# Patient Record
Sex: FEMALE | Race: WHITE | NOT HISPANIC OR LATINO | Employment: OTHER | URBAN - METROPOLITAN AREA
[De-identification: names, ages, dates, MRNs, and addresses within clinical notes are randomized per-mention and may not be internally consistent; named-entity substitution may affect disease eponyms.]

---

## 2017-02-19 ENCOUNTER — APPOINTMENT (EMERGENCY)
Dept: RADIOLOGY | Facility: HOSPITAL | Age: 77
DRG: 194 | End: 2017-02-19
Payer: MEDICARE

## 2017-02-19 ENCOUNTER — HOSPITAL ENCOUNTER (INPATIENT)
Facility: HOSPITAL | Age: 77
LOS: 4 days | Discharge: HOME | DRG: 194 | End: 2017-02-23
Attending: EMERGENCY MEDICINE | Admitting: FAMILY MEDICINE
Payer: MEDICARE

## 2017-02-19 DIAGNOSIS — J18.9 PNEUMONIA: Primary | ICD-10-CM

## 2017-02-19 PROBLEM — F32.A DEPRESSION: Status: ACTIVE | Noted: 2017-02-19

## 2017-02-19 LAB
ALBUMIN SERPL BCP-MCNC: 3.3 G/DL (ref 3.5–5)
ALP SERPL-CCNC: 69 U/L (ref 46–116)
ALT SERPL W P-5'-P-CCNC: 16 U/L (ref 12–78)
ANION GAP SERPL CALCULATED.3IONS-SCNC: 11 MMOL/L (ref 4–13)
APTT PPP: 30 SECONDS (ref 24–33)
AST SERPL W P-5'-P-CCNC: 23 U/L (ref 5–45)
BASOPHILS # BLD AUTO: 0 THOUSANDS/ΜL (ref 0–0.1)
BASOPHILS NFR BLD AUTO: 1 % (ref 0–1)
BILIRUB DIRECT SERPL-MCNC: 0.1 MG/DL (ref 0–0.2)
BILIRUB SERPL-MCNC: 0.3 MG/DL (ref 0.2–1)
BUN SERPL-MCNC: 19 MG/DL (ref 5–25)
CALCIUM SERPL-MCNC: 8.9 MG/DL (ref 8.3–10.1)
CHLORIDE SERPL-SCNC: 106 MMOL/L (ref 100–108)
CK SERPL-CCNC: 75 U/L (ref 26–192)
CO2 SERPL-SCNC: 22 MMOL/L (ref 21–32)
CREAT SERPL-MCNC: 0.79 MG/DL (ref 0.6–1.3)
EOSINOPHIL # BLD AUTO: 0 THOUSAND/ΜL (ref 0–0.61)
EOSINOPHIL NFR BLD AUTO: 0 % (ref 0–6)
ERYTHROCYTE [DISTWIDTH] IN BLOOD BY AUTOMATED COUNT: 12.9 % (ref 11.6–15.1)
FLUAV AG SPEC QL IA: NEGATIVE
FLUBV AG SPEC QL IA: NEGATIVE
GFR SERPL CREATININE-BSD FRML MDRD: >60 ML/MIN/1.73SQ M
GLUCOSE SERPL-MCNC: 134 MG/DL (ref 65–140)
HCT VFR BLD AUTO: 41.3 % (ref 37–47)
HGB BLD-MCNC: 13.2 G/DL (ref 12–16)
INR PPP: 0.95 (ref 0.86–1.16)
LACTATE SERPL-SCNC: 1.3 MMOL/L (ref 0.5–2)
LIPASE SERPL-CCNC: 188 U/L (ref 73–393)
LYMPHOCYTES # BLD AUTO: 0.6 THOUSANDS/ΜL (ref 0.6–4.47)
LYMPHOCYTES NFR BLD AUTO: 23 % (ref 14–44)
MCH RBC QN AUTO: 28.3 PG (ref 27–31)
MCHC RBC AUTO-ENTMCNC: 32 G/DL (ref 31.4–37.4)
MCV RBC AUTO: 88 FL (ref 82–98)
MONOCYTES # BLD AUTO: 0.3 THOUSAND/ΜL (ref 0.17–1.22)
MONOCYTES NFR BLD AUTO: 12 % (ref 4–12)
NEUTROPHILS # BLD AUTO: 1.5 THOUSANDS/ΜL (ref 1.85–7.62)
NEUTS SEG NFR BLD AUTO: 64 % (ref 43–75)
NT-PROBNP SERPL-MCNC: 839 PG/ML
PLATELET # BLD AUTO: 126 THOUSANDS/UL (ref 130–400)
PMV BLD AUTO: 8 FL (ref 8.9–12.7)
POTASSIUM SERPL-SCNC: 4.6 MMOL/L (ref 3.5–5.3)
PROT SERPL-MCNC: 7 G/DL (ref 6.4–8.2)
PROTHROMBIN TIME: 10 SECONDS (ref 9.4–11.7)
RBC # BLD AUTO: 4.69 MILLION/UL (ref 4.2–5.4)
SODIUM SERPL-SCNC: 139 MMOL/L (ref 136–145)
TROPONIN I SERPL-MCNC: 0.02 NG/ML
WBC # BLD AUTO: 2.4 THOUSAND/UL (ref 4.8–10.8)

## 2017-02-19 PROCEDURE — 94640 AIRWAY INHALATION TREATMENT: CPT

## 2017-02-19 PROCEDURE — 85730 THROMBOPLASTIN TIME PARTIAL: CPT | Performed by: EMERGENCY MEDICINE

## 2017-02-19 PROCEDURE — 82248 BILIRUBIN DIRECT: CPT | Performed by: EMERGENCY MEDICINE

## 2017-02-19 PROCEDURE — 87040 BLOOD CULTURE FOR BACTERIA: CPT | Performed by: EMERGENCY MEDICINE

## 2017-02-19 PROCEDURE — 71010 HB CHEST X-RAY 1 VIEW FRONTAL (PORTABLE): CPT

## 2017-02-19 PROCEDURE — 36415 COLL VENOUS BLD VENIPUNCTURE: CPT | Performed by: EMERGENCY MEDICINE

## 2017-02-19 PROCEDURE — 99285 EMERGENCY DEPT VISIT HI MDM: CPT

## 2017-02-19 PROCEDURE — 83880 ASSAY OF NATRIURETIC PEPTIDE: CPT | Performed by: EMERGENCY MEDICINE

## 2017-02-19 PROCEDURE — 83605 ASSAY OF LACTIC ACID: CPT | Performed by: EMERGENCY MEDICINE

## 2017-02-19 PROCEDURE — 96365 THER/PROPH/DIAG IV INF INIT: CPT

## 2017-02-19 PROCEDURE — 94760 N-INVAS EAR/PLS OXIMETRY 1: CPT

## 2017-02-19 PROCEDURE — 87081 CULTURE SCREEN ONLY: CPT | Performed by: FAMILY MEDICINE

## 2017-02-19 PROCEDURE — 87400 INFLUENZA A/B EACH AG IA: CPT | Performed by: FAMILY MEDICINE

## 2017-02-19 PROCEDURE — 84484 ASSAY OF TROPONIN QUANT: CPT | Performed by: EMERGENCY MEDICINE

## 2017-02-19 PROCEDURE — 85025 COMPLETE CBC W/AUTO DIFF WBC: CPT | Performed by: EMERGENCY MEDICINE

## 2017-02-19 PROCEDURE — 85610 PROTHROMBIN TIME: CPT | Performed by: EMERGENCY MEDICINE

## 2017-02-19 PROCEDURE — 80053 COMPREHEN METABOLIC PANEL: CPT | Performed by: EMERGENCY MEDICINE

## 2017-02-19 PROCEDURE — 87798 DETECT AGENT NOS DNA AMP: CPT | Performed by: FAMILY MEDICINE

## 2017-02-19 PROCEDURE — 83690 ASSAY OF LIPASE: CPT | Performed by: EMERGENCY MEDICINE

## 2017-02-19 PROCEDURE — 93005 ELECTROCARDIOGRAM TRACING: CPT | Performed by: EMERGENCY MEDICINE

## 2017-02-19 PROCEDURE — 82550 ASSAY OF CK (CPK): CPT | Performed by: EMERGENCY MEDICINE

## 2017-02-19 RX ORDER — CEFUROXIME AXETIL 250 MG/1
250 TABLET ORAL 2 TIMES DAILY
COMMUNITY
Start: 2017-02-15 | End: 2017-02-23 | Stop reason: HOSPADM

## 2017-02-19 RX ORDER — IPRATROPIUM BROMIDE AND ALBUTEROL SULFATE 2.5; .5 MG/3ML; MG/3ML
3 SOLUTION RESPIRATORY (INHALATION)
Status: DISCONTINUED | OUTPATIENT
Start: 2017-02-19 | End: 2017-02-23 | Stop reason: HOSPADM

## 2017-02-19 RX ORDER — LEVOFLOXACIN 5 MG/ML
750 INJECTION, SOLUTION INTRAVENOUS ONCE
Status: COMPLETED | OUTPATIENT
Start: 2017-02-19 | End: 2017-02-19

## 2017-02-19 RX ORDER — URSODIOL 300 MG/1
300 CAPSULE ORAL 2 TIMES DAILY
Status: DISCONTINUED | OUTPATIENT
Start: 2017-02-19 | End: 2017-02-23 | Stop reason: HOSPADM

## 2017-02-19 RX ORDER — ONDANSETRON 2 MG/ML
4 INJECTION INTRAMUSCULAR; INTRAVENOUS EVERY 6 HOURS PRN
Status: DISCONTINUED | OUTPATIENT
Start: 2017-02-19 | End: 2017-02-23 | Stop reason: HOSPADM

## 2017-02-19 RX ORDER — BENAZEPRIL HYDROCHLORIDE 10 MG/1
20 TABLET ORAL DAILY
Status: DISCONTINUED | OUTPATIENT
Start: 2017-02-20 | End: 2017-02-23 | Stop reason: HOSPADM

## 2017-02-19 RX ORDER — ACETAMINOPHEN 325 MG/1
650 TABLET ORAL EVERY 6 HOURS PRN
Status: DISCONTINUED | OUTPATIENT
Start: 2017-02-19 | End: 2017-02-23 | Stop reason: HOSPADM

## 2017-02-19 RX ORDER — LEVOFLOXACIN 5 MG/ML
250 INJECTION, SOLUTION INTRAVENOUS EVERY 24 HOURS
Status: DISCONTINUED | OUTPATIENT
Start: 2017-02-20 | End: 2017-02-23 | Stop reason: HOSPADM

## 2017-02-19 RX ORDER — FLUOXETINE HYDROCHLORIDE 20 MG/1
20 CAPSULE ORAL DAILY
Status: DISCONTINUED | OUTPATIENT
Start: 2017-02-20 | End: 2017-02-23 | Stop reason: HOSPADM

## 2017-02-19 RX ORDER — LANOLIN ALCOHOL/MO/W.PET/CERES
3 CREAM (GRAM) TOPICAL
Status: DISCONTINUED | OUTPATIENT
Start: 2017-02-19 | End: 2017-02-23 | Stop reason: HOSPADM

## 2017-02-19 RX ORDER — DIPHENHYDRAMINE HCL 25 MG
12.5 TABLET ORAL
Status: DISCONTINUED | OUTPATIENT
Start: 2017-02-19 | End: 2017-02-23 | Stop reason: HOSPADM

## 2017-02-19 RX ORDER — PANTOPRAZOLE SODIUM 40 MG/1
40 TABLET, DELAYED RELEASE ORAL
Status: DISCONTINUED | OUTPATIENT
Start: 2017-02-20 | End: 2017-02-21

## 2017-02-19 RX ADMIN — ACETAMINOPHEN 650 MG: 325 TABLET, FILM COATED ORAL at 20:37

## 2017-02-19 RX ADMIN — URSODIOL 300 MG: 300 CAPSULE ORAL at 20:40

## 2017-02-19 RX ADMIN — LEVOFLOXACIN 750 MG: 5 INJECTION, SOLUTION INTRAVENOUS at 14:30

## 2017-02-19 RX ADMIN — SODIUM CHLORIDE 500 ML: 0.9 INJECTION, SOLUTION INTRAVENOUS at 14:15

## 2017-02-19 RX ADMIN — IPRATROPIUM BROMIDE AND ALBUTEROL SULFATE 3 ML: .5; 3 SOLUTION RESPIRATORY (INHALATION) at 14:16

## 2017-02-19 RX ADMIN — IPRATROPIUM BROMIDE AND ALBUTEROL SULFATE 3 ML: .5; 3 SOLUTION RESPIRATORY (INHALATION) at 20:00

## 2017-02-19 RX ADMIN — MELATONIN TAB 3 MG 3 MG: 3 TAB at 21:16

## 2017-02-20 LAB
ANION GAP SERPL CALCULATED.3IONS-SCNC: 9 MMOL/L (ref 4–13)
BUN SERPL-MCNC: 15 MG/DL (ref 5–25)
CALCIUM SERPL-MCNC: 8.5 MG/DL (ref 8.3–10.1)
CHLORIDE SERPL-SCNC: 109 MMOL/L (ref 100–108)
CO2 SERPL-SCNC: 23 MMOL/L (ref 21–32)
CREAT SERPL-MCNC: 0.64 MG/DL (ref 0.6–1.3)
ERYTHROCYTE [DISTWIDTH] IN BLOOD BY AUTOMATED COUNT: 12.9 % (ref 11.6–15.1)
FLUAV AG SPEC QL: DETECTED
FLUBV AG SPEC QL: ABNORMAL
GFR SERPL CREATININE-BSD FRML MDRD: >60 ML/MIN/1.73SQ M
GLUCOSE SERPL-MCNC: 100 MG/DL (ref 65–140)
HCT VFR BLD AUTO: 35.8 % (ref 37–47)
HGB BLD-MCNC: 11.4 G/DL (ref 12–16)
MAGNESIUM SERPL-MCNC: 1.8 MG/DL (ref 1.6–2.6)
MCH RBC QN AUTO: 27.9 PG (ref 27–31)
MCHC RBC AUTO-ENTMCNC: 31.9 G/DL (ref 31.4–37.4)
MCV RBC AUTO: 87 FL (ref 82–98)
PLATELET # BLD AUTO: 113 THOUSANDS/UL (ref 130–400)
PMV BLD AUTO: 8.1 FL (ref 8.9–12.7)
POTASSIUM SERPL-SCNC: 4.3 MMOL/L (ref 3.5–5.3)
RBC # BLD AUTO: 4.11 MILLION/UL (ref 4.2–5.4)
RSV B RNA SPEC QL NAA+PROBE: ABNORMAL
SODIUM SERPL-SCNC: 141 MMOL/L (ref 136–145)
WBC # BLD AUTO: 1.8 THOUSAND/UL (ref 4.8–10.8)

## 2017-02-20 PROCEDURE — 94760 N-INVAS EAR/PLS OXIMETRY 1: CPT

## 2017-02-20 PROCEDURE — G8978 MOBILITY CURRENT STATUS: HCPCS

## 2017-02-20 PROCEDURE — 94640 AIRWAY INHALATION TREATMENT: CPT

## 2017-02-20 PROCEDURE — G8980 MOBILITY D/C STATUS: HCPCS

## 2017-02-20 PROCEDURE — 97162 PT EVAL MOD COMPLEX 30 MIN: CPT

## 2017-02-20 PROCEDURE — 97166 OT EVAL MOD COMPLEX 45 MIN: CPT

## 2017-02-20 PROCEDURE — 97110 THERAPEUTIC EXERCISES: CPT

## 2017-02-20 PROCEDURE — 80048 BASIC METABOLIC PNL TOTAL CA: CPT | Performed by: FAMILY MEDICINE

## 2017-02-20 PROCEDURE — 83735 ASSAY OF MAGNESIUM: CPT | Performed by: FAMILY MEDICINE

## 2017-02-20 PROCEDURE — G8987 SELF CARE CURRENT STATUS: HCPCS

## 2017-02-20 PROCEDURE — 97535 SELF CARE MNGMENT TRAINING: CPT

## 2017-02-20 PROCEDURE — G8988 SELF CARE GOAL STATUS: HCPCS

## 2017-02-20 PROCEDURE — 85027 COMPLETE CBC AUTOMATED: CPT | Performed by: FAMILY MEDICINE

## 2017-02-20 RX ORDER — OSELTAMIVIR PHOSPHATE 75 MG/1
75 CAPSULE ORAL EVERY 12 HOURS SCHEDULED
Status: DISCONTINUED | OUTPATIENT
Start: 2017-02-20 | End: 2017-02-20

## 2017-02-20 RX ORDER — OSELTAMIVIR PHOSPHATE 30 MG/1
30 CAPSULE ORAL EVERY 12 HOURS SCHEDULED
Status: DISCONTINUED | OUTPATIENT
Start: 2017-02-20 | End: 2017-02-23 | Stop reason: HOSPADM

## 2017-02-20 RX ORDER — ASPIRIN 325 MG
325 TABLET ORAL DAILY
Status: DISCONTINUED | OUTPATIENT
Start: 2017-02-20 | End: 2017-02-23 | Stop reason: HOSPADM

## 2017-02-20 RX ORDER — IPRATROPIUM BROMIDE AND ALBUTEROL SULFATE 2.5; .5 MG/3ML; MG/3ML
3 SOLUTION RESPIRATORY (INHALATION) EVERY 4 HOURS PRN
Status: DISCONTINUED | OUTPATIENT
Start: 2017-02-20 | End: 2017-02-23 | Stop reason: HOSPADM

## 2017-02-20 RX ADMIN — OSELTAMIVIR PHOSPHATE 30 MG: 30 CAPSULE ORAL at 14:01

## 2017-02-20 RX ADMIN — LEVOFLOXACIN 250 MG: 5 INJECTION, SOLUTION INTRAVENOUS at 15:17

## 2017-02-20 RX ADMIN — ASPIRIN 325 MG: 325 TABLET ORAL at 08:47

## 2017-02-20 RX ADMIN — URSODIOL 300 MG: 300 CAPSULE ORAL at 08:47

## 2017-02-20 RX ADMIN — IPRATROPIUM BROMIDE AND ALBUTEROL SULFATE 3 ML: .5; 3 SOLUTION RESPIRATORY (INHALATION) at 07:27

## 2017-02-20 RX ADMIN — IPRATROPIUM BROMIDE AND ALBUTEROL SULFATE 3 ML: .5; 3 SOLUTION RESPIRATORY (INHALATION) at 20:25

## 2017-02-20 RX ADMIN — OSELTAMIVIR PHOSPHATE 30 MG: 30 CAPSULE ORAL at 21:14

## 2017-02-20 RX ADMIN — IPRATROPIUM BROMIDE AND ALBUTEROL SULFATE 3 ML: .5; 3 SOLUTION RESPIRATORY (INHALATION) at 13:22

## 2017-02-20 RX ADMIN — PANTOPRAZOLE SODIUM 40 MG: 40 TABLET, DELAYED RELEASE ORAL at 05:10

## 2017-02-20 RX ADMIN — BENAZEPRIL HYDROCHLORIDE 20 MG: 10 TABLET ORAL at 08:47

## 2017-02-20 RX ADMIN — FLUOXETINE 20 MG: 20 CAPSULE ORAL at 08:47

## 2017-02-20 RX ADMIN — URSODIOL 300 MG: 300 CAPSULE ORAL at 17:23

## 2017-02-20 RX ADMIN — ACETAMINOPHEN 650 MG: 325 TABLET, FILM COATED ORAL at 14:00

## 2017-02-20 RX ADMIN — MELATONIN TAB 3 MG 3 MG: 3 TAB at 21:14

## 2017-02-20 RX ADMIN — ENOXAPARIN SODIUM 40 MG: 40 INJECTION SUBCUTANEOUS at 08:47

## 2017-02-21 PROBLEM — J10.1 INFLUENZA A: Status: ACTIVE | Noted: 2017-02-21

## 2017-02-21 PROBLEM — D61.818 PANCYTOPENIA (HCC): Status: ACTIVE | Noted: 2017-02-21

## 2017-02-21 LAB
BASOPHILS # BLD AUTO: 0 THOUSANDS/ΜL (ref 0–0.1)
BASOPHILS NFR BLD AUTO: 1 % (ref 0–1)
BILIRUB UR QL STRIP: NEGATIVE
CLARITY UR: CLEAR
COLOR UR: YELLOW
EOSINOPHIL # BLD AUTO: 0 THOUSAND/ΜL (ref 0–0.61)
EOSINOPHIL NFR BLD AUTO: 0 % (ref 0–6)
ERYTHROCYTE [DISTWIDTH] IN BLOOD BY AUTOMATED COUNT: 13 % (ref 11.6–15.1)
GLUCOSE UR STRIP-MCNC: NEGATIVE MG/DL
HCT VFR BLD AUTO: 38.8 % (ref 37–47)
HGB BLD-MCNC: 12.4 G/DL (ref 12–16)
HGB UR QL STRIP.AUTO: NEGATIVE
KETONES UR STRIP-MCNC: NEGATIVE MG/DL
L PNEUMO1 AG UR QL IA.RAPID: NEGATIVE
LEUKOCYTE ESTERASE UR QL STRIP: NEGATIVE
LYMPHOCYTES # BLD AUTO: 0.6 THOUSANDS/ΜL (ref 0.6–4.47)
LYMPHOCYTES NFR BLD AUTO: 25 % (ref 14–44)
MCH RBC QN AUTO: 28.5 PG (ref 27–31)
MCHC RBC AUTO-ENTMCNC: 32.1 G/DL (ref 31.4–37.4)
MCV RBC AUTO: 89 FL (ref 82–98)
MONOCYTES # BLD AUTO: 0.2 THOUSAND/ΜL (ref 0.17–1.22)
MONOCYTES NFR BLD AUTO: 9 % (ref 4–12)
MRSA NOSE QL CULT: NORMAL
NEUTROPHILS # BLD AUTO: 1.4 THOUSANDS/ΜL (ref 1.85–7.62)
NEUTS SEG NFR BLD AUTO: 65 % (ref 43–75)
NITRITE UR QL STRIP: NEGATIVE
PH UR STRIP.AUTO: 6 [PH] (ref 5–9)
PLATELET # BLD AUTO: 145 THOUSANDS/UL (ref 130–400)
PMV BLD AUTO: 8.1 FL (ref 8.9–12.7)
PROT UR STRIP-MCNC: NEGATIVE MG/DL
RBC # BLD AUTO: 4.37 MILLION/UL (ref 4.2–5.4)
S PNEUM AG UR QL: NEGATIVE
SP GR UR STRIP.AUTO: 1.02 (ref 1–1.03)
UROBILINOGEN UR QL STRIP.AUTO: 0.2 E.U./DL
VIT B12 SERPL-MCNC: 394 PG/ML (ref 100–900)
WBC # BLD AUTO: 2.2 THOUSAND/UL (ref 4.8–10.8)

## 2017-02-21 PROCEDURE — 85025 COMPLETE CBC W/AUTO DIFF WBC: CPT | Performed by: INTERNAL MEDICINE

## 2017-02-21 PROCEDURE — 94760 N-INVAS EAR/PLS OXIMETRY 1: CPT

## 2017-02-21 PROCEDURE — 81003 URINALYSIS AUTO W/O SCOPE: CPT | Performed by: EMERGENCY MEDICINE

## 2017-02-21 PROCEDURE — 82607 VITAMIN B-12: CPT | Performed by: INTERNAL MEDICINE

## 2017-02-21 PROCEDURE — 86022 PLATELET ANTIBODIES: CPT | Performed by: INTERNAL MEDICINE

## 2017-02-21 PROCEDURE — 94640 AIRWAY INHALATION TREATMENT: CPT

## 2017-02-21 PROCEDURE — 87449 NOS EACH ORGANISM AG IA: CPT | Performed by: FAMILY MEDICINE

## 2017-02-21 RX ORDER — IPRATROPIUM BROMIDE AND ALBUTEROL SULFATE 2.5; .5 MG/3ML; MG/3ML
SOLUTION RESPIRATORY (INHALATION)
Status: COMPLETED
Start: 2017-02-21 | End: 2017-02-21

## 2017-02-21 RX ORDER — LANOLIN ALCOHOL/MO/W.PET/CERES
CREAM (GRAM) TOPICAL
Status: COMPLETED
Start: 2017-02-21 | End: 2017-02-21

## 2017-02-21 RX ORDER — FAMOTIDINE 20 MG/1
20 TABLET, FILM COATED ORAL DAILY
Status: DISCONTINUED | OUTPATIENT
Start: 2017-02-22 | End: 2017-02-23 | Stop reason: HOSPADM

## 2017-02-21 RX ORDER — ACETAMINOPHEN 325 MG/1
TABLET ORAL
Status: COMPLETED
Start: 2017-02-21 | End: 2017-02-21

## 2017-02-21 RX ADMIN — BENAZEPRIL HYDROCHLORIDE 20 MG: 10 TABLET ORAL at 09:01

## 2017-02-21 RX ADMIN — IPRATROPIUM BROMIDE AND ALBUTEROL SULFATE 3 ML: .5; 3 SOLUTION RESPIRATORY (INHALATION) at 08:22

## 2017-02-21 RX ADMIN — OSELTAMIVIR PHOSPHATE 30 MG: 30 CAPSULE ORAL at 09:01

## 2017-02-21 RX ADMIN — FLUOXETINE 20 MG: 20 CAPSULE ORAL at 09:01

## 2017-02-21 RX ADMIN — IPRATROPIUM BROMIDE AND ALBUTEROL SULFATE 3 ML: .5; 3 SOLUTION RESPIRATORY (INHALATION) at 14:56

## 2017-02-21 RX ADMIN — ACETAMINOPHEN 650 MG: 325 TABLET ORAL at 21:45

## 2017-02-21 RX ADMIN — MELATONIN TAB 3 MG 3 MG: 3 TAB at 21:45

## 2017-02-21 RX ADMIN — URSODIOL 300 MG: 300 CAPSULE ORAL at 09:01

## 2017-02-21 RX ADMIN — IPRATROPIUM BROMIDE AND ALBUTEROL SULFATE 3 ML: .5; 3 SOLUTION RESPIRATORY (INHALATION) at 20:30

## 2017-02-21 RX ADMIN — ACETAMINOPHEN 650 MG: 325 TABLET, FILM COATED ORAL at 21:45

## 2017-02-21 RX ADMIN — PANTOPRAZOLE SODIUM 40 MG: 40 TABLET, DELAYED RELEASE ORAL at 05:17

## 2017-02-21 RX ADMIN — ASPIRIN 325 MG: 325 TABLET ORAL at 09:01

## 2017-02-21 RX ADMIN — URSODIOL 300 MG: 300 CAPSULE ORAL at 17:25

## 2017-02-21 RX ADMIN — ENOXAPARIN SODIUM 40 MG: 40 INJECTION SUBCUTANEOUS at 09:01

## 2017-02-21 RX ADMIN — ACETAMINOPHEN 650 MG: 325 TABLET, FILM COATED ORAL at 05:17

## 2017-02-21 RX ADMIN — LEVOFLOXACIN 250 MG: 5 INJECTION, SOLUTION INTRAVENOUS at 15:00

## 2017-02-21 RX ADMIN — OSELTAMIVIR PHOSPHATE 30 MG: 30 CAPSULE ORAL at 21:45

## 2017-02-22 LAB
ANION GAP SERPL CALCULATED.3IONS-SCNC: 7 MMOL/L (ref 4–13)
BASOPHILS # BLD AUTO: 0 THOUSANDS/ΜL (ref 0–0.1)
BASOPHILS NFR BLD AUTO: 0 % (ref 0–1)
BUN SERPL-MCNC: 12 MG/DL (ref 5–25)
CALCIUM SERPL-MCNC: 8.6 MG/DL (ref 8.3–10.1)
CHLORIDE SERPL-SCNC: 105 MMOL/L (ref 100–108)
CO2 SERPL-SCNC: 27 MMOL/L (ref 21–32)
CREAT SERPL-MCNC: 0.65 MG/DL (ref 0.6–1.3)
EOSINOPHIL # BLD AUTO: 0 THOUSAND/ΜL (ref 0–0.61)
EOSINOPHIL NFR BLD AUTO: 1 % (ref 0–6)
ERYTHROCYTE [DISTWIDTH] IN BLOOD BY AUTOMATED COUNT: 13.6 % (ref 11.6–15.1)
GFR SERPL CREATININE-BSD FRML MDRD: >60 ML/MIN/1.73SQ M
GLUCOSE SERPL-MCNC: 111 MG/DL (ref 65–140)
HCT VFR BLD AUTO: 36.2 % (ref 37–47)
HGB BLD-MCNC: 11.6 G/DL (ref 12–16)
LYMPHOCYTES # BLD AUTO: 0.7 THOUSANDS/ΜL (ref 0.6–4.47)
LYMPHOCYTES NFR BLD AUTO: 28 % (ref 14–44)
MAGNESIUM SERPL-MCNC: 1.8 MG/DL (ref 1.6–2.6)
MCH RBC QN AUTO: 27.8 PG (ref 27–31)
MCHC RBC AUTO-ENTMCNC: 32.2 G/DL (ref 31.4–37.4)
MCV RBC AUTO: 87 FL (ref 82–98)
MONOCYTES # BLD AUTO: 0.3 THOUSAND/ΜL (ref 0.17–1.22)
MONOCYTES NFR BLD AUTO: 10 % (ref 4–12)
NEUTROPHILS # BLD AUTO: 1.6 THOUSANDS/ΜL (ref 1.85–7.62)
NEUTS SEG NFR BLD AUTO: 61 % (ref 43–75)
NRBC BLD AUTO-RTO: 0 /100 WBCS
PF4 HEPARIN CMPLX AB SER-ACNC: 0.25 OD (ref 0–0.4)
PLATELET # BLD AUTO: 156 THOUSANDS/UL (ref 130–400)
PMV BLD AUTO: 8.3 FL (ref 8.9–12.7)
POTASSIUM SERPL-SCNC: 5.3 MMOL/L (ref 3.5–5.3)
RBC # BLD AUTO: 4.18 MILLION/UL (ref 4.2–5.4)
SODIUM SERPL-SCNC: 139 MMOL/L (ref 136–145)
WBC # BLD AUTO: 2.6 THOUSAND/UL (ref 4.8–10.8)

## 2017-02-22 PROCEDURE — 83735 ASSAY OF MAGNESIUM: CPT | Performed by: INTERNAL MEDICINE

## 2017-02-22 PROCEDURE — 94760 N-INVAS EAR/PLS OXIMETRY 1: CPT

## 2017-02-22 PROCEDURE — 94640 AIRWAY INHALATION TREATMENT: CPT

## 2017-02-22 PROCEDURE — 80048 BASIC METABOLIC PNL TOTAL CA: CPT | Performed by: INTERNAL MEDICINE

## 2017-02-22 PROCEDURE — 97110 THERAPEUTIC EXERCISES: CPT

## 2017-02-22 PROCEDURE — 85025 COMPLETE CBC W/AUTO DIFF WBC: CPT | Performed by: INTERNAL MEDICINE

## 2017-02-22 RX ADMIN — FLUOXETINE 20 MG: 20 CAPSULE ORAL at 09:39

## 2017-02-22 RX ADMIN — ACETAMINOPHEN 650 MG: 325 TABLET, FILM COATED ORAL at 05:52

## 2017-02-22 RX ADMIN — URSODIOL 300 MG: 300 CAPSULE ORAL at 09:39

## 2017-02-22 RX ADMIN — BENAZEPRIL HYDROCHLORIDE 20 MG: 10 TABLET ORAL at 09:38

## 2017-02-22 RX ADMIN — IPRATROPIUM BROMIDE AND ALBUTEROL SULFATE 3 ML: .5; 3 SOLUTION RESPIRATORY (INHALATION) at 07:40

## 2017-02-22 RX ADMIN — MELATONIN TAB 3 MG 3 MG: 3 TAB at 21:27

## 2017-02-22 RX ADMIN — IPRATROPIUM BROMIDE AND ALBUTEROL SULFATE 3 ML: .5; 3 SOLUTION RESPIRATORY (INHALATION) at 13:16

## 2017-02-22 RX ADMIN — ASPIRIN 325 MG: 325 TABLET ORAL at 09:39

## 2017-02-22 RX ADMIN — FAMOTIDINE 20 MG: 20 TABLET ORAL at 09:39

## 2017-02-22 RX ADMIN — URSODIOL 300 MG: 300 CAPSULE ORAL at 17:16

## 2017-02-22 RX ADMIN — LEVOFLOXACIN 250 MG: 5 INJECTION, SOLUTION INTRAVENOUS at 14:12

## 2017-02-22 RX ADMIN — IPRATROPIUM BROMIDE AND ALBUTEROL SULFATE 3 ML: .5; 3 SOLUTION RESPIRATORY (INHALATION) at 01:33

## 2017-02-22 RX ADMIN — IPRATROPIUM BROMIDE AND ALBUTEROL SULFATE 3 ML: .5; 3 SOLUTION RESPIRATORY (INHALATION) at 19:18

## 2017-02-22 RX ADMIN — OSELTAMIVIR PHOSPHATE 30 MG: 30 CAPSULE ORAL at 09:39

## 2017-02-22 RX ADMIN — OSELTAMIVIR PHOSPHATE 30 MG: 30 CAPSULE ORAL at 21:27

## 2017-02-23 VITALS
DIASTOLIC BLOOD PRESSURE: 65 MMHG | RESPIRATION RATE: 22 BRPM | TEMPERATURE: 97.6 F | OXYGEN SATURATION: 84 % | HEART RATE: 105 BPM | BODY MASS INDEX: 19.1 KG/M2 | SYSTOLIC BLOOD PRESSURE: 150 MMHG | WEIGHT: 126 LBS | HEIGHT: 68 IN

## 2017-02-23 PROCEDURE — 94760 N-INVAS EAR/PLS OXIMETRY 1: CPT

## 2017-02-23 PROCEDURE — 97110 THERAPEUTIC EXERCISES: CPT

## 2017-02-23 PROCEDURE — 94640 AIRWAY INHALATION TREATMENT: CPT

## 2017-02-23 PROCEDURE — 97535 SELF CARE MNGMENT TRAINING: CPT

## 2017-02-23 PROCEDURE — 94761 N-INVAS EAR/PLS OXIMETRY MLT: CPT

## 2017-02-23 RX ORDER — LEVOFLOXACIN 500 MG/1
500 TABLET, FILM COATED ORAL DAILY
Qty: 3 TABLET | Refills: 0 | Status: SHIPPED | OUTPATIENT
Start: 2017-02-23 | End: 2017-02-26

## 2017-02-23 RX ORDER — IPRATROPIUM BROMIDE AND ALBUTEROL SULFATE 2.5; .5 MG/3ML; MG/3ML
3 SOLUTION RESPIRATORY (INHALATION)
Qty: 360 ML | Refills: 0 | Status: SHIPPED | OUTPATIENT
Start: 2017-02-23 | End: 2017-03-25

## 2017-02-23 RX ORDER — ATORVASTATIN CALCIUM 40 MG/1
40 TABLET, FILM COATED ORAL
Qty: 30 TABLET | Refills: 0 | Status: SHIPPED | OUTPATIENT
Start: 2017-02-23 | End: 2019-09-18

## 2017-02-23 RX ORDER — OSELTAMIVIR PHOSPHATE 30 MG/1
30 CAPSULE ORAL EVERY 12 HOURS SCHEDULED
Qty: 6 CAPSULE | Refills: 0 | Status: SHIPPED | OUTPATIENT
Start: 2017-02-23 | End: 2017-02-26

## 2017-02-23 RX ORDER — ASPIRIN 325 MG
325 TABLET ORAL DAILY
Qty: 30 TABLET | Refills: 0 | Status: SHIPPED | OUTPATIENT
Start: 2017-02-23 | End: 2019-09-18

## 2017-02-23 RX ADMIN — ASPIRIN 325 MG: 325 TABLET ORAL at 08:16

## 2017-02-23 RX ADMIN — FLUOXETINE 20 MG: 20 CAPSULE ORAL at 08:16

## 2017-02-23 RX ADMIN — FAMOTIDINE 20 MG: 20 TABLET ORAL at 08:16

## 2017-02-23 RX ADMIN — URSODIOL 300 MG: 300 CAPSULE ORAL at 08:16

## 2017-02-23 RX ADMIN — IPRATROPIUM BROMIDE AND ALBUTEROL SULFATE 3 ML: .5; 3 SOLUTION RESPIRATORY (INHALATION) at 13:38

## 2017-02-23 RX ADMIN — LEVOFLOXACIN 250 MG: 5 INJECTION, SOLUTION INTRAVENOUS at 14:08

## 2017-02-23 RX ADMIN — OSELTAMIVIR PHOSPHATE 30 MG: 30 CAPSULE ORAL at 08:16

## 2017-02-23 RX ADMIN — IPRATROPIUM BROMIDE AND ALBUTEROL SULFATE 3 ML: .5; 3 SOLUTION RESPIRATORY (INHALATION) at 07:26

## 2017-02-23 RX ADMIN — BENAZEPRIL HYDROCHLORIDE 20 MG: 10 TABLET ORAL at 08:16

## 2017-02-24 LAB
ATRIAL RATE: 102 BPM
BACTERIA BLD CULT: NORMAL
P AXIS: 33 DEGREES
PR INTERVAL: 136 MS
QRS AXIS: -33 DEGREES
QRSD INTERVAL: 80 MS
QT INTERVAL: 350 MS
QTC INTERVAL: 456 MS
T WAVE AXIS: 71 DEGREES
VENTRICULAR RATE: 102 BPM

## 2017-02-25 LAB — BACTERIA BLD CULT: NORMAL

## 2017-03-23 ENCOUNTER — ALLSCRIPTS OFFICE VISIT (OUTPATIENT)
Dept: OTHER | Facility: OTHER | Age: 77
End: 2017-03-23

## 2017-03-23 DIAGNOSIS — R06.09 OTHER FORMS OF DYSPNEA: ICD-10-CM

## 2017-04-03 ENCOUNTER — GENERIC CONVERSION - ENCOUNTER (OUTPATIENT)
Dept: OTHER | Facility: OTHER | Age: 77
End: 2017-04-03

## 2018-01-10 NOTE — PROGRESS NOTES
Assessment    1  Cough (786 2) (R05)   2  COPD, moderate (496) (J44 9)   3  Dyspnea on exertion (786 09) (R06 09)   4  Hypoxia (799 02) (R09 02)    Plan  COPD, moderate    · ProAir  (90 Base) MCG/ACT Inhalation Aerosol Solution; 2 PUFFS EVERY 6  HOURS   Rx By: Edilia Dooley; Dispense: 30 Days ; #:1 X 8 5 GM Inhaler; Refill: 1; For: COPD, moderate; ELMO = N; Verified Transmission to [facility] ([address]; Last Updated By: SystemRevelens; 3/23/2017 10:42:19 AM   · ProAir RespiClick 911 (90 Base) MCG/ACT Inhalation Aerosol Powder Breath  Activated; INHALE 2 PUFFS 4 times daily   Rx By: Edilia Dooley; Dispense: 30 Days ; #:1 Aerosol Powder Breath Activated; Refill: 0; For: COPD, moderate; ELMO = N; Dispense Sample  COPD, moderate, Dyspnea on exertion    · Nocturnal Pulse Oximetry; Status:Active; Requested for:23Mar2017;    Perform:[facility]; Order Comments:on room air; Due:23Mar2018; Ordered; For:COPD, moderate, Dyspnea on exertion; Ordered By:Kamilla Winslow;  Dyspnea on exertion    · * XR CHEST PA & LATERAL; Status:Active; Requested for:23Mar2017;    Perform:[facility]; Order Comments:compare to chest Xray 2/17; Due:23Mar2018; Ordered; For:Dyspnea on exertion; Ordered By:Monroe Winslow; Results/Data  PFT Results v2:     Spirometry: Forced vital capacity: 1 99L and 75% Predicted Values  Forced expiratory volume in one second: 0 93L and 47% Predicted Value  FEV1/FVC ratio is 47  Post Bronchodilator Spirometry: Forced vital capacity : 2 09L and 79% Predicted Values  Forced expiratory volume in one second : 1 20L and 61% Predicted Value  FEV1/FVC ratio is 57  Lung Volumes:   DLCO:    PFT Interpretation:   moderate airway obstruction  improvement post bronchodilation FEV1 from 0 93 to 1 20 61% of predicted (28% + change)        Discussion/Summary  Discussion Summary:   Zenon North has moderate COPD and s/p pneumonia She had chest Xray done in 2/17 that revealed density in right hilum and perihilar tissue  She is going to have repeat Chest Xray this week or next  Mamta Shahid is a former smoker  She had 28% increase in FEV1 post bronchodilation  FEV1 was 1 98L or 47% of predicted and increased to 1 20L or 61% of predicted  Mamta Shahid will continue Advair 250/50 1 puff BID and was given ProAir respiclick for rescue inhalation  Mamta Shahid was discharged with supplemental oxygen  She had room air oxygen 92% and this decreased to 88% with steps  with 3L she had oxygen saturation of 94%  I am going to order room air nocturnal pulse oximetry  Follow up in 6 months  Mamta Shahid is stable  Counseling Documentation With Imm: The patient was counseled regarding  Goals and Barriers: The patient has the current Goals: Mamta Shahid will continue maintenance corticosteroid inhaler  Chest xray will be down within 7 days  Patient's Capacity to Self-Care: Patient is able to Self-Care  Active Problems    1  Aorto-iliac atherosclerosis (440 0,440 20) (I70 0,I70 299)   2  CHF (congestive heart failure) (428 0) (I50 9)   3  Hypertension (401 9) (I10)    Chief Complaint  Chief Complaint Free Text Note Form: Pt presents today for hfu  Pt was in [address] for pneumonia, flu, and bronchitis  Pt reports no cough or sob currently  History of Present Illness  HPI: Mamta Shahid is a 76year old female who was hospitalized in February 2017  She had pneumonia and influenza A  She was treated with Ceftin and had chest Xray  Chest Xray showed increased density in the right hilum and perihilar tissue  She was discharged with supplemental oxygen L4 with ambulation  She is using it now with exertion, walking up and down steps and with sleep  Mamta Shahid is a former smoker; She quit over twenty years ago and smoked for 20+ years; 1 PPD  Mamta Shahid is here for hospital follow up  Mamta Shahid is currently using Advair 250/50 1 puff daily  She uses nebulizer about once daily  Past Medical History    1  No pertinent past medical history    Surgical History    1   History of Appendectomy   2  History of Cholecystectomy   3  History of Hip Surgery   4  History of Tonsillectomy   5  History of Tubal Ligation  Surgical History Reviewed: The surgical history was reviewed and updated today  Family History  Mother    1  Family history of cardiac disorder (V17 49) (Z82 49)  Father    2  Family history of cardiac disorder (V17 49) (Z82 49)  Sister    3  Family history of cardiac disorder (V17 49) (Z82 49)  Brother    4  Family history of cardiac disorder (V17 49) (Z82 49)  Paternal Great Grandfather    5  Family history of cerebrovascular accident (V17 1) (Z82 3)  Family History Reviewed: The family history was reviewed and updated today  Social History    · Alcohol use   · Former smoker (G45 02) (G90 650)  Social History Reviewed: The social history was reviewed and updated today  Current Meds   1  Amlodipine Besy-Benazepril HCl - 10-20 MG Oral Capsule; Therapy: 21Apr2014 to Recorded   2  Dexilant 60 MG Oral Capsule Delayed Release; Therapy: 21Apr2014 to Recorded   3  Iron TABS; Therapy: (Recorded:08May2014) to Recorded   4  Vitamin B12 TABS; Therapy: (Recorded:08May2014) to Recorded  Medication List Reviewed: The medication list was reviewed and updated today  Allergies    1  No Known Drug Allergies    Vitals  Vital Signs    Recorded: 30OCC6150 10:00AM   Temperature 98 2 F, Oral   Heart Rate 119   Pulse Quality Normal   Respiration Quality Normal   Respiration 12   Systolic 021, LUE, Sitting   Diastolic 82, LUE, Sitting   Height 5 ft 6 in   Weight 130 lb    BMI Calculated 20 98   BSA Calculated 1 67   O2 Saturation 98, RA     Physical Exam    Constitutional   General appearance: No acute distress, well appearing and well nourished  Eyes   Examination of pupil and irises: Anicteric, pupils reactive     Ears, Nose, Mouth, and Throat   External inspection of ears and nose: Normal     Otoscopic examination: Tympanic membranes translucent with normal light reflex  Canals patent without erythema  Nasal mucosa, septum, and turbinates: Normal without edema or erythema  Lips, teeth, and gums: Normal, good dentition  Oropharynx: Normal with no erythema, edema, exudate or lesions  Mallampati Classification: 2  Neck   Neck: Supple, symmetric, trachea midline, no masses  Jugular veins: Normal     Pulmonary   Chest: Normal     Respiratory effort: No increased work of breathing or signs of respiratory distress  Auscultation of lungs: Abnormal   wheezing over both bases  Cardiovascular   Auscultation of heart: Normal rate and rhythm, normal S1 and S2, no murmurs  Pedal pulses: 2+ bilaterally  Examination of extremities for edema and/or varicosities: Normal     Abdomen   Abdomen: Soft, non-tender  Lymphatic   Palpation of lymph nodes in neck: No lymphadenopathy  Musculoskeletal   Gait and station: Normal     Digits and nails: Normal without clubbing or cyanosis  Neurologic   Mental Status: Normal  Not confused, no evidence of dementia, good comprehension, good concentration  Motor Exam: Gross motor function normal     Skin   Skin and subcutaneous tissue: Limited exam shows no rash      Psychiatric   Orientation to person, place and time: Normal     Mood and affect: Normal        Signatures   Electronically signed by : ANTONINA Li; Mar 23 2017 10:55AM EST                       (Author)

## 2018-01-14 VITALS
HEART RATE: 119 BPM | SYSTOLIC BLOOD PRESSURE: 118 MMHG | DIASTOLIC BLOOD PRESSURE: 82 MMHG | OXYGEN SATURATION: 98 % | TEMPERATURE: 98.2 F | RESPIRATION RATE: 12 BRPM | WEIGHT: 130 LBS | HEIGHT: 66 IN | BODY MASS INDEX: 20.89 KG/M2

## 2018-01-16 NOTE — MISCELLANEOUS
Message  reviewed noctural pulse oximetry with patient  Oxygen room air below 88% was 32 minutes  she should continue to use supplemental oxygen 2L at night time  She is agreeable  She has not had follow up ches tXray  Signatures   Electronically signed by : ANTONINA To;  Apr  3 2017 11:57AM EST                       (Author)

## 2019-09-18 ENCOUNTER — APPOINTMENT (EMERGENCY)
Dept: RADIOLOGY | Facility: HOSPITAL | Age: 79
End: 2019-09-18
Payer: MEDICARE

## 2019-09-18 ENCOUNTER — HOSPITAL ENCOUNTER (EMERGENCY)
Facility: HOSPITAL | Age: 79
End: 2019-09-18
Attending: EMERGENCY MEDICINE
Payer: MEDICARE

## 2019-09-18 ENCOUNTER — HOSPITAL ENCOUNTER (INPATIENT)
Facility: HOSPITAL | Age: 79
LOS: 2 days | Discharge: HOME HEALTH | DRG: 184 | End: 2019-09-20
Attending: SURGERY | Admitting: SURGERY
Payer: MEDICARE

## 2019-09-18 VITALS
WEIGHT: 122.25 LBS | HEART RATE: 89 BPM | OXYGEN SATURATION: 98 % | BODY MASS INDEX: 19.73 KG/M2 | DIASTOLIC BLOOD PRESSURE: 89 MMHG | RESPIRATION RATE: 17 BRPM | SYSTOLIC BLOOD PRESSURE: 183 MMHG | TEMPERATURE: 98.2 F

## 2019-09-18 DIAGNOSIS — S22.49XA MULTIPLE RIB FRACTURES: Primary | ICD-10-CM
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
## 2019-09-18 DIAGNOSIS — S22.49XA MULTIPLE CLOSED FRACTURES OF RIBS: Primary | ICD-10-CM

## 2019-09-18 LAB
ALBUMIN SERPL BCP-MCNC: 3.4 G/DL (ref 3.5–5)
ALP SERPL-CCNC: 78 U/L (ref 46–116)
ALT SERPL W P-5'-P-CCNC: 14 U/L (ref 12–78)
ANION GAP SERPL CALCULATED.3IONS-SCNC: 6 MMOL/L (ref 4–13)
ANION GAP SERPL CALCULATED.3IONS-SCNC: 7 MMOL/L (ref 4–13)
APTT PPP: 22 SECONDS (ref 23–37)
APTT PPP: 29 SECONDS (ref 23–37)
AST SERPL W P-5'-P-CCNC: 35 U/L (ref 5–45)
ATRIAL RATE: 99 BPM
BASOPHILS # BLD AUTO: 0.01 THOUSANDS/ΜL (ref 0–0.1)
BASOPHILS NFR BLD AUTO: 0 % (ref 0–1)
BILIRUB SERPL-MCNC: 0.5 MG/DL (ref 0.2–1)
BUN SERPL-MCNC: 15 MG/DL (ref 5–25)
BUN SERPL-MCNC: 16 MG/DL (ref 5–25)
CALCIUM SERPL-MCNC: 8.8 MG/DL (ref 8.3–10.1)
CALCIUM SERPL-MCNC: 8.9 MG/DL (ref 8.3–10.1)
CHLORIDE SERPL-SCNC: 98 MMOL/L (ref 100–108)
CHLORIDE SERPL-SCNC: 98 MMOL/L (ref 100–108)
CO2 SERPL-SCNC: 26 MMOL/L (ref 21–32)
CO2 SERPL-SCNC: 28 MMOL/L (ref 21–32)
CREAT SERPL-MCNC: 0.8 MG/DL (ref 0.6–1.3)
CREAT SERPL-MCNC: 0.99 MG/DL (ref 0.6–1.3)
EOSINOPHIL # BLD AUTO: 0.03 THOUSAND/ΜL (ref 0–0.61)
EOSINOPHIL NFR BLD AUTO: 1 % (ref 0–6)
ERYTHROCYTE [DISTWIDTH] IN BLOOD BY AUTOMATED COUNT: 12.9 % (ref 11.6–15.1)
GFR SERPL CREATININE-BSD FRML MDRD: 54 ML/MIN/1.73SQ M
GFR SERPL CREATININE-BSD FRML MDRD: 69 ML/MIN/1.73SQ M
GLUCOSE SERPL-MCNC: 102 MG/DL (ref 65–140)
GLUCOSE SERPL-MCNC: 103 MG/DL (ref 65–140)
HCT VFR BLD AUTO: 38.8 % (ref 34.8–46.1)
HGB BLD-MCNC: 12.1 G/DL (ref 11.5–15.4)
IMM GRANULOCYTES # BLD AUTO: 0.02 THOUSAND/UL (ref 0–0.2)
IMM GRANULOCYTES NFR BLD AUTO: 0 % (ref 0–2)
INR PPP: 0.92 (ref 0.91–1.09)
INR PPP: 1.01 (ref 0.84–1.19)
LYMPHOCYTES # BLD AUTO: 0.45 THOUSANDS/ΜL (ref 0.6–4.47)
LYMPHOCYTES NFR BLD AUTO: 8 % (ref 14–44)
MCH RBC QN AUTO: 28.4 PG (ref 26.8–34.3)
MCHC RBC AUTO-ENTMCNC: 31.2 G/DL (ref 31.4–37.4)
MCV RBC AUTO: 91 FL (ref 82–98)
MONOCYTES # BLD AUTO: 0.37 THOUSAND/ΜL (ref 0.17–1.22)
MONOCYTES NFR BLD AUTO: 6 % (ref 4–12)
NEUTROPHILS # BLD AUTO: 4.88 THOUSANDS/ΜL (ref 1.85–7.62)
NEUTS SEG NFR BLD AUTO: 85 % (ref 43–75)
NRBC BLD AUTO-RTO: 0 /100 WBCS
P AXIS: 44 DEGREES
PLATELET # BLD AUTO: 163 THOUSANDS/UL (ref 149–390)
PLATELET # BLD AUTO: 187 THOUSANDS/UL (ref 149–390)
PMV BLD AUTO: 9.7 FL (ref 8.9–12.7)
PMV BLD AUTO: 9.9 FL (ref 8.9–12.7)
POTASSIUM SERPL-SCNC: 5.1 MMOL/L (ref 3.5–5.3)
POTASSIUM SERPL-SCNC: 6.1 MMOL/L (ref 3.5–5.3)
PR INTERVAL: 148 MS
PROT SERPL-MCNC: 7.2 G/DL (ref 6.4–8.2)
PROTHROMBIN TIME: 12.9 SECONDS (ref 11.6–14.5)
PROTHROMBIN TIME: 9.9 SECONDS (ref 9.8–12)
QRS AXIS: -22 DEGREES
QRSD INTERVAL: 80 MS
QT INTERVAL: 346 MS
QTC INTERVAL: 444 MS
RBC # BLD AUTO: 4.26 MILLION/UL (ref 3.81–5.12)
SODIUM SERPL-SCNC: 130 MMOL/L (ref 136–145)
SODIUM SERPL-SCNC: 133 MMOL/L (ref 136–145)
T WAVE AXIS: 64 DEGREES
TROPONIN I SERPL-MCNC: <0.02 NG/ML
TSH SERPL DL<=0.05 MIU/L-ACNC: 3.08 UIU/ML (ref 0.36–3.74)
VENTRICULAR RATE: 99 BPM
WBC # BLD AUTO: 5.76 THOUSAND/UL (ref 4.31–10.16)

## 2019-09-18 PROCEDURE — 80048 BASIC METABOLIC PNL TOTAL CA: CPT | Performed by: PHYSICIAN ASSISTANT

## 2019-09-18 PROCEDURE — 85049 AUTOMATED PLATELET COUNT: CPT | Performed by: EMERGENCY MEDICINE

## 2019-09-18 PROCEDURE — 72125 CT NECK SPINE W/O DYE: CPT

## 2019-09-18 PROCEDURE — 80053 COMPREHEN METABOLIC PANEL: CPT | Performed by: PHYSICIAN ASSISTANT

## 2019-09-18 PROCEDURE — 93005 ELECTROCARDIOGRAM TRACING: CPT

## 2019-09-18 PROCEDURE — 99222 1ST HOSP IP/OBS MODERATE 55: CPT | Performed by: INTERNAL MEDICINE

## 2019-09-18 PROCEDURE — 84443 ASSAY THYROID STIM HORMONE: CPT | Performed by: EMERGENCY MEDICINE

## 2019-09-18 PROCEDURE — 85610 PROTHROMBIN TIME: CPT | Performed by: EMERGENCY MEDICINE

## 2019-09-18 PROCEDURE — 99223 1ST HOSP IP/OBS HIGH 75: CPT | Performed by: SURGERY

## 2019-09-18 PROCEDURE — 99285 EMERGENCY DEPT VISIT HI MDM: CPT

## 2019-09-18 PROCEDURE — 73030 X-RAY EXAM OF SHOULDER: CPT

## 2019-09-18 PROCEDURE — 85730 THROMBOPLASTIN TIME PARTIAL: CPT | Performed by: EMERGENCY MEDICINE

## 2019-09-18 PROCEDURE — 70450 CT HEAD/BRAIN W/O DYE: CPT

## 2019-09-18 PROCEDURE — 93010 ELECTROCARDIOGRAM REPORT: CPT | Performed by: INTERNAL MEDICINE

## 2019-09-18 PROCEDURE — 85025 COMPLETE CBC W/AUTO DIFF WBC: CPT | Performed by: PHYSICIAN ASSISTANT

## 2019-09-18 PROCEDURE — 85610 PROTHROMBIN TIME: CPT | Performed by: PHYSICIAN ASSISTANT

## 2019-09-18 PROCEDURE — 84484 ASSAY OF TROPONIN QUANT: CPT | Performed by: PHYSICIAN ASSISTANT

## 2019-09-18 PROCEDURE — 96360 HYDRATION IV INFUSION INIT: CPT

## 2019-09-18 PROCEDURE — 71250 CT THORAX DX C-: CPT

## 2019-09-18 PROCEDURE — 1124F ACP DISCUSS-NO DSCNMKR DOCD: CPT | Performed by: SURGERY

## 2019-09-18 PROCEDURE — 36415 COLL VENOUS BLD VENIPUNCTURE: CPT | Performed by: PHYSICIAN ASSISTANT

## 2019-09-18 PROCEDURE — 85730 THROMBOPLASTIN TIME PARTIAL: CPT | Performed by: PHYSICIAN ASSISTANT

## 2019-09-18 RX ORDER — OXYCODONE HYDROCHLORIDE 5 MG/1
5 TABLET ORAL EVERY 4 HOURS PRN
Status: DISCONTINUED | OUTPATIENT
Start: 2019-09-18 | End: 2019-09-20 | Stop reason: HOSPADM

## 2019-09-18 RX ORDER — ACETAMINOPHEN 325 MG/1
975 TABLET ORAL EVERY 8 HOURS SCHEDULED
Status: DISCONTINUED | OUTPATIENT
Start: 2019-09-18 | End: 2019-09-20 | Stop reason: HOSPADM

## 2019-09-18 RX ORDER — HYDROMORPHONE HCL/PF 1 MG/ML
0.2 SYRINGE (ML) INJECTION EVERY 4 HOURS PRN
Status: DISCONTINUED | OUTPATIENT
Start: 2019-09-18 | End: 2019-09-19

## 2019-09-18 RX ORDER — LIDOCAINE 50 MG/G
1 PATCH TOPICAL DAILY
Status: DISCONTINUED | OUTPATIENT
Start: 2019-09-18 | End: 2019-09-20 | Stop reason: HOSPADM

## 2019-09-18 RX ORDER — GABAPENTIN 100 MG/1
100 CAPSULE ORAL
Status: DISCONTINUED | OUTPATIENT
Start: 2019-09-18 | End: 2019-09-20 | Stop reason: HOSPADM

## 2019-09-18 RX ORDER — HEPARIN SODIUM 5000 [USP'U]/ML
5000 INJECTION, SOLUTION INTRAVENOUS; SUBCUTANEOUS EVERY 8 HOURS SCHEDULED
Status: DISCONTINUED | OUTPATIENT
Start: 2019-09-18 | End: 2019-09-19

## 2019-09-18 RX ORDER — OXYCODONE HYDROCHLORIDE AND ACETAMINOPHEN 5; 325 MG/1; MG/1
1 TABLET ORAL ONCE
Status: COMPLETED | OUTPATIENT
Start: 2019-09-18 | End: 2019-09-18

## 2019-09-18 RX ORDER — LANOLIN ALCOHOL/MO/W.PET/CERES
3 CREAM (GRAM) TOPICAL
Status: DISCONTINUED | OUTPATIENT
Start: 2019-09-18 | End: 2019-09-20 | Stop reason: HOSPADM

## 2019-09-18 RX ORDER — OXYCODONE HYDROCHLORIDE 5 MG/1
2.5 TABLET ORAL EVERY 4 HOURS PRN
Status: DISCONTINUED | OUTPATIENT
Start: 2019-09-18 | End: 2019-09-20 | Stop reason: HOSPADM

## 2019-09-18 RX ADMIN — OXYCODONE HYDROCHLORIDE 5 MG: 5 TABLET ORAL at 16:35

## 2019-09-18 RX ADMIN — ACETAMINOPHEN 975 MG: 325 TABLET ORAL at 16:33

## 2019-09-18 RX ADMIN — LIDOCAINE 1 PATCH: 50 PATCH CUTANEOUS at 15:07

## 2019-09-18 RX ADMIN — OXYCODONE HYDROCHLORIDE 5 MG: 5 TABLET ORAL at 20:47

## 2019-09-18 RX ADMIN — GABAPENTIN 100 MG: 100 CAPSULE ORAL at 22:57

## 2019-09-18 RX ADMIN — HYDROMORPHONE HYDROCHLORIDE 0.2 MG: 1 INJECTION, SOLUTION INTRAMUSCULAR; INTRAVENOUS; SUBCUTANEOUS at 22:57

## 2019-09-18 RX ADMIN — OXYCODONE HYDROCHLORIDE AND ACETAMINOPHEN 1 TABLET: 5; 325 TABLET ORAL at 11:14

## 2019-09-18 RX ADMIN — HEPARIN SODIUM 5000 UNITS: 5000 INJECTION INTRAVENOUS; SUBCUTANEOUS at 22:57

## 2019-09-18 RX ADMIN — SODIUM CHLORIDE 1000 ML: 0.9 INJECTION, SOLUTION INTRAVENOUS at 12:07

## 2019-09-18 RX ADMIN — ACETAMINOPHEN 975 MG: 325 TABLET ORAL at 22:57

## 2019-09-18 RX ADMIN — HEPARIN SODIUM 5000 UNITS: 5000 INJECTION INTRAVENOUS; SUBCUTANEOUS at 15:08

## 2019-09-18 RX ADMIN — MELATONIN 3 MG: 3 TAB ORAL at 22:57

## 2019-09-18 NOTE — CONSULTS
Consultation - Acute Pain Service   Roxanne Clemente 78 y o  female MRN: [id number]  Unit/Bed#: ED 28 Encounter: [id number]               Assessment/Plan     Assessment:   Patient Active Problem List   Diagnosis    Numbness    [facility]    Hypertension    GERD (gastroesophageal reflux disease)    Pneumonia    Depression    Influenza A    Pancytopenia ([facility] )        Plan:   Right 2-4 rib fractures  · Patient able to tolerate IS to 1000mL with pain in right upper chest at 1000mL  Plt and PT/INR/aPTT normal on admission  · Respiratory status stable, not a candidate for Epidural at this time  · Recommend continue with Scheduled Acetaminophen 975mg PO [id number]  · Continue Gabapentin 100mg PO QHS  · Continue Lidocaine patch   · Continue oxycodone 2 5/5mg PO for moderate/severe pain [id number] PRN  · Continue Hydromorphone 0 2mg IV for breakthrough pain [id number] PRN  · Plan discussed with primary team    APS will continue to follow; please contact [facility]PS ( [phone]) with any further questions    History of Present Illness    Admit Date:  9/18/2019  Hospital Day:  0 days  Primary Service:  Trauma  Attending Provider:  Geo Schmidt DO  Reason for Consult / Principal Problem: right rib fractures 2-4  HPI: Roxanne Clemente is a 78y o  year old female who presents with fall from standing position on Friday with associated right upper chest wall pain since her fall on Friday  Patient states that she was managing her pain at home with intermittent use of ibuprofen throughout the weekend  She denied any limitation in breathing or increased shortness of breath with her pain for the week  She uses oxygen chronically at bed time of 2L NC for her severe COPD and sates that she has not needed to increase the frequency or amount of oxygen since her fall  She denied any other falls since her initial fall on Friday   She reports that her pain is well controlled on examination, and that the percocet she received on admission has helped to relieve her symptoms  Current pain location(s): right upper chest wall  Pain Scale: Moderate, worse with deep inspiration  Quality: sharp/burning  Current Analgesic regimen:  As written above    Pain History: chronic intermittent (1-2x weekly) tramadol for chronic arthritic pains  Pain Management Provider:  PCP    I have reviewed the patient's controlled substance dispensing history in the Prescription Drug Monitoring Program in compliance with the [facility] regulations before prescribing any controlled substances  Inpatient consult to Acute Pain Service  Consult performed by: Tori Mcqueen MD  Consult ordered by: Irena Prado MD          Review of Systems   Constitutional: Negative for activity change, appetite change, chills, diaphoresis, fatigue and fever  Eyes: Negative for discharge and itching  Respiratory: Negative for cough, chest tightness, shortness of breath and wheezing  Cardiovascular: Positive for chest pain (right chest wall)  Negative for palpitations and leg swelling  Gastrointestinal: Negative for abdominal distention, abdominal pain, constipation, diarrhea, nausea and vomiting  Endocrine: Negative for cold intolerance and heat intolerance  Musculoskeletal: Negative for arthralgias, back pain, gait problem and myalgias  Skin: Negative for color change, pallor, rash and wound  Allergic/Immunologic: Negative for environmental allergies and food allergies  Neurological: Negative for dizziness, weakness, light-headedness and headaches  Hematological: Negative for adenopathy  Psychiatric/Behavioral: Negative for agitation, behavioral problems, confusion, decreased concentration and dysphoric mood         Historical Information   Past Medical History:   Diagnosis Date    Arthritis     [facility]     Coronary artery disease     aortic valve replacement    Heart murmur     Hypertension      Past Surgical History:   Procedure Laterality Date    AORTIC VALVE REPLACEMENT      APPENDECTOMY      CHOLECYSTECTOMY      JOINT REPLACEMENT      bilaterl hip and right knee    TONSILECTOMY AND ADNOIDECTOMY      TONSILLECTOMY      TUBAL LIGATION       Social History   Social History     Substance and Sexual Activity   Alcohol Use Yes    Comment: rare     Social History     Substance and Sexual Activity   Drug Use No     Social History     Tobacco Use   Smoking Status Former Smoker    Packs/day: 1 00    Years: 20 00    Pack years: 20 00    Last attempt to quit: 2000    Years since quittin 7   Smokeless Tobacco Never Used     Family History: non-contributory    Meds/Allergies   all current active meds have been reviewed, current meds:   Current Facility-Administered Medications   Medication Dose Route Frequency    acetaminophen (TYLENOL) tablet 975 mg  975 mg Oral Q8H [address]    gabapentin (NEURONTIN) capsule 100 mg  100 mg Oral HS    heparin (porcine) subcutaneous injection 5,000 Units  5,000 Units Subcutaneous Q8H [address]    HYDROmorphone (DILAUDID) injection 0 2 mg  0 2 mg Intravenous Q4H PRN    lidocaine (LIDODERM) 5 % patch 1 patch  1 patch Topical Daily    oxyCODONE (ROXICODONE) IR tablet 2 5 mg  2 5 mg Oral Q4H PRN    oxyCODONE (ROXICODONE) IR tablet 5 mg  5 mg Oral Q4H PRN    and PTA meds:   Prior to Admission Medications   Prescriptions Last Dose Informant Patient Reported? Taking?    FLUoxetine (PROzac) 20 mg capsule   Yes No   Sig: Take 20 mg by mouth daily   benazepril (LOTENSIN) 20 mg tablet   Yes No   Sig: Take 20 mg by mouth daily   omeprazole (PriLOSEC) 40 MG capsule   Yes No   Sig: Take 40 mg by mouth daily      Facility-Administered Medications: None       No Known Allergies    Objective   Temp:  [98 °F (36 7 °C)-98 2 °F (36 8 °C)] 98 2 °F (36 8 °C)  HR:  [] 100  Resp:  [17-24] 19  BP: (135-201)/() 141/105  No intake or output data in the 24 hours ending 19 1609    Physical Exam   Constitutional: She is oriented to person, place, and time  She appears well-developed and well-nourished  No distress  HENT:   Head: Normocephalic and atraumatic  Eyes: Pupils are equal, round, and reactive to light  Conjunctivae and EOM are normal  Right eye exhibits no discharge  Left eye exhibits no discharge  Neck: Normal range of motion  Neck supple  No JVD present  Cardiovascular: Normal rate and regular rhythm  Pulmonary/Chest: Effort normal  No respiratory distress  She exhibits tenderness (right upper chest wall tenderness)  Abdominal: Soft  Bowel sounds are normal  She exhibits no distension  There is no tenderness  Musculoskeletal: Normal range of motion  She exhibits no edema, tenderness or deformity  Neurological: She is alert and oriented to person, place, and time  No cranial nerve deficit  Skin: Skin is warm and dry  No rash noted  She is not diaphoretic  No erythema  Psychiatric: She has a normal mood and affect  Her behavior is normal    Vitals reviewed  Lab Results:   I have personally reviewed pertinent labs  , CBC:   Lab Results   Component Value Date    WBC 5 76 09/18/2019    HGB 12 1 09/18/2019    HCT 38 8 09/18/2019    MCV 91 09/18/2019     09/18/2019    MCH 28 4 09/18/2019    MCHC 31 2 (L) 09/18/2019    RDW 12 9 09/18/2019    MPV 9 9 09/18/2019    NRBC 0 09/18/2019   , CMP:   Lab Results   Component Value Date    SODIUM 133 (L) 09/18/2019    K 5 1 09/18/2019    CL 98 (L) 09/18/2019    CO2 28 09/18/2019    BUN 15 09/18/2019    CREATININE 0 99 09/18/2019    CALCIUM 8 9 09/18/2019    AST 35 09/18/2019    ALT 14 09/18/2019    ALKPHOS 78 09/18/2019    EGFR 54 09/18/2019       Imaging Studies: I have personally reviewed pertinent reports  Counseling / Coordination of Care  Total floor / unit time spent today Level 3 = 55 minutes  Greater than 50% of total time was spent with the patient and / or family counseling and / or coordination of care   A description of the counseling / coordination of care: Discussed plan of care with patient, primary team and attending physician    Mae Smith MD  Palliative and Supportive Care Fellow

## 2019-09-18 NOTE — ED NOTES
"I'm probably dehydrated because I don't drink much"  Pt sitting on bed, with neighbors at bedside, conversing easily       Debby Juarez RN  09/18/19 [id number]

## 2019-09-18 NOTE — ED PROVIDER NOTES
History  Chief Complaint   Patient presents [facility]   [facility] she fell on friday- her leg gave oput  She landed on her rt shoulder  Pain and increased pain when she takes a deep breath     78year old female hx CHF, CAD, HTN presents with R shoulder pain x5 days  She had a fall on Friday  She was walking on the curb near [facility] when she fell  She states her knee gave out, which happens often  No dizziness, lightheadedness, weakness, chest pain before or after the fall  No back pain  She landed on her R shoulder  She has worsened chest pain with deep inspiration  She has neck pain  She states the pain was at its worst yesterday  She has been taking advil with minimal relief  She denies any injury below the chest  She is ambulating normally without difficulty or pain  No numbness or tingling  No blood thinners  No knee pain or hip pain  No visual disturbances  No abdominal pain, nausea, vomiting, diarrhea, constipation  Prior to Admission Medications   Prescriptions Last Dose Informant Patient Reported? Taking? FLUoxetine (PROzac) 20 mg capsule   Yes No   Sig: Take 20 mg by mouth daily   benazepril (LOTENSIN) 20 mg tablet   Yes No   Sig: Take 20 mg by mouth daily   omeprazole (PriLOSEC) 40 MG capsule   Yes No   Sig: Take 40 mg by mouth daily      Facility-Administered Medications: None       Past Medical History:   Diagnosis Date    Arthritis     CHF (congestive heart failure) (HCC)     Coronary artery disease     aortic valve replacement    Heart murmur     Hypertension        Past Surgical History:   Procedure Laterality Date    AORTIC VALVE REPLACEMENT      APPENDECTOMY      CHOLECYSTECTOMY      JOINT REPLACEMENT      bilaterl hip and right knee    TONSILECTOMY AND ADNOIDECTOMY      TONSILLECTOMY      TUBAL LIGATION         Family History   Problem Relation Age of Onset    Heart defect Father      I have reviewed and agree with the history as documented      Social History Tobacco Use    Smoking status: Former Smoker     Packs/day:  00     Years: 20 00     Pack years: 20 00     Last attempt to quit: 2000     Years since quittin 7    Smokeless tobacco: Never Used   Substance Use Topics    Alcohol use: Yes     Comment: rare    Drug use: No        Review of Systems   Constitutional: Negative for chills and fever  HENT: Negative for sneezing and sore throat  Eyes: Negative for visual disturbance  Respiratory: Negative for cough and shortness of breath  Cardiovascular: Negative for chest pain, palpitations and leg swelling  Gastrointestinal: Negative for abdominal pain, constipation, diarrhea, nausea and vomiting  Musculoskeletal: Positive for arthralgias, myalgias and neck pain  Negative for back pain, gait problem, joint swelling and neck stiffness  Skin: Negative for color change, pallor, rash and wound  Neurological: Negative for dizziness, syncope, weakness, light-headedness, numbness and headaches  All other systems reviewed and are negative  Physical Exam  Physical Exam   Constitutional: She is oriented to person, place, and time  She appears well-developed and well-nourished  No distress  HENT:   Head: Normocephalic and atraumatic  Right Ear: External ear normal    Left Ear: External ear normal    Nose: Nose normal    Mouth/Throat: Oropharynx is clear and moist  No oropharyngeal exudate  No raccoon or marrero sign  No hemotympanum   Eyes: Pupils are equal, round, and reactive to light  Conjunctivae and EOM are normal  Right eye exhibits no discharge  Left eye exhibits no discharge  No scleral icterus  Neck: Normal range of motion and full passive range of motion without pain  Neck supple  Muscular tenderness present  No spinous process tenderness present  No neck rigidity  No edema, no erythema and normal range of motion present  Cardiovascular: Normal rate, regular rhythm, normal heart sounds and intact distal pulses   Exam reveals no gallop and no friction rub  No murmur heard  Pulmonary/Chest: Effort normal and breath sounds normal  No stridor  No respiratory distress  She has no wheezes  She has no rales  Sp02 is 95% indicating adequate oxygenation on room air   Abdominal: Soft  Bowel sounds are normal  She exhibits no distension and no mass  There is no tenderness  There is no guarding  Abdomen soft, nontender  No peritoneal signs   Musculoskeletal:        Arms:  No midline or paravertebral tenderness at cervical, thoracic, or lumbar spine  No step offs  Sensation intact  Neurological: She is alert and oriented to person, place, and time  She has normal strength  She displays no atrophy and no tremor  No cranial nerve deficit or sensory deficit  She exhibits normal muscle tone  She displays a negative Romberg sign  She displays no seizure activity  Coordination and gait normal  GCS eye subscore is 4  GCS verbal subscore is 5  GCS motor subscore is 6  No signs of ataxia  Good finger to nose, heel to shin, rapid alternating movements  Skin: Skin is warm and dry  Capillary refill takes less than 2 seconds  No rash noted  She is not diaphoretic  No erythema  No pallor  Nursing note and vitals reviewed        Vital Signs  ED Triage Vitals   Temperature Pulse Respirations Blood Pressure SpO2   09/18/19 1024 09/18/19 1024 09/18/19 1024 09/18/19 1024 09/18/19 1024   98 °F (36 7 °C) (!) 107 (!) 24 135/89 95 %      Temp Source Heart Rate Source Patient Position - Orthostatic VS BP Location FiO2 (%)   09/18/19 1232 09/18/19 1232 09/18/19 1232 09/18/19 1232 --   Oral Monitor Lying Left arm       Pain Score       09/18/19 1024       3           Vitals:    09/18/19 1024 09/18/19 1232 09/18/19 1245 09/18/19 1300   BP: 135/89 (!) 182/81 (!) 201/90 (!) 190/81   Pulse: (!) 107 105 102 101   Patient Position - Orthostatic VS:  Lying           Visual Acuity  Visual Acuity      Most Recent Value   L Pupil Size (mm)  3   R Pupil Size (mm) 3          ED Medications  Medications   oxyCODONE-acetaminophen (PERCOCET) 5-325 mg per tablet 1 tablet (1 tablet Oral Given 9/18/19 1114)       Diagnostic Studies  Results Reviewed     Procedure Component Value Units Date/Time    Basic metabolic panel [[id number]]  (Abnormal) Collected:  09/18/19 1206    Lab Status:  Final result Specimen:  Blood from Arm, Left Updated:  09/18/19 1223     Sodium 133 mmol/L      Potassium 5 1 mmol/L      Chloride 98 mmol/L      CO2 28 mmol/L      ANION GAP 7 mmol/L      BUN 15 mg/dL      Creatinine 0 99 mg/dL      Glucose 102 mg/dL      Calcium 8 9 mg/dL      eGFR 54 ml/min/1 73sq m     Narrative:       [place] guidelines for Chronic Kidney Disease (CKD):     Stage 1 with normal or high GFR (GFR > 90 mL/min/1 73 square meters)    Stage 2 Mild CKD (GFR = 60-89 mL/min/1 73 square meters)    Stage 3A Moderate CKD (GFR = 45-59 mL/min/1 73 square meters)    Stage 3B Moderate CKD (GFR = 30-44 mL/min/1 73 square meters)    Stage 4 Severe CKD (GFR = 15-29 mL/min/1 73 square meters)    Stage 5 End Stage CKD (GFR <15 mL/min/1 73 square meters)  Note: GFR calculation is accurate only with a steady state creatinine    CBC and differential [[id number]]  (Abnormal) Collected:  09/18/19 1154    Lab Status:  Final result Specimen:  Blood from Arm, Left Updated:  09/18/19 1200     WBC 5 76 Thousand/uL      RBC 4 26 Million/uL      Hemoglobin 12 1 g/dL      Hematocrit 38 8 %      MCV 91 fL      MCH 28 4 pg      MCHC 31 2 g/dL      RDW 12 9 %      MPV 9 7 fL      Platelets 067 Thousands/uL      nRBC 0 /100 WBCs      Neutrophils Relative 85 %      Immat GRANS % 0 %      Lymphocytes Relative 8 %      Monocytes Relative 6 %      Eosinophils Relative 1 %      Basophils Relative 0 %      Neutrophils Absolute 4 88 Thousands/µL      Immature Grans Absolute 0 02 Thousand/uL      Lymphocytes Absolute 0 45 Thousands/µL      Monocytes Absolute 0 37 Thousand/µL      Eosinophils Absolute 0 03 Thousand/µL      Basophils Absolute 0 01 Thousands/µL     Comprehensive metabolic panel [[id number]]  (Abnormal) Collected:  09/18/19 1112    Lab Status:  Final result Specimen:  Blood from Arm, Right Updated:  09/18/19 1153     Sodium 130 mmol/L      Potassium 6 1 mmol/L      Chloride 98 mmol/L      CO2 26 mmol/L      ANION GAP 6 mmol/L      BUN 16 mg/dL      Creatinine 0 80 mg/dL      Glucose 103 mg/dL      Calcium 8 8 mg/dL      AST 35 U/L      ALT 14 U/L      Alkaline Phosphatase 78 U/L      Total Protein 7 2 g/dL      Albumin 3 4 g/dL      Total Bilirubin 0 50 mg/dL      eGFR 69 ml/min/1 73sq m     Narrative:       [place] guidelines for Chronic Kidney Disease (CKD):     Stage 1 with normal or high GFR (GFR > 90 mL/min/1 73 square meters)    Stage 2 Mild CKD (GFR = 60-89 mL/min/1 73 square meters)    Stage 3A Moderate CKD (GFR = 45-59 mL/min/1 73 square meters)    Stage 3B Moderate CKD (GFR = 30-44 mL/min/1 73 square meters)    Stage 4 Severe CKD (GFR = 15-29 mL/min/1 73 square meters)    Stage 5 End Stage CKD (GFR <15 mL/min/1 73 square meters)  Note: GFR calculation is accurate only with a steady state creatinine    Troponin I [[id number]]  (Normal) Collected:  09/18/19 1112    Lab Status:  Final result Specimen:  Blood from Arm, Right Updated:  09/18/19 1136     Troponin I <0 02 ng/mL     Protime-INR [[id number]]  (Normal) Collected:  09/18/19 1112    Lab Status:  Final result Specimen:  Blood from Arm, Right Updated:  09/18/19 1128     Protime 9 9 seconds      INR 0 92    APTT [[id number]]  (Abnormal) Collected:  09/18/19 1112    Lab Status:  Final result Specimen:  Blood from Arm, Right Updated:  09/18/19 1128     PTT 22 seconds                  XR shoulder 2+ views RIGHT   Final Result by Aly Garcia MD (09/18 1203)      No acute osseous abnormality  Degenerative changes as described              Workstation performed: [id number]         CT head without contrast   Final Result by Rizwana Jones MD (09/18 1140)      No acute intracranial abnormality  Microangiopathic changes  Since 2016 there are new areas of chronic encephalomalacia involving the posterior superior left cerebellum and left occipital lobe suggesting prior CVA  Workstation performed: [id number]         CT spine cervical without contrast   Final Result by Rizwana Jones MD (09/18 1148)      No cervical spine fracture or traumatic malalignment  Severe degenerative disc disease  Workstation performed: [id number]         CT chest without contrast   Final Result by Rizwana Jones MD (09/18 1202)      Severe COPD  No acute traumatic pulmonary abnormality evident  Persistent scarlike consolidation in the superior segment of the right lower lobe  See above for further details  No pleural fluid collection or pneumothorax  Nondisplaced fractures of the right 2nd, 3rd and 4th ribs  The study was marked in [facility] for immediate notification  Workstation performed: [id number]                    Procedures  ECG 12 Lead Documentation Only  Date/Time: 9/18/2019 11:16 AM  Performed by: Harshil Escalante PA-C  Authorized by:  Harshil Escalante PA-C     Indications / Diagnosis:  Fall, rib pain  Patient location:  ED  Interpretation:     Interpretation: normal    Rate:     ECG rate:  99    ECG rate assessment: normal    Rhythm:     Rhythm: sinus rhythm    Ectopy:     Ectopy: none    QRS:     QRS axis:  Normal    QRS intervals:  Normal  Conduction:     Conduction: normal    ST segments:     ST segments:  Normal  T waves:     T waves: normal             ED Course  ED Course as of Sep 18 1308   Wed Sep 18, 2019   1201 Will repeat bmp   Potassium(!): 6 1   1223 Patient accepted trauma transfer Dr Ann Luciano      1223 Potassium: 5 1                               MDM  Number of Diagnoses or Management Options  Fall, initial encounter:   Multiple rib fractures: Diagnosis management comments: Patient to be transferred trauma service       Amount and/or Complexity of Data Reviewed  Clinical lab tests: ordered and reviewed  Tests in the radiology section of CPT®: ordered and reviewed  Tests in the medicine section of CPT®: ordered and reviewed  Discussion of test results with the performing providers: yes  Review and summarize past medical records: yes  Discuss the patient with other providers: yes  Independent visualization of images, tracings, or specimens: yes        Disposition  Final diagnoses:   Multiple rib fractures   Fall, initial encounter     Time reflects when diagnosis was documented in both MDM as applicable and the Disposition within this note     Time User Action Codes Description Comment    9/18/2019 12:21 PM Vianey Pascual Add [S22 49XA] Multiple rib fractures     9/18/2019 12:21 PM Vianey Pascual Add [W19  Candelaria Cody, initial encounter       ED Disposition     ED Disposition Condition Date/Time Comment    Transfer to Another Facility-In Network  Wed Sep 18, 2019 12:21 PM Lashaun Ibarra should be transferred out to [facility]        MD Documentation      Most Recent Value   Patient Condition  The patient has been stabilized such that within reasonable medical probability, no material deterioration of the patient condition or the condition of the unborn child(hernesto) is likely to result from the transfer   Reason for Transfer  Level of Care needed not available at this facility   Benefits of Transfer  Specialized equipment and/or services available at the receiving facility (Include comment)________________________   Risks of Transfer  Potential for delay in receiving treatment   Accepting Physician  Dr Schofield Felicia Name, Simona Pace   Provider Certification  General risk, such as traffic hazards, adverse weather conditions, rough terrain or turbulence, possible failure of equipment (including vehicle or aircraft), or consequences of actions of persons outside the control of the transport personnel      RN Documentation      [address] Name, [address] Assignment  Emergency trama   Report Given to  Will Riggs RN   Medications Reviewed with Next Provider of Service  Yes   Transport Mode  Ambulance   Patient Belongings Disposition  Sent with patient      Follow-up Information    None         Patient's Medications   Discharge Prescriptions    No medications on file     No discharge procedures on file      ED Provider  Electronically Signed by           Sherryle Macadam, PA-C  09/18/19 7629

## 2019-09-18 NOTE — H&P
H&P Exam - [address] 78 y o  female MRN: [id number]  Unit/Bed#: ED 28 Encounter: [id number]    Assessment/Plan   Trauma Alert: Evaluation  Model of Arrival: Ambulance and Transfer \[facility] [place] [facility]\""  Trauma Team: Attending Stefanie Narvaez, Residents Angelia and Fellow Petit-Me  Consultants: None    Trauma Active Problems:   -Right 2-4 rib fractures  -Fall   -Syncope    Trauma Plan:   -Rib fracture protocol  -Multimodal pain control  -APS consult  -Respiratory protocol  -Serial CXR    Chief Complaint: It hurts when I [facility]    History of Present Illness   HPI:  Bryant Monroy is a 78 y o  female who presents with rib fractures  Patient fell 3 days ago while attempting to walk in a restaurant  She states that she fell hard on her right shoulder  No LOC, vomoting  Complains of worsening right sided rib pain & pain with coughing  Found to have right 2-4 rib fractures on CT chest obtained @ \[facility]\""  Pain well controlled with oxycodone PTA  No blood thinners  Hx of CHF s/p TAVR, COPD, HTN  Mechanism:Fall    Review of Systems   Constitutional: Negative for appetite change, chills, diaphoresis, fatigue and fever  HENT: Negative for congestion, rhinorrhea and sore throat  Eyes: Negative for photophobia, pain, discharge, redness, itching and visual disturbance  Respiratory: Negative for cough, shortness of breath, wheezing and stridor  Cardiovascular: Positive for chest pain  Negative for palpitations and leg swelling  Gastrointestinal: Negative for abdominal distention, abdominal pain, constipation, diarrhea, nausea and vomiting  Endocrine: Negative for polydipsia and polyuria  Genitourinary: Negative for dysuria and hematuria  Musculoskeletal: Negative for back pain, neck pain and neck stiffness  Skin: Negative for pallor, rash and wound  Neurological: Positive for syncope, weakness and headaches  Negative for dizziness, light-headedness and numbness     Psychiatric/Behavioral: Negative for behavioral problems and confusion  All other systems reviewed and are negative  Historical Information   History is unobtainable from the patient due to n/a  Efforts to obtain history included the following sources: other medical personnel, obtained from other records    Past Medical History:   Diagnosis Date    Arthritis     CHF (congestive heart failure) ([facility] [address] )     Coronary artery disease     aortic valve replacement    Heart murmur     Hypertension      Past Surgical History:   Procedure Laterality Date    AORTIC VALVE REPLACEMENT      APPENDECTOMY      CHOLECYSTECTOMY      JOINT REPLACEMENT      bilaterl hip and right knee    TONSILECTOMY AND ADNOIDECTOMY      TONSILLECTOMY      TUBAL LIGATION       Social History   Social History     Substance and Sexual Activity   Alcohol Use Yes    Comment: rare     Social History     Substance and Sexual Activity   Drug Use No     Social History     Tobacco Use   Smoking Status Former Smoker    Packs/day:     Years: 20     Pack years: 20     Last attempt to quit: 2000    Years since quittin 7   Smokeless Tobacco Never Used       There is no immunization history on file for this patient  Last Tetanus: unk  Family History: Non-contributory  Unable to obtain/limited by n/a      Meds/Allergies   all current active meds have been reviewed  Scheduled Meds:    Current Facility-Administered Medications:  acetaminophen 975 mg Oral Q8H [facility] Irena Prado MD   gabapentin 100 mg Oral HS Irena Prado MD   heparin (porcine) 5,000 Units Subcutaneous [facility] Irena Prado MD   HYDROmorphone 0 2 mg Intravenous Q4H PRN Irena Prado MD   lidocaine 1 patch Topical Daily Irena Prado MD   oxyCODONE 2 5 mg Oral Q4H PRN Irena Prado MD   oxyCODONE 5 mg Oral Q4H PRN Irena Prado MD     Continuous Infusions:   PRN Meds:  HYDROmorphone    oxyCODONE    oxyCODONE    No Known Allergies      PHYSICAL EXAM    PE limited by: n/a    Objective   Vitals:   First set: Pulse: 91 (09/18/19 1418)  Respirations: 18 (09/18/19 1418)  Blood Pressure: (!) 199/79 (09/18/19 1418)    Primary Survey:   (A) Airway: intact  (B) Breathing: decreased b/s RMF  (C) Circulation: Pulses:   carotid  2/4, pedal  2/4, radial  2/4 and femoral  2/4  (D) Disabliity:  GCS Total:  15  (E) Expose:  Completed    Secondary Survey: (Click on Physical Exam tab above)  Physical Exam   Constitutional: She is oriented to person, place, and time  She appears well-developed and well-nourished  No distress  HENT:   Head: Normocephalic and atraumatic  B/l TM impacted with cerumen   Eyes: Pupils are equal, round, and reactive to light  Conjunctivae and EOM are normal  No scleral icterus  Neck: Normal range of motion  Neck supple  Cardiovascular: Normal rate, regular rhythm, normal heart sounds and intact distal pulses  Exam reveals no gallop and no friction rub  No murmur heard  Pulmonary/Chest: Effort normal  No stridor  No respiratory distress  She has no wheezes  She has no rales  She exhibits tenderness  Right lateral rib TTP  No crepitance, ecchymosis  Decreased b/s right mlf   Abdominal: Soft  Bowel sounds are normal  She exhibits no distension and no mass  There is no tenderness  There is no rebound and no guarding  Musculoskeletal: Normal range of motion  She exhibits tenderness  She exhibits no edema or deformity  Ecchymosis over right anterior shoulder  FROM  Motor/sens intact  No deformity   Neurological: She is alert and oriented to person, place, and time  GCS eye subscore is 4  GCS verbal subscore is 5  GCS motor subscore is 6  Moving all extremities   Skin: Skin is warm and dry  Capillary refill takes less than 2 seconds  No rash noted  She is not diaphoretic  No erythema  No pallor  Psychiatric: She has a normal mood and affect  Her behavior is normal    Nursing note and vitals reviewed        Invasive Devices     Peripheral Intravenous Line Peripheral IV 09/18/19 Left;Ventral (anterior) Antecubital less than 1 day                Lab Results:   Results: I have personally reviewed pertinent films in PACS, BMP/CMP:   Lab Results   Component Value Date    SODIUM 133 (L) 09/18/2019    K 5 1 09/18/2019    CL 98 (L) 09/18/2019    CO2 28 09/18/2019    BUN 15 09/18/2019    CREATININE 0 99 09/18/2019    CALCIUM 8 9 09/18/2019    AST 35 09/18/2019    ALT 14 09/18/2019    ALKPHOS 78 09/18/2019    EGFR 54 09/18/2019    and CBC:   Lab Results   Component Value Date    WBC 5 76 09/18/2019    HGB 12 1 09/18/2019    HCT 38 8 09/18/2019    MCV 91 09/18/2019     09/18/2019    MCH 28 4 09/18/2019    MCHC 31 2 (L) 09/18/2019    RDW 12 9 09/18/2019    MPV 9 9 09/18/2019    NRBC 0 09/18/2019     Imaging/EKG Studies: Results: I have personally reviewed pertinent films in PACS   Xr Shoulder 2+ Views Right    Result Date: 9/18/2019  Impression: No acute osseous abnormality  Degenerative changes as described  Workstation performed: [id number]     Ct Head Without Contrast    Result Date: 9/18/2019  Impression: No acute intracranial abnormality  Microangiopathic changes  Since 2016 there are new areas of chronic encephalomalacia involving the posterior superior left cerebellum and left occipital lobe suggesting prior CVA  Workstation performed: [id number]     Ct Chest Without Contrast    Result Date: 9/18/2019  Impression: Severe COPD  No acute traumatic pulmonary abnormality evident  Persistent scarlike consolidation in the superior segment of the right lower lobe  See above for further details  No pleural fluid collection or pneumothorax  Nondisplaced fractures of the right 2nd, 3rd and 4th ribs  The study was [facility] in [facility] for immediate notification  Workstation performed: [id number]     Ct Spine Cervical Without Contrast    Result Date: 9/18/2019  Impression: No cervical spine fracture or traumatic malalignment  Severe degenerative disc disease    Workstation performed: [id number]     Other Studies:     Code Status: Level 3 - DNAR and DNI  Advance Directive and Living Will:      Power of :    POLST:

## 2019-09-18 NOTE — ED NOTES
"I was walking into [employer] in [place] and just went down"  Pt states feeling better after pain medication       Debby Juarez RN  09/18/19 6532

## 2019-09-18 NOTE — CONSULTS
Consultation - [address] 78 y o  female MRN: [id number]  Unit/Bed#: ED 28 Encounter: [id number]      Assessment/Plan     Fall  Patient experience fall 5 days ago while walking  It appears to be not purely mechanical as she did not trip  She is noted to have right lower extremity weakness on exam   This patient does have history of bilateral hip replacement and right knee replacement  She also complains of fatigue significantly limiting her activities   - PT/OT evaluation  - patient may benefit from using her assistive devices that she has at home from her surgery many years ago    Fatigue  As above, patient complains of worsening fatigue and setting of sleeping up to 12-13 hours per day of interrupted sleep  She uses supplemental oxygen overnight for her COPD, and states that she has been using this more frequently recently  TSH is normal    - also complains of occasional diaphoresis, unclear if this is related; would consider malignancy workup if so   * CBC with differential is largely normal today   * noted history of leukopenia which is no longer present  - recommend iron studies  - albumin is slightly low at 3 4  - recommend rechecking vitamin-D, B12, B6  - monitor overnight pulse ox    History of Present Illness   Physician Requesting Consult: Tobi Law,   Reason for Consult / Principal Problem: Fall  Hx and PE limited by: None  Additional history obtained from: Chart review      HPI: Chio Mendez is a 78y o  year old female with history of COPD with nocturnal oxygen when needed, CVA in 2016, HTN, aortic valve replacement, hip replacement x3, R knee replacement who presents with right rib fractures from fall  The patient reports that the fall occurred 5 days ago when she was walking outside friendlys  The patient is not sure exactly what caused the fall, since she did not trip or feel lightheaded    She states that she was not able to catch herself and that her head landed on soft ground in a garden  She was able to tolerate the pain initially but it became unbearable last night she presents to the hospital today  The patient reports that she has had probably 3-4 falls like this in the past 2 years  However, none have lead to injury in this way  The patient lives at home with her   They have family and friends nearby who helps him with driving  She reports that her  does heavy housework and laundry  The patient is able to manage household finances and does the cooking  She does grocery shopping weekly when her friend drive her, however she says that she is limited by fatigue  The patient complains of non restful sleep, stating that she sleeps from 11:00 p m  To 9:00 a m , sometimes feels rested in the morning, and sometimes takes 2-3 hour naps around 1 p m  Osmani Masterson She states that she wakes up approximately twice at night to use the bathroom  She states that she does not snore but her  does  The patient states that she has a decreased appetite recently  She complains of sweats but no fever or chills  She follows with a PCP based out of [facility]  She reports that her only medications are Prozac, benazepril, and omeprazole  She has a nebulizer that she uses at home when needed, and she does not require this daily  Inpatient consult to Gerontology     Date/Time 9/18/2019 4:10 PM     Performed by  Mukul Madden DO     Authorized by Paul Ferreira MD              Review of Systems   Constitutional: Positive for appetite change, diaphoresis and fatigue  Negative for chills and fever  Eyes: Negative for visual disturbance  Respiratory: Negative for cough, shortness of breath and wheezing  Cardiovascular: Negative for chest pain, palpitations and leg swelling  Gastrointestinal: Positive for nausea  Negative for abdominal distention, constipation, diarrhea and vomiting  Endocrine: Positive for heat intolerance  Genitourinary: Negative for difficulty urinating and dysuria  Neurological: Negative for dizziness, light-headedness and headaches  All other systems reviewed and are negative          Geriatric Conditions:   Memory:  Good, MiniCog 5/5 with recall of 3 words, however clock draw was abnormal  Mobility:  Good, limited by fatigue  Falls:  Pt reports 3-4 in past 2 years, none resulting in injury like this  Assistive Devices:  Has cane, walker, commode at home but has not used since knee surgery approx 10 years ago  [place]: Level 4  Nutrition/weight loss/grocery shopping/meal preparation: Patient is able to manage  Vision impairment: Reading glasses  Hearing impairment: None  Incontinence: None  Delirium: None  Polypharmacy: None  Patients primary residence:Home Lives with:  iADL's:  Telephone: patient, Shopping: patient, Food: patient, Housekeeping: patient, Laundry: , Transport: friends/family, Medications: patient, Finances: patient  ADL's:  Independent    Historical Information   Past Medical History:   Diagnosis Date    Arthritis     CHF [facility]congestive heart failure) ([facility] [address] )     Coronary artery disease     aortic valve replacement    Heart murmur     Hypertension      Past Surgical History:   Procedure Laterality Date    AORTIC VALVE REPLACEMENT      APPENDECTOMY      CHOLECYSTECTOMY      JOINT REPLACEMENT      bilaterl hip and right knee    TONSILECTOMY AND ADNOIDECTOMY      TONSILLECTOMY      TUBAL LIGATION       Social History   Social History     Substance and Sexual Activity   Alcohol Use Yes    Comment: rare     Social History     Substance and Sexual Activity   Drug Use No     Social History     Tobacco Use   Smoking Status Former Smoker    Packs/day: 1 00    Years: 20 00    Pack years: 20 00    Last attempt to quit: 2000    Years since quittin 7   Smokeless Tobacco Never Used     Family History: non-contributory    Meds/Allergies   all current active meds have been reviewed    No Known Allergies    Objective   No intake or output data in the 24 hours ending 09/18/19 1630  Invasive Devices     Peripheral Intravenous Line            Peripheral IV 09/18/19 Left;Ventral (anterior) Antecubital less than 1 day                Physical Exam   Constitutional: She is oriented to person, place, and time  She appears well-developed and well-nourished  HENT:   Head: Normocephalic and atraumatic  Eyes: Conjunctivae and EOM are normal  No scleral icterus  Cardiovascular: Normal rate, regular rhythm and normal heart sounds  No murmur heard  Pulmonary/Chest: Effort normal and breath sounds normal  She has no wheezes  She has no rales  Abdominal: Soft  Bowel sounds are normal  She exhibits no distension  There is no tenderness  There is no guarding  Musculoskeletal: She exhibits no edema, tenderness or deformity  Neurological: She is alert and oriented to person, place, and time  No cranial nerve deficit  RLE 4/5, all other extremities 5/5   Skin: Skin is warm and dry  No erythema  Psychiatric: She has a normal mood and affect  Her behavior is normal        Lab Results:   I have personally reviewed pertinent lab results including the following:  -BMP with sodium 133, chloride 98, K 5 1; TSH 3 082; CBC WNL    I have personally reviewed the following imaging study reports:  - CT chest - nondisplaced fractures of right ribs 2, 3, and 4   Right LL consolidation, reported as consistent with scarring, imaging evidence of COPD    Personal interpretation of imaging studies mentioned above:    - as above - COPD    Therapies:   PT: Pending  OT: Pending    VTE Prophylaxis: Heparin    Code Status: Level 3 - DNAR and DNI  Advance Directive and Living Will:      Power of :    POLST:      Family and Social Support: Patient lives with , has family, friends, and neighbors nearby who visit and drive them when needed  No data recorded    Goals of Care: Ongoing

## 2019-09-18 NOTE — EMTALA/ACUTE CARE TRANSFER
[facility] EMERGENCY DEPARTMENT  [address]  [place] [address]  Dept: [phone]      EMTALA TRANSFER CONSENT    NAME Cameron Albarran                                         1940                              MRN [id number]    I have been informed of my rights regarding examination, treatment, and transfer   by Dr Christa Daily MD    Benefits: Specialized equipment and/or services available at the receiving facility (Include comment)________________________    Risks: Potential for delay in receiving treatment      Transfer Request   I acknowledge that my medical condition has been evaluated and explained to me by the emergency department physician or other qualified medical person and/or my attending physician who has recommended and offered to me further medical examination and treatment  I understand the Hospital's obligation with respect to the treatment and stabilization of my emergency medical condition  I nevertheless request to be transferred  I release the Hospital, the doctor, and any other persons caring for me from all responsibility or liability for any injury or ill effects that may result from my transfer and agree to accept all responsibility for the consequences of my choice to transfer, rather than receive stabilizing treatment at the Hospital  I understand that because the transfer is my request, my insurance may not provide reimbursement for the services  The Hospital will assist and direct me and my family in how to make arrangements for transfer, but the hospital is not liable for any fees charged by the transport service  In spite of this understanding, I refuse to consent to further medical examination and treatment which has been offered to me, and request transfer to  [facility] [address], [address]   I authorize the performance of emergency medical procedures and treatments upon me in both transit and upon arrival at the receiving facility  Additionally, I authorize the release of any and all medical records to the receiving facility and request they be transported with me, if possible  I authorize the performance of emergency medical procedures and treatments upon me in both transit and upon arrival at the receiving facility  Additionally, I authorize the release of any and all medical records to the receiving facility and request they be transported with me, if possible  I understand that the safest mode of transportation during a medical emergency is an ambulance and that the Hospital advocates the use of this mode of transport  Risks of traveling to the receiving facility by car, including absence of medical control, life sustaining equipment, such as oxygen, and medical personnel has been explained to me and I fully understand them  (JON CORRECT BOX BELOW)  [  ]  I consent to the stated transfer and to be transported by ambulance/helicopter  [  ]  I consent to the stated transfer, but refuse transportation by ambulance and accept full responsibility for my transportation by car  I understand the risks of non-ambulance transfers and I exonerate the Hospital and its staff from any deterioration in my condition that results from this refusal     X___________________________________________    DATE  19  TIME________  Signature of patient or legally responsible individual signing on patient behalf           RELATIONSHIP TO PATIENT_________________________          Provider Certification    NAME Primitivo Demarco                                        [facility] 1940                              MRN [id number]    A medical screening exam was performed on the above named patient  Based on the examination:    Condition Necessitating Transfer The primary encounter diagnosis was Multiple rib fractures  A diagnosis of Fall, initial encounter was also pertinent to this visit      Patient Condition: The patient has been stabilized such that within reasonable medical probability, no material deterioration of the patient condition or the condition of the unborn child(hernesto) is likely to result from the transfer    Reason for Transfer: Level of Care needed not available at this facility    Transfer Requirements: [address]   · Space available and qualified personnel available for treatment as acknowledged by    · Agreed to accept transfer and to provide appropriate medical treatment as acknowledged by       Dr Cj Shoemakre  · Appropriate medical records of the examination and treatment of the patient are provided at the time of transfer   [facility], [address] _______  · Transfer will be performed by qualified personnel from    and appropriate transfer equipment as required, including the use of necessary and appropriate life support measures  Provider Certification: I have examined the patient and explained the following risks and benefits of being transferred/refusing transfer to the patient/family:  General risk, such as traffic hazards, adverse weather conditions, rough terrain or turbulence, possible failure of equipment (including vehicle or aircraft), or consequences of actions of persons outside the control of the transport personnel      Based on these reasonable risks and benefits to the patient and/or the unborn child(hernesto), and based upon the information available at the time of the patients examination, I certify that the medical benefits reasonably to be expected from the provision of appropriate medical treatments at another medical facility outweigh the increasing risks, if any, to the individuals medical condition, and in the case of labor to the unborn child, from effecting the transfer      X____________________________________________ DATE 09/18/19        TIME_______      ORIGINAL - SEND TO MEDICAL RECORDS   COPY - SEND WITH PATIENT DURING TRANSFER

## 2019-09-18 NOTE — ED NOTES
Pt states she uses oxygen via NC at night   "it's as needed, but I've been needing it more and more"       Nicolle Levine, BALDOMERO  09/18/19 7542

## 2019-09-19 ENCOUNTER — APPOINTMENT (INPATIENT)
Dept: RADIOLOGY | Facility: HOSPITAL | Age: 79
DRG: 184 | End: 2019-09-19
Payer: MEDICARE

## 2019-09-19 PROBLEM — S22.49XA MULTIPLE RIB FRACTURES: Status: ACTIVE | Noted: 2019-09-19
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
## 2019-09-19 LAB
ANION GAP SERPL CALCULATED.3IONS-SCNC: 7 MMOL/L (ref 4–13)
BASOPHILS # BLD AUTO: 0.02 THOUSANDS/ΜL (ref 0–0.1)
BASOPHILS NFR BLD AUTO: 1 % (ref 0–1)
BUN SERPL-MCNC: 15 MG/DL (ref 5–25)
CALCIUM SERPL-MCNC: 9.3 MG/DL (ref 8.3–10.1)
CHLORIDE SERPL-SCNC: 102 MMOL/L (ref 100–108)
CO2 SERPL-SCNC: 27 MMOL/L (ref 21–32)
CREAT SERPL-MCNC: 0.94 MG/DL (ref 0.6–1.3)
EOSINOPHIL # BLD AUTO: 0.06 THOUSAND/ΜL (ref 0–0.61)
EOSINOPHIL NFR BLD AUTO: 2 % (ref 0–6)
ERYTHROCYTE [DISTWIDTH] IN BLOOD BY AUTOMATED COUNT: 13 % (ref 11.6–15.1)
GFR SERPL CREATININE-BSD FRML MDRD: 57 ML/MIN/1.73SQ M
GLUCOSE SERPL-MCNC: 92 MG/DL (ref 65–140)
HCT VFR BLD AUTO: 37 % (ref 34.8–46.1)
HGB BLD-MCNC: 11.5 G/DL (ref 11.5–15.4)
IMM GRANULOCYTES # BLD AUTO: 0.02 THOUSAND/UL (ref 0–0.2)
IMM GRANULOCYTES NFR BLD AUTO: 1 % (ref 0–2)
LYMPHOCYTES # BLD AUTO: 0.49 THOUSANDS/ΜL (ref 0.6–4.47)
LYMPHOCYTES NFR BLD AUTO: 15 % (ref 14–44)
MCH RBC QN AUTO: 28.3 PG (ref 26.8–34.3)
MCHC RBC AUTO-ENTMCNC: 31.1 G/DL (ref 31.4–37.4)
MCV RBC AUTO: 91 FL (ref 82–98)
MONOCYTES # BLD AUTO: 0.3 THOUSAND/ΜL (ref 0.17–1.22)
MONOCYTES NFR BLD AUTO: 9 % (ref 4–12)
NEUTROPHILS # BLD AUTO: 2.31 THOUSANDS/ΜL (ref 1.85–7.62)
NEUTS SEG NFR BLD AUTO: 72 % (ref 43–75)
NRBC BLD AUTO-RTO: 0 /100 WBCS
PLATELET # BLD AUTO: 160 THOUSANDS/UL (ref 149–390)
PMV BLD AUTO: 9.8 FL (ref 8.9–12.7)
POTASSIUM SERPL-SCNC: 4.6 MMOL/L (ref 3.5–5.3)
RBC # BLD AUTO: 4.06 MILLION/UL (ref 3.81–5.12)
SODIUM SERPL-SCNC: 136 MMOL/L (ref 136–145)
WBC # BLD AUTO: 3.2 THOUSAND/UL (ref 4.31–10.16)

## 2019-09-19 PROCEDURE — 99232 SBSQ HOSP IP/OBS MODERATE 35: CPT | Performed by: INTERNAL MEDICINE

## 2019-09-19 PROCEDURE — G8987 SELF CARE CURRENT STATUS: HCPCS

## 2019-09-19 PROCEDURE — G8978 MOBILITY CURRENT STATUS: HCPCS

## 2019-09-19 PROCEDURE — 94760 N-INVAS EAR/PLS OXIMETRY 1: CPT

## 2019-09-19 PROCEDURE — 85025 COMPLETE CBC W/AUTO DIFF WBC: CPT | Performed by: EMERGENCY MEDICINE

## 2019-09-19 PROCEDURE — 99232 SBSQ HOSP IP/OBS MODERATE 35: CPT | Performed by: SURGERY

## 2019-09-19 PROCEDURE — G8988 SELF CARE GOAL STATUS: HCPCS

## 2019-09-19 PROCEDURE — NC001 PR NO CHARGE: Performed by: SURGERY

## 2019-09-19 PROCEDURE — 97163 PT EVAL HIGH COMPLEX 45 MIN: CPT

## 2019-09-19 PROCEDURE — 97166 OT EVAL MOD COMPLEX 45 MIN: CPT

## 2019-09-19 PROCEDURE — 80048 BASIC METABOLIC PNL TOTAL CA: CPT | Performed by: EMERGENCY MEDICINE

## 2019-09-19 PROCEDURE — 71046 X-RAY EXAM CHEST 2 VIEWS: CPT

## 2019-09-19 PROCEDURE — G8979 MOBILITY GOAL STATUS: HCPCS

## 2019-09-19 RX ORDER — DOCUSATE SODIUM 100 MG/1
100 CAPSULE, LIQUID FILLED ORAL 2 TIMES DAILY
Status: DISCONTINUED | OUTPATIENT
Start: 2019-09-19 | End: 2019-09-20 | Stop reason: HOSPADM

## 2019-09-19 RX ORDER — SENNOSIDES 8.6 MG
1 TABLET ORAL
Status: DISCONTINUED | OUTPATIENT
Start: 2019-09-19 | End: 2019-09-20 | Stop reason: HOSPADM

## 2019-09-19 RX ADMIN — ACETAMINOPHEN 975 MG: 325 TABLET ORAL at 12:42

## 2019-09-19 RX ADMIN — LIDOCAINE 1 PATCH: 50 PATCH CUTANEOUS at 16:05

## 2019-09-19 RX ADMIN — HEPARIN SODIUM 5000 UNITS: 5000 INJECTION INTRAVENOUS; SUBCUTANEOUS at 05:16

## 2019-09-19 RX ADMIN — ACETAMINOPHEN 975 MG: 325 TABLET ORAL at 05:16

## 2019-09-19 RX ADMIN — GABAPENTIN 100 MG: 100 CAPSULE ORAL at 22:11

## 2019-09-19 RX ADMIN — ACETAMINOPHEN 975 MG: 325 TABLET ORAL at 22:10

## 2019-09-19 RX ADMIN — OXYCODONE HYDROCHLORIDE 5 MG: 5 TABLET ORAL at 16:04

## 2019-09-19 RX ADMIN — SENNOSIDES 8.6 MG: 8.6 TABLET, FILM COATED ORAL at 22:12

## 2019-09-19 RX ADMIN — OXYCODONE HYDROCHLORIDE 5 MG: 5 TABLET ORAL at 09:48

## 2019-09-19 RX ADMIN — MELATONIN 3 MG: 3 TAB ORAL at 22:11

## 2019-09-19 RX ADMIN — ENOXAPARIN SODIUM 30 MG: 30 INJECTION SUBCUTANEOUS at 23:38

## 2019-09-19 RX ADMIN — OXYCODONE HYDROCHLORIDE 5 MG: 5 TABLET ORAL at 22:06

## 2019-09-19 NOTE — PLAN OF CARE
Problem: PHYSICAL THERAPY ADULT  Goal: Performs mobility at highest level of function for planned discharge setting  See evaluation for individualized goals  Description  Treatment/Interventions: Functional transfer training, LE strengthening/ROM, Elevations, Therapeutic exercise, Endurance training, Patient/family training, Equipment eval/education, Bed mobility, Gait training, Spoke to nursing, OT  Equipment Recommended: Walker(RW)       See flowsheet documentation for full assessment, interventions and recommendations  Note:   Prognosis: Good  Problem List: Decreased strength, Decreased endurance, Impaired balance, Decreased mobility, Pain, Decreased safety awareness, Decreased cognition  Assessment: Pt is 78 y o  female seen for PT evaluation s/p admit to [facility] on 9/18/2019  Two pt identifiers were used to confirm  Pt presented s/p fall 3 days ago while walking into restaurant with increased R rib pain  Pt was admitted with a primary dx of: R 2-4 rib fxs,   PT now consulted for assessment of mobility and d/c needs  Pt with OOB to chair orders  Pts current co morbidities effecting treatment include: CHF, CAD, HTN, heart murmur, and person factors including EZRA home  Pts current clinical presentation is Unstable/ Unpredictable (high complexity) due to Ongoing medical management for primary dx, Increased reliance on more restrictive AD compared to baseline, Decreased activity tolerance compared to baseline, Fall risk, Increased assistance needed from caregiver at current time, Cog status, Continuous pulse oximetry monitoring     Prior to admission, pt was I with ambulation without the use of an AD as per pt  Upon evaluation, pt currently is requiring ; S for transfers and S for ambulation w/ RW   Pt denies any lightheadedness or dizziness with ambulation   Pt presents at PT eval functioning below baseline and currently w/ overall mobility deficits 2* to: BLE weakness, impaired balance, decreased endurance, gait deviations, pain, decreased activity tolerance compared to baseline, decreased safety awareness, fall risk  Pt currently at a fall risk 2* to impairments listed above  Based on the aforementioned PT evaluation, pt will continue to benefit from skilled Acute PT interventions to address stated impairments; to maximize functional mobility; for ongoing pt/ family training; and DME needs  At conclusion of PT session pt returned back in chair with phone and call bell within reach  Pt denies any further questions at this time  PT is currently recommending home PT, home with family support  Pt/ family agreeable to plan and goals as stated on evaluation  PT will continue to follow during hospital stay  Barriers to Discharge: None  Barriers to Discharge Comments: Pt denies any mobility or safety concerns at d/c   Recommendation: Home with family support, Home PT, Other (Comment)(increased family support)          See flowsheet documentation for full assessment  Dehydration

## 2019-09-19 NOTE — RESTORATIVE TECHNICIAN NOTE
Restorative Specialist Mobility Note       Activity: Bathroom privileges, Stand at bedside, Chair([id number])     Assistive Device: Other (Comment)     Ambulation Response: Tolerated well  Repositioned: Up in chair              Anti-Embolism Device On: Bilateral, Sequential compression devices, below knee     Pt left in chair with chair alarm and call bell within reach      Vernadine Mcburney

## 2019-09-19 NOTE — PLAN OF CARE
Problem: Potential for Falls  Goal: Patient will remain free of falls  Description  INTERVENTIONS:  - Assess patient frequently for physical needs  -  Identify cognitive and physical deficits and behaviors that affect risk of falls    -  [place] fall precautions as indicated by assessment   - Educate patient/family on patient safety including physical limitations  - Instruct patient to call for assistance with activity based on assessment  - Modify environment to reduce risk of injury  - Consider OT/PT consult to assist with strengthening/mobility  Outcome: Progressing     Problem: PAIN - ADULT  Goal: Verbalizes/displays adequate comfort level or baseline comfort level  Description  Interventions:  - Encourage patient to monitor pain and request assistance  - Assess pain using appropriate pain scale  - Administer analgesics based on type and severity of pain and evaluate response  - Implement non-pharmacological measures as appropriate and evaluate response  - Consider cultural and social influences on pain and pain management  - Notify physician/advanced practitioner if interventions unsuccessful or patient reports new pain  Outcome: Progressing     Problem: INFECTION - ADULT  Goal: Absence or prevention of progression during hospitalization  Description  INTERVENTIONS:  - Assess and monitor for signs and symptoms of infection  - Monitor lab/diagnostic results  - Monitor all insertion sites, i e  indwelling lines, tubes, and drains  - Monitor endotracheal if appropriate and nasal secretions for changes in amount and color  - [place] appropriate cooling/warming therapies per order  - Administer medications as ordered  - Instruct and encourage patient and family to use good hand hygiene technique  - Identify and instruct in appropriate isolation precautions for identified infection/condition  Outcome: Progressing  Goal: Absence of fever/infection during neutropenic period  Description  INTERVENTIONS:  - Monitor WBC    Outcome: Progressing     Problem: SAFETY ADULT  Goal: Patient will remain free of falls  Description  INTERVENTIONS:  - Assess patient frequently for physical needs  -  Identify cognitive and physical deficits and behaviors that affect risk of falls    -  [place] fall precautions as indicated by assessment   - Educate patient/family on patient safety including physical limitations  - Instruct patient to call for assistance with activity based on assessment  - Modify environment to reduce risk of injury  - Consider OT/PT consult to assist with strengthening/mobility  Outcome: Progressing  Goal: Maintain or return to baseline ADL function  Description  INTERVENTIONS:  -  Assess patient's ability to carry out ADLs; assess patient's baseline for ADL function and identify physical deficits which impact ability to perform ADLs (bathing, care of mouth/teeth, toileting, grooming, dressing, etc )  - Assess/evaluate cause of self-care deficits   - Assess range of motion  - Assess patient's mobility; develop plan if impaired  - Assess patient's need for assistive devices and provide as appropriate  - Encourage maximum independence but intervene and supervise when necessary  - Involve family in performance of ADLs  - Assess for home care needs following discharge   - Consider OT consult to assist with ADL evaluation and planning for discharge  - Provide patient education as appropriate  Outcome: Progressing  Goal: Maintain or return mobility status to optimal level  Description  INTERVENTIONS:  - Assess patient's baseline mobility status (ambulation, transfers, stairs, etc )    - Identify cognitive and physical deficits and behaviors that affect mobility  - Identify mobility aids required to assist with transfers and/or ambulation (gait belt, sit-to-stand, lift, walker, cane, etc )  - [place] fall precautions as indicated by assessment  - Record patient progress and toleration of activity level on Mobility SBAR; progress patient to next Phase/Stage  - Instruct patient to call for assistance with activity based on assessment  - Consider rehabilitation consult to assist with strengthening/weightbearing, etc   Outcome: Progressing     Problem: DISCHARGE PLANNING  Goal: Discharge to home or other facility with appropriate resources  Description  INTERVENTIONS:  - Identify barriers to discharge w/patient and caregiver  - Arrange for needed discharge resources and transportation as appropriate  - Identify discharge learning needs (meds, wound care, etc )  - Arrange for interpretive services to assist at discharge as needed  - Refer to Case Management Department for coordinating discharge planning if the patient needs post-hospital services based on physician/advanced practitioner order or complex needs related to functional status, cognitive ability, or social support system  Outcome: Progressing     Problem: Knowledge Deficit  Goal: Patient/family/caregiver demonstrates understanding of disease process, treatment plan, medications, and discharge instructions  Description  Complete learning assessment and assess knowledge base    Interventions:  - Provide teaching at level of understanding  - Provide teaching via preferred learning methods  Outcome: Progressing

## 2019-09-19 NOTE — PLAN OF CARE
Problem: OCCUPATIONAL THERAPY ADULT  Goal: Performs self-care activities at highest level of function for planned discharge setting  See evaluation for individualized goals  Description             See flowsheet documentation for full assessment, interventions and recommendations  Note:         Assessment: Pt is 78 y o  female seen for OT evaluation admitted to [place] [address] on 2019 with Multiple rib fractures s/p fall/syncope  pt denies LOC  Pt has a hx of HTN, CVA, GERD and depression  Orders were placed for OT evaluation and treatment  At least 2 pt identifers were utilized including name/ and wristband check  Prior to admission, pt reported to be independent with ADLs/functional mobility except for occasional help with closing her bra in the back  Pt shares IADLs of cooking/cleaning with  who she lives with  Pt does not drive and  is home with her most of the time  Pt has friends/family close by as well  During time of evaluation, pt was Supervision UB ADLs, Minimal Assistance LB ADLs and Supervision functional mobility  Pt required verbal cues for safety awareness and energy conservation  Deficits currently affecting pt's occupational performance include: orientation, attention span, decreased activity tolerance, impaired memory, impaired problem solving and decreased safety awareness  Pt has family/friends at home and that live close to her that can assist pt with deficits previously listed  Pt would continue to benefit from skilled OT services while in the hospital  Areas of occupation to be addressed include ADLs/IADLs: bathing, dressing, grooming, toileting and toilet hygiene, oral hygiene, functional mobility, health maintenance and management, community mobility household management and cognitive reorientation  From an OT standpoint, pt would benefit from Home with family support at time of discharge  to return to prior level of functioning        OT Discharge Recommendation: Home with family support

## 2019-09-19 NOTE — PROGRESS NOTES
Geriatric Medicine Progress Note   Unit/Bed#: [facility] 617-01 Encounter: [id number]        Ibeth Grover 78 y o  female [id number]    Hospital Stay Days: 1      Assessment/Plan:    Active Problems:    Hypertension    Multiple rib fractures    Fall    Ambulatory dysfunction with fall  Patient had a fall with incomplete recollection of events/unclear trigger 6 days ago, with subsequent rib fractures  She complains of feeling tired today  She states that her pain was very well controlled overnight but that it has recently worsened   - PT/OT  - may benefit from short term rehab    Rib fractures  Fractures of ribs 2,3,4 on the right secondary to fall  - pain control per protocol - APAP [id number] scheduled, oxy 2 5, dilaudid for breakthrough  - lung cancer history - reestablish with outpatient oncologist at [facility] - pt does not recall his name    Hyponatremia - resolved  Now resolved  Possibly secondary to dehydration and poor PO intake  - patient may benefit from nutritional evaluation, albumin slightly low yesterday at 3 4    Possible mild cognitive impairment  Patient has history of CVAs, aortic valve replacement  Today she discloses that she also has a history of lung cancer, about 3-4 years ago that was treated with radiation only  - recommend outpatient geriatric assessment  - delirium precautions    Disposition: Continue inpatient      Subjective:   Patient seen and examined  She reports that her pain was very well controlled overnight but that it has gotten worse this morning  She is otherwise feeling well; denies fever/chills/nausea/vomiting  She reports this morning that she has a history of lung cancer, 3-4 years ago  She states that she refused chemo and that it was treated with radiation only  She says this was all done at [facility], and that she had been following with an oncologist but has not seen him in some time  She does not recall his name          Vitals: Temp (24hrs), Av °F (36 7 °C), Min:97 8 °F (36 6 °C), Max:98 2 °F (36 8 °C)  Current: Temperature: 97 8 °F (36 6 °C)  Vitals:    09/18/19 1932 09/18/19 2215 09/19/19 0100 09/19/19 0654   BP: 138/72 119/68  152/76   Pulse: 99 101 92 89   Resp: 17 17  18   Temp: 98 1 °F (36 7 °C) 97 9 °F (36 6 °C)  97 8 °F (36 6 °C)   SpO2: 93% 93% 99% 100%   Weight:        Body mass index is 19 75 kg/m²  I/O last 24 hours: In: 61 [P O :60]  Out: -     Physical Exam   Constitutional: She is oriented to person, place, and time  She appears well-developed and well-nourished  Thin   HENT:   Head: Normocephalic and atraumatic  Eyes: Conjunctivae are normal  No scleral icterus  Cardiovascular: Normal rate, regular rhythm and normal heart sounds  No murmur heard  Pulmonary/Chest: Effort normal and breath sounds normal  She has no wheezes  She has no rales  Abdominal: Soft  Bowel sounds are normal  She exhibits no distension  There is no tenderness  There is no guarding  Musculoskeletal: She exhibits no edema, tenderness or deformity  Neurological: She is alert and oriented to person, place, and time  Skin: Skin is warm and dry  No erythema  Psychiatric: She has a normal mood and affect   Her behavior is normal        Invasive Devices     Peripheral Intravenous Line            Peripheral IV 09/18/19 Left;Ventral (anterior) Antecubital less than 1 day                          Labs:   Recent Results (from the past 24 hour(s))   Protime-INR    Collection Time: 09/18/19 11:12 AM   Result Value Ref Range    Protime 9 9 9 8 - 12 0 seconds    INR 0 92 0 91 - 1 09   APTT    Collection Time: 09/18/19 11:12 AM   Result Value Ref Range    PTT 22 (L) 23 - 37 seconds   Comprehensive metabolic panel    Collection Time: 09/18/19 11:12 AM   Result Value Ref Range    Sodium 130 (L) 136 - 145 mmol/L    Potassium 6 1 (H) 3 5 - 5 3 mmol/L    Chloride 98 (L) 100 - 108 mmol/L    CO2 26 21 - 32 mmol/L    ANION GAP 6 4 - 13 mmol/L    BUN 16 5 - 25 mg/dL    Creatinine 0 80 0 60 - 1 30 mg/dL    Glucose 103 65 - 140 mg/dL    Calcium 8 8 8 3 - 10 1 mg/dL    AST 35 5 - 45 U/L    ALT 14 12 - 78 U/L    Alkaline Phosphatase 78 46 - 116 U/L    Total Protein 7 2 6 4 - 8 2 g/dL    Albumin 3 4 (L) 3 5 - 5 0 g/dL    Total Bilirubin 0 50 0 20 - 1 00 mg/dL    eGFR 69 ml/min/1 73sq m   Troponin I    Collection Time: 09/18/19 11:12 AM   Result Value Ref Range    Troponin I <0 02 <=0 04 ng/mL   ECG 12 lead    Collection Time: 09/18/19 11:13 AM   Result Value Ref Range    Ventricular Rate 99 BPM    Atrial Rate 99 BPM    TN Interval 148 ms    QRSD Interval 80 ms    QT Interval 346 ms    QTC Interval 444 ms    P Axis 44 degrees    QRS Axis -22 degrees    T Wave Axis 64 degrees   CBC and differential    Collection Time: 09/18/19 11:54 AM   Result Value Ref Range    WBC 5 76 4 31 - 10 16 Thousand/uL    RBC 4 26 3 81 - 5 12 Million/uL    Hemoglobin 12 1 11 5 - 15 4 g/dL    Hematocrit 38 8 34 8 - 46 1 %    MCV 91 82 - 98 fL    MCH 28 4 26 8 - 34 3 pg    MCHC 31 2 (L) 31 4 - 37 4 g/dL    RDW 12 9 11 6 - 15 1 %    MPV 9 7 8 9 - 12 7 fL    Platelets 542 986 - 076 Thousands/uL    nRBC 0 /100 WBCs    Neutrophils Relative 85 (H) 43 - 75 %    Immat GRANS % 0 0 - 2 %    Lymphocytes Relative 8 (L) 14 - 44 %    Monocytes Relative 6 4 - 12 %    Eosinophils Relative 1 0 - 6 %    Basophils Relative 0 0 - 1 %    Neutrophils Absolute 4 88 1 85 - 7 62 Thousands/µL    Immature Grans Absolute 0 02 0 00 - 0 20 Thousand/uL    Lymphocytes Absolute 0 45 (L) 0 60 - 4 47 Thousands/µL    Monocytes Absolute 0 37 0 17 - 1 22 Thousand/µL    Eosinophils Absolute 0 03 0 00 - 0 61 Thousand/µL    Basophils Absolute 0 01 0 00 - 0 10 Thousands/µL   Basic metabolic panel    Collection Time: 09/18/19 12:06 PM   Result Value Ref Range    Sodium 133 (L) 136 - 145 mmol/L    Potassium 5 1 3 5 - 5 3 mmol/L    Chloride 98 (L) 100 - 108 mmol/L    CO2 28 21 - 32 mmol/L    ANION GAP 7 4 - 13 mmol/L    BUN 15 5 - 25 mg/dL    Creatinine 0 99 0 60 - 1 30 mg/dL    Glucose 102 65 - 140 mg/dL    Calcium 8 9 8 3 - 10 1 mg/dL    eGFR 54 ml/min/1 73sq m   TSH, 3rd generation with Free T4 reflex    Collection Time: 09/18/19 12:06 PM   Result Value Ref Range    TSH 3RD GENERATON 3 082 0 358 - 3 740 uIU/mL   Protime-INR STAT    Collection Time: 09/18/19  3:06 PM   Result Value Ref Range    Protime 12 9 11 6 - 14 5 seconds    INR 1 01 0 84 - 1 19   APTT STAT    Collection Time: 09/18/19  3:06 PM   Result Value Ref Range    PTT 29 23 - 37 seconds   Platelet count    Collection Time: 09/18/19  3:06 PM   Result Value Ref Range    Platelets 263 868 - 435 Thousands/uL    MPV 9 9 8 9 - 12 7 fL   CBC and differential    Collection Time: 09/19/19  5:06 AM   Result Value Ref Range    WBC 3 20 (L) 4 31 - 10 16 Thousand/uL    RBC 4 06 3 81 - 5 12 Million/uL    Hemoglobin 11 5 11 5 - 15 4 g/dL    Hematocrit 37 0 34 8 - 46 1 %    MCV 91 82 - 98 fL    MCH 28 3 26 8 - 34 3 pg    MCHC 31 1 (L) 31 4 - 37 4 g/dL    RDW 13 0 11 6 - 15 1 %    MPV 9 8 8 9 - 12 7 fL    Platelets 774 162 - 232 Thousands/uL    nRBC 0 /100 WBCs    Neutrophils Relative 72 43 - 75 %    Immat GRANS % 1 0 - 2 %    Lymphocytes Relative 15 14 - 44 %    Monocytes Relative 9 4 - 12 %    Eosinophils Relative 2 0 - 6 %    Basophils Relative 1 0 - 1 %    Neutrophils Absolute 2 31 1 85 - 7 62 Thousands/µL    Immature Grans Absolute 0 02 0 00 - 0 20 Thousand/uL    Lymphocytes Absolute 0 49 (L) 0 60 - 4 47 Thousands/µL    Monocytes Absolute 0 30 0 17 - 1 22 Thousand/µL    Eosinophils Absolute 0 06 0 00 - 0 61 Thousand/µL    Basophils Absolute 0 02 0 00 - 0 10 Thousands/µL   Basic Metabolic Panel    Collection Time: 09/19/19  5:06 AM   Result Value Ref Range    Sodium 136 136 - 145 mmol/L    Potassium 4 6 3 5 - 5 3 mmol/L    Chloride 102 100 - 108 mmol/L    CO2 27 21 - 32 mmol/L    ANION GAP 7 4 - 13 mmol/L    BUN 15 5 - 25 mg/dL    Creatinine 0 94 0 60 - 1 30 mg/dL    Glucose 92 65 - 140 mg/dL    Calcium 9 3 8 3 - 10 1 mg/dL eGFR 57 ml/min/1 73sq m       Radiology Results: I have personally reviewed pertinent reports  Other Diagnostic Testing:   I have personally reviewed pertinent reports          Active Meds:   Current Facility-Administered Medications   Medication Dose Route Frequency    acetaminophen (TYLENOL) tablet 975 mg  975 mg Oral Q8H [address]    gabapentin (NEURONTIN) capsule 100 mg  100 mg Oral HS    heparin (porcine) subcutaneous injection 5,000 Units  5,000 Units Subcutaneous Q8H [address]    HYDROmorphone (DILAUDID) injection 0 2 mg  0 2 mg Intravenous Q4H PRN    lidocaine (LIDODERM) 5 % patch 1 patch  1 patch Topical Daily    melatonin tablet 3 mg  3 mg Oral HS    oxyCODONE (ROXICODONE) IR tablet 2 5 mg  2 5 mg Oral Q4H PRN    oxyCODONE (ROXICODONE) IR tablet 5 mg  5 mg Oral Q4H PRN           Kaylene Iglesias DO  Internal Medicine Resident PGY-2

## 2019-09-19 NOTE — OCCUPATIONAL THERAPY NOTE
Occupational Therapy Evaluation         Patient Name: Santo Adams  [id number] Date: 9/19/2019 09/19/19 1101   Note Type   Note type Eval only   Restrictions/Precautions   Weight Bearing Precautions Per Order No   Other Precautions Chair Alarm;Cognitive;Multiple lines; Fall Risk;Pain   Pain Assessment   Pain Assessment 0-10   Pain Score 5   Pain Type Acute pain   Pain Location Rib cage   Pain Orientation Right   Pain Descriptors Aching;Burning   Pain Frequency Constant/continuous   Pain Onset Ongoing   Clinical Progression Gradually improving   Patient's Stated Pain Goal No pain   Hospital Pain Intervention(s) Repositioned; Ambulation/increased activity   Response to Interventions tolerated    Multiple Pain Sites Yes   Pain 2   Pain Score 2 5   Pain Location 2 Shoulder   Pain Orientation 2 Bilateral  (pt reported chronic arthritis)   Home Living   Type of [address] [place] [address] Two level;Bed/bath upstairs; Access;Stairs to enter with rails  (4 [employer]; 13 steps to bed/bath)   Bathroom Shower/Tub Tub/shower unit   Bathroom Toilet Standard   Bathroom Equipment Shower chair   Bathroom Accessibility Accessible   Home Equipment Walker;Cane;Other (Comment)  (no AD at baseline)   Prior Function   Level of [place] Needs assistance with ADLs and functional mobility; Independent with ADLs and functional mobility   Lives With Spouse   Receives Help From Family;Friend(s)   ADL Assistance Independent  (pt reported occasional help with closing bra in back)   IADLs Needs assistance  (shares with  )   Falls in the last 6 months 1 to 4  (2 per pt; questionable historian )   Vocational Retired  (previous [employer])   Comments pt does not drives; recieves rides from family/friends   Lifestyle   Autonomy pt reported to be independent with ADLs/functional mobilit except for occasional help with closing her bra in the back  Pt shares IADLs of cooking/cleaning with  who she lives with   Pt does not frive and  is home with her most of the time  Pt has friends/family close by as well  Reciprocal Relationships Supportive family, friends   Service to Others retired nursing aide   Intrinsic Gratification cooking, crosswords   Psychosocial   Psychosocial ([address]) [address] " My  helps me at home with things I can't do"  ADL   Where Assessed Chair   Equipment Provided Other (Comment)  (none provided)   Eating Assistance 7  Independent   Grooming Assistance 5  Supervision/Setup   Grooming Deficit Setup;Verbal cueing;Supervision/safety; Increased time to complete   UB Bathing Assistance 5  Supervision/Setup   UB Bathing Deficit Setup;Verbal cueing;Supervision/safety; Increased time to complete   LB Bathing Assistance 4  Minimal Assistance   LB Bathing Deficit Setup;Verbal cueing;Supervision/safety; Increased time to complete   UB Dressing Assistance 5  Supervision/Setup   UB Dressing Deficit Setup;Verbal cueing;Supervision/safety; Increased time to complete   LB Dressing Assistance 4  Minimal Assistance   LB Dressing Deficit Setup;Verbal cueing;Supervision/safety; Increased time to complete   Toileting Assistance  5  Supervision/Setup   Toileting Deficit Setup;Verbal cueing;Supervison/safety; Increased time to complete   Functional Assistance 5  Supervision/Setup   Functional Deficit Setup;Verbal cueing;Supervision/safety; Increased time to complete   Bed Mobility   Additional Comments pt received OOB in recliner and session ended with pt in recliner, alarm on and current needs met  Transfers   Sit to Stand 5  Supervision   Additional items Assist x 1; Increased time required;Verbal cues   Stand to Sit 5  Supervision   Additional items Assist x 1; Increased time required;Verbal cues   Functional Mobility   Functional Mobility 5  Supervision   Additional Comments Ax1  Pt required verbal cues for energy conservation and safety awareness      Additional items Rolling walker   Balance   Static Sitting Fair +   Dynamic Sitting Fair +   Static Standing Fair   Dynamic Standing [address], & [address] -   Activity Tolerance   Activity Tolerance Patient limited by pain   Medical Staff Made Aware PT JULIUS Sorensen   RUDAVID Assessment   RUE Assessment WFL  (assessed functionally )   LUE Assessment   LUE Assessment WFL  (assessed functionally )   Hand Function   Gross Motor Coordination Functional   Fine Motor Coordination Functional   Vision-Basic Assessment   Current Vision Wears glasses only for reading   Patient Visual Report Other (Comment)  (pt reported no recent changes)   Perception   Inattention/Neglect Appears intact   Motor Planning Appears intact   Cognition   Overall Cognitive Status Impaired   Arousal/Participation Alert; Responsive;Arousable; Cooperative   Attention Attends with cues to redirect   Orientation Level Oriented to person;Oriented to place;Oriented to situation   Memory Decreased recall of recent events;Decreased short term memory   Following Commands Follows one step commands with increased time or repetition   Comments pt agreeable to OT eval   Assessment   Assessment Pt is 78 y o  female seen for OT evaluation admitted to [place] [address] on 2019 with Multiple rib fractures s/p fall/syncope  pt denies LOC  Pt has a hx of HTN, CVA, GERD and depression  Orders were placed for OT evaluation and treatment  At least 2 pt identifers were utilized including name/ and wristband check  Prior to admission, pt reported to be independent with ADLs/functional mobility except for occasional help with closing her bra in the back  Pt shares IADLs of cooking/cleaning with  who she lives with  Pt does not drive and  is home with her most of the time  Pt has friends/family close by as well  During time of evaluation, pt was Supervision UB ADLs, Minimal Assistance LB ADLs and Supervision functional mobility   Pt required verbal cues for safety awareness and energy conservation  Deficits currently affecting pt's occupational performance include: orientation, attention span, decreased activity tolerance, impaired memory, impaired problem solving and decreased safety awareness  Pt has family/friends at home and that live close to her that can assist pt with deficits previously listed  Pt would continue to benefit from skilled OT services while in the hospital  Areas of occupation to be addressed include ADLs/IADLs: bathing, dressing, grooming, toileting and toilet hygiene, oral hygiene, functional mobility, health maintenance and management, community mobility household management and cognitive reorientation  From an OT standpoint, pt would benefit from Home OT at time of discharge  to return to prior level of functioning  Goals   Patient Goals pt expressed need to go home   Recommendation   OT Discharge Recommendation Home OT   Barthel Index   Feeding 10   Bathing 0   Grooming Score 0   Dressing Score 5   Bladder Score 10   Bowels Score 10   Toilet Use Score 5   Transfers (Bed/Chair) Score 10   Mobility (Level Surface) Score 0   Stairs Score 5   Barthel Index Score 55   Modified Nneka Scale   Modified Nneka Scale 4     Occupational Therapy Goals:     1  Pt will complete LB dressing independently with no verbal cues for proper body mechanics   2  Pt will tolerate 35-40 minute OT session with no verbal cues for deep breathing techniques to increase activity tolerance  3  Pt will complete transfers to/from all surfaces independently with no verbal cues for safety awreness  4  Pt will be independent with functional mobility during participation in ADLs/IADLs with no verbal cues for energy conservation  5  Pt will complete further cognitive testing to determine cognitive level of  functioning

## 2019-09-19 NOTE — UTILIZATION REVIEW
Initial Clinical Review    Admission: Date/Time/Statement: Inpatient Admission Orders (From admission, onward)     Ordered        09/18/19 1452  Inpatient Admission  Once                   Orders Placed This Encounter   Procedures    Inpatient Admission     Standing Status:   Standing     Number of Occurrences:   1     Order Specific Question:   Admitting Physician     Answer:   Mell Dia [[id number]]     Order Specific Question:   Level of Care     Answer:   Med Surg [[id number]]     Order Specific Question:   Estimated length of stay     Answer:   More than 2 Midnights     Order Specific Question:   Certification     Answer:   I certify that inpatient services are medically necessary for this patient for a duration of greater than two midnights  See H&P and MD Progress Notes for additional information about the patient's course of treatment  ED Arrival Information     Expected Arrival Acuity Means of Arrival Escorted By Service Admission Type    9/18/2019 13:29 9/18/2019 13:29 Emergent Ambulance SLETS DEPARTMENT [facility]) Trauma Emergency    Arrival Complaint    fall,rib fractures        Chief Complaint   Patient presents with    Fall     Pt fell on friday walking  Does not know why she fell, pt has rib fx on R side, 2, 3, 4  Assessment/Plan: 78 y o  female who presents with rib fractures  Patient fell 3 days ago while attempting to walk in a restaurant  She states that she fell hard on her right shoulder  No LOC, vomoting  Complains of worsening right sided rib pain & pain with coughing  Found to have right 2-4 rib fractures on CT chest obtained @ [facility]  Pain well controlled with oxycodone PTA  No blood thinners  Hx of CHF s/p TAVR, COPD, HTN      Trauma Active Problems:   -Right 2-4 rib fractures  -Fall   -Syncope     Trauma Plan:   -Rib fracture protocol  -Multimodal pain control  -APS consult  -Respiratory protocol  -Serial CXR  ED Triage Vitals   Temperature Pulse Respirations Blood Pressure SpO2   09/18/19 1932 09/18/19 1418 09/18/19 1418 09/18/19 1418 09/18/19 1417   98 1 °F (36 7 °C) 91 18 (!) 199/79 100 %      Temp src Heart Rate Source Patient Position - Orthostatic VS BP Location FiO2 (%)   -- 09/18/19 1418 09/18/19 1418 -- --    Monitor Lying        Pain Score       09/18/19 1420       3        Wt Readings from Last 1 Encounters:   09/18/19 55 5 kg (122 lb 5 7 oz)     Additional Vital Signs: oxygen 2 liters   09/19/19 06:54:40  97 8 °F (36 6 °C)  89  18  152/76  101  100 %  --  --   09/18/19 22:15:48  97 9 °F (36 6 °C)  101  17  119/68  85  93 %  --  --   09/18/19 19:32:39  98 1 °F (36 7 °C)  99  17  138/72  94  93 %  --  --   09/18/19 1751  --  93  18  174/90Abnormal   --  95 %  None (Room air)  Sitting   09/18/19 17:31:26  --  97  17  --  --  94 %  --  --   09/18/19 16:14:29  --  98  18  161/73  --  96 %  --  --   09/18/19 1503  --  100  19  141/105Abnormal   --  95 %  --  Lying   09/18/19 1448  --  102  17  141/105Abnormal   --  94 %           Pertinent Labs/Diagnostic Test Results:   9/18/2019 Xray right shoulder - No acute osseous abnormality  Degenerative changes    9/18/2019  CT head - No acute intracranial abnormality   Microangiopathic changes   Since 2016 there are new areas of chronic encephalomalacia involving the posterior superior left cerebellum and left occipital lobe suggesting prior CVA    9/18/2019  CT cervical spine - No cervical spine fracture or traumatic malalignment  Severe degenerative disc disease    9/18/2019 CT chest - Severe COPD   No acute traumatic pulmonary abnormality evident   Persistent scarlike consolidation in the superior segment of the right lower lobe   See above for further details  No pleural fluid collection or pneumothorax    Nondisplaced fractures of the right 2nd, 3rd and 4th ribs  Results from last 7 days   Lab Units 09/19/19  0506 09/18/19  1506 09/18/19  1154   WBC Thousand/uL 3 20*  --  5 76   HEMOGLOBIN g/dL 11 5  --  12 1   HEMATOCRIT % 37 0  --  38 8   PLATELETS Thousands/uL 160 163 187   NEUTROS ABS Thousands/µL 2 31  --  4 88     Results from last 7 days   Lab Units 09/19/19  0506 09/18/19  1206 09/18/19  1112   SODIUM mmol/L 136 133* 130*   POTASSIUM mmol/L 4 6 5 1 6 1*   CHLORIDE mmol/L 102 98* 98*   CO2 mmol/L 27 28 26   ANION GAP mmol/L 7 7 6   BUN mg/dL 15 15 16   CREATININE mg/dL 0 94 0 99 0 80   EGFR ml/min/1 73sq m 57 54 69   CALCIUM mg/dL 9 3 8 9 8 8     Results from last 7 days   Lab Units 09/18/19  1112   AST U/L 35   ALT U/L 14   ALK PHOS U/L 78   TOTAL PROTEIN g/dL 7 2   ALBUMIN g/dL 3 4*   TOTAL BILIRUBIN mg/dL 0 50     Results from last 7 days   Lab Units 09/19/19  0506 09/18/19  1206 09/18/19  1112   GLUCOSE RANDOM mg/dL 92 102 103     Results from last 7 days   Lab Units 09/18/19  1112   TROPONIN I ng/mL <0 02     Results from last 7 days   Lab Units 09/18/19  1506 09/18/19  1112   PROTIME seconds 12 9 9 9   INR  1 01 0 92   PTT seconds 29 22*     Results from last 7 days   Lab Units 09/18/19  1206   TSH 3RD GENERATON uIU/mL 3 082     ED Treatment:   Medication Administration from 09/18/2019 1329 to 09/18/2019 1914       Date/Time Order Dose Route Action Comments     09/18/2019 1507 lidocaine (LIDODERM) 5 % patch 1 patch 1 patch Topical Medication Applied      09/18/2019 1635 oxyCODONE (ROXICODONE) IR tablet 5 mg 5 mg Oral Given      09/18/2019 1633 acetaminophen (TYLENOL) tablet 975 mg 975 mg Oral Given      09/18/2019 1508 heparin (porcine) subcutaneous injection 5,000 Units 5,000 Units Subcutaneous Given         Past Medical History:   Diagnosis Date    Arthritis     CHF [facility]congestive heart failure) ([facility] [address] )     Coronary artery disease     aortic valve replacement    Heart murmur     Hypertension      Present on Admission:   Multiple rib fractures   Hypertension      Admitting Diagnosis: Multiple closed fractures of ribs [S22 49XA]  Age/Sex: 78 y o  female  Admission Orders:  9/18/2019  1452 INPATIENT     Current Facility-Administered Medications:  acetaminophen 975 mg Oral Q8H [address]   gabapentin 100 mg Oral HS   heparin (porcine) 5,000 Units Subcutaneous Q8H [address]   HYDROmorphone - used x 1 (8357) 0 2 mg Intravenous Q4H PRN   lidocaine 1 patch Topical Daily   melatonin 3 mg Oral HS   oxyCODONE 2 5 mg Oral Q4H PRN   oxyCODONE - used x 2  5 mg Oral Q4H PRN       IP CONSULT TO CASE MANAGEMENT  IP CONSULT TO ACUTE PAIN SERVICE  IP CONSULT TO GERONTOLOGY     telemetry    Network Utilization Review Department  Phone: [phone]; Fax [phone]  [email or web address]  ATTENTION: Please call with any questions or concerns to [phone]  and carefully listen to the prompts so that you are directed to the right person  Send all requests for admission clinical reviews, approved or denied determinations and any other requests to fax [phone]   All voicemails are confidential

## 2019-09-19 NOTE — PHYSICAL THERAPY NOTE
PHYSICAL THERAPY EVALUATION  NAME:  An Hutchins: 09/19/19    AGE:   78 y o  Mrn:   [id number]  ADMIT DX:  Multiple closed fractures of ribs [S22 49XA]    Past Medical History:   Diagnosis Date    Arthritis     CHF (congestive heart failure) (HCC)     Coronary artery disease     aortic valve replacement    Heart murmur     Hypertension        Past Surgical History:   Procedure Laterality Date    AORTIC VALVE REPLACEMENT      APPENDECTOMY      CHOLECYSTECTOMY      JOINT REPLACEMENT      bilaterl hip and right knee    TONSILECTOMY AND ADNOIDECTOMY      TONSILLECTOMY      TUBAL LIGATION         Length Of Stay: 1    PHYSICAL THERAPY EVALUATION:        09/19/19 1100   Note Type   Note type Eval only   Pain Assessment   Pain Assessment 0-10   Pain Score 5   Pain Type Acute pain   Pain Location Rib cage   Pain Orientation Right   Pain Descriptors Aching;Burning   Pain Frequency Constant/continuous   Pain Onset Ongoing   Clinical Progression Gradually improving   Effect of Pain on Daily Activities increased pain with activity    Patient's Stated Pain Goal No pain   Hospital Pain Intervention(s) Ambulation/increased activity;Repositioned   Response to Interventions tolerated    Home Living   Type of [address] [place] [address] Two level;Stairs to enter with rails;Bed/bath upstairs  (4 EZRA )   Home Equipment Walker;Cane   Additional Comments Pt reports living wiht spouse who is able to assist pt if needed    Prior Function   Level of [place] Independent with ADLs and functional mobility   Lives With Bhatt-Walker Help From Family;Friend(s)   ADL Assistance Independent   Falls in the last 6 months 1 to 4  (2 as per pt )   Vocational Retired   Comments Pt denies the use of an AD for ambulation PTA    Restrictions/Precautions   Weight Bearing Precautions Per Order No   Other Precautions Chair Alarm;Cognitive;Multiple lines; Fall Risk;Pain   General   Family/Caregiver Present No   Cognition   Overall Cognitive Status Impaired   Arousal/Participation Alert   Orientation Level Oriented to person;Oriented to place;Oriented to time   Following Commands Follows one step commands with increased time or repetition   RUE Assessment   RUE Assessment WFL   LUE Assessment   LUE Assessment WFL   RLE Assessment   RLE Assessment WFL   Strength RLE   RLE Overall Strength 4/5   LLE Assessment   LLE Assessment WFL   Strength LLE   LLE Overall Strength 4/5   Bed Mobility   Additional Comments NA, Pt seated OOB in chair at time of PT eval    Transfers   Sit to Stand 5  Supervision   Additional items Increased time required;Verbal cues   Stand to Sit 5  Supervision   Additional items Increased time required;Verbal cues   Additional Comments VC and TC needed for hand placement and safety    Ambulation/Elevation   Gait pattern Short stride; Foward flexed; Inconsistent mary   Gait Assistance 5  Supervision   Assistive Device Rolling walker   Distance 60ft x 2    Stair Management Assistance 5  Supervision   Stair Management Technique Two rails   Number of Stairs 4   Balance   Static Sitting Fair +   Static Standing Fair   Ambulatory Fair -   Endurance Deficit   Endurance Deficit Yes   Endurance Deficit Description pain    Activity Tolerance   Activity Tolerance Patient limited by pain   Medical Staff Made Aware JULIUS Perkins and Chad Brand OT student   Nurse Made Aware Pt appropriate to be seen and mobilize per nsg    Assessment   Prognosis Good   Problem List Decreased strength;Decreased endurance; Impaired balance;Decreased mobility;Pain;Decreased safety awareness;Decreased cognition   Assessment Pt is 78 y o  female seen for PT evaluation s/p admit [facility] 9/18[facility]2019  Two pt identifiers were used to confirm  Pt presented s/p fall 3 days ago while walking into restaurant with increased R rib pain  Pt was admitted with a primary dx of: R 2-4 rib fxs,   PT now consulted for assessment of mobility and d/c needs   Pt with OOB to chair orders  Pts current co morbidities effecting treatment include: CHF, CAD, HTN, heart murmur, and person factors including EZRA home  Pts current clinical presentation is Unstable/ Unpredictable (high complexity) due to Ongoing medical management for primary dx, Increased reliance on more restrictive AD compared to baseline, Decreased activity tolerance compared to baseline, Fall risk, Increased assistance needed from caregiver at current time, Cog status, Continuous pulse oximetry monitoring     Prior to admission, pt was I with ambulation without the use of an AD as per pt  Upon evaluation, pt currently is requiring ; S for transfers and S for ambulation w/ RW   Pt denies any lightheadedness or dizziness with ambulation  Pt presents at PT eval functioning below baseline and currently w/ overall mobility deficits 2* to: BLE weakness, impaired balance, decreased endurance, gait deviations, pain, decreased activity tolerance compared to baseline, decreased safety awareness, fall risk  Pt currently at a fall risk 2* to impairments listed above  Based on the aforementioned PT evaluation, pt will continue to benefit from skilled Acute PT interventions to address stated impairments; to maximize functional mobility; for ongoing pt/ family training; and DME needs  At conclusion of PT session pt returned back in chair with phone and call bell within reach  Pt denies any further questions at this time  PT is currently recommending home PT, home with family support  Pt/ family agreeable to plan and goals as stated on evaluation  PT will continue to follow during hospital stay  Barriers to Discharge None   Barriers to Discharge Comments Pt denies any mobility or safety concerns at d/c    Goals   Patient Goals " to go home"   STG Expiration Date 09/29/19   Short Term Goal #1 In 10 days pt will complete: 1) Bed mobility skills with mod I to increase safety and independence as well as decrease caregiver burden   2) Functional transfers with mod I to promote increased independence, safety, and QOL in the home environment  3) Ambulate 200' using least restrictive AD with mod I without LOB and stable vitals so that pt can negotiate home environment safely and promote independence with functional mobility and return to PLOF  4) Stair training up/ down 10 step/s using rail/s with S so that pt can enter/negotiate home environment safely and decrease fall risk  5) Improve balance grades to Good to increase safety with all mobility and decrease fall risk  6) Improve BLE strength by 1/2 grade to help increase overall functional mobility and decrease fall risk  7) PT for ongoing pt and family education; DME needs and D/C planning to promote highest level of function in least restrictive environment  Plan   Treatment/Interventions Functional transfer training;LE strengthening/ROM; Elevations; Therapeutic exercise; Endurance training;Patient/family training;Equipment eval/education; Bed mobility;Gait training;Spoke to nursing;OT   PT Frequency Other (Comment)  (3-6x a week )   Recommendation   Recommendation Home with family support;Home PT; Other (Comment)  (increased family support)   Equipment Recommended Walker  (RW)   Modified [place] Tobacco Scale   Modified [place] Scale 4   Barthel Index   Feeding 10   Bathing 0   Grooming Score 5   Dressing Score 5   Bladder Score 10   Bowels Score 10   Toilet Use Score 5   Transfers (Bed/Chair) Score 10   Mobility (Level Surface) Score 0   Stairs Score 5   Barthel Index Score 60   Lottie Thakkar PT

## 2019-09-19 NOTE — SOCIAL WORK
Patient was informed of the role of CM  Pt was alert when addressed by Cm  Pt lives at home with , Diana Cowden, [phone]  Pt lives in a 2 story home with approximately 10-12 steps with railings in the home  Pt said that there are 4 steps leading into the home with railings  Pt informed cm that she was independent with all ADLS  Pt confirmed receiving inpatient PT/OT at [facility] [place] in October, 2011  Pt denied being treated for any inpatient mental health or substance abuse  When asked if pt has received [address] services, pt reported [facility]  Pt reported having DME in the home to incklude RW, BSC, cane, and shower chair  Pt reported using [facility] in [place]  Pt sees Dr Malena Leyva at [facility]  PT/OT will continue to treat pt while admitted to hospital  PT/OT recommending [address] for pt  Pt expected for d/c tomorrow  CM reviewed d/c planning process including the following: identifying help at home, patient preference for d/c planning needs, Discharge Lounge, [facility] Meds to Bed program, availability of treatment team to discuss questions or concerns patient and/or family may have regarding understanding medications and recognizing signs and symptoms once discharged  CM also encouraged patient to follow up with all recommended appointments after discharge  Patient advised of importance for patient and family to participate in managing patients medical well being       ~ I have reviewed and agree with the above documentation      SIVAN Cazares Asp

## 2019-09-19 NOTE — PROGRESS NOTES
Tertialry/Progress Note - Сергей Puckett 1940, 78 y o  female MRN: [id number]    Unit/Bed#: [facility] 603-87 Encounter: [id number]    Primary Care Provider: Emily Pascal MD   Date and time admitted to hospital: 9/18/2019  2:06 PM        Fall  Assessment & Plan  PT/OT    Multiple rib fractures  Assessment & Plan  -Rib fracture protocol  -Multimodal pain management  -APS following; not a candidate for epidural  -Cont IS/pulm toilet  -DVT Ppx  -F/U repeat CXR today      Hypertension  Assessment & Plan  Cont  Home meds          Progress Note - Tertiary Trauma [place]   Сергей Puckett 78 y o  female MRN: [id number]  Unit/Bed#: [facility] 276-54 Encounter: [id number]    Summary of Diagnosed Injuries:   1  Multiple closed fractures of ribs  Inpatient consult to Gerontology    Inpatient consult to Gerontology       Bedside nurse rounds completed with nurse Fan Hernandez  Prophylaxis: Sequential compression device (Venodyne)  and Enoxaparin (Lovenox)    Disposition: Unchanged    Code status:  Level 3 - DNAR and DNI    Consultants: APS, Geriatrics    Is the patient 72 years or older?: YES:    1  Before the illness or injury that brought you to the Emergency, did you need someone to help you on a regular basis? 0=No   2  Since the illness or injury that brought you to the Emergency, have you needed more help than usual to take care of yourself? 0=No   3  Have you been hospitalized for one or more nights during the past 6 months (excluding a stay in the Emergency Department)? 0=No   4  In general, do you see well? 1=No   5  In general, do you have serious problems with your memory? 0=No   6  Do you take more than three different medications everyday?  1=Yes   TOTAL   2     Did you order a geriatric consult if the score was 2 or greater?: yes    SUBJECTIVE:     Transfer from: \[facility]\""  Outside [facility] Received: yes  Tertiary Exam Due on: 09/20/2019    Mechanism of Injury:Fall    Details related to Injury: from standing after prolonged sitting    Chief Complaint:  My ribs hurt when I move from the bed to the chair    HPI/Last 24 hour events:  No acute events overnight  Patient rested well and pain was well controlled  She had increased pain this morning and being moved to the chair  She is otherwise comfortable  Tolerating diet well  Active medications:           Current Facility-Administered Medications:     acetaminophen (TYLENOL) tablet 975 mg, 975 mg, Oral, Q8H [address], 975 mg at 09/19/19 0516    gabapentin (NEURONTIN) capsule 100 mg, 100 mg, Oral, HS, 100 mg at 09/18/19 2257    heparin (porcine) subcutaneous injection 5,000 Units, 5,000 Units, Subcutaneous, Q8H [address], 5,000 Units at 09/19/19 0516 **AND** [COMPLETED] Platelet count, , , Once    HYDROmorphone (DILAUDID) injection 0 2 mg, 0 2 mg, Intravenous, Q4H PRN, 0 2 mg at 09/18/19 2257    lidocaine (LIDODERM) 5 % patch 1 patch, 1 patch, Topical, Daily, 1 patch at 09/18/19 1507    melatonin tablet 3 mg, 3 mg, Oral, HS, 3 mg at 09/18/19 2257    oxyCODONE (ROXICODONE) IR tablet 2 5 mg, 2 5 mg, Oral, Q4H PRN    oxyCODONE (ROXICODONE) IR tablet 5 mg, 5 mg, Oral, Q4H PRN, 5 mg at 09/18/19 2047      OBJECTIVE:     Vitals:   Vitals:    09/19/19 0654   BP: 152/76   Pulse: 89   Resp: 18   Temp: 97 8 °F (36 6 °C)   SpO2: 100%       Physical Exam:   GENERAL APPEARANCE:  No acute distress  Resting comfortably in chair  NEURO:  Alert and orient x4  GCS 15    HEENT: NC/AT  perrl  CV:  RRR  No murmurs  LUNGS:  Diminished right middle lung field  Otherwise CTAB  Right lateral chest wall tenderness to palpation  GI:  Soft, nontender, nondistended  :  Voiding  MSK:  No joint tenderness, effusion  Full range of motion in upper and lower extremities bilaterally  SKIN:  Ecchymosis over anterior right shoulder  I/O:   I/O       09/17 0701 - 09/18 0700 09/18 0701 - 09/19 0700 09/19 0701 - 09/20 0700    P  O   60 240    Total Intake(mL/kg)  60 (1 1) 240 (4 3)    Net  +60 +240 Unmeasured Urine Occurrence  2 x 1 x          Invasive Devices: Invasive Devices     Peripheral Intravenous Line            Peripheral IV 09/18/19 Left;Ventral (anterior) Antecubital less than 1 day                  Imaging:   Xr Shoulder 2+ Views Right    Result Date: 9/18/2019  Impression: No acute osseous abnormality  Degenerative changes as described  Workstation performed: [id number]     Ct Head Without Contrast    Result Date: 9/18/2019  Impression: No acute intracranial abnormality  Microangiopathic changes  Since 2016 there are new areas of chronic encephalomalacia involving the posterior superior left cerebellum and left occipital lobe suggesting prior CVA  Workstation performed: [id number]     Ct Chest Without Contrast    Result Date: 9/18/2019  Impression: Severe COPD  No acute traumatic pulmonary abnormality evident  Persistent scarlike consolidation in the superior segment of the right lower lobe  See above for further details  No pleural fluid collection or pneumothorax  Nondisplaced fractures of the right 2nd, 3rd and 4th ribs  The study was marked in [facility] for immediate notification  Workstation performed: [id number]     Ct Spine Cervical Without Contrast    Result Date: 9/18/2019  Impression: No cervical spine fracture or traumatic malalignment  Severe degenerative disc disease    Workstation performed: [id number]       Labs:   CBC:   Lab Results   Component Value Date    WBC 3 20 (L) 09/19/2019    HGB 11 5 09/19/2019    HCT 37 0 09/19/2019    MCV 91 09/19/2019     09/19/2019    MCH 28 3 09/19/2019    MCHC 31 1 (L) 09/19/2019    RDW 13 0 09/19/2019    MPV 9 8 09/19/2019    NRBC 0 09/19/2019     CMP:   Lab Results   Component Value Date     09/19/2019    CO2 27 09/19/2019    BUN 15 09/19/2019    CREATININE 0 94 09/19/2019    CALCIUM 9 3 09/19/2019    AST 35 09/18/2019    ALT 14 09/18/2019    ALKPHOS 78 09/18/2019    EGFR 57 09/19/2019

## 2019-09-19 NOTE — PHYSICIAN ADVISOR
Current patient class: Inpatient  The patient is currently on Hospital Day: 2 at [address] [place] [address]        The patient is  documented to require at least a 2nd midnight in the hospital  As such the patient is  expected to satisfy the 2 midnight benchmark and is therefore eligible for inpatient admission  After review of the relevant documentation, labs, vital signs and test results, the patient is appropriate for INPATIENT ADMISSION  Admission to the hospital as an inpatient is a complex decision making process which requires the practitioner to consider the patients presenting complaint, history and physical examination and all relevant testing  With this in mind, in this case, the patient was deemed appropriate for INPATIENT ADMISSION  After review of the documentation and testing available at the time of the admission I concur with this clinical determination of medical necessity  Rationale is as follows: The patient is a 78 yrs Female who presented to the ED at 9/18/2019  2:06 PM with a chief complaint of Fall (Pt fell on friday walking  Does not know why she fell, pt has rib fx on R side, 2, 3, 4 )   Patient admitted for rib fractures-she was started on multi modal pain control-plan of care includes respiratory protocol in serial chest x-rays as well as PT/OT evaluation    Labs on admission showed hyponatremia  She is appropriate for inpatient admission for ongoing care  The patients vitals on arrival were ED Triage Vitals   Temperature Pulse Respirations Blood Pressure SpO2   09/18/19 1932 09/18/19 1418 09/18/19 1418 09/18/19 1418 09/18/19 1417   98 1 °F (36 7 °C) 91 18 (!) 199/79 100 %      Temp src Heart Rate Source Patient Position - Orthostatic VS BP Location FiO2 (%)   -- 09/18/19 1418 09/18/19 1418 -- --    Monitor Lying        Pain Score       09/18/19 1420       3           Past Medical History:   Diagnosis Date    Arthritis     CHF [facility]congestive heart failure) ([facility] [address] )     Coronary artery disease     aortic valve replacement    Heart murmur     Hypertension      Past Surgical History:   Procedure Laterality Date    AORTIC VALVE REPLACEMENT      APPENDECTOMY      CHOLECYSTECTOMY      JOINT REPLACEMENT      bilaterl hip and right knee    TONSILECTOMY AND ADNOIDECTOMY      TONSILLECTOMY      TUBAL LIGATION         The patient was admitted to the hospital at 1451 on 9/18/19 for the following diagnosis:  Multiple closed fractures of ribs [S22 49XA]    Consults have been placed to:   IP CONSULT TO CASE MANAGEMENT  IP CONSULT TO ACUTE PAIN SERVICE  IP CONSULT TO GERONTOLOGY    Vitals:    09/18/19 2215 09/19/19 0100 09/19/19 0654 09/19/19 1555   BP: 119/68  152/76 137/76   Pulse: 101 92 89 96   Resp: 17  18 18   Temp: 97 9 °F (36 6 °C)  97 8 °F (36 6 °C) 97 6 °F (36 4 °C)   SpO2: 93% 99% 100% 94%   Weight:           Most recent labs:    Recent Labs     09/18/19  1112  09/18/19  1506 09/19/19  0506   WBC  --    < >  --  3 20*   HGB  --    < >  --  11 5   HCT  --    < >  --  37 0   PLT  --    < > 163 160   K 6 1*   < >  --  4 6   CALCIUM 8 8   < >  --  9 3   BUN 16   < >  --  15   CREATININE 0 80   < >  --  0 94   INR 0 92  --  1 01  --    TROPONINI <0 02  --   --   --    AST 35  --   --   --    ALT 14  --   --   --    ALKPHOS 78  --   --   --     < > = values in this interval not displayed         Scheduled Meds:  Current Facility-Administered Medications:  acetaminophen 975 mg Oral [facility] Estefania Loco MD   docusate sodium 100 mg Oral BID Mikel Evans PA-C   enoxaparin 30 mg Subcutaneous Q12H Mikel Evans PA-C   gabapentin 100 mg Oral HS Estefania Loco MD   lidocaine 1 patch Topical Daily Estefania Loco MD   melatonin 3 mg Oral HS Nirav Spruce, DO   oxyCODONE 2 5 mg Oral Q4H PRN Estefania Loco MD   oxyCODONE 5 mg Oral Q4H PRN Estefania Loco MD   senna 1 tablet Oral HS Mikel Evans PA-C     Continuous Infusions:   PRN Meds: oxyCODONE   oxyCODONE    Surgical procedures (if appropriate):

## 2019-09-19 NOTE — PROGRESS NOTES
Progress Note - Acute Pain Service    Roxanne Clemente 78 y o  female MRN: [id number]  Unit/Bed#: [facility] 617-01 Encounter: [id number]      Assessment:   Active Problems:    Hypertension    Multiple rib fractures    Fall      Plan:   Right 2-4 rib fractures  · Patient able to tolerate IS to 750mL with pain in right upper chest at 750mL  Plt and PT/INR/aPTT normal on admission  · Respiratory status stable, not a candidate for Epidural at this time  Patent saturating well on room air and ambulating with transition to bedside chair  · Recommend continue with Scheduled Acetaminophen 975mg PO [id number]  · Continue Gabapentin 100mg PO QHS  · Continue Lidocaine patch   · Continue oxycodone 2 5/5mg PO for moderate/severe pain [id number] PRN  · Hydromorphone 0 2mg IV for breakthrough pain [id number] PRN, may discontinue IV Hydromorphone given good pain control with current oral regimen  APS sign off  Thank you for the Consult    Please contact APS ( [phone]) with any further questions    Pain History  Current pain location(s): right upper chest wall and right shoulder  Pain Scale:   moderate  Quality: sharp  24 hour history: improving    Opioid requirement previous 24 hours: 26 5mg OME    Meds/Allergies   all current active meds have been reviewed and current meds:   Current Facility-Administered Medications   Medication Dose Route Frequency    acetaminophen (TYLENOL) tablet 975 mg  975 mg Oral Q8H [address]    gabapentin (NEURONTIN) capsule 100 mg  100 mg Oral HS    heparin (porcine) subcutaneous injection 5,000 Units  5,000 Units Subcutaneous Q8H [address]    HYDROmorphone (DILAUDID) injection 0 2 mg  0 2 mg Intravenous Q4H PRN    lidocaine (LIDODERM) 5 % patch 1 patch  1 patch Topical Daily    melatonin tablet 3 mg  3 mg Oral HS    oxyCODONE (ROXICODONE) IR tablet 2 5 mg  2 5 mg Oral Q4H PRN    oxyCODONE (ROXICODONE) IR tablet 5 mg  5 mg Oral Q4H PRN       No Known Allergies    Objective     Temp:  [97 8 °F (36 6 °C)-98 2 °F (36 8 °C)] 97 8 °F (36 6 °C)  HR:  [] 89  Resp:  [17-19] 18  BP: (119-201)/() 152/76    Physical Exam   Constitutional: She is oriented to person, place, and time  She appears well-developed and well-nourished  No distress  HENT:   Head: Normocephalic and atraumatic  Eyes: Pupils are equal, round, and reactive to light  Conjunctivae and EOM are normal  Right eye exhibits no discharge  Left eye exhibits no discharge  Neck: Normal range of motion  Neck supple  No JVD present  Cardiovascular: Normal rate and regular rhythm  Pulmonary/Chest: Effort normal  No respiratory distress  She exhibits tenderness  Abdominal: Soft  She exhibits no distension  There is no tenderness  Musculoskeletal: Normal range of motion  She exhibits no edema, tenderness or deformity  Neurological: She is alert and oriented to person, place, and time  No cranial nerve deficit  Skin: Skin is warm and dry  No rash noted  She is not diaphoretic  No erythema  Psychiatric: She has a normal mood and affect  Her behavior is normal    Vitals reviewed  Lab Results:   Results from last 7 days   Lab Units 09/19/19  0506   WBC Thousand/uL 3 20*   HEMOGLOBIN g/dL 11 5   HEMATOCRIT % 37 0   PLATELETS Thousands/uL 160      Results from last 7 days   Lab Units 09/19/19  0506  09/18/19  1112   POTASSIUM mmol/L 4 6   < > 6 1*   CHLORIDE mmol/L 102   < > 98*   CO2 mmol/L 27   < > 26   BUN mg/dL 15   < > 16   CREATININE mg/dL 0 94   < > 0 80   CALCIUM mg/dL 9 3   < > 8 8   ALK PHOS U/L  --   --  78   ALT U/L  --   --  14   AST U/L  --   --  35    < > = values in this interval not displayed  Imaging Studies: I have personally reviewed pertinent reports  Counseling / Coordination of Care  Total floor / unit time spent today 30 minutes  Greater than 50% of total time was spent with the patient and / or family counseling and / or coordination of care   A description of the counseling / coordination of care: disssion of plan of care with patient, nursing staff and attending      Vicky Simms MD  Palliative and Supportive Care Fellow

## 2019-09-20 ENCOUNTER — APPOINTMENT (INPATIENT)
Dept: NON INVASIVE DIAGNOSTICS | Facility: HOSPITAL | Age: 79
DRG: 184 | End: 2019-09-20
Payer: MEDICARE

## 2019-09-20 VITALS
TEMPERATURE: 98.3 F | SYSTOLIC BLOOD PRESSURE: 147 MMHG | HEIGHT: 66 IN | OXYGEN SATURATION: 96 % | RESPIRATION RATE: 16 BRPM | WEIGHT: 122.36 LBS | BODY MASS INDEX: 19.66 KG/M2 | HEART RATE: 103 BPM | DIASTOLIC BLOOD PRESSURE: 69 MMHG

## 2019-09-20 PROCEDURE — 93306 TTE W/DOPPLER COMPLETE: CPT | Performed by: INTERNAL MEDICINE

## 2019-09-20 PROCEDURE — NC001 PR NO CHARGE: Performed by: SURGERY

## 2019-09-20 PROCEDURE — 94760 N-INVAS EAR/PLS OXIMETRY 1: CPT

## 2019-09-20 PROCEDURE — 97116 GAIT TRAINING THERAPY: CPT

## 2019-09-20 PROCEDURE — 99238 HOSP IP/OBS DSCHRG MGMT 30/<: CPT | Performed by: SURGERY

## 2019-09-20 PROCEDURE — 97535 SELF CARE MNGMENT TRAINING: CPT

## 2019-09-20 PROCEDURE — 93306 TTE W/DOPPLER COMPLETE: CPT

## 2019-09-20 PROCEDURE — 99232 SBSQ HOSP IP/OBS MODERATE 35: CPT | Performed by: INTERNAL MEDICINE

## 2019-09-20 RX ORDER — GABAPENTIN 100 MG/1
100 CAPSULE ORAL
Qty: 14 CAPSULE | Refills: 0 | Status: SHIPPED | OUTPATIENT
Start: 2019-09-20 | End: 2020-12-12 | Stop reason: HOSPADM

## 2019-09-20 RX ORDER — ACETAMINOPHEN 325 MG/1
975 TABLET ORAL EVERY 8 HOURS PRN
Qty: 30 TABLET | Refills: 0 | Status: SHIPPED | OUTPATIENT
Start: 2019-09-20 | End: 2020-12-12 | Stop reason: HOSPADM

## 2019-09-20 RX ORDER — LANOLIN ALCOHOL/MO/W.PET/CERES
3 CREAM (GRAM) TOPICAL
Qty: 30 TABLET | Refills: 0 | Status: ON HOLD | OUTPATIENT
Start: 2019-09-20 | End: 2022-07-11 | Stop reason: ALTCHOICE

## 2019-09-20 RX ADMIN — OXYCODONE HYDROCHLORIDE 5 MG: 5 TABLET ORAL at 11:15

## 2019-09-20 RX ADMIN — LIDOCAINE 1 PATCH: 50 PATCH CUTANEOUS at 15:03

## 2019-09-20 RX ADMIN — ACETAMINOPHEN 975 MG: 325 TABLET ORAL at 06:08

## 2019-09-20 RX ADMIN — ACETAMINOPHEN 975 MG: 325 TABLET ORAL at 13:14

## 2019-09-20 RX ADMIN — ENOXAPARIN SODIUM 30 MG: 30 INJECTION SUBCUTANEOUS at 11:15

## 2019-09-20 NOTE — PLAN OF CARE
Problem: Potential for Falls  Goal: Patient will remain free of falls  Description  INTERVENTIONS:  - Assess patient frequently for physical needs  -  Identify cognitive and physical deficits and behaviors that affect risk of falls    -  [place] fall precautions as indicated by assessment   - Educate patient/family on patient safety including physical limitations  - Instruct patient to call for assistance with activity based on assessment  - Modify environment to reduce risk of injury  - Consider OT/PT consult to assist with strengthening/mobility  Outcome: Progressing     Problem: PAIN - ADULT  Goal: Verbalizes/displays adequate comfort level or baseline comfort level  Description  Interventions:  - Encourage patient to monitor pain and request assistance  - Assess pain using appropriate pain scale  - Administer analgesics based on type and severity of pain and evaluate response  - Implement non-pharmacological measures as appropriate and evaluate response  - Consider cultural and social influences on pain and pain management  - Notify physician/advanced practitioner if interventions unsuccessful or patient reports new pain  Outcome: Progressing     Problem: INFECTION - ADULT  Goal: Absence or prevention of progression during hospitalization  Description  INTERVENTIONS:  - Assess and monitor for signs and symptoms of infection  - Monitor lab/diagnostic results  - Monitor all insertion sites, i e  indwelling lines, tubes, and drains  - Monitor endotracheal if appropriate and nasal secretions for changes in amount and color  - [place] appropriate cooling/warming therapies per order  - Administer medications as ordered  - Instruct and encourage patient and family to use good hand hygiene technique  - Identify and instruct in appropriate isolation precautions for identified infection/condition  Outcome: Progressing  Goal: Absence of fever/infection during neutropenic period  Description  INTERVENTIONS:  - Monitor WBC    Outcome: Progressing     Problem: SAFETY ADULT  Goal: Patient will remain free of falls  Description  INTERVENTIONS:  - Assess patient frequently for physical needs  -  Identify cognitive and physical deficits and behaviors that affect risk of falls    -  [place] fall precautions as indicated by assessment   - Educate patient/family on patient safety including physical limitations  - Instruct patient to call for assistance with activity based on assessment  - Modify environment to reduce risk of injury  - Consider OT/PT consult to assist with strengthening/mobility  Outcome: Progressing  Goal: Maintain or return to baseline ADL function  Description  INTERVENTIONS:  -  Assess patient's ability to carry out ADLs; assess patient's baseline for ADL function and identify physical deficits which impact ability to perform ADLs (bathing, care of mouth/teeth, toileting, grooming, dressing, etc )  - Assess/evaluate cause of self-care deficits   - Assess range of motion  - Assess patient's mobility; develop plan if impaired  - Assess patient's need for assistive devices and provide as appropriate  - Encourage maximum independence but intervene and supervise when necessary  - Involve family in performance of ADLs  - Assess for home care needs following discharge   - Consider OT consult to assist with ADL evaluation and planning for discharge  - Provide patient education as appropriate  Outcome: Progressing  Goal: Maintain or return mobility status to optimal level  Description  INTERVENTIONS:  - Assess patient's baseline mobility status (ambulation, transfers, stairs, etc )    - Identify cognitive and physical deficits and behaviors that affect mobility  - Identify mobility aids required to assist with transfers and/or ambulation (gait belt, sit-to-stand, lift, walker, cane, etc )  - [place] fall precautions as indicated by assessment  - Record patient progress and toleration of activity level on Mobility SBAR; progress patient to next Phase/Stage  - Instruct patient to call for assistance with activity based on assessment  - Consider rehabilitation consult to assist with strengthening/weightbearing, etc   Outcome: Progressing     Problem: DISCHARGE PLANNING  Goal: Discharge to home or other facility with appropriate resources  Description  INTERVENTIONS:  - Identify barriers to discharge w/patient and caregiver  - Arrange for needed discharge resources and transportation as appropriate  - Identify discharge learning needs (meds, wound care, etc )  - Arrange for interpretive services to assist at discharge as needed  - Refer to Case Management Department for coordinating discharge planning if the patient needs post-hospital services based on physician/advanced practitioner order or complex needs related to functional status, cognitive ability, or social support system  Outcome: Progressing     Problem: Knowledge Deficit  Goal: Patient/family/caregiver demonstrates understanding of disease process, treatment plan, medications, and discharge instructions  Description  Complete learning assessment and assess knowledge base  Interventions:  - Provide teaching at level of understanding  - Provide teaching via preferred learning methods  Outcome: Progressing     Problem: Nutrition/Hydration-ADULT  Goal: Nutrient/Hydration intake appropriate for improving, restoring or maintaining nutritional needs  Description  Monitor and assess patient's nutrition/hydration status for malnutrition  Collaborate with interdisciplinary team and initiate plan and interventions as ordered  Monitor patient's weight and dietary intake as ordered or per policy  Utilize nutrition screening tool and intervene as necessary  Determine patient's food preferences and provide high-protein, high-caloric foods as appropriate       INTERVENTIONS:  - Monitor oral intake, urinary output, labs, and treatment plans  - Assess nutrition and hydration status and recommend course of action  - Evaluate amount of meals eaten  - Assist patient with eating if necessary   - Allow adequate time for meals  - Recommend/ encourage appropriate diets, oral nutritional supplements, and vitamin/mineral supplements  - Order, calculate, and assess calorie counts as needed  - Recommend, monitor, and adjust tube feedings and TPN/PPN based on assessed needs  - Assess need for intravenous fluids  - Provide specific nutrition/hydration education as appropriate  - Include patient/family/caregiver in decisions related to nutrition  Outcome: Progressing

## 2019-09-20 NOTE — PLAN OF CARE
Problem: PHYSICAL THERAPY ADULT  Goal: Performs mobility at highest level of function for planned discharge setting  See evaluation for individualized goals  Description  Treatment/Interventions: Functional transfer training, LE strengthening/ROM, Elevations, Therapeutic exercise, Endurance training, Patient/family training, Equipment eval/education, Bed mobility, Gait training, Spoke to nursing, OT  Equipment Recommended: Walker(RW)       See flowsheet documentation for full assessment, interventions and recommendations  Outcome: Adequate for Discharge  Note:   Prognosis: Good  Problem List: Decreased strength, Decreased endurance, Decreased range of motion, Impaired balance, Decreased mobility, Pain  Assessment: Pt supine in bed upon arrival, poleasant and agreeable to OOB ambualtion, pt requires supervision for all bed mobility, sit to stand transfers, and ambualtion  Pt [id number] 360' with RW and seated rest breaks due to SOB  Pt was able to negotiate 14 steps using 2 rails and taking standing rest breaks due to fatigue  Pt returned to supine in bed at end of session resting cvomfortably with all ammenities within reach  Pt will continue to benenfit from skilled Pt to increase strength and endruance and maximize safe dfunctional mobility  Barriers to Discharge: None  Barriers to Discharge Comments: Pt denies any mobility or safety concerns at d/c   Recommendation: Home with family support     PT - OK to Discharge: Yes(when medically ready)    See flowsheet documentation for full assessment

## 2019-09-20 NOTE — PROGRESS NOTES
Progress Note - [place]  1940, 78 y o  female MRN: [id number]    Unit/Bed#: [facility] 396-59 Encounter: [id number]    Primary Care Provider: Malvin Treviño MD   Date and time admitted to hospital: 9/18/2019  2:06 PM        Fall  Assessment & Plan  PT/OT  F/U Echo    Hypertension  Assessment & Plan  Cont  Home meds    * Multiple rib fractures  Assessment & Plan  -Rib fracture protocol  -Multimodal pain management  -APS following; not a candidate for epidural  -Cont IS/pulm toilet  -DVT Ppx  -F/U repeat CXR today          Bedside rounds completed with nurse Dayna Ansari  Disposition: Possible discharge home today      SUBJECTIVE:  Chief Complaint:  I still have some pain    Subjective:  No acute events overnight  Pain well controlled on current regimen  No respiratory distress  Afebrile  Tolerating diet well        OBJECTIVE:     Meds/Allergies     Current Facility-Administered Medications:     acetaminophen (TYLENOL) tablet 975 mg, 975 mg, Oral, Q8H [facility], Jas Amador MD, 975 mg at 09/20/19 0608    docusate sodium (COLACE) capsule 100 mg, 100 mg, Oral, BID, Lala Sorensen PA-C    enoxaparin (LOVENOX) subcutaneous injection 30 mg, 30 mg, Subcutaneous, Q12H, Lala Sorensen PA-C, 30 mg at 09/19/19 2338    gabapentin (NEURONTIN) capsule 100 mg, 100 mg, Oral, HS, Jas Amador MD, 100 mg at 09/19/19 2211    lidocaine (LIDODERM) 5 % patch 1 patch, 1 patch, Topical, Daily, Jas Amador MD, 1 patch at 09/19/19 1605    melatonin tablet 3 mg, 3 mg, Oral, HS, Lachelle Paulino DO, 3 mg at 09/19/19 2211    oxyCODONE (ROXICODONE) IR tablet 2 5 mg, 2 5 mg, Oral, Q4H PRN, Jas Amador MD    oxyCODONE (ROXICODONE) IR tablet 5 mg, 5 mg, Oral, Q4H PRN, Jas Amador MD, 5 mg at 09/19/19 2206    senna (SENOKOT) tablet 8 6 mg, 1 tablet, Oral, HS, Lala Sorensen PA-C, 8 6 mg at 09/19/19 2212     Vitals:   Vitals:    09/20/19 0820   BP:    Pulse:    Resp:    Temp:    SpO2: 95% Intake/Output:  I/O       09/18 0701 - 09/19 0700 09/19 0701 - 09/20 0700 09/20 0701 - 09/21 0700    P  O  60 576     Total Intake(mL/kg) 60 (1 1) 576 (10 4)     Net +60 +576            Unmeasured Urine Occurrence 2 x 2 x            Nutrition/GI Proph/Bowel Reg: Regular house diet    Physical Exam:   GENERAL APPEARANCE:  No acute distress  Resting comfortably chair  NEURO:  AAO x4  GCS 15  HEENT:  NC/AT  CV:  Regular rate and rhythm  No murmur  LUNGS:  Decreased breath sounds and right middle lung field  Otherwise CTAB  GI:  Soft, nontender, nondistended  :  Voiding  MSK:  Atraumatic    No joint tenderness  SKIN:  No rash, skin lesions appreciated    Invasive Devices     Peripheral Intravenous Line            Peripheral IV 09/18/19 Left;Ventral (anterior) Antecubital 1 day                 Lab Results: BMP/CMP: No results found for: SODIUM, K, CL, CO2, ANIONGAP, BUN, CREATININE, GLUCOSE, CALCIUM, AST, ALT, ALKPHOS, PROT, BILITOT, EGFR and CBC: No results found for: WBC, HGB, HCT, MCV, PLT, ADJUSTEDWBC, MCH, MCHC, RDW, MPV, NRBC  Imaging/EKG Studies: Results: I have personally reviewed pertinent films in PACS  Other Studies:   VTE Prophylaxis: Sequential compression device (Venodyne)  and Enoxaparin (Lovenox)

## 2019-09-20 NOTE — PLAN OF CARE
Problem: OCCUPATIONAL THERAPY ADULT  Goal: Performs self-care activities at highest level of function for planned discharge setting  See evaluation for individualized goals  Description    Outcome: Progressing  Note:         Assessment: pt participated in am ot sesion and was seen focusing on adls seated in chair at bathroom sink  pt was able to perofrm ub and lb adls with sba in stance and mod I seated  pt toileted self with distant s   pt required min cues for safety  pt becomes sob and reports has o2 at home and has been needing to wear it more lately  pt reports + sob with outdoor walking lately  pt was moderately sob this session with pulse ox remaining in mid 90's on room air  pl was attempted for lacls however was unable to see same  pt was ab,le tp complete running stitch however was iunable to see to perform whip stitch   plan to perform blind moca as appropriate  pt reports esequiel is home with her most of time to assist as needed  currently recommend s and asst for adls iadls and to ensure safety       OT Discharge Recommendation: Home with family support(recommend s for safety and iadl management)        DEEPIKA Mendoza

## 2019-09-20 NOTE — DISCHARGE INSTRUCTIONS
Geriatric Medicine Discharge Instructions:  -please make an appointment to have a comprehensive geriatric assessment, the contact information is located in the follow up section  -please make an appointment to follow-up with your family doctor to discuss your hospitalization         Rib Fracture   WHAT YOU NEED TO KNOW:   A rib fracture is a crack or break in a rib bone  Rib fractures usually heal within 6 weeks  You should be able to return to normal activities before that time  Do not wrap anything around your body to try to splint your ribs  This can prevent you from taking deep breaths and increases your risk for pneumonia  DISCHARGE INSTRUCTIONS:   Call 911 for any of the following:   · You have trouble breathing  · You have new or increased pain  Return to the emergency department if:   · Your pain does not get better, even after treatment  · You have a fever or a cough  Contact your healthcare provider if:   · You have questions or concerns about your condition or care  Medicines:   · NSAIDs  help decrease swelling and pain  NSAIDs are available without a doctor's order  Ask your healthcare provider which medicine is right for you  Ask how much to take and when to take it  Take as directed  NSAIDs can cause stomach bleeding and kidney problems if not taken correctly  · Prescription pain medicine  may be given  Ask your healthcare provider how to take this medicine safely  Some prescription pain medicines contain acetaminophen  Do not take other medicines that contain acetaminophen without talking to your healthcare provider  Too much acetaminophen may cause liver damage  Prescription pain medicine may cause constipation  Ask your healthcare provider how to prevent or treat constipation  · Take your medicine as directed  Contact your healthcare provider if you think your medicine is not helping or if you have side effects  Tell him or her if you are allergic to any medicine   Keep a list of the medicines, vitamins, and herbs you take  Include the amounts, and when and why you take them  Bring the list or the pill bottles to follow-up visits  Carry your medicine list with you in case of an emergency  Follow up with your healthcare provider as directed:  Write down your questions so you remember to ask them during your visits  Deep breathing:  Deep breathing will decrease your risk for pneumonia  Hug a pillow on the injured side while doing this exercise, to decrease pain  Take a deep breath and hold it for as long as possible  You should let the air out and then cough strongly  Deep breaths help open your airway  You may be given an incentive spirometer to help take deep breaths  Put the plastic piece in your mouth  Take a slow, deep breath  You should then let the air out and cough  Repeat these steps 10 times every hour  Rest:  Rest and limit activity to decrease swelling and pain, and allow your injury to heal  Avoid activities that may cause more pain or damage to your ribs such as, pulling, pushing, and lifting  As your pain decreases, begin movements slowly  Take short walks between rest periods  Ice:  Apply ice on the fractured area for 15 to 20 minutes every hour or as directed  Use an ice pack or put crushed ice in a plastic bag  Cover it with a towel  Ice helps prevent tissue damage and decreases swelling and pain  © 2017 [facility] Information is for End User's use only and may not be sold, redistributed or otherwise used for commercial purposes  All illustrations and images included in CareNotes® are the copyrighted property of A D A M [employer] Inc  or Matheus Mcdonough  The above information is an  only  It is not intended as medical advice for individual conditions or treatments  Talk to your doctor, nurse or pharmacist before following any medical regimen to see if it is safe and effective for you        Fall Prevention   WHAT YOU NEED TO KNOW: Fall prevention includes ways to make your home and other areas safer  It also includes ways you can move more carefully to prevent a fall  Health conditions that cause changes in your blood pressure, vision, or muscle strength and coordination may increase your risk for falls  Medicines may also increase your risk for falls if they make you dizzy, weak, or sleepy  DISCHARGE INSTRUCTIONS:   Call 911 or have someone else call if:   · You have fallen and are unconscious  · You have fallen and cannot move part of your body  Contact your healthcare provider if:   · You have fallen and have pain or a headache  · You have questions or concerns about your condition or care  Fall prevention tips:   · Stand or sit up slowly  This may help you keep your balance and prevent falls  · Use assistive devices as directed  Your healthcare provider may suggest that you use a cane or walker to help you keep your balance  You may need to have grab bars put in your bathroom near the toilet or in the shower  · Wear shoes that fit well and have soles that   Wear shoes both inside and outside  Use slippers with good   Do not wear shoes with high heels  · Wear a personal alarm  This is a device that allows you to call 911 if you fall and need help  Ask your healthcare provider for more information  · Stay active  Exercise can help strengthen your muscles and improve your balance  Your healthcare provider may recommend water aerobics or walking  He or she may also recommend physical therapy to improve your coordination  Never start an exercise program without talking to your healthcare provider first      · Manage your medical conditions  Keep all appointments with your healthcare providers  Visit your eye doctor as directed  Home safety tips:   · Add items to prevent falls in the bathroom  Put nonslip strips on your bath or shower floor to prevent you from slipping   Use a bath mat if you do not have carpet in the bathroom  This will prevent you from falling when you step out of the bath or shower  Use a shower seat so you do not need to stand while you shower  Sit on the toilet or a chair in your bathroom to dry yourself and put on clothing  This will prevent you from losing your balance from drying or dressing yourself while you are standing  · Keep paths clear  Remove books, shoes, and other objects from walkways and stairs  Place cords for telephones and lamps out of the way so that you do not need to walk over them  Tape them down if you cannot move them  Remove small rugs  If you cannot remove a rug, secure it with double-sided tape  This will prevent you from tripping  · Install bright lights in your home  Use night lights to help light paths to the bathroom or kitchen  Always turn on the light before you start walking  · Keep items you use often on shelves within reach  Do not use a step stool to help you reach an item  · Paint or place reflective tape on the edges of your stairs  This will help you see the stairs better  Follow up with your healthcare provider as directed:  Write down your questions so you remember to ask them during your visits  © 2017 [address] [employer] Information is for End User's use only and may not be sold, redistributed or otherwise used for commercial purposes  All illustrations and images included in CareNotes® are the copyrighted property of [employer]  or Matheus Mcdonough  The above information is an  only  It is not intended as medical advice for individual conditions or treatments  Talk to your doctor, nurse or pharmacist before following any medical regimen to see if it is safe and effective for you

## 2019-09-20 NOTE — PROGRESS NOTES
Geriatric Medicine Progress Note   Unit/Bed#: [facility] 617-01 Encounter: [id number]      Jerson Yee 78 y o  female [id number]    Hospital Stay Days: 2      Assessment/Plan:    Principal Problem:    Multiple rib fractures  Active Problems:    Hypertension    Fall    Ambulatory dysfunction with fall  Patient had a fall with incomplete recollection of events/unclear trigger 7 days ago, with subsequent rib fractures  She complains of feeling tired today    - PT/OT  - HHC at discharge  - recommend geriatric assessment of home safety, gait, possible ongoing PT     Rib fractures  Fractures of ribs 2,3,4 on the right secondary to fall  - pain control per protocol - APAP [id number] scheduled, oxy 2 5, dilaudid for breakthrough  - discussed with patient that Tramadol is not recommended for pain control; she takes 1-2 a day at home and feels that she does not get dizzy, but expresses understanding that it is an unpredictable medication that is not currently recommended for older adults     Hyponatremia - resolved  Now resolved  Possibly secondary to dehydration and poor PO intake  - patient may benefit from nutritional evaluation, albumin slightly low  at 3 4     Possible mild cognitive impairment  Patient has history of CVAs, aortic valve replacement  She and her  are independent of ADLs and iADLs together with the exception of driving  - recommend outpatient geriatric assessment  - delirium precautions       Disposition: per primary team      Subjective:   Patient seen and examined  She notes that her pain is well controlled and she is still but that it worsened whenever she moves  She is concerned about pain control when she is home; she states that Tylenol never really works for her so she takes tramadol at home  She also uses Advil occasionally         Vitals: Temp (24hrs), Av 1 °F (36 7 °C), Min:97 6 °F (36 4 °C), Max:98 3 °F (36 8 °C)  Current: Temperature: 98 3 °F (36 8 °C)  Vitals:    19 0003 09/20/19 0743 09/20/19 0820 09/20/19 1530   BP: 134/71 156/74  147/69   Pulse: 91 96  103   Resp: 19 16     Temp: 98 2 °F (36 8 °C) 98 2 °F (36 8 °C)  98 3 °F (36 8 °C)   TempSrc:  Oral     SpO2: 99% 95% 95% 96%   Weight:       Height:        Body mass index is 19 75 kg/m²  I/O last 24 hours: In: 12 [P O :936]  Out: -     Physical Exam   Constitutional: She is oriented to person, place, and time  She appears well-developed and well-nourished  Thin, appears older than stated age   HENT:   Head: Normocephalic and atraumatic  Eyes: Conjunctivae are normal  No scleral icterus  Cardiovascular: Normal rate, regular rhythm and normal heart sounds  No murmur heard  Pulmonary/Chest: Effort normal and breath sounds normal  She has no wheezes  She has no rales  Abdominal: Soft  Bowel sounds are normal  She exhibits no distension  There is no tenderness  There is no guarding  Musculoskeletal: She exhibits tenderness  She exhibits no edema or deformity  Neurological: She is alert and oriented to person, place, and time  Skin: Skin is warm and dry  No erythema  Psychiatric: She has a normal mood and affect  Her behavior is normal        Invasive Devices     None                           Labs: No results found for this or any previous visit (from the past 24 hour(s))  Radiology Results: I have personally reviewed pertinent reports  Other Diagnostic Testing:   I have personally reviewed pertinent reports          Active Meds:   Current Facility-Administered Medications   Medication Dose Route Frequency    acetaminophen (TYLENOL) tablet 975 mg  975 mg Oral Q8H [facility]    docusate sodium (COLACE) capsule 100 mg  100 mg Oral BID    enoxaparin (LOVENOX) subcutaneous injection 30 mg  30 mg Subcutaneous Q12H    gabapentin (NEURONTIN) capsule 100 mg  100 mg Oral HS    lidocaine (LIDODERM) 5 % patch 1 patch  1 patch Topical Daily    melatonin tablet 3 mg  3 mg Oral HS    oxyCODONE (ROXICODONE) IR tablet 2 5 mg  2 5 mg Oral Q4H PRN    oxyCODONE (ROXICODONE) IR tablet 5 mg  5 mg Oral Q4H PRN    senna (SENOKOT) tablet 8 6 mg  1 tablet Oral HS         Asmita Zuniga DO PGY-2  Internal Medicine Residency

## 2019-09-20 NOTE — PHYSICAL THERAPY NOTE
Physical Therapy Progress Note     09/20/19 6447   Pain Assessment   Pain Assessment 0-10   Pain Score 3   Pain Type Acute pain   Pain Location Rib cage   Pain Orientation Right   Hospital Pain Intervention(s) Medication (See MAR); Repositioned; Ambulation/increased activity   Restrictions/Precautions   Weight Bearing Precautions Per Order No   Other Precautions Pain; Fall Risk; Chair Alarm; Bed Alarm   General   Chart Reviewed Yes   Response to Previous Treatment Patient with no complaints from previous session  Family/Caregiver Present No   Cognition   Overall Cognitive Status Impaired   Arousal/Participation Alert; Responsive; Cooperative   Attention Attends with cues to redirect   Orientation Level Oriented X4   Memory Decreased recall of recent events;Decreased short term memory   Following Commands Follows one step commands without difficulty   Bed Mobility   Supine to Sit 5  Supervision   Additional items Increased time required;Verbal cues   Sit to Supine 5  Supervision   Additional items Increased time required   Transfers   Sit to Stand 5  Supervision   Additional items Assist x 1; Increased time required;Verbal cues;HOB elevated   Stand to Sit 5  Supervision   Additional items Assist x 1; Increased time required;Verbal cues   Ambulation/Elevation   Gait pattern Short stride; Shuffling;Excessively slow   Gait Assistance 5  Supervision   Assistive Device Rolling walker   Distance 360'   Stair Management Assistance 5  Supervision   Additional items Assist x 1;Verbal cues; Tactile cues; Increased time required   Stair Management Technique Two rails; Nonreciprocal;Foreward; Step to pattern   Number of Stairs 14   Endurance Deficit   Endurance Deficit Yes   Activity Tolerance   Activity Tolerance Patient limited by fatigue;Patient limited by pain   Nurse Made Aware BALDOMERO Calvo ok to see   Assessment   Prognosis Good   Problem List Decreased strength;Decreased endurance;Decreased range of motion; Impaired balance;Decreased mobility;Pain   Assessment Pt supine in bed upon arrival, poleasant and agreeable to OOB ambualtion, pt requires supervision for all bed mobility, sit to stand transfers, and ambualtion  Pt [id number] 360' with RW and seated rest breaks due to SOB  Pt was able to negotiate 14 steps using 2 rails and taking standing rest breaks due to fatigue  Pt returned to supine in bed at end of session resting cvomfortably with all ammenities within reach  Pt will continue to benenfit from skilled Pt to increase strength and endruance and maximize safe dfunctional mobility  Barriers to Discharge None   Goals   Patient Goals none stated   STG Expiration Date 09/29/19   Short Term Goal #1 In 10 days pt will complete: 1) Bed mobility skills with mod I to increase safety and independence as well as decrease caregiver burden  2) Functional transfers with mod I to promote increased independence, safety, and QOL in the home environment  3) Ambulate 200' using least restrictive AD with mod I without LOB and stable vitals so that pt can negotiate home environment safely and promote independence with functional mobility and return to PLOF  4) Stair training up/ down 10 step/s using rail/s with S so that pt can enter/negotiate home environment safely and decrease fall risk  5) Improve balance grades to Good to increase safety with all mobility and decrease fall risk  6) Improve BLE strength by 1/2 grade to help increase overall functional mobility and decrease fall risk  7) PT for ongoing pt and family education; DME needs and D/C planning to promote highest level of function in least restrictive environment  Plan   Treatment/Interventions Functional transfer training;LE strengthening/ROM; Therapeutic exercise; Endurance training;Bed mobility;Gait training;Elevations   Progress Progressing toward goals   PT Frequency   (3-6x/week)   Recommendation   Recommendation Home with family support   Equipment Recommended Walker   PT - OK to Discharge Yes  (when medically ready)     Marco Kirkland, PTA

## 2019-09-20 NOTE — DISCHARGE SUMMARY
Discharge Summary - Santo Adams 78 y o  female MRN: [id number]    Unit/Bed#: [facility] [place] Encounter: [id number]    Admission Date:   Admission Orders (From admission, onward)     Ordered        09/18/19 1452  Inpatient Admission  Once                     Admitting Diagnosis: Multiple closed fractures of ribs [S22 49XA]  Patient Active Problem List   Diagnosis    Numbness    [facility] [address] [facility]    Hypertension    GERD (gastroesophageal reflux disease)    Pneumonia    Depression    Influenza A    [facility]    Multiple rib fractures    Fall         HPI: Per Dr Tone Mishra' H&P "Santo Adams is a 78 y o  female who presents with rib fractures  Patient fell 3 days ago while attempting to walk in a restaurant  She states that she fell hard on her right shoulder  No LOC, vomoting  Complains of worsening right sided rib pain & pain with coughing  Found to have right 2-4 rib fractures on CT chest obtained @ [facility]  Pain well controlled with oxycodone PTA  No blood thinners  Hx of CHF s/p TAVR, COPD, HTN "    Procedures Performed: No orders of the defined types were placed in this encounter  Summary of Hospital Course:  Patient was evaluated by the acute Pain service as well as during tolerated the  She was started on a pain regimen consisting of schedule Tylenol as well as q h s  Gabapentin, Lidoderm patches, low-dose oxycodone  Patient's pain was well controlled during the admission  She had no episodes of hypoxia  Serial chest x-rays were stable  She was evaluated by PT/OT  Home with home health services was recommended  Patient was discharged today in stable condition  She was instructed to continue incentive spirometry  She will have a follow-up with trauma in her Outpatient Clinic in 2 weeks  Patient was not requiring narcotic pain medication for the last 24 hours  She will be discharged without scripts for narcotics      Significant Findings, Care, Treatment and Services Provided:   Xr Chest Pa & Lateral (24 Hours After Admission)    Result Date: 9/19/2019  Impression: Right side rib fractures are not visible by x-ray  No pneumothorax  Small pleural effusions  Stable parenchymal opacity in the right pulmonary hilum with linear scarring in the right midlung  Workstation performed: [id number]     Xr Shoulder 2+ Views Right    Result Date: 9/18/2019  Impression: No acute osseous abnormality  Degenerative changes as described  Workstation performed: [id number]     Ct Head Without Contrast    Result Date: 9/18/2019  Impression: No acute intracranial abnormality  Microangiopathic changes  Since 2016 there are new areas of chronic encephalomalacia involving the posterior superior left cerebellum and left occipital lobe suggesting prior CVA  Workstation performed: [id number]     Ct Chest Without Contrast    Result Date: 9/18/2019  Impression: Severe COPD  No acute traumatic pulmonary abnormality evident  Persistent scarlike consolidation in the superior segment of the right lower lobe  See above for further details  No pleural fluid collection or pneumothorax  Nondisplaced fractures of the right 2nd, 3rd and 4th ribs  The study was marked in [facility] for immediate notification  Workstation performed: [id number]     Ct Spine Cervical Without Contrast    Result Date: 9/18/2019  Impression: No cervical spine fracture or traumatic malalignment  Severe degenerative disc disease  Workstation performed: [id number]       Complications: N/A    Discharge Diagnosis:  1   Multiple closed fractures of ribs  Inpatient consult to Gerontology    Inpatient consult to Gerontology    acetaminophen (TYLENOL) 325 mg tablet    Ambulatory Referral to  [facility] [place]    gabapentin (NEURONTIN) 100 mg capsule    melatonin 3 mg         Resolved Problems  Date Reviewed: 9/20/2019    None          Condition at Discharge: stable         Discharge instructions/Information to patient and family:   See after visit summary for information provided to patient and family  Provisions for Follow-Up Care:  See after visit summary for information related to follow-up care and any pertinent home health orders  PCP: Robert Lopez MD    Disposition: Home    Planned Readmission: No      Discharge Statement   I spent 30 minutes discharging the patient  This time was spent on the day of discharge  I had direct contact with the patient on the day of discharge  Additional documentation is required if more than 30 minutes were spent on discharge  Discharge Medications:  See after visit summary for reconciled discharge medications provided to patient and family

## 2019-09-20 NOTE — OCCUPATIONAL THERAPY NOTE
Occupational Therapy Treatment Note:       09/20/19 1110   Pain Assessment   Pain Assessment No/denies pain   Pain Score No Pain   ADL   Grooming Assistance 5  Supervision/Setup   UB Bathing Assistance 5  Supervision/Setup   LB Bathing Assistance 5  Supervision/Setup   UB Dressing Assistance 5  Supervision/Setup   LB Dressing Assistance 5  Supervision/Setup   Toileting Assistance  6  Modified independent   Toileting Comments pt noted with underwear pulled up in crooked fashion   Functional Standing Tolerance   Time fair balance   Transfers   Sit to Stand 6  Modified independent   Stand to Sit 6  Modified independent   Functional Mobility   Functional Mobility 5  Supervision   Additional items   (NO device, pt refusing rw)   Cognition   Overall Cognitive Status Impaired   Comments unable to see well enough to perform lacls recommend blind moca   Activity Tolerance   Activity Tolerance Patient limited by fatigue   Assessment   Assessment pt participated in am ot sesion and was seen focusing on adls seated in chair at bathroom sink  pt was able to perofrm ub and lb adls with sba in stance and mod I seated  pt toileted self with distant s   pt required min cues for safety  pt becomes sob and reports has o2 at home and has been needing to wear it more lately  pt reports + sob with outdoor walking lately  pt was moderately sob this session with pulse ox remaining in mid 90's on room air  pl was attempted for lacls however was unable to see same  pt was ab,le tp complete running stitch however was iunable to see to perform whip stitch   plan to perform blind moca as appropriate  pt reports esequiel is home with her most of time to assist as needed  currently recommend s and asst for adls iadls and to ensure safety  Plan   Treatment Interventions ADL retraining;UE strengthening/ROM; Cognitive reorientation; Endurance training;Patient/family training;Equipment evaluation/education; Activityengagement   Goal Expiration Date (10/2/2019)   Treatment Day 1   OT Frequency 3-5x/wk   Recommendation   OT Discharge Recommendation Home with family support  (recommend s for safety and iadl management)   Barthel Index   Feeding 10   Bathing 5   Grooming Score 5   Dressing Score 10   Bladder Score 10   Bowels Score 10   Toilet Use Score 5   Transfers (Bed/Chair) Score 10   Mobility (Level Surface) Score 0   Stairs Score 0   Barthel Index Score 65   Modified [place] Scale   Modified [place] Scale 4   April A DEEPIKA Zaman

## 2019-09-20 NOTE — PLAN OF CARE
Problem: Potential for Falls  Goal: Patient will remain free of falls  Description  INTERVENTIONS:  - Assess patient frequently for physical needs  -  Identify cognitive and physical deficits and behaviors that affect risk of falls    -  [place] fall precautions as indicated by assessment   - Educate patient/family on patient safety including physical limitations  - Instruct patient to call for assistance with activity based on assessment  - Modify environment to reduce risk of injury  - Consider OT/PT consult to assist with strengthening/mobility  9/20/2019 1220 by Benjamin Gonzalez RN  Outcome: Adequate for Discharge  9/20/2019 0845 by Benjamin Gonzalez RN  Outcome: Progressing     Problem: PAIN - ADULT  Goal: Verbalizes/displays adequate comfort level or baseline comfort level  Description  Interventions:  - Encourage patient to monitor pain and request assistance  - Assess pain using appropriate pain scale  - Administer analgesics based on type and severity of pain and evaluate response  - Implement non-pharmacological measures as appropriate and evaluate response  - Consider cultural and social influences on pain and pain management  - Notify physician/advanced practitioner if interventions unsuccessful or patient reports new pain  9/20/2019 1220 by Benjamin Gonzalez RN  Outcome: Adequate for Discharge  9/20/2019 0845 by Benjamin Gonzalez RN  Outcome: Progressing     Problem: INFECTION - ADULT  Goal: Absence or prevention of progression during hospitalization  Description  INTERVENTIONS:  - Assess and monitor for signs and symptoms of infection  - Monitor lab/diagnostic results  - Monitor all insertion sites, i e  indwelling lines, tubes, and drains  - Monitor endotracheal if appropriate and nasal secretions for changes in amount and color  - [place] appropriate cooling/warming therapies per order  - Administer medications as ordered  - Instruct and encourage patient and family to use good hand hygiene technique  - Identify and instruct in appropriate isolation precautions for identified infection/condition  9/20/2019 1220 by Jeet Washington RN  Outcome: Adequate for Discharge  9/20/2019 0845 by Jeet Washington RN  Outcome: Progressing  Goal: Absence of fever/infection during neutropenic period  Description  INTERVENTIONS:  - Monitor WBC    9/20/2019 1220 by Jeet Washington RN  Outcome: Adequate for Discharge  9/20/2019 0845 by Jeet Washington RN  Outcome: Progressing     Problem: SAFETY ADULT  Goal: Patient will remain free of falls  Description  INTERVENTIONS:  - Assess patient frequently for physical needs  -  Identify cognitive and physical deficits and behaviors that affect risk of falls    -  [place] fall precautions as indicated by assessment   - Educate patient/family on patient safety including physical limitations  - Instruct patient to call for assistance with activity based on assessment  - Modify environment to reduce risk of injury  - Consider OT/PT consult to assist with strengthening/mobility  9/20/2019 1220 by Jeet Washington RN  Outcome: Adequate for Discharge  9/20/2019 0845 by Jeet Washington RN  Outcome: Progressing  Goal: Maintain or return to baseline ADL function  Description  INTERVENTIONS:  -  Assess patient's ability to carry out ADLs; assess patient's baseline for ADL function and identify physical deficits which impact ability to perform ADLs (bathing, care of mouth/teeth, toileting, grooming, dressing, etc )  - Assess/evaluate cause of self-care deficits   - Assess range of motion  - Assess patient's mobility; develop plan if impaired  - Assess patient's need for assistive devices and provide as appropriate  - Encourage maximum independence but intervene and supervise when necessary  - Involve family in performance of ADLs  - Assess for home care needs following discharge   - Consider OT consult to assist with ADL evaluation and planning for discharge  - Provide patient education as appropriate  9/20/2019 1220 by Dolores Diaz RN  Outcome: Adequate for Discharge  9/20/2019 0845 by Dolores Diaz RN  Outcome: Progressing  Goal: Maintain or return mobility status to optimal level  Description  INTERVENTIONS:  - Assess patient's baseline mobility status (ambulation, transfers, stairs, etc )    - Identify cognitive and physical deficits and behaviors that affect mobility  - Identify mobility aids required to assist with transfers and/or ambulation (gait belt, sit-to-stand, lift, walker, cane, etc )  - [place] fall precautions as indicated by assessment  - Record patient progress and toleration of activity level on Mobility SBAR; progress patient to next Phase/Stage  - Instruct patient to call for assistance with activity based on assessment  - Consider rehabilitation consult to assist with strengthening/weightbearing, etc   9/20/2019 1220 by Dolores Diaz RN  Outcome: Adequate for Discharge  9/20/2019 0845 by Dolores Diaz RN  Outcome: Progressing     Problem: DISCHARGE PLANNING  Goal: Discharge to home or other facility with appropriate resources  Description  INTERVENTIONS:  - Identify barriers to discharge w/patient and caregiver  - Arrange for needed discharge resources and transportation as appropriate  - Identify discharge learning needs (meds, wound care, etc )  - Arrange for interpretive services to assist at discharge as needed  - Refer to Case Management Department for coordinating discharge planning if the patient needs post-hospital services based on physician/advanced practitioner order or complex needs related to functional status, cognitive ability, or social support system  9/20/2019 1220 by Dolores Diaz RN  Outcome: Adequate for Discharge  9/20/2019 0845 by Dolores Diaz RN  Outcome: Progressing     Problem: Knowledge Deficit  Goal: Patient/family/caregiver demonstrates understanding of disease process, treatment plan, medications, and discharge instructions  Description  Complete learning assessment and assess knowledge base  Interventions:  - Provide teaching at level of understanding  - Provide teaching via preferred learning methods  9/20/2019 1220 by Danyelle Can RN  Outcome: Adequate for Discharge  9/20/2019 0845 by Danyelle Can RN  Outcome: Progressing     Problem: Nutrition/Hydration-ADULT  Goal: Nutrient/Hydration intake appropriate for improving, restoring or maintaining nutritional needs  Description  Monitor and assess patient's nutrition/hydration status for malnutrition  Collaborate with interdisciplinary team and initiate plan and interventions as ordered  Monitor patient's weight and dietary intake as ordered or per policy  Utilize nutrition screening tool and intervene as necessary  Determine patient's food preferences and provide high-protein, high-caloric foods as appropriate       INTERVENTIONS:  - Monitor oral intake, urinary output, labs, and treatment plans  - Assess nutrition and hydration status and recommend course of action  - Evaluate amount of meals eaten  - Assist patient with eating if necessary   - Allow adequate time for meals  - Recommend/ encourage appropriate diets, oral nutritional supplements, and vitamin/mineral supplements  - Order, calculate, and assess calorie counts as needed  - Recommend, monitor, and adjust tube feedings and TPN/PPN based on assessed needs  - Assess need for intravenous fluids  - Provide specific nutrition/hydration education as appropriate  - Include patient/family/caregiver in decisions related to nutrition  9/20/2019 1220 by Danyelle Can RN  Outcome: Adequate for Discharge  9/20/2019 0845 by Danyelle Can RN  Outcome: Progressing

## 2020-12-09 ENCOUNTER — APPOINTMENT (EMERGENCY)
Dept: RADIOLOGY | Facility: HOSPITAL | Age: 80
DRG: 291 | End: 2020-12-09
Payer: MEDICARE

## 2020-12-09 ENCOUNTER — HOSPITAL ENCOUNTER (INPATIENT)
Facility: HOSPITAL | Age: 80
LOS: 3 days | Discharge: HOME | DRG: 291 | End: 2020-12-12
Attending: EMERGENCY MEDICINE | Admitting: INTERNAL MEDICINE
Payer: MEDICARE

## 2020-12-09 DIAGNOSIS — R79.89 ELEVATED BRAIN NATRIURETIC PEPTIDE (BNP) LEVEL: ICD-10-CM

## 2020-12-09 DIAGNOSIS — R77.8 ELEVATED TROPONIN: ICD-10-CM

## 2020-12-09 DIAGNOSIS — R00.0 TACHYCARDIA: ICD-10-CM

## 2020-12-09 DIAGNOSIS — J90 PLEURAL EFFUSION: ICD-10-CM

## 2020-12-09 DIAGNOSIS — J44.9 CHRONIC OBSTRUCTIVE PULMONARY DISEASE, UNSPECIFIED COPD TYPE (HCC): ICD-10-CM

## 2020-12-09 DIAGNOSIS — R06.02 SHORTNESS OF BREATH: Primary | ICD-10-CM

## 2020-12-09 DIAGNOSIS — J90 PLEURAL EFFUSION, BILATERAL: ICD-10-CM

## 2020-12-09 DIAGNOSIS — J96.11 CHRONIC RESPIRATORY FAILURE WITH HYPOXIA (HCC): ICD-10-CM

## 2020-12-09 PROBLEM — Z85.118 HISTORY OF LUNG CANCER: Status: ACTIVE | Noted: 2020-12-09

## 2020-12-09 PROBLEM — I50.33 DIASTOLIC CHF, ACUTE ON CHRONIC (HCC): Status: ACTIVE | Noted: 2020-12-09

## 2020-12-09 PROBLEM — Z95.2 S/P TAVR (TRANSCATHETER AORTIC VALVE REPLACEMENT): Status: ACTIVE | Noted: 2020-12-09

## 2020-12-09 LAB
ALBUMIN SERPL BCP-MCNC: 3.6 G/DL (ref 3.5–5)
ALP SERPL-CCNC: 84 U/L (ref 46–116)
ALT SERPL W P-5'-P-CCNC: 17 U/L (ref 12–78)
ANION GAP SERPL CALCULATED.3IONS-SCNC: 7 MMOL/L (ref 4–13)
AST SERPL W P-5'-P-CCNC: 18 U/L (ref 5–45)
BASOPHILS # BLD AUTO: 0.04 THOUSANDS/ΜL (ref 0–0.1)
BASOPHILS NFR BLD AUTO: 1 % (ref 0–1)
BILIRUB SERPL-MCNC: 0.2 MG/DL (ref 0.2–1)
BUN SERPL-MCNC: 18 MG/DL (ref 5–25)
CALCIUM SERPL-MCNC: 8.7 MG/DL (ref 8.3–10.1)
CHLORIDE SERPL-SCNC: 100 MMOL/L (ref 100–108)
CO2 SERPL-SCNC: 27 MMOL/L (ref 21–32)
CREAT SERPL-MCNC: 1.18 MG/DL (ref 0.6–1.3)
EOSINOPHIL # BLD AUTO: 0.13 THOUSAND/ΜL (ref 0–0.61)
EOSINOPHIL NFR BLD AUTO: 2 % (ref 0–6)
ERYTHROCYTE [DISTWIDTH] IN BLOOD BY AUTOMATED COUNT: 14 % (ref 11.6–15.1)
FLUAV RNA RESP QL NAA+PROBE: NEGATIVE
FLUBV RNA RESP QL NAA+PROBE: NEGATIVE
GFR SERPL CREATININE-BSD FRML MDRD: 43 ML/MIN/1.73SQ M
GLUCOSE SERPL-MCNC: 243 MG/DL (ref 65–140)
HCT VFR BLD AUTO: 40.1 % (ref 34.8–46.1)
HGB BLD-MCNC: 11.4 G/DL (ref 11.5–15.4)
IMM GRANULOCYTES # BLD AUTO: 0.02 THOUSAND/UL (ref 0–0.2)
IMM GRANULOCYTES NFR BLD AUTO: 0 % (ref 0–2)
LYMPHOCYTES # BLD AUTO: 2.31 THOUSANDS/ΜL (ref 0.6–4.47)
LYMPHOCYTES NFR BLD AUTO: 26 % (ref 14–44)
MAGNESIUM SERPL-MCNC: 1.7 MG/DL (ref 1.6–2.6)
MCH RBC QN AUTO: 25.7 PG (ref 26.8–34.3)
MCHC RBC AUTO-ENTMCNC: 28.4 G/DL (ref 31.4–37.4)
MCV RBC AUTO: 90 FL (ref 82–98)
MONOCYTES # BLD AUTO: 0.55 THOUSAND/ΜL (ref 0.17–1.22)
MONOCYTES NFR BLD AUTO: 6 % (ref 4–12)
NEUTROPHILS # BLD AUTO: 5.74 THOUSANDS/ΜL (ref 1.85–7.62)
NEUTS SEG NFR BLD AUTO: 65 % (ref 43–75)
NRBC BLD AUTO-RTO: 0 /100 WBCS
NT-PROBNP SERPL-MCNC: 1746 PG/ML
PLATELET # BLD AUTO: 320 THOUSANDS/UL (ref 149–390)
PMV BLD AUTO: 9.8 FL (ref 8.9–12.7)
POTASSIUM SERPL-SCNC: 4.1 MMOL/L (ref 3.5–5.3)
PROT SERPL-MCNC: 7.2 G/DL (ref 6.4–8.2)
RBC # BLD AUTO: 4.44 MILLION/UL (ref 3.81–5.12)
RSV RNA RESP QL NAA+PROBE: NEGATIVE
SARS-COV-2 RNA RESP QL NAA+PROBE: NEGATIVE
SODIUM SERPL-SCNC: 134 MMOL/L (ref 136–145)
TROPONIN I SERPL-MCNC: 0.19 NG/ML
WBC # BLD AUTO: 8.79 THOUSAND/UL (ref 4.31–10.16)

## 2020-12-09 PROCEDURE — 96374 THER/PROPH/DIAG INJ IV PUSH: CPT

## 2020-12-09 PROCEDURE — 0241U HB NFCT DS VIR RESP RNA 4 TRGT: CPT | Performed by: EMERGENCY MEDICINE

## 2020-12-09 PROCEDURE — G1004 CDSM NDSC: HCPCS

## 2020-12-09 PROCEDURE — 83735 ASSAY OF MAGNESIUM: CPT | Performed by: EMERGENCY MEDICINE

## 2020-12-09 PROCEDURE — 96375 TX/PRO/DX INJ NEW DRUG ADDON: CPT

## 2020-12-09 PROCEDURE — 1124F ACP DISCUSS-NO DSCNMKR DOCD: CPT | Performed by: EMERGENCY MEDICINE

## 2020-12-09 PROCEDURE — 36415 COLL VENOUS BLD VENIPUNCTURE: CPT

## 2020-12-09 PROCEDURE — 99222 1ST HOSP IP/OBS MODERATE 55: CPT | Performed by: PHYSICIAN ASSISTANT

## 2020-12-09 PROCEDURE — 96361 HYDRATE IV INFUSION ADD-ON: CPT

## 2020-12-09 PROCEDURE — 84484 ASSAY OF TROPONIN QUANT: CPT | Performed by: EMERGENCY MEDICINE

## 2020-12-09 PROCEDURE — 99285 EMERGENCY DEPT VISIT HI MDM: CPT | Performed by: EMERGENCY MEDICINE

## 2020-12-09 PROCEDURE — 83880 ASSAY OF NATRIURETIC PEPTIDE: CPT | Performed by: EMERGENCY MEDICINE

## 2020-12-09 PROCEDURE — 71275 CT ANGIOGRAPHY CHEST: CPT

## 2020-12-09 PROCEDURE — 99285 EMERGENCY DEPT VISIT HI MDM: CPT

## 2020-12-09 PROCEDURE — 85025 COMPLETE CBC W/AUTO DIFF WBC: CPT | Performed by: EMERGENCY MEDICINE

## 2020-12-09 PROCEDURE — 80053 COMPREHEN METABOLIC PANEL: CPT | Performed by: EMERGENCY MEDICINE

## 2020-12-09 PROCEDURE — 93005 ELECTROCARDIOGRAM TRACING: CPT

## 2020-12-09 PROCEDURE — 71045 X-RAY EXAM CHEST 1 VIEW: CPT

## 2020-12-09 RX ORDER — ASPIRIN 325 MG
325 TABLET ORAL DAILY
Status: DISCONTINUED | OUTPATIENT
Start: 2020-12-10 | End: 2020-12-12 | Stop reason: HOSPADM

## 2020-12-09 RX ORDER — FUROSEMIDE 10 MG/ML
20 INJECTION INTRAMUSCULAR; INTRAVENOUS ONCE
Status: COMPLETED | OUTPATIENT
Start: 2020-12-09 | End: 2020-12-09

## 2020-12-09 RX ORDER — FLUOXETINE HYDROCHLORIDE 20 MG/1
20 CAPSULE ORAL DAILY
Status: DISCONTINUED | OUTPATIENT
Start: 2020-12-10 | End: 2020-12-12 | Stop reason: HOSPADM

## 2020-12-09 RX ORDER — METHYLPREDNISOLONE SODIUM SUCCINATE 125 MG/2ML
125 INJECTION, POWDER, LYOPHILIZED, FOR SOLUTION INTRAMUSCULAR; INTRAVENOUS ONCE
Status: COMPLETED | OUTPATIENT
Start: 2020-12-09 | End: 2020-12-09

## 2020-12-09 RX ORDER — IPRATROPIUM BROMIDE AND ALBUTEROL SULFATE 2.5; .5 MG/3ML; MG/3ML
SOLUTION RESPIRATORY (INHALATION)
Status: DISPENSED
Start: 2020-12-09 | End: 2020-12-10

## 2020-12-09 RX ORDER — ONDANSETRON 2 MG/ML
4 INJECTION INTRAMUSCULAR; INTRAVENOUS EVERY 6 HOURS PRN
Status: DISCONTINUED | OUTPATIENT
Start: 2020-12-09 | End: 2020-12-12 | Stop reason: HOSPADM

## 2020-12-09 RX ORDER — NITROGLYCERIN 0.4 MG/1
0.4 TABLET SUBLINGUAL ONCE
Status: DISCONTINUED | OUTPATIENT
Start: 2020-12-09 | End: 2020-12-09

## 2020-12-09 RX ORDER — IPRATROPIUM BROMIDE AND ALBUTEROL SULFATE 2.5; .5 MG/3ML; MG/3ML
3 SOLUTION RESPIRATORY (INHALATION) ONCE
Status: DISCONTINUED | OUTPATIENT
Start: 2020-12-09 | End: 2020-12-11

## 2020-12-09 RX ORDER — METHYLPREDNISOLONE SODIUM SUCCINATE 40 MG/ML
40 INJECTION, POWDER, LYOPHILIZED, FOR SOLUTION INTRAMUSCULAR; INTRAVENOUS DAILY
Status: DISCONTINUED | OUTPATIENT
Start: 2020-12-10 | End: 2020-12-12 | Stop reason: HOSPADM

## 2020-12-09 RX ORDER — LEVALBUTEROL INHALATION SOLUTION 0.63 MG/3ML
0.63 SOLUTION RESPIRATORY (INHALATION) EVERY 8 HOURS PRN
Status: DISCONTINUED | OUTPATIENT
Start: 2020-12-09 | End: 2020-12-10 | Stop reason: SDUPTHER

## 2020-12-09 RX ORDER — LEVALBUTEROL 1.25 MG/.5ML
1.25 SOLUTION, CONCENTRATE RESPIRATORY (INHALATION) EVERY 8 HOURS
Status: DISCONTINUED | OUTPATIENT
Start: 2020-12-10 | End: 2020-12-12 | Stop reason: HOSPADM

## 2020-12-09 RX ORDER — LISINOPRIL 20 MG/1
20 TABLET ORAL DAILY
Status: DISCONTINUED | OUTPATIENT
Start: 2020-12-10 | End: 2020-12-11

## 2020-12-09 RX ORDER — LANOLIN ALCOHOL/MO/W.PET/CERES
3 CREAM (GRAM) TOPICAL
Status: DISCONTINUED | OUTPATIENT
Start: 2020-12-09 | End: 2020-12-12 | Stop reason: HOSPADM

## 2020-12-09 RX ORDER — ONDANSETRON 2 MG/ML
INJECTION INTRAMUSCULAR; INTRAVENOUS
Status: COMPLETED
Start: 2020-12-09 | End: 2020-12-09

## 2020-12-09 RX ORDER — ACETAMINOPHEN 325 MG/1
975 TABLET ORAL EVERY 8 HOURS PRN
Status: DISCONTINUED | OUTPATIENT
Start: 2020-12-09 | End: 2020-12-12 | Stop reason: HOSPADM

## 2020-12-09 RX ORDER — CALCIUM CARBONATE 200(500)MG
1000 TABLET,CHEWABLE ORAL DAILY PRN
Status: DISCONTINUED | OUTPATIENT
Start: 2020-12-09 | End: 2020-12-12 | Stop reason: HOSPADM

## 2020-12-09 RX ORDER — ASPIRIN 325 MG
325 TABLET ORAL ONCE
Status: COMPLETED | OUTPATIENT
Start: 2020-12-09 | End: 2020-12-09

## 2020-12-09 RX ORDER — ASPIRIN 325 MG
325 TABLET ORAL DAILY
COMMUNITY
End: 2020-12-31 | Stop reason: HOSPADM

## 2020-12-09 RX ORDER — ONDANSETRON 2 MG/ML
4 INJECTION INTRAMUSCULAR; INTRAVENOUS ONCE
Status: COMPLETED | OUTPATIENT
Start: 2020-12-09 | End: 2020-12-09

## 2020-12-09 RX ORDER — PANTOPRAZOLE SODIUM 40 MG/1
40 TABLET, DELAYED RELEASE ORAL
Status: DISCONTINUED | OUTPATIENT
Start: 2020-12-10 | End: 2020-12-12 | Stop reason: HOSPADM

## 2020-12-09 RX ADMIN — ONDANSETRON 4 MG: 2 INJECTION INTRAMUSCULAR; INTRAVENOUS at 20:57

## 2020-12-09 RX ADMIN — ASPIRIN 325 MG: 325 TABLET, FILM COATED ORAL at 21:33

## 2020-12-09 RX ADMIN — SODIUM CHLORIDE 250 ML: 0.9 INJECTION, SOLUTION INTRAVENOUS at 21:31

## 2020-12-09 RX ADMIN — METHYLPREDNISOLONE SODIUM SUCCINATE 125 MG: 125 INJECTION, POWDER, FOR SOLUTION INTRAMUSCULAR; INTRAVENOUS at 21:08

## 2020-12-09 RX ADMIN — IOHEXOL 85 ML: 350 INJECTION, SOLUTION INTRAVENOUS at 22:02

## 2020-12-09 RX ADMIN — FUROSEMIDE 20 MG: 10 INJECTION, SOLUTION INTRAMUSCULAR; INTRAVENOUS at 23:13

## 2020-12-10 ENCOUNTER — APPOINTMENT (INPATIENT)
Dept: NON INVASIVE DIAGNOSTICS | Facility: HOSPITAL | Age: 80
DRG: 291 | End: 2020-12-10
Payer: MEDICARE

## 2020-12-10 LAB
ANION GAP SERPL CALCULATED.3IONS-SCNC: 9 MMOL/L (ref 4–13)
BASOPHILS # BLD AUTO: 0 THOUSANDS/ΜL (ref 0–0.1)
BASOPHILS NFR BLD AUTO: 0 % (ref 0–1)
BILIRUB UR QL STRIP: NEGATIVE
BUN SERPL-MCNC: 22 MG/DL (ref 5–25)
CALCIUM SERPL-MCNC: 8.9 MG/DL (ref 8.3–10.1)
CHLORIDE SERPL-SCNC: 99 MMOL/L (ref 100–108)
CLARITY UR: CLEAR
CO2 SERPL-SCNC: 26 MMOL/L (ref 21–32)
COLOR UR: YELLOW
CREAT SERPL-MCNC: 1 MG/DL (ref 0.6–1.3)
EOSINOPHIL # BLD AUTO: 0 THOUSAND/ΜL (ref 0–0.61)
EOSINOPHIL NFR BLD AUTO: 0 % (ref 0–6)
ERYTHROCYTE [DISTWIDTH] IN BLOOD BY AUTOMATED COUNT: 14.1 % (ref 11.6–15.1)
GFR SERPL CREATININE-BSD FRML MDRD: 53 ML/MIN/1.73SQ M
GLUCOSE SERPL-MCNC: 152 MG/DL (ref 65–140)
GLUCOSE UR STRIP-MCNC: NEGATIVE MG/DL
HCT VFR BLD AUTO: 34.4 % (ref 34.8–46.1)
HGB BLD-MCNC: 10.1 G/DL (ref 11.5–15.4)
HGB UR QL STRIP.AUTO: NEGATIVE
IMM GRANULOCYTES # BLD AUTO: 0.03 THOUSAND/UL (ref 0–0.2)
IMM GRANULOCYTES NFR BLD AUTO: 1 % (ref 0–2)
KETONES UR STRIP-MCNC: NEGATIVE MG/DL
LEUKOCYTE ESTERASE UR QL STRIP: NEGATIVE
LYMPHOCYTES # BLD AUTO: 0.17 THOUSANDS/ΜL (ref 0.6–4.47)
LYMPHOCYTES NFR BLD AUTO: 8 % (ref 14–44)
MCH RBC QN AUTO: 26.1 PG (ref 26.8–34.3)
MCHC RBC AUTO-ENTMCNC: 29.4 G/DL (ref 31.4–37.4)
MCV RBC AUTO: 89 FL (ref 82–98)
MONOCYTES # BLD AUTO: 0.03 THOUSAND/ΜL (ref 0.17–1.22)
MONOCYTES NFR BLD AUTO: 1 % (ref 4–12)
NEUTROPHILS # BLD AUTO: 1.85 THOUSANDS/ΜL (ref 1.85–7.62)
NEUTS SEG NFR BLD AUTO: 90 % (ref 43–75)
NITRITE UR QL STRIP: NEGATIVE
NRBC BLD AUTO-RTO: 0 /100 WBCS
PH UR STRIP.AUTO: 5.5 [PH]
PLATELET # BLD AUTO: 188 THOUSANDS/UL (ref 149–390)
PMV BLD AUTO: 10 FL (ref 8.9–12.7)
POTASSIUM SERPL-SCNC: 4.6 MMOL/L (ref 3.5–5.3)
PROT UR STRIP-MCNC: NEGATIVE MG/DL
RBC # BLD AUTO: 3.87 MILLION/UL (ref 3.81–5.12)
SODIUM SERPL-SCNC: 134 MMOL/L (ref 136–145)
SP GR UR STRIP.AUTO: <=1.005 (ref 1–1.03)
TROPONIN I SERPL-MCNC: 0.34 NG/ML
TROPONIN I SERPL-MCNC: 0.56 NG/ML
TROPONIN I SERPL-MCNC: 0.66 NG/ML
TROPONIN I SERPL-MCNC: 0.7 NG/ML
UROBILINOGEN UR QL STRIP.AUTO: 0.2 E.U./DL
WBC # BLD AUTO: 2.08 THOUSAND/UL (ref 4.31–10.16)

## 2020-12-10 PROCEDURE — 80048 BASIC METABOLIC PNL TOTAL CA: CPT | Performed by: PHYSICIAN ASSISTANT

## 2020-12-10 PROCEDURE — 93306 TTE W/DOPPLER COMPLETE: CPT

## 2020-12-10 PROCEDURE — 99223 1ST HOSP IP/OBS HIGH 75: CPT | Performed by: INTERNAL MEDICINE

## 2020-12-10 PROCEDURE — 93306 TTE W/DOPPLER COMPLETE: CPT | Performed by: INTERNAL MEDICINE

## 2020-12-10 PROCEDURE — 94640 AIRWAY INHALATION TREATMENT: CPT

## 2020-12-10 PROCEDURE — 97163 PT EVAL HIGH COMPLEX 45 MIN: CPT

## 2020-12-10 PROCEDURE — 93005 ELECTROCARDIOGRAM TRACING: CPT

## 2020-12-10 PROCEDURE — 85025 COMPLETE CBC W/AUTO DIFF WBC: CPT | Performed by: PHYSICIAN ASSISTANT

## 2020-12-10 PROCEDURE — 99232 SBSQ HOSP IP/OBS MODERATE 35: CPT | Performed by: INTERNAL MEDICINE

## 2020-12-10 PROCEDURE — 94760 N-INVAS EAR/PLS OXIMETRY 1: CPT

## 2020-12-10 PROCEDURE — 81003 URINALYSIS AUTO W/O SCOPE: CPT | Performed by: INTERNAL MEDICINE

## 2020-12-10 PROCEDURE — 97110 THERAPEUTIC EXERCISES: CPT

## 2020-12-10 PROCEDURE — 84484 ASSAY OF TROPONIN QUANT: CPT | Performed by: PHYSICIAN ASSISTANT

## 2020-12-10 PROCEDURE — 99222 1ST HOSP IP/OBS MODERATE 55: CPT | Performed by: INTERNAL MEDICINE

## 2020-12-10 RX ORDER — SODIUM CHLORIDE FOR INHALATION 0.9 %
3 VIAL, NEBULIZER (ML) INHALATION
Status: DISCONTINUED | OUTPATIENT
Start: 2020-12-10 | End: 2020-12-10

## 2020-12-10 RX ORDER — SODIUM CHLORIDE FOR INHALATION 0.9 %
3 VIAL, NEBULIZER (ML) INHALATION
Status: DISCONTINUED | OUTPATIENT
Start: 2020-12-10 | End: 2020-12-12 | Stop reason: HOSPADM

## 2020-12-10 RX ORDER — FUROSEMIDE 10 MG/ML
20 INJECTION INTRAMUSCULAR; INTRAVENOUS DAILY
Status: DISCONTINUED | OUTPATIENT
Start: 2020-12-11 | End: 2020-12-11

## 2020-12-10 RX ORDER — SODIUM CHLORIDE FOR INHALATION 0.9 %
3 VIAL, NEBULIZER (ML) INHALATION EVERY 6 HOURS PRN
Status: DISCONTINUED | OUTPATIENT
Start: 2020-12-10 | End: 2020-12-12 | Stop reason: HOSPADM

## 2020-12-10 RX ORDER — LEVALBUTEROL 1.25 MG/.5ML
1.25 SOLUTION, CONCENTRATE RESPIRATORY (INHALATION) EVERY 6 HOURS PRN
Status: DISCONTINUED | OUTPATIENT
Start: 2020-12-10 | End: 2020-12-12 | Stop reason: HOSPADM

## 2020-12-10 RX ADMIN — LEVALBUTEROL HYDROCHLORIDE 1.25 MG: 1.25 SOLUTION, CONCENTRATE RESPIRATORY (INHALATION) at 15:36

## 2020-12-10 RX ADMIN — ISODIUM CHLORIDE 3 ML: 0.03 SOLUTION RESPIRATORY (INHALATION) at 15:36

## 2020-12-10 RX ADMIN — ACETAMINOPHEN 975 MG: 325 TABLET, FILM COATED ORAL at 21:17

## 2020-12-10 RX ADMIN — METHYLPREDNISOLONE SODIUM SUCCINATE 40 MG: 40 INJECTION, POWDER, FOR SOLUTION INTRAMUSCULAR; INTRAVENOUS at 09:28

## 2020-12-10 RX ADMIN — ISODIUM CHLORIDE 3 ML: 0.03 SOLUTION RESPIRATORY (INHALATION) at 23:44

## 2020-12-10 RX ADMIN — ASPIRIN 325 MG: 325 TABLET, FILM COATED ORAL at 09:28

## 2020-12-10 RX ADMIN — ENOXAPARIN SODIUM 40 MG: 40 INJECTION SUBCUTANEOUS at 09:28

## 2020-12-10 RX ADMIN — LISINOPRIL 20 MG: 20 TABLET ORAL at 09:28

## 2020-12-10 RX ADMIN — MELATONIN TAB 3 MG 3 MG: 3 TAB at 21:17

## 2020-12-10 RX ADMIN — PANTOPRAZOLE SODIUM 40 MG: 40 TABLET, DELAYED RELEASE ORAL at 06:31

## 2020-12-10 RX ADMIN — Medication 12.5 MG: at 21:18

## 2020-12-10 RX ADMIN — Medication 12.5 MG: at 12:18

## 2020-12-10 RX ADMIN — LEVALBUTEROL HYDROCHLORIDE 1.25 MG: 1.25 SOLUTION, CONCENTRATE RESPIRATORY (INHALATION) at 23:44

## 2020-12-10 RX ADMIN — MELATONIN TAB 3 MG 3 MG: 3 TAB at 01:17

## 2020-12-10 RX ADMIN — LEVALBUTEROL HYDROCHLORIDE 1.25 MG: 1.25 SOLUTION, CONCENTRATE RESPIRATORY (INHALATION) at 07:43

## 2020-12-10 RX ADMIN — FLUOXETINE 20 MG: 20 CAPSULE ORAL at 09:28

## 2020-12-10 RX ADMIN — ACETAMINOPHEN 975 MG: 325 TABLET, FILM COATED ORAL at 06:31

## 2020-12-11 ENCOUNTER — APPOINTMENT (INPATIENT)
Dept: RADIOLOGY | Facility: HOSPITAL | Age: 80
DRG: 291 | End: 2020-12-11
Payer: MEDICARE

## 2020-12-11 ENCOUNTER — APPOINTMENT (INPATIENT)
Dept: NON INVASIVE DIAGNOSTICS | Facility: HOSPITAL | Age: 80
DRG: 291 | End: 2020-12-11
Payer: MEDICARE

## 2020-12-11 LAB
ANION GAP SERPL CALCULATED.3IONS-SCNC: 7 MMOL/L (ref 4–13)
ATRIAL RATE: 103 BPM
ATRIAL RATE: 107 BPM
ATRIAL RATE: 141 BPM
ATRIAL RATE: 82 BPM
BUN SERPL-MCNC: 31 MG/DL (ref 5–25)
CALCIUM SERPL-MCNC: 9 MG/DL (ref 8.3–10.1)
CHEST PAIN STATEMENT: NORMAL
CHLORIDE SERPL-SCNC: 99 MMOL/L (ref 100–108)
CHOLEST SERPL-MCNC: 155 MG/DL (ref 50–200)
CO2 SERPL-SCNC: 31 MMOL/L (ref 21–32)
CREAT SERPL-MCNC: 1.02 MG/DL (ref 0.6–1.3)
GFR SERPL CREATININE-BSD FRML MDRD: 51 ML/MIN/1.73SQ M
GLUCOSE SERPL-MCNC: 112 MG/DL (ref 65–140)
HDLC SERPL-MCNC: 75 MG/DL
LDLC SERPL CALC-MCNC: 61 MG/DL (ref 0–100)
MAX DIASTOLIC BP: 67 MMHG
MAX HEART RATE: 100 BPM
MAX PREDICTED HEART RATE: 138 BPM
MAX. SYSTOLIC BP: 146 MMHG
NONHDLC SERPL-MCNC: 80 MG/DL
P AXIS: -4 DEGREES
P AXIS: 46 DEGREES
P AXIS: 59 DEGREES
P AXIS: 62 DEGREES
POTASSIUM SERPL-SCNC: 4.5 MMOL/L (ref 3.5–5.3)
PR INTERVAL: 116 MS
PR INTERVAL: 152 MS
PR INTERVAL: 152 MS
PR INTERVAL: 154 MS
PROTOCOL NAME: NORMAL
QRS AXIS: -12 DEGREES
QRS AXIS: -30 DEGREES
QRS AXIS: -35 DEGREES
QRS AXIS: -57 DEGREES
QRSD INTERVAL: 84 MS
QRSD INTERVAL: 84 MS
QRSD INTERVAL: 90 MS
QRSD INTERVAL: 98 MS
QT INTERVAL: 310 MS
QT INTERVAL: 338 MS
QT INTERVAL: 352 MS
QT INTERVAL: 420 MS
QTC INTERVAL: 451 MS
QTC INTERVAL: 461 MS
QTC INTERVAL: 474 MS
QTC INTERVAL: 490 MS
REASON FOR TERMINATION: NORMAL
SODIUM SERPL-SCNC: 137 MMOL/L (ref 136–145)
T WAVE AXIS: 103 DEGREES
T WAVE AXIS: 72 DEGREES
T WAVE AXIS: 75 DEGREES
T WAVE AXIS: 85 DEGREES
TARGET HR FORMULA: NORMAL
TEST INDICATION: NORMAL
TIME IN EXERCISE PHASE: NORMAL
TRIGL SERPL-MCNC: 95 MG/DL
VENTRICULAR RATE: 103 BPM
VENTRICULAR RATE: 107 BPM
VENTRICULAR RATE: 141 BPM
VENTRICULAR RATE: 82 BPM

## 2020-12-11 PROCEDURE — 93010 ELECTROCARDIOGRAM REPORT: CPT | Performed by: INTERNAL MEDICINE

## 2020-12-11 PROCEDURE — 99232 SBSQ HOSP IP/OBS MODERATE 35: CPT | Performed by: INTERNAL MEDICINE

## 2020-12-11 PROCEDURE — A9502 TC99M TETROFOSMIN: HCPCS

## 2020-12-11 PROCEDURE — 93017 CV STRESS TEST TRACING ONLY: CPT

## 2020-12-11 PROCEDURE — 93005 ELECTROCARDIOGRAM TRACING: CPT

## 2020-12-11 PROCEDURE — 94760 N-INVAS EAR/PLS OXIMETRY 1: CPT

## 2020-12-11 PROCEDURE — 93016 CV STRESS TEST SUPVJ ONLY: CPT | Performed by: INTERNAL MEDICINE

## 2020-12-11 PROCEDURE — 97535 SELF CARE MNGMENT TRAINING: CPT

## 2020-12-11 PROCEDURE — 97167 OT EVAL HIGH COMPLEX 60 MIN: CPT

## 2020-12-11 PROCEDURE — 94640 AIRWAY INHALATION TREATMENT: CPT

## 2020-12-11 PROCEDURE — 78452 HT MUSCLE IMAGE SPECT MULT: CPT | Performed by: INTERNAL MEDICINE

## 2020-12-11 PROCEDURE — 80061 LIPID PANEL: CPT | Performed by: INTERNAL MEDICINE

## 2020-12-11 PROCEDURE — 80048 BASIC METABOLIC PNL TOTAL CA: CPT | Performed by: INTERNAL MEDICINE

## 2020-12-11 PROCEDURE — G1004 CDSM NDSC: HCPCS

## 2020-12-11 PROCEDURE — 78452 HT MUSCLE IMAGE SPECT MULT: CPT

## 2020-12-11 PROCEDURE — 93018 CV STRESS TEST I&R ONLY: CPT | Performed by: INTERNAL MEDICINE

## 2020-12-11 RX ORDER — FUROSEMIDE 20 MG/1
20 TABLET ORAL DAILY
Status: DISCONTINUED | OUTPATIENT
Start: 2020-12-11 | End: 2020-12-12 | Stop reason: HOSPADM

## 2020-12-11 RX ORDER — FUROSEMIDE 20 MG/1
20 TABLET ORAL DAILY
Status: DISCONTINUED | OUTPATIENT
Start: 2020-12-12 | End: 2020-12-11

## 2020-12-11 RX ORDER — LISINOPRIL 10 MG/1
10 TABLET ORAL DAILY
Status: DISCONTINUED | OUTPATIENT
Start: 2020-12-12 | End: 2020-12-12 | Stop reason: HOSPADM

## 2020-12-11 RX ADMIN — ASPIRIN 325 MG: 325 TABLET, FILM COATED ORAL at 08:40

## 2020-12-11 RX ADMIN — ISODIUM CHLORIDE 3 ML: 0.03 SOLUTION RESPIRATORY (INHALATION) at 07:30

## 2020-12-11 RX ADMIN — FUROSEMIDE 20 MG: 20 TABLET ORAL at 16:46

## 2020-12-11 RX ADMIN — ISODIUM CHLORIDE 3 ML: 0.03 SOLUTION RESPIRATORY (INHALATION) at 16:29

## 2020-12-11 RX ADMIN — PANTOPRAZOLE SODIUM 40 MG: 40 TABLET, DELAYED RELEASE ORAL at 05:43

## 2020-12-11 RX ADMIN — LISINOPRIL 20 MG: 20 TABLET ORAL at 08:40

## 2020-12-11 RX ADMIN — METOPROLOL TARTRATE 25 MG: 25 TABLET, FILM COATED ORAL at 21:45

## 2020-12-11 RX ADMIN — FUROSEMIDE 20 MG: 10 INJECTION, SOLUTION INTRAMUSCULAR; INTRAVENOUS at 08:40

## 2020-12-11 RX ADMIN — MELATONIN TAB 3 MG 3 MG: 3 TAB at 21:45

## 2020-12-11 RX ADMIN — REGADENOSON 0.4 MG: 0.08 INJECTION, SOLUTION INTRAVENOUS at 09:58

## 2020-12-11 RX ADMIN — METHYLPREDNISOLONE SODIUM SUCCINATE 40 MG: 40 INJECTION, POWDER, FOR SOLUTION INTRAMUSCULAR; INTRAVENOUS at 08:40

## 2020-12-11 RX ADMIN — ENOXAPARIN SODIUM 40 MG: 40 INJECTION SUBCUTANEOUS at 08:40

## 2020-12-11 RX ADMIN — LEVALBUTEROL HYDROCHLORIDE 1.25 MG: 1.25 SOLUTION, CONCENTRATE RESPIRATORY (INHALATION) at 07:30

## 2020-12-11 RX ADMIN — ISODIUM CHLORIDE 3 ML: 0.03 SOLUTION RESPIRATORY (INHALATION) at 23:42

## 2020-12-11 RX ADMIN — LEVALBUTEROL HYDROCHLORIDE 1.25 MG: 1.25 SOLUTION, CONCENTRATE RESPIRATORY (INHALATION) at 23:43

## 2020-12-11 RX ADMIN — Medication 12.5 MG: at 08:40

## 2020-12-11 RX ADMIN — LEVALBUTEROL HYDROCHLORIDE 1.25 MG: 1.25 SOLUTION, CONCENTRATE RESPIRATORY (INHALATION) at 16:29

## 2020-12-11 RX ADMIN — FLUOXETINE 20 MG: 20 CAPSULE ORAL at 08:40

## 2020-12-12 VITALS
OXYGEN SATURATION: 97 % | TEMPERATURE: 97.9 F | DIASTOLIC BLOOD PRESSURE: 59 MMHG | HEIGHT: 65 IN | WEIGHT: 121.25 LBS | BODY MASS INDEX: 20.2 KG/M2 | SYSTOLIC BLOOD PRESSURE: 124 MMHG | RESPIRATION RATE: 18 BRPM | HEART RATE: 75 BPM

## 2020-12-12 PROBLEM — N18.30 CKD (CHRONIC KIDNEY DISEASE) STAGE 3, GFR 30-59 ML/MIN (HCC): Status: ACTIVE | Noted: 2020-12-12

## 2020-12-12 LAB
ANION GAP SERPL CALCULATED.3IONS-SCNC: 6 MMOL/L (ref 4–13)
BUN SERPL-MCNC: 30 MG/DL (ref 5–25)
CALCIUM SERPL-MCNC: 9 MG/DL (ref 8.3–10.1)
CHLORIDE SERPL-SCNC: 100 MMOL/L (ref 100–108)
CO2 SERPL-SCNC: 31 MMOL/L (ref 21–32)
CREAT SERPL-MCNC: 0.83 MG/DL (ref 0.6–1.3)
GFR SERPL CREATININE-BSD FRML MDRD: 66 ML/MIN/1.73SQ M
GLUCOSE SERPL-MCNC: 108 MG/DL (ref 65–140)
POTASSIUM SERPL-SCNC: 4.4 MMOL/L (ref 3.5–5.3)
SODIUM SERPL-SCNC: 137 MMOL/L (ref 136–145)

## 2020-12-12 PROCEDURE — 94760 N-INVAS EAR/PLS OXIMETRY 1: CPT

## 2020-12-12 PROCEDURE — 99232 SBSQ HOSP IP/OBS MODERATE 35: CPT | Performed by: PHYSICIAN ASSISTANT

## 2020-12-12 PROCEDURE — G0009 ADMIN PNEUMOCOCCAL VACCINE: HCPCS | Performed by: PHYSICIAN ASSISTANT

## 2020-12-12 PROCEDURE — 99239 HOSP IP/OBS DSCHRG MGMT >30: CPT | Performed by: INTERNAL MEDICINE

## 2020-12-12 PROCEDURE — 90732 PPSV23 VACC 2 YRS+ SUBQ/IM: CPT | Performed by: PHYSICIAN ASSISTANT

## 2020-12-12 PROCEDURE — 80048 BASIC METABOLIC PNL TOTAL CA: CPT | Performed by: INTERNAL MEDICINE

## 2020-12-12 PROCEDURE — 99232 SBSQ HOSP IP/OBS MODERATE 35: CPT | Performed by: INTERNAL MEDICINE

## 2020-12-12 PROCEDURE — 94640 AIRWAY INHALATION TREATMENT: CPT

## 2020-12-12 RX ORDER — PREDNISONE 10 MG/1
TABLET ORAL
Qty: 30 TABLET | Refills: 0 | Status: SHIPPED | OUTPATIENT
Start: 2020-12-12 | End: 2020-12-24

## 2020-12-12 RX ORDER — LEVALBUTEROL 1.25 MG/.5ML
1.25 SOLUTION, CONCENTRATE RESPIRATORY (INHALATION) EVERY 8 HOURS
Qty: 45 ML | Refills: 0 | Status: SHIPPED | OUTPATIENT
Start: 2020-12-12 | End: 2021-01-11

## 2020-12-12 RX ORDER — FUROSEMIDE 20 MG/1
20 TABLET ORAL DAILY
Qty: 30 TABLET | Refills: 0 | Status: SHIPPED | OUTPATIENT
Start: 2020-12-13 | End: 2021-09-01 | Stop reason: HOSPADM

## 2020-12-12 RX ADMIN — LISINOPRIL 10 MG: 10 TABLET ORAL at 08:54

## 2020-12-12 RX ADMIN — METHYLPREDNISOLONE SODIUM SUCCINATE 40 MG: 40 INJECTION, POWDER, FOR SOLUTION INTRAMUSCULAR; INTRAVENOUS at 08:55

## 2020-12-12 RX ADMIN — ASPIRIN 325 MG: 325 TABLET, FILM COATED ORAL at 08:54

## 2020-12-12 RX ADMIN — ENOXAPARIN SODIUM 40 MG: 40 INJECTION SUBCUTANEOUS at 08:56

## 2020-12-12 RX ADMIN — FLUOXETINE 20 MG: 20 CAPSULE ORAL at 08:54

## 2020-12-12 RX ADMIN — TIOTROPIUM BROMIDE 18 MCG: 18 CAPSULE ORAL; RESPIRATORY (INHALATION) at 08:59

## 2020-12-12 RX ADMIN — METOPROLOL TARTRATE 25 MG: 25 TABLET, FILM COATED ORAL at 08:54

## 2020-12-12 RX ADMIN — ISODIUM CHLORIDE 3 ML: 0.03 SOLUTION RESPIRATORY (INHALATION) at 07:24

## 2020-12-12 RX ADMIN — FUROSEMIDE 20 MG: 20 TABLET ORAL at 08:54

## 2020-12-12 RX ADMIN — PNEUMOCOCCAL VACCINE POLYVALENT 0.5 ML
25; 25; 25; 25; 25; 25; 25; 25; 25; 25; 25; 25; 25; 25; 25; 25; 25; 25; 25; 25; 25; 25; 25 INJECTION, SOLUTION INTRAMUSCULAR; SUBCUTANEOUS at 12:35

## 2020-12-12 RX ADMIN — PANTOPRAZOLE SODIUM 40 MG: 40 TABLET, DELAYED RELEASE ORAL at 06:02

## 2020-12-12 RX ADMIN — LEVALBUTEROL HYDROCHLORIDE 1.25 MG: 1.25 SOLUTION, CONCENTRATE RESPIRATORY (INHALATION) at 07:24

## 2020-12-16 ENCOUNTER — PATIENT OUTREACH (OUTPATIENT)
Dept: CASE MANAGEMENT | Facility: HOSPITAL | Age: 80
End: 2020-12-16

## 2020-12-21 ENCOUNTER — PATIENT OUTREACH (OUTPATIENT)
Dept: CASE MANAGEMENT | Facility: HOSPITAL | Age: 80
End: 2020-12-21

## 2020-12-29 ENCOUNTER — APPOINTMENT (EMERGENCY)
Dept: RADIOLOGY | Facility: HOSPITAL | Age: 80
DRG: 065 | End: 2020-12-29
Payer: MEDICARE

## 2020-12-29 ENCOUNTER — HOSPITAL ENCOUNTER (INPATIENT)
Facility: HOSPITAL | Age: 80
LOS: 1 days | Discharge: HOME | DRG: 065 | End: 2020-12-31
Attending: EMERGENCY MEDICINE | Admitting: FAMILY MEDICINE
Payer: MEDICARE

## 2020-12-29 ENCOUNTER — TELEPHONE (OUTPATIENT)
Dept: INPATIENT UNIT | Facility: HOSPITAL | Age: 80
End: 2020-12-29

## 2020-12-29 DIAGNOSIS — R20.0 NUMBNESS: ICD-10-CM

## 2020-12-29 DIAGNOSIS — I48.91 ATRIAL FIBRILLATION WITH RAPID VENTRICULAR RESPONSE (HCC): ICD-10-CM

## 2020-12-29 DIAGNOSIS — J44.9 COPD (CHRONIC OBSTRUCTIVE PULMONARY DISEASE) (HCC): ICD-10-CM

## 2020-12-29 DIAGNOSIS — I63.9 CVA (CEREBRAL VASCULAR ACCIDENT) (HCC): Primary | ICD-10-CM

## 2020-12-29 DIAGNOSIS — J96.11 CHRONIC RESPIRATORY FAILURE WITH HYPOXIA (HCC): ICD-10-CM

## 2020-12-29 DIAGNOSIS — G45.9 TIA (TRANSIENT ISCHEMIC ATTACK): ICD-10-CM

## 2020-12-29 PROBLEM — I50.32 CHRONIC DIASTOLIC (CONGESTIVE) HEART FAILURE (HCC): Status: ACTIVE | Noted: 2020-12-09

## 2020-12-29 LAB
ALBUMIN SERPL BCP-MCNC: 3.9 G/DL (ref 3.5–5)
ALP SERPL-CCNC: 76 U/L (ref 46–116)
ALT SERPL W P-5'-P-CCNC: 15 U/L (ref 12–78)
ANION GAP SERPL CALCULATED.3IONS-SCNC: 10 MMOL/L (ref 4–13)
APTT PPP: 29 SECONDS (ref 23–37)
AST SERPL W P-5'-P-CCNC: 12 U/L (ref 5–45)
ATRIAL RATE: 82 BPM
BASOPHILS # BLD AUTO: 0.02 THOUSANDS/ΜL (ref 0–0.1)
BASOPHILS NFR BLD AUTO: 0 % (ref 0–1)
BILIRUB SERPL-MCNC: 0.2 MG/DL (ref 0.2–1)
BUN SERPL-MCNC: 39 MG/DL (ref 5–25)
CALCIUM SERPL-MCNC: 9 MG/DL (ref 8.3–10.1)
CHLORIDE SERPL-SCNC: 97 MMOL/L (ref 100–108)
CO2 SERPL-SCNC: 29 MMOL/L (ref 21–32)
CREAT SERPL-MCNC: 1.19 MG/DL (ref 0.6–1.3)
EOSINOPHIL # BLD AUTO: 0.03 THOUSAND/ΜL (ref 0–0.61)
EOSINOPHIL NFR BLD AUTO: 0 % (ref 0–6)
ERYTHROCYTE [DISTWIDTH] IN BLOOD BY AUTOMATED COUNT: 14 % (ref 11.6–15.1)
GFR SERPL CREATININE-BSD FRML MDRD: 43 ML/MIN/1.73SQ M
GLUCOSE SERPL-MCNC: 124 MG/DL (ref 65–140)
GLUCOSE SERPL-MCNC: 127 MG/DL (ref 65–140)
HCT VFR BLD AUTO: 42 % (ref 34.8–46.1)
HGB BLD-MCNC: 12.5 G/DL (ref 11.5–15.4)
IMM GRANULOCYTES # BLD AUTO: 0.05 THOUSAND/UL (ref 0–0.2)
IMM GRANULOCYTES NFR BLD AUTO: 1 % (ref 0–2)
INR PPP: 0.94 (ref 0.84–1.19)
LYMPHOCYTES # BLD AUTO: 0.66 THOUSANDS/ΜL (ref 0.6–4.47)
LYMPHOCYTES NFR BLD AUTO: 7 % (ref 14–44)
MCH RBC QN AUTO: 26.2 PG (ref 26.8–34.3)
MCHC RBC AUTO-ENTMCNC: 29.8 G/DL (ref 31.4–37.4)
MCV RBC AUTO: 88 FL (ref 82–98)
MONOCYTES # BLD AUTO: 0.48 THOUSAND/ΜL (ref 0.17–1.22)
MONOCYTES NFR BLD AUTO: 5 % (ref 4–12)
NEUTROPHILS # BLD AUTO: 8.23 THOUSANDS/ΜL (ref 1.85–7.62)
NEUTS SEG NFR BLD AUTO: 87 % (ref 43–75)
NRBC BLD AUTO-RTO: 0 /100 WBCS
P AXIS: 54 DEGREES
PLATELET # BLD AUTO: 243 THOUSANDS/UL (ref 149–390)
PMV BLD AUTO: 9.7 FL (ref 8.9–12.7)
POTASSIUM SERPL-SCNC: 4.6 MMOL/L (ref 3.5–5.3)
PR INTERVAL: 160 MS
PROT SERPL-MCNC: 7.7 G/DL (ref 6.4–8.2)
PROTHROMBIN TIME: 12.5 SECONDS (ref 11.6–14.5)
QRS AXIS: -16 DEGREES
QRSD INTERVAL: 88 MS
QT INTERVAL: 378 MS
QTC INTERVAL: 441 MS
RBC # BLD AUTO: 4.77 MILLION/UL (ref 3.81–5.12)
SODIUM SERPL-SCNC: 136 MMOL/L (ref 136–145)
T WAVE AXIS: 108 DEGREES
TROPONIN I SERPL-MCNC: 0.02 NG/ML
VENTRICULAR RATE: 82 BPM
WBC # BLD AUTO: 9.47 THOUSAND/UL (ref 4.31–10.16)

## 2020-12-29 PROCEDURE — 70498 CT ANGIOGRAPHY NECK: CPT

## 2020-12-29 PROCEDURE — 83036 HEMOGLOBIN GLYCOSYLATED A1C: CPT | Performed by: FAMILY MEDICINE

## 2020-12-29 PROCEDURE — 99291 CRITICAL CARE FIRST HOUR: CPT | Performed by: EMERGENCY MEDICINE

## 2020-12-29 PROCEDURE — 85730 THROMBOPLASTIN TIME PARTIAL: CPT | Performed by: EMERGENCY MEDICINE

## 2020-12-29 PROCEDURE — 99220 PR INITIAL OBSERVATION CARE/DAY 70 MINUTES: CPT | Performed by: FAMILY MEDICINE

## 2020-12-29 PROCEDURE — 80053 COMPREHEN METABOLIC PANEL: CPT | Performed by: EMERGENCY MEDICINE

## 2020-12-29 PROCEDURE — 36415 COLL VENOUS BLD VENIPUNCTURE: CPT | Performed by: EMERGENCY MEDICINE

## 2020-12-29 PROCEDURE — 82948 REAGENT STRIP/BLOOD GLUCOSE: CPT

## 2020-12-29 PROCEDURE — 93005 ELECTROCARDIOGRAM TRACING: CPT

## 2020-12-29 PROCEDURE — 85610 PROTHROMBIN TIME: CPT | Performed by: EMERGENCY MEDICINE

## 2020-12-29 PROCEDURE — 99291 CRITICAL CARE FIRST HOUR: CPT

## 2020-12-29 PROCEDURE — 94640 AIRWAY INHALATION TREATMENT: CPT

## 2020-12-29 PROCEDURE — 71045 X-RAY EXAM CHEST 1 VIEW: CPT

## 2020-12-29 PROCEDURE — 85025 COMPLETE CBC W/AUTO DIFF WBC: CPT | Performed by: EMERGENCY MEDICINE

## 2020-12-29 PROCEDURE — 94760 N-INVAS EAR/PLS OXIMETRY 1: CPT

## 2020-12-29 PROCEDURE — 70496 CT ANGIOGRAPHY HEAD: CPT

## 2020-12-29 PROCEDURE — 93010 ELECTROCARDIOGRAM REPORT: CPT | Performed by: INTERNAL MEDICINE

## 2020-12-29 PROCEDURE — 84484 ASSAY OF TROPONIN QUANT: CPT | Performed by: EMERGENCY MEDICINE

## 2020-12-29 RX ORDER — FLUOXETINE HYDROCHLORIDE 20 MG/1
20 CAPSULE ORAL DAILY
Status: DISCONTINUED | OUTPATIENT
Start: 2020-12-30 | End: 2020-12-31 | Stop reason: HOSPADM

## 2020-12-29 RX ORDER — LANOLIN ALCOHOL/MO/W.PET/CERES
3 CREAM (GRAM) TOPICAL
Status: DISCONTINUED | OUTPATIENT
Start: 2020-12-29 | End: 2020-12-31 | Stop reason: HOSPADM

## 2020-12-29 RX ORDER — ATORVASTATIN CALCIUM 80 MG/1
80 TABLET, FILM COATED ORAL
Status: DISCONTINUED | OUTPATIENT
Start: 2020-12-29 | End: 2020-12-31 | Stop reason: HOSPADM

## 2020-12-29 RX ORDER — SODIUM CHLORIDE FOR INHALATION 0.9 %
VIAL, NEBULIZER (ML) INHALATION
Status: COMPLETED
Start: 2020-12-29 | End: 2020-12-29

## 2020-12-29 RX ORDER — PANTOPRAZOLE SODIUM 40 MG/1
40 TABLET, DELAYED RELEASE ORAL
Status: DISCONTINUED | OUTPATIENT
Start: 2020-12-30 | End: 2020-12-31 | Stop reason: HOSPADM

## 2020-12-29 RX ORDER — CLOPIDOGREL BISULFATE 75 MG/1
75 TABLET ORAL DAILY
Status: DISCONTINUED | OUTPATIENT
Start: 2020-12-30 | End: 2020-12-30

## 2020-12-29 RX ORDER — CLOPIDOGREL BISULFATE 75 MG/1
300 TABLET ORAL ONCE
Status: COMPLETED | OUTPATIENT
Start: 2020-12-29 | End: 2020-12-29

## 2020-12-29 RX ORDER — LEVALBUTEROL 1.25 MG/.5ML
1.25 SOLUTION, CONCENTRATE RESPIRATORY (INHALATION)
Status: DISCONTINUED | OUTPATIENT
Start: 2020-12-30 | End: 2020-12-31 | Stop reason: HOSPADM

## 2020-12-29 RX ORDER — ASPIRIN 325 MG
325 TABLET ORAL ONCE
Status: COMPLETED | OUTPATIENT
Start: 2020-12-29 | End: 2020-12-29

## 2020-12-29 RX ORDER — SODIUM CHLORIDE FOR INHALATION 0.9 %
3 VIAL, NEBULIZER (ML) INHALATION EVERY 8 HOURS PRN
Status: DISCONTINUED | OUTPATIENT
Start: 2020-12-29 | End: 2020-12-31 | Stop reason: HOSPADM

## 2020-12-29 RX ORDER — ASPIRIN 81 MG/1
81 TABLET ORAL DAILY
Status: DISCONTINUED | OUTPATIENT
Start: 2020-12-30 | End: 2020-12-30

## 2020-12-29 RX ADMIN — ATORVASTATIN CALCIUM 80 MG: 80 TABLET ORAL at 20:05

## 2020-12-29 RX ADMIN — ISODIUM CHLORIDE 3 ML: 0.03 SOLUTION RESPIRATORY (INHALATION) at 23:27

## 2020-12-29 RX ADMIN — IOHEXOL 85 ML: 350 INJECTION, SOLUTION INTRAVENOUS at 14:53

## 2020-12-29 RX ADMIN — CLOPIDOGREL BISULFATE 300 MG: 75 TABLET ORAL at 15:35

## 2020-12-29 RX ADMIN — LEVALBUTEROL HYDROCHLORIDE 1.25 MG: 1.25 SOLUTION, CONCENTRATE RESPIRATORY (INHALATION) at 23:26

## 2020-12-29 RX ADMIN — MELATONIN 3 MG: at 21:26

## 2020-12-29 RX ADMIN — ASPIRIN 325 MG: 325 TABLET, FILM COATED ORAL at 15:35

## 2020-12-29 NOTE — ED PROVIDER NOTES
History  Chief Complaint   Patient presents with   [address] she awoke with a severe headache  States at 1230 pm she started with slurred speech, left face weakness and numbness fingers left hand   Hx of stroke in past  Stroke alert called by Dr Mitra Morales at [phone]     Patient is an 49-year-old female  She does have a history of a stroke  She reports that she was not left with any deficits  Patient has been having intermittent neurologic symptoms over the last 3 weeks and she was admitted for congestive heart failure  She does have a history of a valve replacement but is not on oral anticoagulation  She has congestive heart failure, COPD, coronary artery disease, hypertension, and heart murmur  Old chart shows a history of COPD and lung cancer  She has chronic kidney disease  Patient woke with a headache this morning  It has since spontaneously improved  At 12:30 p m  patient developed slurred speech and left face/left arm numbness  She presents to the emergency room for evaluation  Patient did report some blurred vision in her left eye  Otherwise no vision loss  Symptoms are moderate in severity  No aggravating or relieving factors  Prior to Admission Medications   Prescriptions Last Dose Informant Patient Reported? Taking?    FLUoxetine (PROzac) 20 mg capsule   Yes No   Sig: Take 20 mg by mouth daily   aspirin 325 mg tablet   Yes No   Sig: Take 325 mg by mouth daily   benazepril (LOTENSIN) 20 mg tablet   Yes No   Sig: Take 20 mg by mouth daily   furosemide (LASIX) 20 mg tablet   No No   Sig: Take 1 tablet (20 mg total) by mouth daily   levalbuterol (XOPENEX) 1 25 mg/0 5 mL nebulizer solution   No No   Sig: Take 0 5 mL (1 25 mg total) by nebulization every 8 (eight) hours   melatonin 3 mg   No No   Sig: Take 1 tablet (3 mg total) by mouth daily at bedtime   metoprolol tartrate (LOPRESSOR) 25 mg tablet   No No   Sig: Take 1 tablet (25 mg total) by mouth every 12 (twelve) hours omeprazole (PriLOSEC) 40 MG capsule   Yes No   Sig: Take 40 mg by mouth daily   tiotropium (SPIRIVA) 18 mcg inhalation capsule   No No   Sig: Place 1 capsule (18 mcg total) into inhaler and inhale daily      Facility-Administered Medications: None       Past Medical History:   Diagnosis Date    Arthritis     [facility]     COPD [facility]chronic obstructive pulmonary disease[facility]     Coronary artery disease     aortic valve replacement    Heart murmur     Hypertension        Past Surgical History:   Procedure Laterality Date    AORTIC VALVE REPLACEMENT      APPENDECTOMY      CHOLECYSTECTOMY      JOINT REPLACEMENT      bilaterl hip and right knee    TONSILECTOMY AND ADNOIDECTOMY      TONSILLECTOMY      TUBAL LIGATION         Family History   Problem Relation Age of Onset    Heart defect Father      I have reviewed and agree with the history as documented  E-Cigarette/Vaping    E-Cigarette Use Never User      E-Cigarette/Vaping Substances     Social History     Tobacco Use    Smoking status: Former Smoker     Packs/day: 1 00     Years: 25 00     Pack years: 25      Types: Cigarettes     Quit date: 2000     Years since quittin 0    Smokeless tobacco: Never Used   Substance Use Topics    Alcohol use: Yes     Alcohol/week: 1 0 standard drinks     Types: 1 Glasses of wine per week     Frequency: 2-3 times a week     Drinks per session: 1 or 2     Binge frequency: Never     Comment: rare    Drug use: No       Review of Systems   Constitutional: Negative for chills and fever  HENT: Negative for rhinorrhea and sore throat  Eyes: Positive for visual disturbance  Negative for pain and redness  Respiratory: Negative for cough and shortness of breath  Cardiovascular: Negative for chest pain and leg swelling  Gastrointestinal: Negative for abdominal pain, diarrhea and vomiting  Endocrine: Negative for polydipsia and polyuria     Genitourinary: Negative for dysuria, frequency, hematuria, vaginal bleeding and vaginal discharge  Musculoskeletal: Positive for arthralgias  Negative for back pain and neck pain  Skin: Negative for rash and wound  Allergic/Immunologic: Negative for immunocompromised state  Neurological: Positive for speech difficulty, numbness and headaches  Negative for weakness  Hematological: Does not bruise/bleed easily  Psychiatric/Behavioral: Negative for hallucinations and suicidal ideas  All other systems reviewed and are negative  Physical Exam  Physical Exam  Vitals signs reviewed  Constitutional:       General: She is not in acute distress  HENT:      Head: Normocephalic and atraumatic  Nose: Nose normal       Mouth/Throat:      Mouth: Mucous membranes are moist    Eyes:      General:         Right eye: No discharge  Left eye: No discharge  Extraocular Movements: Extraocular movements intact  Conjunctiva/sclera: Conjunctivae normal       Pupils: Pupils are equal, round, and reactive to light  Comments: Visual fields are full   Neck:      Musculoskeletal: Normal range of motion and neck supple  No neck rigidity  Cardiovascular:      Rate and Rhythm: Normal rate and regular rhythm  Pulses: Normal pulses  Heart sounds: Normal heart sounds  No murmur  No friction rub  No gallop  Pulmonary:      Effort: Pulmonary effort is normal  No respiratory distress  Breath sounds: Normal breath sounds  No stridor  No wheezing, rhonchi or rales  Abdominal:      General: Bowel sounds are normal  There is no distension  Palpations: Abdomen is soft  Tenderness: There is no abdominal tenderness  There is no right CVA tenderness, left CVA tenderness, guarding or rebound  Musculoskeletal: Normal range of motion  General: No swelling, tenderness, deformity or signs of injury  Right lower leg: No edema  Left lower leg: No edema        Comments: No calf tenderness or unilateral leg swelling  Skin:     General: Skin is warm and dry  Coloration: Skin is not jaundiced  Findings: No rash  Neurological:      General: No focal deficit present  Mental Status: She is alert and oriented to person, place, and time  GCS: GCS eye subscore is 4  GCS verbal subscore is 5  GCS motor subscore is 6  Sensory: Sensory deficit present  Motor: Weakness present  Comments: Patient has decreased sensation to touch on the left side of her body  She has mild slurred speech  No aphasia  No facial droop  She has drift in the left leg    Otherwise motor exam is normal    Psychiatric:         Mood and Affect: Mood normal          Behavior: Behavior normal          Vital Signs  ED Triage Vitals [12/29/20 1437]   Temperature Pulse Respirations Blood Pressure SpO2   (!) 96 7 °F (35 9 °C) 79 20 (!) 164/113 94 %      Temp Source Heart Rate Source Patient Position - Orthostatic VS BP Location FiO2 (%)   Tympanic Monitor Lying Right arm --      Pain Score       4           Vitals:    12/29/20 1437 12/29/20 1456 12/29/20 1528   BP: (!) 164/113 155/68 143/61   Pulse: 79 81 78   Patient Position - Orthostatic VS: Lying           Visual Acuity  Visual Acuity      Most Recent Value   L Pupil Size (mm)  3   R Pupil Size (mm)  3          ED Medications  Medications   iohexol (OMNIPAQUE) 350 MG/ML injection (SINGLE-DOSE) 85 mL (85 mL Intravenous Given 12/29/20 1453)   aspirin tablet 325 mg (325 mg Oral Given 12/29/20 1535)   clopidogrel (PLAVIX) tablet 300 mg (300 mg Oral Given 12/29/20 1535)       Diagnostic Studies  Results Reviewed     Procedure Component Value Units Date/Time    Comprehensive metabolic panel [[id number]]  (Abnormal) Collected: 12/29/20 1449    Lab Status: Final result Specimen: Blood from Arm, Left Updated: 12/29/20 1517     Sodium 136 mmol/L      Potassium 4 6 mmol/L      Chloride 97 mmol/L      CO2 29 mmol/L      ANION GAP 10 mmol/L      BUN 39 mg/dL Creatinine 1 19 mg/dL      Glucose 127 mg/dL      Calcium 9 0 mg/dL      AST 12 U/L      ALT 15 U/L      Alkaline Phosphatase 76 U/L      Total Protein 7 7 g/dL      Albumin 3 9 g/dL      Total Bilirubin 0 20 mg/dL      eGFR 43 ml/min/1 73sq m     Narrative:      [place] guidelines for Chronic Kidney Disease (CKD):     Stage 1 with normal or high GFR (GFR > 90 mL/min/1 73 square meters)    Stage 2 Mild CKD (GFR = 60-89 mL/min/1 73 square meters)    Stage 3A Moderate CKD (GFR = 45-59 mL/min/1 73 square meters)    Stage 3B Moderate CKD (GFR = 30-44 mL/min/1 73 square meters)    Stage 4 Severe CKD (GFR = 15-29 mL/min/1 73 square meters)    Stage 5 End Stage CKD (GFR <15 mL/min/1 73 square meters)  Note: GFR calculation is accurate only with a steady state creatinine    Troponin I [[id number]]  (Normal) Collected: 12/29/20 1449    Lab Status: Final result Specimen: Blood from Arm, Left Updated: 12/29/20 1517     Troponin I 0 02 ng/mL     Protime-INR [[id number]]  (Normal) Collected: 12/29/20 1449    Lab Status: Final result Specimen: Blood from Arm, Left Updated: 12/29/20 1507     Protime 12 5 seconds      INR 0 94    APTT [[id number]]  (Normal) Collected: 12/29/20 1449    Lab Status: Final result Specimen: Blood from Arm, Left Updated: 12/29/20 1507     PTT 29 seconds     CBC and differential [[id number]]  (Abnormal) Collected: 12/29/20 1449    Lab Status: Final result Specimen: Blood from Arm, Left Updated: 12/29/20 1456     WBC 9 47 Thousand/uL      RBC 4 77 Million/uL      Hemoglobin 12 5 g/dL      Hematocrit 42 0 %      MCV 88 fL      MCH 26 2 pg      MCHC 29 8 g/dL      RDW 14 0 %      MPV 9 7 fL      Platelets 966 Thousands/uL      nRBC 0 /100 WBCs      Neutrophils Relative 87 %      Immat GRANS % 1 %      Lymphocytes Relative 7 %      Monocytes Relative 5 %      Eosinophils Relative 0 %      Basophils Relative 0 %      Neutrophils Absolute 8 23 Thousands/µL      Immature Grans Absolute 0 05 Thousand/uL      Lymphocytes Absolute 0 66 Thousands/µL      Monocytes Absolute 0 48 Thousand/µL      Eosinophils Absolute 0 03 Thousand/µL      Basophils Absolute 0 02 Thousands/µL     Fingerstick Glucose (POCT) [[id number]]  (Normal) Collected: 12/29/20 1435    Lab Status: Final result Updated: 12/29/20 1437     POC Glucose 124 mg/dl                  CTA stroke alert (head/neck)   Final Result by Enrique Dumont DO (12/29 1528)      Moderate atherosclerotic disease of the aortic arch and proximal great vessels  There is a high-grade stenosis of the left common carotid artery origin similar to the prior examination with moderate narrowing of the subclavian's distal to the vertebral    artery origins  Atherosclerotic disease of the right distal common carotid artery and proximal cervical internal carotid artery is stable compared to the prior exam, approximately 50%  Stable atherosclerotic disease of the intracranial internal carotid arteries  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 just proximal to the vertebrobasilar junction, unchanged  Persistent consolidation within the right lower lobe when compared with CT of the chest dated 12/9/2020 may represent sequela of patient's lung carcinoma with prior radiation therapy  Superimposed infection not excluded  Findings were directly discussed with Dr Tha Ferreira on 12/29/2020 3:13 PM                      Workstation performed: [id number]         CT stroke alert brain   Final Result by Enrique Dumont DO (12/29 1529)      Stable chronic microangiopathic change  Stable old infarcts within the left cerebellum  No mass, hemorrhage or definitive signs of acute territorial infarction        Findings were directly discussed with Dr Ivanna Linder on 12/29/2020 3:13 PM       Workstation performed: [id number]         XR chest 1 view portable    (Results Pending)              Procedures  CriticalCare Time  Performed by: Latia Kemp MD  Authorized by: Latia Kemp MD     Critical care provider statement:     Critical care time (minutes):  60    Critical care time was exclusive of:  Separately billable procedures and treating other patients    Critical care was necessary to treat or prevent imminent or life-threatening deterioration of the following conditions:  CNS failure or compromise (Stroke alert)    Critical care was time spent personally by me on the following activities:  Obtaining history from patient or surrogate, development of treatment plan with patient or surrogate, discussions with consultants, evaluation of patient's response to treatment, examination of patient, ordering and performing treatments and interventions, ordering and review of laboratory studies, ordering and review of radiographic studies and re-evaluation of patient's condition    I assumed direction of critical care for this patient from another provider in my specialty: no               ED Course                 Stroke Assessment     Row Name 12/29/20 1437             NIH Stroke Scale    Interval  Baseline      Level of Consciousness (1a )  0      LOC Questions (1b )  0      LOC Commands (1c )  0      Best Gaze (2 )  0      Visual (3 )  0      Facial Palsy (4 )  0      Motor Arm, Left (5a )  0      Motor Arm, Right (5b )  0      Motor Leg, Left (6a )  1      Motor Leg, Right (6b )  0      Limb Ataxia (7 )  0      Sensory (8 )  1      Best Language (9 )  0      Dysarthria (10 )  1      Extinction and Inattention (11 ) (Formerly Neglect)  0      Total  3                                  MDM  Number of Diagnoses or Management Options  Diagnosis management comments: Stroke alert was called  NIH stroke scale was 3  Patient was able to ambulate  Consulted with Neurology  As deficits are relatively mild, recommended no tPA  Recommended admission for stroke pathway and Plavix/aspirin  Consulted with patient  Agrees with plan  There was no intracranial hemorrhage  No fever or meningeal signs to suggest meningitis  Consulted with hospitalist for admission  Amount and/or Complexity of Data Reviewed  Clinical lab tests: ordered and reviewed  Tests in the radiology section of CPT®: ordered and reviewed  Discuss the patient with other providers: yes  Independent visualization of images, tracings, or specimens: yes        Disposition  Final diagnoses:   CVA (cerebral vascular accident) [facility])     Time reflects when diagnosis was documented in both MDM as applicable and the Disposition within this note     Time User Action Codes Description Comment    12/29/2020  4:07 PM Dale Noel [I63 [id number]] CVA (cerebral vascular accident) [facility])       ED Disposition     ED Disposition Condition Date/Time Comment    Admit Stable Tue Dec 29, 2020  4:08 PM       Follow-up Information    None         Patient's Medications   Discharge Prescriptions    No medications on file     No discharge procedures on file      PDMP Review     None          ED Provider  Electronically Signed by           Corrinne Ambrosia, MD  12/29/20 6596

## 2020-12-29 NOTE — QUICK NOTE
Stroke Alert    Called 2:46pm    79 y/o F with HTN, COPD, CAD, and prior strokes (bilateral cerebellar and L PCA) that presents with onset of dysarthria and L numbness  LKN reported at 12:30pm  NIHSS 3 on exam for L sided numbness, mild dysarthria, and LLE drift  No other findings on exam per report  Pt is able to ambulate independently  HCT and CTA reviewed - perhaps subacute-appearing infarct in L posterior temporal/occipital area however chronic infarcts also noted in bilateral cerebellar hemispheres (L>R) and L occipital areas; no hemorrhage; and no significant abnormality on angio but has bilateral moderate atherosclerotic disease  Not a tPA candidate as pt has mild, non-disabling deficit  Admit to stroke pathway  If no contraindications, load with 300mg Plavix then continue on DAPT in addition to ASA 81mg  Given hx of multiple embolic infarcts, high suspicion for proximal embolic source

## 2020-12-29 NOTE — NURSING NOTE
Patient was admitted to the floor and is asking for food; MD paged regarding diet  NIHSS done and 1 was scored  Patient is oreineited to the room and has all comfort items in reach

## 2020-12-30 ENCOUNTER — APPOINTMENT (OUTPATIENT)
Dept: RADIOLOGY | Facility: HOSPITAL | Age: 80
DRG: 065 | End: 2020-12-30
Payer: MEDICARE

## 2020-12-30 ENCOUNTER — PATIENT OUTREACH (OUTPATIENT)
Dept: CASE MANAGEMENT | Facility: HOSPITAL | Age: 80
End: 2020-12-30

## 2020-12-30 PROBLEM — I48.91 ATRIAL FIBRILLATION WITH RAPID VENTRICULAR RESPONSE (HCC): Status: ACTIVE | Noted: 2020-12-30

## 2020-12-30 LAB
ANION GAP SERPL CALCULATED.3IONS-SCNC: 7 MMOL/L (ref 4–13)
BUN SERPL-MCNC: 35 MG/DL (ref 5–25)
CALCIUM SERPL-MCNC: 8.8 MG/DL (ref 8.3–10.1)
CHLORIDE SERPL-SCNC: 102 MMOL/L (ref 100–108)
CHOLEST SERPL-MCNC: 166 MG/DL (ref 50–200)
CO2 SERPL-SCNC: 29 MMOL/L (ref 21–32)
CREAT SERPL-MCNC: 1.07 MG/DL (ref 0.6–1.3)
ERYTHROCYTE [DISTWIDTH] IN BLOOD BY AUTOMATED COUNT: 14.4 % (ref 11.6–15.1)
EST. AVERAGE GLUCOSE BLD GHB EST-MCNC: 105 MG/DL
FLUAV RNA RESP QL NAA+PROBE: NEGATIVE
FLUBV RNA RESP QL NAA+PROBE: NEGATIVE
GFR SERPL CREATININE-BSD FRML MDRD: 48 ML/MIN/1.73SQ M
GLUCOSE SERPL-MCNC: 116 MG/DL (ref 65–140)
HBA1C MFR BLD: 5.3 %
HCT VFR BLD AUTO: 37.4 % (ref 34.8–46.1)
HDLC SERPL-MCNC: 58 MG/DL
HGB BLD-MCNC: 10.5 G/DL (ref 11.5–15.4)
LDLC SERPL CALC-MCNC: 81 MG/DL (ref 0–100)
MCH RBC QN AUTO: 25.5 PG (ref 26.8–34.3)
MCHC RBC AUTO-ENTMCNC: 28.1 G/DL (ref 31.4–37.4)
MCV RBC AUTO: 91 FL (ref 82–98)
PLATELET # BLD AUTO: 141 THOUSANDS/UL (ref 149–390)
PMV BLD AUTO: 10.5 FL (ref 8.9–12.7)
POTASSIUM SERPL-SCNC: 4.8 MMOL/L (ref 3.5–5.3)
PROCALCITONIN SERPL-MCNC: <0.05 NG/ML
RBC # BLD AUTO: 4.11 MILLION/UL (ref 3.81–5.12)
RSV RNA RESP QL NAA+PROBE: NEGATIVE
SARS-COV-2 RNA RESP QL NAA+PROBE: NEGATIVE
SODIUM SERPL-SCNC: 138 MMOL/L (ref 136–145)
TRIGL SERPL-MCNC: 137 MG/DL
TROPONIN I SERPL-MCNC: 0.04 NG/ML
TROPONIN I SERPL-MCNC: 0.08 NG/ML
WBC # BLD AUTO: 5.06 THOUSAND/UL (ref 4.31–10.16)

## 2020-12-30 PROCEDURE — 97163 PT EVAL HIGH COMPLEX 45 MIN: CPT

## 2020-12-30 PROCEDURE — 80048 BASIC METABOLIC PNL TOTAL CA: CPT | Performed by: FAMILY MEDICINE

## 2020-12-30 PROCEDURE — 92523 SPEECH SOUND LANG COMPREHEN: CPT

## 2020-12-30 PROCEDURE — 84484 ASSAY OF TROPONIN QUANT: CPT | Performed by: FAMILY MEDICINE

## 2020-12-30 PROCEDURE — 85027 COMPLETE CBC AUTOMATED: CPT | Performed by: FAMILY MEDICINE

## 2020-12-30 PROCEDURE — 99223 1ST HOSP IP/OBS HIGH 75: CPT | Performed by: PSYCHIATRY & NEUROLOGY

## 2020-12-30 PROCEDURE — 70551 MRI BRAIN STEM W/O DYE: CPT

## 2020-12-30 PROCEDURE — 99232 SBSQ HOSP IP/OBS MODERATE 35: CPT | Performed by: FAMILY MEDICINE

## 2020-12-30 PROCEDURE — 0241U HB NFCT DS VIR RESP RNA 4 TRGT: CPT | Performed by: NURSE PRACTITIONER

## 2020-12-30 PROCEDURE — 80061 LIPID PANEL: CPT | Performed by: FAMILY MEDICINE

## 2020-12-30 PROCEDURE — 99223 1ST HOSP IP/OBS HIGH 75: CPT | Performed by: INTERNAL MEDICINE

## 2020-12-30 PROCEDURE — 94640 AIRWAY INHALATION TREATMENT: CPT

## 2020-12-30 PROCEDURE — 84145 PROCALCITONIN (PCT): CPT | Performed by: FAMILY MEDICINE

## 2020-12-30 PROCEDURE — G1004 CDSM NDSC: HCPCS

## 2020-12-30 PROCEDURE — 93005 ELECTROCARDIOGRAM TRACING: CPT

## 2020-12-30 PROCEDURE — 97167 OT EVAL HIGH COMPLEX 60 MIN: CPT

## 2020-12-30 PROCEDURE — 94760 N-INVAS EAR/PLS OXIMETRY 1: CPT

## 2020-12-30 PROCEDURE — 97116 GAIT TRAINING THERAPY: CPT

## 2020-12-30 RX ORDER — MAGNESIUM HYDROXIDE/ALUMINUM HYDROXICE/SIMETHICONE 120; 1200; 1200 MG/30ML; MG/30ML; MG/30ML
30 SUSPENSION ORAL EVERY 4 HOURS PRN
Status: DISCONTINUED | OUTPATIENT
Start: 2020-12-30 | End: 2020-12-31 | Stop reason: HOSPADM

## 2020-12-30 RX ORDER — DIGOXIN 0.25 MG/ML
250 INJECTION INTRAMUSCULAR; INTRAVENOUS ONCE
Status: COMPLETED | OUTPATIENT
Start: 2020-12-30 | End: 2020-12-30

## 2020-12-30 RX ORDER — SODIUM CHLORIDE 9 MG/ML
50 INJECTION, SOLUTION INTRAVENOUS CONTINUOUS
Status: DISCONTINUED | OUTPATIENT
Start: 2020-12-30 | End: 2020-12-31

## 2020-12-30 RX ORDER — ASPIRIN 81 MG/1
81 TABLET ORAL DAILY
Status: DISCONTINUED | OUTPATIENT
Start: 2020-12-31 | End: 2020-12-31 | Stop reason: HOSPADM

## 2020-12-30 RX ORDER — NITROGLYCERIN 0.4 MG/1
0.4 TABLET SUBLINGUAL ONCE
Status: COMPLETED | OUTPATIENT
Start: 2020-12-30 | End: 2020-12-30

## 2020-12-30 RX ORDER — ACETAMINOPHEN 325 MG/1
650 TABLET ORAL EVERY 6 HOURS PRN
Status: DISCONTINUED | OUTPATIENT
Start: 2020-12-30 | End: 2020-12-31 | Stop reason: HOSPADM

## 2020-12-30 RX ORDER — DILTIAZEM HYDROCHLORIDE 5 MG/ML
15 INJECTION INTRAVENOUS ONCE
Status: COMPLETED | OUTPATIENT
Start: 2020-12-30 | End: 2020-12-30

## 2020-12-30 RX ADMIN — LEVALBUTEROL HYDROCHLORIDE 1.25 MG: 1.25 SOLUTION, CONCENTRATE RESPIRATORY (INHALATION) at 23:38

## 2020-12-30 RX ADMIN — DIGOXIN 250 MCG: 0.25 INJECTION INTRAMUSCULAR; INTRAVENOUS at 20:09

## 2020-12-30 RX ADMIN — ENOXAPARIN SODIUM 40 MG: 40 INJECTION SUBCUTANEOUS at 10:03

## 2020-12-30 RX ADMIN — LEVALBUTEROL HYDROCHLORIDE 1.25 MG: 1.25 SOLUTION, CONCENTRATE RESPIRATORY (INHALATION) at 16:34

## 2020-12-30 RX ADMIN — APIXABAN 2.5 MG: 2.5 TABLET, FILM COATED ORAL at 18:28

## 2020-12-30 RX ADMIN — TIOTROPIUM BROMIDE 18 MCG: 18 CAPSULE ORAL; RESPIRATORY (INHALATION) at 10:04

## 2020-12-30 RX ADMIN — FLUOXETINE 20 MG: 20 CAPSULE ORAL at 10:03

## 2020-12-30 RX ADMIN — PANTOPRAZOLE SODIUM 40 MG: 40 TABLET, DELAYED RELEASE ORAL at 05:58

## 2020-12-30 RX ADMIN — DIGOXIN 250 MCG: 0.25 INJECTION INTRAMUSCULAR; INTRAVENOUS at 21:26

## 2020-12-30 RX ADMIN — ISODIUM CHLORIDE 3 ML: 0.03 SOLUTION RESPIRATORY (INHALATION) at 23:39

## 2020-12-30 RX ADMIN — CLOPIDOGREL BISULFATE 75 MG: 75 TABLET ORAL at 10:03

## 2020-12-30 RX ADMIN — DILTIAZEM HYDROCHLORIDE 15 MG: 5 INJECTION INTRAVENOUS at 14:07

## 2020-12-30 RX ADMIN — ATORVASTATIN CALCIUM 80 MG: 80 TABLET ORAL at 16:45

## 2020-12-30 RX ADMIN — LEVALBUTEROL HYDROCHLORIDE 1.25 MG: 1.25 SOLUTION, CONCENTRATE RESPIRATORY (INHALATION) at 07:44

## 2020-12-30 RX ADMIN — DILTIAZEM HYDROCHLORIDE 5 MG/HR: 5 INJECTION INTRAVENOUS at 14:25

## 2020-12-30 RX ADMIN — SODIUM CHLORIDE 50 ML/HR: 0.9 INJECTION, SOLUTION INTRAVENOUS at 20:08

## 2020-12-30 RX ADMIN — ISODIUM CHLORIDE 3 ML: 0.03 SOLUTION RESPIRATORY (INHALATION) at 07:44

## 2020-12-30 RX ADMIN — ASPIRIN 81 MG: 81 TABLET, COATED ORAL at 10:04

## 2020-12-30 RX ADMIN — METOPROLOL TARTRATE 25 MG: 25 TABLET, FILM COATED ORAL at 21:45

## 2020-12-30 RX ADMIN — MELATONIN 3 MG: at 21:46

## 2020-12-30 RX ADMIN — NITROGLYCERIN 0.4 MG: 0.4 TABLET SUBLINGUAL at 14:00

## 2020-12-30 RX ADMIN — ISODIUM CHLORIDE 3 ML: 0.03 SOLUTION RESPIRATORY (INHALATION) at 16:34

## 2020-12-30 NOTE — ASSESSMENT & PLAN NOTE
Continue Xopenex treatments along with Spiriva  No evidence of exacerbation  Patient indicated that her nebulizer machine at home is not functioning  Indicated that she has been in contact with [facility] and they will be bringing her a new 1

## 2020-12-30 NOTE — UTILIZATION REVIEW
Initial Clinical Review  PATIENT WAS OBSERVATION STATUS 12/29/20 CONVERTED TO INPATIENT ADMISSION  12/30/20 AS NEEDING > 2MN STAY FOR CONTINUE TREATMENT PENDING MRI BRAIN, AND PATIENT WITH NEW ONSET CHEST PAIN AFIB ON CARDIZEM GTT    Admission: Date/Time/Statement:   Admission Orders (From admission, onward)     Ordered        12/30/20 1521  Inpatient Admission  Once                   Orders Placed This Encounter   Procedures    Inpatient Admission     Standing Status:   Standing     Number of Occurrences:   1     Order Specific Question:   Admitting Physician     Answer:   Nicolle Fam [[id number]]     Order Specific Question:   Level of Care     Answer:   Med Surg [[id number]]     Order Specific Question:   Estimated length of stay     Answer:   More than 2 Midnights     Order Specific Question:   Certification     Answer:   I certify that inpatient services are medically necessary for this patient for a duration of greater than two midnights  See H&P and MD Progress Notes for additional information about the patient's course of treatment  ED Arrival Information     Expected Arrival Acuity Means of Arrival Escorted By Service Admission Type    - 12/29/2020 14:21 Immediate Walk-In Spouse General Medicine Emergency    Arrival Complaint    slurred speech, facial numbness        Chief Complaint   Patient presents with   [address] she awoke with a severe headache  States at 1230 pm she started with slurred speech, left face weakness and numbness fingers left hand   Hx of stroke in past  Stroke alert called by Dr Joana Mckee at 0     Assessment/Plan: 78 y o  female who presents with left-sided numbness  Patient states that she woke up with a headache this morning which did improve  At around 12:30 p m  She noticed numbness of the left side of her face and the left side of her tongue and lips felt swollen and numb and her speech felt slurred  Patient also reports left arm and leg numbness    Patient reports on and off frontal headache which has been occurring since she was tested for COVID  Patient states that the numbness has improved although she does not feel back at her baseline yet  Left sided numbness  Assessment & Plan  Patient presented to the ER with left-sided numbness which has improved although not fully resolved  · Admit to telemetry  · CT of head revealed old infarcts but no acute infarction  CTA head/reviewed  · Stroke alert was called while in the ER  As per Neurology patient not tPA candidate  Patient received full-dose aspirin and loaded with 300 mg of Plavix   · Per neurology will place patient on baby ASA and 75 mg Plavix  High-dose  statin  · Neuro checks Q4  · Lipid panel, HgA1C in AM  · MRI in AM  · Patient recently had Echo done on 12/10 which showed normal EF with grade 1 diastolic dysfunction  Bioprosthesis TAVR was present  Hold off on repeating echo on lasts needed by Neurology    · Neuro eval  CKD (chronic kidney disease) stage 3, GFR 30-59 ml/min  Assessment & Plan        Lab Results   Component Value Date     EGFR 43 12/29/2020     EGFR 66 12/12/2020     EGFR 51 12/11/2020     CREATININE 1 19 12/29/2020     CREATININE 0 83 12/12/2020     CREATININE 1 02 12/11/2020      Creatinine appears close to baseline  Repeat lab work in a m  Chronic respiratory failure with hypoxia (HCC)  Assessment & Plan  Patient uses 2 L of oxygen continuously at home except does not use oxygen when she goes out as she does not have a portable  COPD (chronic obstructive pulmonary disease) (HCC)  Assessment & Plan  Continue Xopenex treatments along with Spiriva  No evidence of exacerbation  Chronic diastolic (congestive) heart failure [facility])  Assessment & Plan      Wt Readings from Last 3 Encounters:   12/29/20 52 3 kg (115 lb 6 4 oz)   12/09/20 55 kg (121 lb 4 1 oz)   09/19/19 55 5 kg (122 lb 5 7 oz)      Appears euvolemic    Hold Lasix, Lotensin, Lopressor  Hypertension  Assessment & Plan  Hold antihypertensives to allow for permissive hypertension        12/30 CONVERTED TO INPATIENT ADMISSION NEW CHEST PAIN, NEW AFIB NEEDING IV CARDIZEM GTT, COVID TESTING TO BE DONE PENDING MRI BRAIN TO R/O CVA      ·   ED Triage Vitals [12/29/20 1437]   Temperature Pulse Respirations Blood Pressure SpO2   (!) 96 7 °F (35 9 °C) 79 20 (!) 164/113 94 %      Temp Source Heart Rate Source Patient Position - Orthostatic VS BP Location FiO2 (%)   Tympanic Monitor Lying Right arm --      Pain Score       4          Wt Readings from Last 1 Encounters:   12/29/20 52 3 kg (115 lb 6 4 oz)     Additional Vital Signs:   29/20 1813  98 1 °F (36 7 °C)  84  20  136/97    98 %  28  2 L/min  Nasal cannula  Sitting   12/29/20 1800    84  20  147/85  108  94 %      None (Room air)  Sitting   12/29/20 1649    90  20  171/75Abnormal     97 %  28  2 L/min  Nasal cannula  Sitting   12/29/20 1628    79  20  148/67    95 %  28  2 L/min  Nasal cannula  Sitting   12/29/20 1615    78  20  146/67  96  96 %      None (Room air)         Pertinent Labs/Diagnostic Test Results:   12/30 CXR Scarring in the right lung   No acute infiltrates  12/29 CTA head neck Moderate atherosclerotic disease of the aortic arch and proximal great vessels   There is a high-grade stenosis of the left common carotid artery origin similar to the prior examination with moderate narrowing of the subclavian's distal to the vertebral   artery origins  Atherosclerotic disease of the right distal common carotid artery and proximal cervical internal carotid artery is stable compared to the prior exam, approximately 50%  Stable atherosclerotic disease of the intracranial internal carotid arteries  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 just proximal to the vertebrobasilar junction, unchanged     Persistent consolidation within the right lower lobe when compared with CT of the chest dated 12/9/2020 may represent sequela of patient's lung carcinoma with prior radiation therapy   Superimposed infection not excluded  CT brain Stable chronic microangiopathic change   Stable old infarcts within the left cerebellum   No mass, hemorrhage or definitive signs of acute territorial infarction     Results from last 7 days   Lab Units 12/29/20  1449   WBC Thousand/uL 9 47   HEMOGLOBIN g/dL 12 5   HEMATOCRIT % 42 0   PLATELETS Thousands/uL 243   NEUTROS ABS Thousands/µL 8 23*     Results from last 7 days   Lab Units 12/30/20  0607 12/29/20  1449   SODIUM mmol/L 138 136   POTASSIUM mmol/L 4 8 4 6   CHLORIDE mmol/L 102 97*   CO2 mmol/L 29 29   ANION GAP mmol/L 7 10   BUN mg/dL 35* 39*   CREATININE mg/dL 1 07 1 19   EGFR ml/min/1 73sq m 48 43   CALCIUM mg/dL 8 8 9 0     Results from last 7 days   Lab Units 12/29/20  1449   AST U/L 12   ALT U/L 15   ALK PHOS U/L 76   TOTAL PROTEIN g/dL 7 7   ALBUMIN g/dL 3 9   TOTAL BILIRUBIN mg/dL 0 20     Results from last 7 days   Lab Units 12/29/20  1435   POC GLUCOSE mg/dl 124     Results from last 7 days   Lab Units 12/30/20  0607 12/29/20  1449   GLUCOSE RANDOM mg/dL 116 127     Results from last 7 days   Lab Units 12/30/20  1405 12/29/20  1449   TROPONIN I ng/mL 0 04 0 02     Results from last 7 days   Lab Units 12/29/20  1449   PROTIME seconds 12 5   INR  0 94   PTT seconds 29     ED Treatment:   Medication Administration from 12/29/2020 1421 to 12/29/2020 1830       Date/Time Order Dose Route Action Action by Comments     12/29/2020 1453 iohexol (OMNIPAQUE) 350 MG/ML injection (SINGLE-DOSE) 85 mL 85 mL Intravenous Given Carmela Haro      12/29/2020 1535 aspirin tablet 325 mg 325 mg Oral Given Zelda Abreu RN      12/29/2020 1535 clopidogrel (PLAVIX) tablet 300 mg 300 mg Oral Given Zelda Abreu RN         Past Medical History:   Diagnosis Date    Arthritis     [facility]congestive [facility]) [facility]     [facility]     Coronary artery disease     aortic valve replacement    Heart murmur     Hypertension      Present on Admission:   Chronic [facility] failure with hypoxia ([facility])   CKD (chronic kidney disease) stage 3, GFR 30-59 ml/min   COPD (chronic obstructive pulmonary disease) ([facility])   Left sided numbness   Hypertension   Chronic diastolic (congestive) heart failure ([facility])      Admitting Diagnosis: CVA (cerebral vascular accident) ([facility] ) [I63 9]  Slurred speech [R47 81]  Age/Sex: 78 y o  female  Admission Orders:  Tele mon  Neuro checks   Cbc   hgba1c  procalcitonin  MRI   Scheduled Medications:  aspirin, 81 mg, Oral, Daily  atorvastatin, 80 mg, Oral, Daily With Dinner  clopidogrel, 75 mg, Oral, Daily  enoxaparin, 40 mg, Subcutaneous, Daily  FLUoxetine, 20 mg, Oral, Daily  levalbuterol, 1 25 mg, Nebulization, Q8H  melatonin, 3 mg, Oral, HS  pantoprazole, 40 mg, Oral, Early Morning  tiotropium, 18 mcg, Inhalation, Daily      Continuous IV Infusions:  diltiazem, 5 mg/hr, Intravenous, Continuous      PRN Meds:  aluminum-magnesium hydroxide-simethicone, 30 mL, Oral, Q4H PRN  sodium chloride, 3 mL, Nebulization, Q8H PRN        IP CONSULT TO NEUROLOGY  IP CONSULT TO CASE MANAGEMENT  IP CONSULT TO NUTRITION SERVICES  IP CONSULT TO CARDIOLOGY    Network Utilization Review Department  ATTENTION: Please call with any questions or concerns to [phone] and carefully listen to the prompts so that you are directed to the right person  All voicemails are confidential   [employer] all requests for admission clinical reviews, approved or denied determinations and any other requests to dedicated fax number below belonging to the campus where the patient is receiving treatment   List of dedicated fax numbers for the Facilities:  [address] [place] [address] DENIALS (Administrative/Medical Necessity) [phone]   [address] [place] [address] (Maternity/NICU/Pediatrics) [phone]   [address] [facility] [address] [facility] [address] ([address]) [address] [place] [address] [address] [phone]   [address] [phone]

## 2020-12-30 NOTE — ASSESSMENT & PLAN NOTE
Initially plan was to obtain CATHY and loop recorder placement per Neurology and Cardiology consulted  Later notified by RN that patient reporting chest pressure and was noted to be tachycardic on monitor  Last troponin mildly elevated 0 08, suspect may be secondary to AFib with RVR  EKG confirmed AFib with RVR  Started on Cardizem drip  Patient had a brief period of hypotension while on drip, discontinued and patient spontaneously returned to sinus rhythm  Suspect patient may be having periods of proximal atrial fibrillation  Discussed with Cardiology  Will continue with Eliquis 2 5 mg p o  B i d , E scribed to patient's pharmacy  As per discussion with Neuro will continue with a baby aspirin and Plavix, high-dose statin also    Discussed with patient  Follow-up with PCP and Cardiology outpatient

## 2020-12-30 NOTE — ASSESSMENT & PLAN NOTE
Wt Readings from Last 3 Encounters:   12/29/20 52 3 kg (115 lb 6 4 oz)   12/09/20 55 kg (121 lb 4 1 oz)   09/19/19 55 5 kg (122 lb 5 7 oz)     Resume Lasix, Lotensin, Lopressor as per discussion with Cardiology  Follow-up with outpatient PCP and Cardiology

## 2020-12-30 NOTE — SPEECH THERAPY NOTE
Speech Language/Pathology    Speech-Language Evaluation    Impression:  Pt  Denies any dysphagia or changes to her speech and language skills  She was cooperative for this assessment and skills were found to be [facility] ALESSANDRA  Pt reports very minimal tingling on her left face however she denies that she has any impairment with her hands, arms, or legs  She reports pain in the left side of her face 6/10  RN was informed  Recommendation:  No speech services are warranted at this time      Current Medical Status:  Wiley Oleary is a 78 y o  female who presents with left-sided numbness  Patient states that she woke up with a headache this morning which did improve  At around 12:30 p m  She noticed numbness of the left side of her face and the left side of her tongue and lips felt swollen and numb and her speech felt slurred  Patient also reports left arm and leg numbness  Patient reports on and off frontal headache which has been occurring since she was tested for COVID  Patient states that the numbness has improved although she does not feel back at her baseline yet  Past Medical History:  See H&P for details      Special Studies:   12/29/20: CT brain: Stable chronic microangiopathic change  Stable old infarcts within the left cerebellum  No mass, hemorrhage or definitive signs of acute territorial infarction  12/29/20: CTA head neck: Moderate atherosclerotic disease of the aortic arch and proximal great vessels  There is a high-grade stenosis of the left common carotid artery origin similar to the prior examination with moderate narrowing of the subclavian's distal to the vertebral   artery origins    Atherosclerotic disease of the right distal common carotid artery and proximal cervical internal carotid artery is stable compared to the prior exam, approximately 50%     Stable atherosclerotic disease of the intracranial internal carotid arteries    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 just proximal to the vertebrobasilar junction, unchanged    Persistent consolidation within the right lower lobe when compared with CT of the chest dated 12/9/2020 may represent sequela of patient's lung carcinoma with prior radiation therapy  Superimposed infection not excluded        12/29/20: Chest xray: Scarring in the right lung  No acute infiltrates  12/30/20: MRI brain wo contrast: pending      Social/Educational/vocational Hx:   Pt  Reports that she lives with her  and that her son drops off groceries for them  They share in chores and ADLs   reportedly does the laundry because it is in the basement       Language Evaluation:    Auditory Comprehension:  R/L discrim: [facility]  Body part ID: [facility]  One step commands: [facility]  Complex or 3 step commands: [facility]  Picture ID: [facility]  Personal y/n ?'s: [facility]  Basic y/n ?'s: [facility]  Complex y/n ?'s: [facility]      Reading Comprehension: states that she wears glasses however they are not here    Verbal Expression:  Auto Sequences:   REINA: [facility]   Counting to 21: [facility]  Word Repetition: [facility]  Phrase Completion: [facility]  Responsive Naming: [facility]  Conversation: [facility]  Able to make basic needs known? yes    Written Expression: DNT    Motor Speech:  Dysarthria: none  OM skills: [facility]  Apraxia: none   Oral:   Verbal:      Cognitive -linguistic skills:  Orientation: [facility]     Problem solving:[facility]    Memory: [facility]      Mandi Eastman MS CCC-SLP  Speech Language Pathologist  Available via [facility] License # [id number]  [facility] License # [id number]

## 2020-12-30 NOTE — PHYSICAL THERAPY NOTE
PT EVALUATION       12/30/20 1020   PT Last Visit   PT Visit Date 12/30/20   Note Type   Note type Evaluation   Pain Assessment   Pain Assessment Tool Pain Assessment not indicated - pt denies pain   Home Living   Type of [address] Two level;Bed/bath upstairs  (3-4 EZRA with rail)   Home Equipment Cane;Walker  (Home O2, 24/7 2L)   Prior Function   Level of [place] Independent with ADLs and functional mobility   Lives With Spouse   Receives Help From Family   ADL Assistance Independent   Comments Pt amb w/out AD PTA   Restrictions/Precautions   Other Precautions Fall Risk;Bed Alarm; Chair Alarm;O2   General   Additional Pertinent History Pt adm with left sided numbness, face and tongue and slurred speech  Cognition   Overall Cognitive Status WFL   Arousal/Participation Cooperative   Orientation Level Oriented X4   Following Commands Follows all commands and directions without difficulty   RLE Assessment   RLE Assessment WFL  (3+ to 4-/5)   LLE Assessment   LLE Assessment WFL  (3+ to 4-/5 but slightly weaker than RLE)   Bed Mobility   Supine to Sit 7  Independent   Transfers   Sit to Stand 5  Supervision   Additional items Verbal cues   Stand to Sit 5  Supervision   Additional items Verbal cues   Ambulation/Elevation   Gait pattern   (mild, generalized unsteadiness)   Gait Assistance 4  Minimal assist   Additional items Assist x 1;Verbal cues; Tactile cues   Assistive Device None   Distance 50 feet with change in direction;pt sat OOB in chair with all needs in reach, (+) chair alarm and RN aware  Balance   Static Sitting Good   Static Standing Fair   Dynamic Standing Fair -   Ambulatory   (P+F-)   Activity Tolerance   Activity Tolerance Patient limited by fatigue;Treatment limited secondary to medical complications (Comment)  (unsteady)   Assessment   Problem List Decreased strength;Decreased range of motion;Decreased endurance; Impaired balance;Decreased mobility; Decreased coordination;Decreased safety awareness   Assessment Patient seen for Physical Therapy evaluation  Patient admitted with Left sided numbness  Comorbidities affecting patient's physical performance include: HTN, COPD, CRF w hypoxia, CKD3, CVA, pneumonia, falls, s/p TAVR  Personal factors affecting patient at time of initial evaluation include: lives in two story house and stairs to enter home  Prior to admission, patient was independent with functional mobility without assistive device, independent with ADLS, living with spouse in a two level home with 3-4 steps to enter and ambulating household distance  Please find objective findings from Physical Therapy assessment regarding body systems outlined above with impairments and limitations including weakness, decreased ROM, impaired balance, decreased endurance, impaired coordination, gait deviations, decreased activity tolerance, decreased functional mobility tolerance, decreased safety awareness, fall risk and SOB upon exertion  The Barthel Index was used as a functional outcome tool presenting with a score of 45 today indicating marked limitations of functional mobility and ADLS  Patient's clinical presentation is currently unstable/unpredictable as seen in patient's presentation of vital sign response, increased fall risk, new onset of impairment of functional mobility, decreased endurance and new onset of weakness  Pt would benefit from continued Physical Therapy treatment to address deficits as defined above and maximize level of functional mobility  As demonstrated by objective findings, the assigned level of complexity for this evaluation is high     Goals   Patient Goals go home   STG Expiration Date 01/06/21   Short Term Goal #1 trans - S/I; pt will amb with RW functional household distances - min A/S   Short Term Goal #2 balance with RW - F/F+ for safe gait and moblility   LTG Expiration Date 01/13/21   Long Term Goal #1 trans - I; pt will amb with RW functional household distances - I   Long Term Goal #2 balance with RW - F+/G for safe gait and mobility; strength LEs - 4- to 4/5; up/down 3-4 steps with rail - I so pt can enter/exit her home   Plan   Treatment/Interventions ADL retraining;Functional transfer training;LE strengthening/ROM; Elevations; Therapeutic exercise; Endurance training;Patient/family training;Equipment eval/education; Bed mobility;Gait training; Compensatory technique education   PT Frequency Once a day   Recommendation   PT Discharge Recommendation Home with skilled therapy   Equipment Recommended   (RW for dc)   Barthel Index   Feeding 5   Bathing 0   Grooming Score 0   Dressing Score 5   Bladder Score 10   Bowels Score 10   Toilet Use Score 5   Transfers (Bed/Chair) Score 10   Mobility (Level Surface) Score 0   Stairs Score 0   Barthel Index Score 39   Licensure   [place] License Number  [place], [place] [id number]     Time In:1020  Time Out:1030  Total Time: 10 mins      S:  "I don't want to use a walker if I can help it"  O:  Pt trans sit to stand with S/min A and amb with S/min A to and from bathroom x 10 feet each way;pt trans on/off toilet with S/min A and is S/min A to wash hands  Pt then amb with RW x 50 feet with S/min A and pt returned to OOB in chair with (+) chair alarm  A:  Pt will benefit from cont amb with RW as gait is steadier and safer with RW   P:  Cont per PT POC  DCP - home PT;RW for dc      Keon Sen [place]   [id number]

## 2020-12-30 NOTE — OCCUPATIONAL THERAPY NOTE
Occupational Therapy Evaluation       12/30/20 0925   Note Type   Note type Evaluation   Restrictions/Precautions   Other Precautions Chair Alarm; Bed Alarm; Fall Risk;O2   Pain Assessment   Pain Assessment Tool 0-10   Pain Score No Pain   Home Living   Type of Home House   Home Layout Multi-level;Bed/bath upstairs;Stairs to enter with rails  (3 EZRA)   Bathroom Shower/Tub Tub/shower unit   Bathroom Toilet Standard   Bathroom Equipment Shower chair;Commode   Home Equipment Walker;Cane  (Home O2)   Additional Comments Patient reports having RW and canes at home, does not use   Has BSC and is able to bring to first floor as needed   Prior Function   Level of [place] Independent with ADLs and functional mobility   Lives With Spouse   Receives Help From Family   ADL Assistance Independent   Comments Per patient, PTA independent in all ADLs and mobility without AD   ADL   Eating Assistance 5  Supervision/Setup   Grooming Assistance 5  Supervision/Setup   UB Bathing Assistance 5  Supervision/Setup   LB Bathing Assistance 4  Minimal Assistance   UB Dressing Assistance 5  Supervision/Setup   LB Dressing Assistance 4  [address]  5  Supervision/Setup   Bed Mobility   Supine to Sit 5  Supervision   Transfers   Sit to Stand 5  Supervision   Stand to Sit 5  Supervision   Additional Comments STS from EOB   Functional Mobility   Functional Mobility 5  Supervision   Additional Comments Patient ambulated short household distance bed to wheelchair with supervision, no AD; further mobility deferred due to patient needing to go for MRI    Balance   Static Sitting Good   Dynamic Sitting Fair +   Static Standing Fair   Dynamic Standing Fair -   Activity Tolerance   Activity Tolerance Patient limited by fatigue   RUE Assessment   RUE Assessment X  (Pt is R hand dominant; WFL except shoulder flexion)   RUE Overall AROM   R Shoulder Flexion Grossly 0 -90*, baseline functional due to arthritis per patient RUE Strength   RUE Overall Strength Within Functional Limits - able to perform ADL tasks with strength  (4+/5 except shoulder)   R Shoulder Flexion 2+/5   LUE Assessment   LUE Assessment X  (WFL except shoulder)   LUE Overall AROM   L Shoulder Flexion Approx  0-90*  (Baseline functional per patient due to arthritis)   LUE Strength   LUE Overall Strength Within Functional Limits - able to perform ADL tasks with strength  (Grossly 4+/5 throughout except shoulder)   L Shoulder Flexion 2+/5   Hand Function   Gross Motor Coordination Functional   Fine Motor Coordination Functional   Sensation   Light Touch No apparent deficits   Proprioception   Proprioception No apparent deficits   Vision - Complex Assessment   Ocular Range of Motion WFL   Tracking Able to track stimulus in all quads without difficulty   Acuity Able to read employee name badge without difficulty   Cognition   Overall Cognitive Status [facility]   Arousal/Participation Alert; Cooperative   Attention Within functional limits   Orientation Level Oriented X4   Following Commands Follows all commands and directions without difficulty   Assessment   Limitation Decreased ADL status; Decreased UE strength;Decreased Safe judgement during ADL;Decreased endurance;Decreased self-care trans;Decreased high-level ADLs   Prognosis Good   Assessment Patient evaluated by Occupational Therapy  Patient admitted with Left sided numbness  The patients occupational profile, medical and therapy history includes a extensive additional review of physical, cognitive, or psychosocial history related to current functional performance  Comorbidities affecting functional mobility and ADLS include: CAD, arthritis, heart murmur, HTN, CHF, COPD  Prior to admission, patient was independent with functional mobility without assistive device, independent with ADLS and independent with IADLS    The evaluation identifies the following performance deficits: weakness, impaired balance, decreased endurance, increased fall risk, new onset of impairment of functional mobility, decreased ADLS, decreased IADLS, decreased activity tolerance, decreased safety awareness and decreased strength, that result in activity limitations and/or participation restrictions  This evaluation requires clinical decision making of high complexity, because the patient presents with comorbidites that affect occupational performance and required significant modification of tasks or assistance with consideration of multiple treatment options  The Barthel Index was used as a functional outcome tool presenting with a score of 45, indicating marked limitations of functional mobility and ADLS  Patient will benefit from skilled Occupational Therapy services to address above deficits and facilitate a safe return to prior level of function  Goals   Patient Goals To go home   STG Time Frame 1-3   Short Term Goal  Goals established to promote patient goal of going home:  Patient will increase standing tolerance to 10 minutes during ADL task to decrease assistance level and decrease fall risk; Patient will increase bed mobility to independent in preparation for ADLS and transfers;  Patient will increase functional mobility to and from bathroom with no assistive device independently to increase performance with ADLS and to use a toilet; Patient will tolerate 10 minutes of UE ROM/strengthening to increase general activity tolerance and performance in ADLS/IADLS; Patient will improve functional activity tolerance to 10 minutes of sustained functional tasks to increase participation in basic self-care and decrease assistance level;  Patient will be able to to verbalize understanding and perform energy conservation/proper body mechanics during ADLS and functional mobility at least 75% of the time with minimal cueing to decrease signs of fatigue and increase stamina to return to prior level of function; Patient will increase dynamic sitting balance to good to improve the ability to sit at edge of bed or on a chair for ADLS;  Patient will increase dynamic standing balance to fair to improve postural stability and decrease fall risk during standing ADLS and transfers  LTG Time Frame 3-7   Long Term Goal Patient will increase standing tolerance to 15 minutes during ADL task to decrease assistance level and decrease fall risk; Patient will tolerate 15 minutes of UE ROM/strengthening to increase general activity tolerance and performance in ADLS/IADLS; Patient will improve functional activity tolerance to 15 minutes of sustained functional tasks to increase participation in basic self-care and decrease assistance level;  Patient will be able to to verbalize understanding and perform energy conservation/proper body mechanics during ADLS and functional mobility at least 90% of the time with nocueing to decrease signs of fatigue and increase stamina to return to prior level of function; Patient will increase static/dynamic standing balance to fair+ to improve postural stability and decrease fall risk during standing ADLS and transfers  Functional Transfer Goals   Pt Will Perform All Functional Transfers   (LTG independent)   ADL Goals   Pt Will Perform Grooming   (LTG independent)   Pt Will Perform Bathing   (STG supervision, LTG independent)   Pt Will Perform UE Dressing   (LTG independent)   Pt Will Perform LE Dressing   (STG supervision, LTG independent)   Pt Will Perform Toileting   (LTG independent)   Plan   Treatment Interventions ADL retraining;Functional transfer training;UE strengthening/ROM; Endurance training;Patient/family training;Equipment evaluation/education; Compensatory technique education;Continued evaluation; Energy conservation; Activityengagement   Goal Expiration Date 01/06/21   OT Frequency 3-5x/wk   Recommendation   OT Discharge Recommendation Return to previous environment with social support   Barthel Index   Feeding 5   Bathing 0 Grooming Score 0   Dressing Score 5   Bladder Score 10   Bowels Score 10   Toilet Use Score 5   Transfers (Bed/Chair) Score 10   Mobility (Level Surface) Score 0   Stairs Score 0   Barthel Index Score 39   Licensure   [place] License Number  [id number] RendonR/L [id number]

## 2020-12-30 NOTE — PROGRESS NOTES
Noted irregular HR in 150s  Patient c/o burning chest discomfort, SOB, and left arm pain  Dr Susi Chauhan called and made aware  EKG, and labs ordered

## 2020-12-30 NOTE — ASSESSMENT & PLAN NOTE
Lab Results   Component Value Date    EGFR 48 12/30/2020    EGFR 43 12/29/2020    EGFR 66 12/12/2020    CREATININE 1 07 12/30/2020    CREATININE 1 19 12/29/2020    CREATININE 0 83 12/12/2020     Creatinine appears close to baseline    Follow-up with PCP 1-2 weeks post discharge

## 2020-12-30 NOTE — H&P
History and Physical - [facility] Internal Medicine    Patient Information: Jenise Vanegas 78 y o  female MRN: [id number]  Unit/Bed#: [address] [address] Encounter: [id number]  Admitting Physician: Rosmery Rivers DO  PCP: Lele Gold MD  Date of Admission:  12/29/20        Hospital Problem List:     Principal Problem:    Left sided numbness  Active Problems:    Hypertension    Chronic diastolic (congestive) heart failure (HCC)    COPD (chronic obstructive pulmonary disease) (HCC)    Chronic respiratory failure with hypoxia (HCC)    CKD (chronic kidney disease) stage 3, GFR 30-59 ml/min      Assessment/Plan:    * Left sided numbness  Assessment & Plan  Patient presented to the ER with left-sided numbness which has improved although not fully resolved   Admit to telemetry   CT of head revealed old infarcts but no acute infarction  CTA head/reviewed   Stroke alert was called while in the ER  As per Neurology patient not tPA candidate  Patient received full-dose aspirin and loaded with 300 mg of Plavix    Per neurology will place patient on baby ASA and 75 mg Plavix  High-dose  statin   Neuro checks Q4   Lipid panel, HgA1C in AM   MRI in AM   Patient recently had Echo done on 12/10 which showed normal EF with grade 1 diastolic dysfunction  Bioprosthesis TAVR was present  Hold off on repeating echo on lasts needed by Neurology     Neuro eval        CKD (chronic kidney disease) stage 3, GFR 30-59 ml/min  Assessment & Plan  Lab Results   Component Value Date    EGFR 43 12/29/2020    EGFR 66 12/12/2020    EGFR 51 12/11/2020    CREATININE 1 19 12/29/2020    CREATININE 0 83 12/12/2020    CREATININE 1 02 12/11/2020     Creatinine appears close to baseline  Repeat lab work in a m      Chronic respiratory failure with hypoxia [facility])  Assessment & Plan  Patient uses 2 L of oxygen continuously at home except does not use oxygen when she goes out as she does not have a portable    COPD (chronic obstructive pulmonary disease) [facility])  Assessment & Plan  Continue Xopenex treatments along with Spiriva  No evidence of exacerbation    Chronic diastolic (congestive) heart failure [facility])  Assessment & Plan  Wt Readings from Last 3 Encounters:   12/29/20 52 3 kg (115 lb 6 4 oz)   12/09/20 55 kg (121 lb 4 1 oz)   09/19/19 55 5 kg (122 lb 5 7 oz)     Appears euvolemic  Hold Lasix, Lotensin, Lopressor        Hypertension  Assessment & Plan  Hold antihypertensives to allow for permissive hypertension          VTE Prophylaxis: Enoxaparin (Lovenox)  / sequential compression device   Code Status: Level 3 - DNAR and DNI    Anticipated Length of Stay:  Patient will be admitted on an Observation basis with an anticipated length of stay of 1 midnights  Justification for Hospital Stay: Left sided numbness    Total Time for Visit, including Counseling / Coordination of Care: 45 minutes  Greater than 50% of this total time spent on direct patient counseling and coordination of care  Chief Complaint:     STROKE Alert (States she awoke with a severe headache  States at 1230 pm she started with slurred speech, left face weakness and numbness fingers left hand   Hx of stroke in past  Stroke alert called by Dr Olga Sena at 2324)    of Present Illness:    Dominik Herron is a 78 y o  female who presents with left-sided numbness  Patient states that she woke up with a headache this morning which did improve  At around 12:30 p m  She noticed numbness of the left side of her face and the left side of her tongue and lips felt swollen and numb and her speech felt slurred  Patient also reports left arm and leg numbness  Patient reports on and off frontal headache which has been occurring since she was tested for COVID  Patient states that the numbness has improved although she does not feel back at her baseline yet  Review of Systems:    Review of Systems   Constitutional: Negative for appetite change, chills and fever     HENT: Negative for congestion and trouble swallowing  Eyes: Positive for visual disturbance (chronic due to macular degeneration of the left eye)  Negative for photophobia  Respiratory: Negative for chest tightness and shortness of breath  Cardiovascular: Negative for chest pain and leg swelling  Gastrointestinal: Negative for abdominal pain, blood in stool, nausea and vomiting  Genitourinary: Negative for dysuria, frequency and hematuria  Musculoskeletal: Positive for neck pain  Skin: Negative for wound  Neurological: Positive for speech difficulty, numbness and headaches  Negative for syncope  Hematological: Does not bruise/bleed easily  Psychiatric/Behavioral: Negative for agitation  Past Medical and Surgical History:     Past Medical History:   Diagnosis Date    Arthritis     [facility]F [facility]congestive heart failure) [facility] [address] )     COPD [facility]ch[facility] obstructive [facility]     Coronary artery disease     aortic valve replacement    Heart murmur     Hypertension        Past Surgical History:   Procedure Laterality Date    AORTIC VALVE REPLACEMENT      APPENDECTOMY      CHOLECYSTECTOMY      JOINT REPLACEMENT      bilaterl hip and right knee    TONSILECTOMY AND ADNOIDECTOMY      TONSILLECTOMY      TUBAL LIGATION         Meds/Allergies:    PTA meds:   Prior to Admission Medications   Prescriptions Last Dose Informant Patient Reported? Taking?    FLUoxetine (PROzac) 20 mg capsule 12/29/2020 at Unknown time  Yes Yes   Sig: Take 20 mg by mouth daily   aspirin 325 mg tablet 12/29/2020 at Unknown time  Yes Yes   Sig: Take 325 mg by mouth daily   benazepril (LOTENSIN) 20 mg tablet 12/29/2020 at Unknown time  Yes Yes   Sig: Take 20 mg by mouth daily   furosemide (LASIX) 20 mg tablet 12/29/2020 at Unknown time  No Yes   Sig: Take 1 tablet (20 mg total) by mouth daily   levalbuterol (XOPENEX) 1 25 mg/0 5 mL nebulizer solution Not Taking at Unknown time  No No   Sig: Take 0 5 mL (1 25 mg total) by nebulization every 8 (eight) hours   Patient not taking: Reported on 2020   melatonin 3 mg 2020 at Unknown time  No Yes   Sig: Take 1 tablet (3 mg total) by mouth daily at bedtime   metoprolol tartrate (LOPRESSOR) 25 mg tablet 2020 at Unknown time  No Yes   Sig: Take 1 tablet (25 mg total) by mouth every 12 (twelve) hours   omeprazole (PriLOSEC) 40 MG capsule 2020 at Unknown time  Yes Yes   Sig: Take 40 mg by mouth daily   tiotropium (SPIRIVA) 18 mcg inhalation capsule 2020 at Unknown time  No Yes   Sig: Place 1 capsule (18 mcg total) into inhaler and inhale daily      Facility-Administered Medications: None       Allergies: No Known Allergies  History:     Marital Status: /Civil Union     Substance Use History:   Social History     Substance and Sexual Activity   Alcohol Use Yes    Alcohol/week: 1 0 standard drinks    Types: 1 Glasses of wine per week    Frequency: 2-3 times a week    Drinks per session: 1 or 2    Binge frequency: Never    Comment: rare     Social History     Tobacco Use   Smoking Status Former Smoker    Packs/day: 1 00    Years: 25 00    Pack years: 25 00    Types: Cigarettes    Quit date: 2000    Years since quittin 0   Smokeless Tobacco Never Used     Social History     Substance and Sexual Activity   Drug Use No       Family History:    Family History   Problem Relation Age of Onset    Heart defect Father        Physical Exam:     Vitals:   Blood Pressure: 102/50 (20)  Pulse: 79 (20)  Temperature: 98 °F (36 7 °C) (20)  Temp Source: Tympanic (20 1813)  Respirations: 18 (20)  Weight - Scale: 52 3 kg (115 lb 6 4 oz) (20 1437)  SpO2: 98 % (20)    Physical Exam  Constitutional:       General: She is not in acute distress  Appearance: She is not diaphoretic  HENT:      Head: Normocephalic and atraumatic  Eyes:      General:         Right eye: No discharge  Left eye: No discharge  Cardiovascular:      Rate and Rhythm: Normal rate and regular rhythm  Pulmonary:      Effort: Pulmonary effort is normal  No respiratory distress  Breath sounds: Normal breath sounds  No wheezing or rales  Abdominal:      General: Bowel sounds are normal  There is no distension  Palpations: Abdomen is soft  Tenderness: There is no abdominal tenderness  Musculoskeletal:      Right lower leg: No edema  Left lower leg: No edema  Neurological:      Mental Status: She is alert and oriented to person, place, and time  Sensory: Sensory deficit (left side ) present  Comments: Speech is clear  Left upper extremity weakness noted compared to the right although patient states that this is chronic due to severe arthritis in her left shoulder                 Lab Results: I have personally reviewed pertinent reports  Results from last 7 days   Lab Units 12/29/20  1449   WBC Thousand/uL 9 47   HEMOGLOBIN g/dL 12 5   HEMATOCRIT % 42 0   PLATELETS Thousands/uL 243   NEUTROS PCT % 87*   LYMPHS PCT % 7*   MONOS PCT % 5   EOS PCT % 0     Results from last 7 days   Lab Units 12/29/20  1449   POTASSIUM mmol/L 4 6   CHLORIDE mmol/L 97*   CO2 mmol/L 29   BUN mg/dL 39*   CREATININE mg/dL 1 19   CALCIUM mg/dL 9 0   ALK PHOS U/L 76   ALT U/L 15   AST U/L 12     Results from last 7 days   Lab Units 12/29/20  1449   INR  0 94       Imaging: I have personally reviewed pertinent reports  Xr Chest 1 View Portable    Result Date: 12/9/2020  Narrative: CHEST INDICATION:   sob  COMPARISON:  September 19, 2019 EXAM PERFORMED/VIEWS:  XR CHEST PORTABLE Single image FINDINGS:  Patient is rotated towards the right  There is again a large right perihilar parenchymal opacity with linear scar radiating outward into the right midlung  There are new bibasilar infiltrates and scattered diffuse interstitial prominence in both lungs proposed on previous scarring    Diaphragms are obscured suggesting small effusions  The cardiac size appears within normal limits  Mild vascular congestion and peribronchial thickening  Bony structures are demineralized and degenerative  There are again advanced degenerative changes both shoulders  No pneumothorax or free air  Impression: This is a limited AP semierect rotated chest x-ray  Patient is known to have significant right side perihilar parenchymal opacities in linear scarring which is grossly stable although distorted by rotation  There are new bibasilar infiltrates as well as diffuse interstitial prominence in both lungs especially in the right upper lung  Diaphragms are partially obscured suggesting small effusions  Mild peribronchial thickening  Findings could represent a diffuse interstitial pulmonary edema  Less likely infectious or inflammatory etiology  The pattern is not typical of viral pneumonia (Covid 19) although this x-ray does not exclude that diagnosis  Recommend follow-up PA and lateral exam when feasible  The study was marked in [facility] for immediate notification  Workstation performed: [id number]     Ct Stroke Alert Brain    Result Date: 12/29/2020  Narrative: CT BRAIN - STROKE ALERT PROTOCOL INDICATION:   stroke  COMPARISON:  None  TECHNIQUE:  CT examination of the brain was performed  In addition to axial images, coronal reformatted images were created and submitted for interpretation  Radiation dose length product (DLP) for this visit:  919 02 mGy-cm   This examination, like all CT scans performed in the [facility], was performed utilizing techniques to minimize radiation dose exposure, including the use of iterative  reconstruction and automated exposure control  IMAGE QUALITY:  Diagnostic  FINDINGS:  PARENCHYMA:  Old left cerebellar infarcts are stable when compared with the prior examination  Mild chronic microangiopathic change within the supratentorial brain parenchyma with a small old left caudate body lacunar infarct  No mass, mass effect or midline shift  No hemorrhage  No definitive signs of acute territorial infarction  Moderate vascular calcification is stable  VENTRICLES AND EXTRA-AXIAL SPACES:  Age-appropriate volume loss is noted  No hydrocephalus  VISUALIZED ORBITS AND PARANASAL SINUSES:  Unremarkable  CALVARIUM AND EXTRACRANIAL SOFT TISSUES:   Normal      Impression: Stable chronic microangiopathic change  Stable old infarcts within the left cerebellum  No mass, hemorrhage or definitive signs of acute territorial infarction  Findings were directly discussed with Dr Cleo Collins on 12/29/2020 3:13 PM  Workstation performed: [id number]     QVJ Ed Chest Pe Study    Result Date: 12/9/2020  Narrative: CTA - CHEST WITH IV CONTRAST - PULMONARY ANGIOGRAM INDICATION:   Respiratory distress  Shortness of breath, increased work of breathing, wheezing  History of COPD, on 3 L oxygen at home  History of tobacco use  Prior history of lung carcinoma, treated with radiation therapy  Patient has suspected COVID-19  COMPARISON: CT dated September 18, 2019 and plain films dated December 9, 2020 and September 19, 2019  TECHNIQUE: CTA examination of the chest was performed using angiographic technique according to a protocol specifically tailored to evaluate for pulmonary embolism  Axial, sagittal, and coronal 2D reformatted images were created from the source data and  submitted for interpretation  In addition, coronal 3D MIP postprocessing was performed on the acquisition scanner  Radiation dose length product (DLP) for this visit:  356 45 mGy-cm   This examination, like all CT scans performed in the [facility], was performed utilizing techniques to minimize radiation dose exposure, including the use of iterative  reconstruction and automated exposure control  IV Contrast:  85 mL of iohexol (OMNIPAQUE)  FINDINGS: PULMONARY ARTERIAL TREE:  No pulmonary embolus is seen   LUNGS:  The lungs are hyperinflated consistent with COPD  Severe advanced emphysematous changes are again evident bilaterally  A somewhat wedge-shaped area of consolidation containing air bronchograms is again evident within the superior segment of the  right lower lobe of the lung; this is similar to the prior study and likely related to the history of lung carcinoma previously treated with radiation therapy  There is pulmonary vascular prominence and septal thickening which is new compared with September 18, 2019, suggesting mild interstitial edema/CHF  PLEURA:  There are small bilateral pleural effusions, right slightly larger than left, new compared with September 18, 2019  HEART/GREAT VESSELS:  There is coronary artery disease  There has been prior aortic valve repair  There is atherosclerosis of the thoracic aorta  MEDIASTINUM AND DORIAN:  Soft tissue fullness in the right hilum appears unchanged compared to the prior study  CHEST WALL AND LOWER NECK:   Unremarkable  VISUALIZED STRUCTURES IN THE UPPER ABDOMEN:  Prior cholecystectomy  Renal atrophy, right greater than left  Atherosclerosis  OSSEOUS STRUCTURES:  No acute fracture or destructive osseous lesion  Old healed right rib fracture deformities  Prior sternotomy  Chronic changes and mild malalignment of the mid to distal sternum unchanged compared with September 18, 2019  Exaggeration of thoracic kyphosis appears similar to the prior study  Multilevel degenerative change of the spine  Impression: No evidence of pulmonary embolism is seen  Mild CHF with small bilateral pleural effusions  COPD with severe advanced emphysematous changes  An area of consolidation containing air bronchograms within the superior segment of the right lower lobe of the lung appears similar to the prior study and is likely related to the history of lung carcinoma previously treated with radiation therapy  Atherosclerosis  Coronary artery disease  Renal atrophy, right greater than left   The study was marked in EPIC for immediate notification  Workstation performed: [id number]     Cta Stroke Alert (head/neck)    Result Date: 12/29/2020  Narrative: CTA NECK AND BRAIN WITH CONTRAST INDICATION: Stroke alert   Dysarthria and left-sided numbness  Prior history of CVA  COMPARISON:   10/9/2016 TECHNIQUE:   Post contrast imaging was performed after administration of iodinated contrast through the neck and brain  Post contrast axial 0 625 mm images timed to opacify the arterial system  3D rendering was performed on an independent workstation  MIP reconstructions performed  Coronal reconstructions were performed of the noncontrast portion of the brain  Radiation dose length product (DLP) for this visit:  676 89 mGy-cm   This examination, like all CT scans performed in the [facility], was performed utilizing techniques to minimize radiation dose exposure, including the use of iterative  reconstruction and automated exposure control  IV Contrast:  85 mL of iohexol (OMNIPAQUE)  IMAGE QUALITY:   Diagnostic FINDINGS: CERVICAL VASCULATURE AORTIC ARCH AND GREAT VESSELS:  Moderate atherosclerotic disease of the aortic arch and proximal great vessels  There is a high-grade stenosis at the left common carotid artery origin and moderate atherosclerotic disease of both subclavian's distal to the vertebral artery origins  RIGHT VERTEBRAL ARTERY CERVICAL SEGMENT:  Normal origin  The vessel is diffusely hypoplastic but patent to the skull base, unchanged  LEFT VERTEBRAL ARTERY CERVICAL SEGMENT:  Mild stenosis at the origin  The vessel is patent and demonstrates mild scattered atherosclerotic disease within the neck  RIGHT EXTRACRANIAL CAROTID SEGMENT:  Atherosclerotic disease of the common carotid artery including an approximately 40% stenosis within the mid common carotid artery    There is more advanced atherosclerotic disease with thick calcification anteriorly within the distal common carotid artery extending to the ICA terminus  There is approximately 50% narrowing of the distal common carotid artery and, 50% narrowing at the ICA origin  Best seen on series 3, image 320  The more distal cervical internal carotid artery on the right just proximal to the skull base demonstrates 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, unchanged from 2016  See series 3 image 233  LEFT EXTRACRANIAL CAROTID SEGMENT:  As described above there is severe stenosis at the origin of the left common carotid artery from the aortic arch similar to the prior examination  Just distal to its origin there is focal noncalcified atherosclerotic disease resulting in approximately 50% narrowing of the vessel  Additional scattered mild atherosclerotic narrowing of the mid to distal common carotid artery  Evaluation of the bifurcation demonstrates eccentric calcification within the ICA bulb extending from its origin throughout the bulb  There is mild, less than 50% narrowing of the distal aspect of the bulb  NASCET criteria was used to determine the degree of internal carotid artery diameter stenosis  INTRACRANIAL VASCULATURE INTERNAL CAROTID ARTERIES:  Calcification of the intracranial internal carotid arteries primarily involving the vessel within the distal carotid canal and cavernous segment  Mild bilateral narrowing  ANTERIOR CIRCULATION:  Symmetric A1 segments and anterior cerebral arteries with normal enhancement  Normal anterior communicating artery  MIDDLE CEREBRAL ARTERY CIRCULATION:  Both M1 segments are patent  No disc stenosis or occlusion of the M2 branches  DISTAL VERTEBRAL ARTERIES:  Distal right vertebral artery is markedly hypoplastic as it extends through the foramen magnum and the vessel does appear to occlude distally just proximal to the vertebrobasilar junction  The distal left vertebral artery demonstrates atherosclerotic disease of the V4 segment with mild narrowing   BASILAR ARTERY:  Mild atherosclerotic disease of the basilar artery primarily proximally  POSTERIOR CEREBRAL ARTERIES: There is fetal origin of the right posterior cerebral artery  The left posterior cerebral artery arises from the basilar tip  Both demonstrate no focal stenosis  Normal left posterior communicating artery  No appreciable  right P1 segment identified  DURAL VENOUS SINUSES:  Normal  NON VASCULAR ANATOMY BONY STRUCTURES:  Cervical degenerative disc disease most prominent C3-4 through C6-7  Osteomalacia of the maxilla and mandible  SOFT TISSUES OF THE NECK:  Unremarkable  VISUALIZED CHEST:  Severe emphysema within the lung fields  There is a consolidation within the right middle lobe and a larger consolidation with air bronchograms in the right lower lobe  Patient does have a history of lung carcinoma prior radiation therapy  This finding is similar to the prior recent study from 12/9/2010 of the chest and may represent parenchymal scarring and treatment related change  The possibility of superimposed infection is not excluded  Impression: Moderate atherosclerotic disease of the aortic arch and proximal great vessels  There is a high-grade stenosis of the left common carotid artery origin similar to the prior examination with moderate narrowing of the subclavian's distal to the vertebral artery origins  Atherosclerotic disease of the right distal common carotid artery and proximal cervical internal carotid artery is stable compared to the prior exam, approximately 50%  Stable atherosclerotic disease of the intracranial internal carotid arteries  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 just proximal to the vertebrobasilar junction, unchanged  Persistent consolidation within the right lower lobe when compared with CT of the chest dated 12/9/2020 may represent sequela of patient's lung carcinoma with prior radiation therapy  Superimposed infection not excluded   Findings were directly discussed with Dr Princess Burleson on 12/29/2020 3:13 PM  Workstation performed: [id number]       CTA stroke alert (head/neck)   Final Result      Moderate atherosclerotic disease of the aortic arch and proximal great vessels  There is a high-grade stenosis of the left common carotid artery origin similar to the prior examination with moderate narrowing of the subclavian's distal to the vertebral    artery origins  Atherosclerotic disease of the right distal common carotid artery and proximal cervical internal carotid artery is stable compared to the prior exam, approximately 50%  Stable atherosclerotic disease of the intracranial internal carotid arteries  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 just proximal to the vertebrobasilar junction, unchanged  Persistent consolidation within the right lower lobe when compared with CT of the chest dated 12/9/2020 may represent sequela of patient's lung carcinoma with prior radiation therapy  Superimposed infection not excluded  Findings were directly discussed with Dr Princess Burleson on 12/29/2020 3:13 PM                      Workstation performed: [id number]         CT stroke alert brain   Final Result      Stable chronic microangiopathic change  Stable old infarcts within the left cerebellum  No mass, hemorrhage or definitive signs of acute territorial infarction  Findings were directly discussed with Dr Shalini Almanza on 12/29/2020 3:13 PM       Workstation performed: [id number]         XR chest 1 view portable    (Results Pending)       EKG, Pathology, and Other Studies Reviewed on Admission:   · No ekg noted    Allscripts/EPIC Records Reviewed: Yes     ** Please Note: "This note has been constructed using a voice recognition system  Therefore there may be syntax, spelling, and/or grammatical errors   Please call if you have any questions  "**

## 2020-12-30 NOTE — CONSULTS
[address]   Neurology Initial Consult    Kentrell Vincent is a 78 y o  female  Iva 34-*          Information obtained from:   Chief Complaint   Patient presents with   [address] she awoke with a severe headache  States at 1230 pm she started with slurred speech, left face weakness and numbness fingers left hand   Hx of stroke in past  Stroke alert called by Dr Delio Echeverria at 1423         Assessment/Plan:    1  Acute Lt Cerebellar ischemic infarct  2  Chronic bilateral cerebellar infarcts with Lt PCA territory infarcts  3  HTN  4  Atherosclerotic Disease  5  Atrial Fibrillation  6  AVR with bioprosthetic valve    -monitor on telemetry   -neuro assessments  -aspirin 81 milligrams daily-continue on discharge  -Lipitor 80 milligrams daily-continue on discharge  -DC Plavix   -Eliquis 2 5mg bid  -MRI of the Brain-completed  -CATHY  -Cardiology on Consult  -PT/OT  -Speech Therapy    Pt is an Vero Mendoza 52 old female who lives at home with her  and has multiple co morbidities including HTN, COPD, CAD, Atherosclerotic Dz,  CVA and Aortic Valve replacement  Pt was brought to the ED with complaints of Lt facial numbness with weakness in the LUE and LLE  Pt was found to have poss subacute infarct on CT and received ASA and Plavix load in the ED  Pt also received Lipitor 80mg at that time  Pt was admitted to Telemetry on Stroke pathway  MRI completed, indicative Acute Ischemic Lt Cerebellar infarct with multiple punctate foci bilaterally  Suspect for cardioembolic etiology  Pt was on DAPT, however, since that time pt has converted into UCAF on Telemetry  Pt will have CATHY with poss cardioversion per Cardiology, will start on Eliquis 2 5mg bid and will maintain on BASA and Lipitor 80mg daily for significant Atherosclerotic disease  Kentrell Vincent will need follow up in 8-10 weeks with general attending or advance practitioner   She will not require outpatient neurological testing  HPI:  Gurwinder Rao Cea is an 78-year-old female with comorbid history consistent with hypertension, COPD, coronary artery disease, CHF, CVA in bilateral cerebellum and left PCA territory, aortic valve replacement with bioprosthetic valve  Patient medications at home include baby aspirin daily, Lotensin, Lopressor and Prozac  Patient reports that yesterday she was not feeling well, felt hungry felt as though she could no longer go on  Patient stated that she had numbness of the left face and tongue and felt like she was talking as if she had just had Novocain to that area  Patient states that her left eye began tearing and blurring and noted increase in paresthesia to the left hand/finger tips which were also cold  Patient stated that she was able to walk however did feel off balance and noted that her left lower extremity was weaker than the right  Patient was then brought to the hospital during which time a stroke alert was called and patient was taken for CT of the head and CTA of the head and neck  [facility] with stable chronic microangiopathic changes as well as old infarcts within the left cerebellum without mass effect  No mass, hemorrhage, signs of acute territorial infarctions  CTA of the head and neck shows positive atherosclerosis 40-50% with subacute appear infarct in left posterior temporal occipital lobe  Patient also with severe stenosis of the left common carotid artery at the aortic arch  Pt presented with slurred speech and positive Pronator drift of the LUE  This symptoms did improve while in the ER and was not a candidate for tPA  Patient did receive full-dose aspirin and Plavix load as well as initiated on Lipitor  She was then admitted to [facility] under the stroke pathway    Patient reports that she continues to have some tingling in her left fingers and reports some upper extremity weakness and cold fingertips    Pt exam reveals slight weakness noted in the THERESE and LL extrem with decrease in sensation to the Lt face and arm to both touch and temp  Vibration is intact equally  Observed pt up ambulating with PT, has need for RW for stability  Tandem and heel toe was deferred due to pt imbalance  Reflex intact bilaterally and pt is able to name, comprehend and repeat appropriately  MRI was completed, noted to have acute Lt cerebellar infarct with multiple bilateral punctate ischemia  Suspect etiology to be embolic  Recommended continued DAPT with CATHY and Loop recorder  Addendum:  Pt went into uncontrolled atrial fibrillation on Telemetry  No need at this time for Loop recorder  Spoke with Cardio, will add pt on [facility] therapy with Eliquis 2 5mg bid (corrected dose due to age and wt)  Pt also has increased Atherosclerotic disease so will maintain on Lipitor 80mg until follow up appointment at which time can decrease to 40mg daily  Pt also should remain on AP therapy for athero, therefore can continue with BASA daily  Cardio poss planning on Cardioversion if rate is controlled, therefore, pt likely to have CATHY for this procedure            Past Medical History:   Diagnosis Date    Arthritis     CHF (congestive heart failure) (HCC)     COPD (chronic obstructive pulmonary disease) (HCC)     Coronary artery disease     aortic valve replacement    Heart murmur     Hypertension        Past Surgical History:   Procedure Laterality Date    AORTIC VALVE REPLACEMENT      APPENDECTOMY      CHOLECYSTECTOMY      JOINT REPLACEMENT      bilaterl hip and right knee    TONSILECTOMY AND ADNOIDECTOMY      TONSILLECTOMY      TUBAL LIGATION         No Known Allergies      Current Facility-Administered Medications:     aluminum-magnesium hydroxide-simethicone (MYLANTA) oral suspension 30 mL, 30 mL, Oral, Q4H PRN, Josefa Revankar, DO    aspirin (ECOTRIN LOW STRENGTH) EC tablet 81 mg, 81 mg, Oral, Daily, Josefa Revankar, DO, 81 mg at 12/30/20 1004    atorvastatin (LIPITOR) tablet 80 mg, 80 mg, Oral, Daily With Dinner, Josefa Revankar, DO, 80 mg at 20    clopidogrel (PLAVIX) tablet 75 mg, 75 mg, Oral, Daily, Josefa Revankar, DO, 75 mg at 20 1003    diltiazem (CARDIZEM) 125 mg in sodium chloride 0 9 % 125 mL infusion, 5 mg/hr, Intravenous, Continuous, HILDA Ching    diltiazem (CARDIZEM) injection 15 mg, 15 mg, Intravenous, Once, Melissa Citron, GERARDNP    enoxaparin (LOVENOX) subcutaneous injection 40 mg, 40 mg, Subcutaneous, Daily, Josefa Revankar, DO, 40 mg at 20 1003    FLUoxetine (PROzac) capsule 20 mg, 20 mg, Oral, Daily, Josefa Revankar, DO, 20 mg at 20 1003    levalbuterol (XOPENEX) inhalation solution 1 25 mg, 1 25 mg, Nebulization, Q8H, Josefa Revankar, DO, 1 25 mg at 20 0744    melatonin tablet 3 mg, 3 mg, Oral, HS, Josefa Revankar, DO, 3 mg at 206    pantoprazole (PROTONIX) EC tablet 40 mg, 40 mg, Oral, Early Morning, Josefa Revankar, DO, 40 mg at 20 0558    sodium chloride 0 9 % inhalation solution 3 mL, 3 mL, Nebulization, Q8H PRN, Josefa Revankar, DO, 3 mL at 20 0744    tiotropium (SPIRIVA) capsule for inhaler 18 mcg, 18 mcg, Inhalation, Daily, Josefa Revankar, DO, 18 mcg at 20 1004    Social History     Socioeconomic History    Marital status: /Civil Union     Spouse name: Not on file    Number of children: Not on file    Years of education: Not on file    Highest education level: Not on file   Occupational History    Not on file   Social Needs    Financial resource strain: Not on file    Food insecurity     Worry: Not on file     Inability: Not on file   [employer] needs     Medical: Not on file     Non-medical: Not on file   Tobacco Use    Smoking status: Former Smoker     Packs/day:      Years: 25      Pack years: 25      Types: Cigarettes     Quit date: 2000     Years since quittin 0    Smokeless tobacco: Never Used   Substance and Sexual Activity    Alcohol use: Yes     Alcohol/week: 1 0 standard drinks     Types: 1 Glasses of wine per week     Frequency: 2-3 times a week     Drinks per session: 1 or 2     Binge frequency: Never     Comment: rare    Drug use: No    Sexual activity: Not on file   Lifestyle    Physical activity     Days per week: Not on file     Minutes per session: Not on file    Stress: Not on file   Relationships    Social connections     Talks on phone: Not on file     Gets together: Not on file     Attends Denominational service: Not on file     Active member of club or organization: Not on file     Attends meetings of clubs or organizations: Not on file     Relationship status: Not on file    Intimate partner violence     Fear of current or ex partner: Not on file     Emotionally abused: Not on file     Physically abused: Not on file     Forced sexual activity: Not on file   Other Topics Concern    Not on file   Social History Narrative    Not on file       Family History   Problem Relation Age of Onset    Heart defect Father          Review of systems:  Please see HPI for positive symptoms  Constitutional: No fever, no chills, no weight change  Ocular: No diplopia, no blurred vision, spots/zigzag lines  HEENT:  No sore throat, headache or congestion  COR:  No chest pain  No palpitations  Lungs:  no sob  GI:  no  nausea, no vomiting, no diarrhea, no constipation, no anorexia  :  No dysuria, frequency, or urgency  No hematuria  Musculoskeletal:  No joint pain or swelling   Skin:  No rash or itching  + coldness 2 fingers with paresthesia noted to left fingertips and right index finger tip  Psychiatric:  no anxiety, no depression  Endocrine:  No polyuria or polydipsia  Physical examination:  /84   Pulse (!) 131   Temp 97 5 °F (36 4 °C) (Oral)   Resp 19   Ht 5' 5" (1 651 m)   Wt 52 3 kg (115 lb 6 4 oz)   SpO2 91%   BMI 19 20 kg/m²     GENERAL APPEARANCE:  The patient is alert, oriented      HEENT:  Head is normocephalic  Pupils are equal and reactive  NECK:  Supple without lymphadenopathy  HEART:  Regular rate and rhythm  LUNGS:  clear to auscultation  No crackles or wheezes are heard  ABDOMEN:  Soft, nontender, nondistended with good bowel sounds heard  EXTREMITIES:  Without cyanosis, clubbing or edema  Mental status: The patient is alert, attentive, and oriented  Speech is clear and fluent, good repetition, comprehension, and naming  Cranial nerves:  CN II: Visual fields are full to confrontation  Fundoscopic exam is normal with sharp discs and no vascular changes  Pupils are 3 mm and briskly reactive to light  CN III, IV, VI: At primary gaze, there is no eye deviation  CN V: Facial sensation is intact to pinprick in all 3 divisions, although decreased to the left face  Corneal responses are intact  CN VII: Face is symmetric with normal eye closure and smile  CN VIII: Hearing is normal to rubbing fingers  CN IX, X: Palate elevates symmetrically  Phonation is normal   CN XI: Head turning and shoulder shrug are intact  CN XII: Tongue is midline with normal movements and no atrophy  Motor: There is no pronator drift of out-stretched arms  Muscle bulk and tone are normal    Muscle exam  Arm Right Left Leg Right Left   Deltoid 4/5 3+/5 Iliopsoas 4+/5 4+/5   Biceps 4/5 3+/5 Quads 4+/5 4+/5   Triceps 4/5 3+/5 Hamstrings 4+/5 4+/5   Wrist Extension 4/5 3+/5 Ankle Dorsi Flexion 4+/5 4+/5   Wrist Flexion 4/5 3+/5 Ankle Plantar Flexion 4+/5 4+/5        Reflexes    RJ BJ TJ KJ AJ Plantars Galindo's   Right 2+ 2+ 2+ 1+ 1+ Downgoing Not present   Left 2+ 2+ 2+ 1+ 1+ Downgoing Not present      Sensory:  Light touch, Temperature, position sense, and vibration sense are intact in fingers and toes, however decreased in left upper extremity and left face  Coordination:  Rapid alternating movements and fine finger movements are slow     There is mild dysmetria on finger-to-nose and heel-knee-shin bilaterally  There are no abnormal or extraneous movements  Romberg with slight sway  Gait/Stance:  Casual gait is steady with rolling walker, deferred heel toe and tandem gait due to imbalance    Lab Results   Component Value Date    WBC 9 47 12/29/2020    HGB 12 5 12/29/2020    HCT 42 0 12/29/2020    MCV 88 12/29/2020     12/29/2020     Lab Results   Component Value Date    HGBA1C 5 5 10/10/2016     Lab Results   Component Value Date    ALT 15 12/29/2020    AST 12 12/29/2020    ALKPHOS 76 12/29/2020    BILITOT 0 6 12/17/2015     Lab Results   Component Value Date    GLUCOSE 105 12/17/2015    CALCIUM 8 8 12/30/2020     12/17/2015    K 4 8 12/30/2020    CO2 29 12/30/2020     12/30/2020    BUN 35 (H) 12/30/2020    CREATININE 1 07 12/30/2020     Chol 166  LDL 81    Review of reports and notes reveal:  Independent Interpretation of images or specimens:  Xr Chest 1 View Portable  Result Date: 12/30/2020  Scarring in the right lung  No acute infiltrates  Workstation performed: [id number]     Ct Stroke Alert Brain  Result Date: 12/29/2020  Stable chronic microangiopathic change  Stable old infarcts within the left cerebellum  No mass, hemorrhage or definitive signs of acute territorial infarction  Findings were directly discussed with Dr Hu Nieves on 12/29/2020 3:13 PM  Workstation performed: [id number]     Cta Stroke Alert (head/neck)  Result Date: 12/29/2020  Moderate atherosclerotic disease of the aortic arch and proximal great vessels  There is a high-grade stenosis of the left common carotid artery origin similar to the prior examination with moderate narrowing of the subclavian's distal to the vertebral artery origins  Atherosclerotic disease of the right distal common carotid artery and proximal cervical internal carotid artery is stable compared to the prior exam, approximately 50%  Stable atherosclerotic disease of the intracranial internal carotid arteries   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

## 2020-12-30 NOTE — PROGRESS NOTES
Kristie Solis made aware of current BP  Per Kristie Solis, hold cardizem drip and start NSS at 50cc/hr

## 2020-12-30 NOTE — ASSESSMENT & PLAN NOTE
Held antihypertensives to allow for permissive hypertension during hospitalization  Discussed with Cardiology, continue with her home medications Lotensin, metoprolol and Lasix  Close follow-up with outpatient Cardiology 1-2 weeks discussed with patient

## 2020-12-30 NOTE — ASSESSMENT & PLAN NOTE
Patient presented to the ER with left-sided numbness which has improved although not fully resolved   Patient had been on telemetry, on 12/30 had gone into uncontrolled atrial fibrillation, was on Cardizem drip, did have period of hypotension, spontaneously converted back into normal sinus rhythm   CT of head revealed old infarcts but no acute infarction  CTA head/reviewed   Stroke alert was called while in the ER  As per Neurology patient not tPA candidate  Patient received full-dose aspirin and loaded with 300 mg of Plavix    Per neurology, on baby ASA and 75 mg Plavix  High-dose  statin   Neuro checks showed improvement   Lipid panel shows LDL 81, HgA1C 5 13   MRI shows small recent infarct left posterior temporal/occipital regional, additional punctate focus right posterior parietal lobe, no ICH, chronic ischemic changes  As per radiology report     As per Neurology patient with infarcts bilaterally suggestive embolic stroke   Patient recently had Echo done on 12/10 which showed normal EF with grade 1 diastolic dysfunction  Bioprosthesis TAVR was present  No repeat echo per Neurology     PT/OT evaluation performed, recommending home with previous support   Patient will be discharged home today   Will follow-up with primary care physician 1-2 weeks post discharge   Patient is also recommended to follow up with her Cardiology in 1-2 weeks

## 2020-12-30 NOTE — ASSESSMENT & PLAN NOTE
Patient uses 2 L of oxygen continuously at home except does not use oxygen when she goes out as she does not have a portable  Provided script to case management in order to obtain portable concentrator for patient to use when leaving the home

## 2020-12-30 NOTE — CASE MANAGEMENT
LOS - 1 day    CPR2  Bundle-CHF  Readmission    SW met with pt to assess needs and discuss plans  Discussed goals of making sure pt's needs are met upon discharge and that Freedom of Choice is offered  Pt is a 30 day readmission  Admissions are not related  Explored with pt factors that may have led to readmission  Per pt she returned with left sided numbness which she had told someone about prior to first discharge  At that time it was slight numbness near her mouth and lips  Pt could not recall who she told  Pt said she was in the process of making her follow up pulmonary appointment when she had to return to hospital   Pt lives at home with her   Independent with ALDs and mobility PTA  Doesn't drive, son and daughter-in-law assist with driving  Pt does not use an assistive device when ambulating  Pt has oxygen concentrator and portable tanks from [address]  Also has a nebulizer that is in disrepair  No HHC/STR history  No MH or D&A issues  Pt's PCP is Dr Deborah Moreno MD  Pt again mentioned that she is still considering changing PCP due to distance from home  Pt said she would like PCP closer  SW offered list of [facility] in [address] [place] [address] and pt accepted  Information on local practices given to pt to consider  Per pt she has prescription coverage and has no difficulty getting her medication as prescribed  Preferred pharmacy is [facility]    Pt does not have POA/advanced directives  Offered information on and assistance with completing advanced directive  Pt accepted offer for literature about process  SW provide pt with brochure and living will/DPOA for Healthcare forms  Per pt her son, Evan Owen, would be her healthcare representative if needed  Discussed discharge plans and needs with pt  Pt's plan is to return home with  when discharged    Offered pt home care services, provided list of area agencies to consider and discussed agreement with local agencies to initiate services to [facility] program pts within 24 hours  Pt again declined home care services stating she doesn't feel she needs them  Pt did request new nebulizer since hers is older and not in good shape  Pt requested nebulizer be ordered from [address] just as her oxygen is  Nebulizer order has been placed with [facility]  Per pt her son will be able to transport her home when discharged  SW explained Observation Status notice to pt  Pt signed and copy given  SW will continue to follow to monitor needs and assist with planning if needs arise

## 2020-12-30 NOTE — PROGRESS NOTES
[address] Internal Medicine Progress Note  Patient: Wiley Oleary 78 y o  female   MRN: [id number]  PCP: Maria De Jesus Abarca MD  Unit/Bed#: [address] [place] [address] Encounter: [id number]  Date Of Visit: 12/30/20    Problem List:    Principal Problem:    Left sided numbness  Active Problems:    Atrial fibrillation with rapid ventricular response (HCC)    Hypertension    Chronic diastolic (congestive) heart failure (HCC)    COPD (chronic obstructive pulmonary disease) (HCC)    Chronic respiratory failure with hypoxia (HCC)    CKD (chronic kidney disease) stage 3, GFR 30-59 ml/min      Assessment & Plan:    * Left sided numbness  Assessment & Plan  Patient presented to the ER with left-sided numbness which has improved although not fully resolved   continue telemetry   CT of head revealed old infarcts but no acute infarction  CTA head/reviewed   Stroke alert was called while in the ER  As per Neurology patient not tPA candidate  Patient received full-dose aspirin and loaded with 300 mg of Plavix    Per neurology, on baby ASA and 75 mg Plavix  High-dose  statin   Neuro checks Q4   Lipid panel shows LDL 81, HgA1C pending   MRI official read pending but as per Neurology patient with infarcts bilaterally suggestive embolic stroke   Patient recently had Echo done on 12/10 which showed normal EF with grade 1 diastolic dysfunction  Bioprosthesis TAVR was present  No repeat echo per Neurology     Neuro eval        Atrial fibrillation with rapid ventricular response [facility])  Assessment & Plan  Initially plan was to obtain CATHY and loop recorder placement per Neurology and Cardiology consulted  Later notified by RN that patient reporting chest pressure and was noted to be tachycardic on monitor  Check troponins  EKG confirms AFib with RVR  Started on Cardizem drip    Cardiology aware and awaiting official recommendations    CKD (chronic kidney disease) stage 3, GFR 30-59 ml/min  Assessment & Plan  Lab Results   Component Value Date    EGFR 48 12/30/2020    EGFR 43 12/29/2020    EGFR 66 12/12/2020    CREATININE 1 07 12/30/2020    CREATININE 1 19 12/29/2020    CREATININE 0 83 12/12/2020     Creatinine appears close to baseline  Repeat lab work in a m    Chronic respiratory failure with hypoxia ([facility] [address] )  Assessment & Plan  Patient uses 2 L of oxygen continuously at home except does not use oxygen when she goes out as she does not have a portable    COPD (chronic obstructive pulmonary disease) ([facility] [address] )  Assessment & Plan  Continue Xopenex treatments along with Spiriva  No evidence of exacerbation  Chronic diastolic (congestive) heart failure [facility])  Assessment & Plan  Wt Readings from Last 3 Encounters:   12/29/20 52 3 kg (115 lb 6 4 oz)   12/09/20 55 kg (121 lb 4 1 oz)   09/19/19 55 5 kg (122 lb 5 7 oz)     Appears euvolemic  Holding Lasix, Lotensin, Lopressor        Hypertension  Assessment & Plan  Held antihypertensives to allow for permissive hypertension  BPs soft        VTE Pharmacologic Prophylaxis:   Pharmacologic: Enoxaparin (Lovenox)  Mechanical VTE Prophylaxis in Place: Yes    Patient Centered Rounds: I have performed bedside rounds with nursing staff today  Discussions with Specialists or Other Care Team Provider: yes - cardio, neuro    Education and Discussions with Family / Patient: yes - offered to call son but patient declined    Time Spent for Care: 30 minutes  More than 50% of total time spent on counseling and coordination of care as described above  Current Length of Stay: 1 day(s)    Current Patient Status: Inpatient   Certification Statement: The patient will continue to require additional inpatient hospital stay due to AFib with RVR, left-sided numbness    Discharge Plan: home    Code Status: Level 3 - DNAR and DNI      Subjective:   Patient reports improvement in her left-sided numbness although not back at baseline yet    Later patient was noted to be tachycardic on tele monitor and reported chest pressure    Objective:     Vitals:   Temp (24hrs), Av 7 °F (36 5 °C), Min:97 3 °F (36 3 °C), Max:98 1 °F (36 7 °C)    Temp:  [97 3 °F (36 3 °C)-98 1 °F (36 7 °C)] 97 5 °F (36 4 °C)  HR:  [] 131  Resp:  [18-31] 19  BP: (102-171)/() 109/84  SpO2:  [91 %-100 %] 91 %  Body mass index is 19 2 kg/m²  Input and Output Summary (last 24 hours): Intake/Output Summary (Last 24 hours) at 2020 1522  Last data filed at 2020 1300  Gross per 24 hour   Intake 480 ml   Output    Net 480 ml       Physical Exam:     Physical Exam  Constitutional:       General: She is not in acute distress  Appearance: She is not diaphoretic  HENT:      Head: Normocephalic and atraumatic  Eyes:      General:         Right eye: No discharge  Left eye: No discharge  Cardiovascular:      Comments: Irregular rate and rhythm, tachycardic  Pulmonary:      Effort: Pulmonary effort is normal  No respiratory distress  Breath sounds: Normal breath sounds  No wheezing or rales  Abdominal:      General: Bowel sounds are normal  There is no distension  Palpations: Abdomen is soft  Tenderness: There is no abdominal tenderness  Musculoskeletal:      Right lower leg: No edema  Left lower leg: No edema  Neurological:      Mental Status: She is alert and oriented to person, place, and time        Comments: Speech is clear and fluent  Left-sided facial sensation decreased compared to the right  Left upper extremity is weaker than the right although this is chronic per patient due to 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         Additional Data:     Labs:    Results from last 7 days   Lab Units 20  1449   WBC Thousand/uL 9 47   HEMOGLOBIN g/dL 12 5   HEMATOCRIT % 42 0   PLATELETS Thousands/uL 243   NEUTROS PCT % 87*   LYMPHS PCT % 7*   MONOS PCT % 5   EOS PCT % 0     Results from last 7 days   Lab Units 20  0607 20  1449   POTASSIUM mmol/L 4 8 4 6   CHLORIDE mmol/L 102 97* CO2 mmol/L 29 29   BUN mg/dL 35* 39*   CREATININE mg/dL 1 07 1 19   CALCIUM mg/dL 8 8 9 0   ALK PHOS U/L  --  76   ALT U/L  --  15   AST U/L  --  12     Results from last 7 days   Lab Units 12/29/20  1449   INR  0 94       * I Have Reviewed All Lab Data Listed Above  * Additional Pertinent Lab Tests Reviewed: All Labs For Current Hospital Admission Reviewed      Imaging:  Imaging Reports Reviewed Today Include: CT head, CTA head/neck  CXR  Imaging Personally Reviewed by Myself Includes:  N/A    Recent Cultures (last 7 days):           Last 24 Hours Medication List:   Current Facility-Administered Medications   Medication Dose Route Frequency Provider Last Rate    aluminum-magnesium hydroxide-simethicone  30 mL Oral Q4H PRN Josefa Revankar, DO      aspirin  81 mg Oral Daily Josefa Revankar, DO      atorvastatin  80 mg Oral Daily With Spofford-Mark Revankar, DO      clopidogrel  75 mg Oral Daily Josefa Revankar, DO      diltiazem  5 mg/hr Intravenous Continuous Genevie Sers, CRNP 5 mg/hr (12/30/20 1425)    enoxaparin  40 mg Subcutaneous Daily Josefa Revankar, DO      FLUoxetine  20 mg Oral Daily Josefa Revankar, DO      levalbuterol  1 25 mg Nebulization Q8H Josefa Revankar, DO      melatonin  3 mg Oral HS Josefa Revankar, DO      pantoprazole  40 mg Oral Early Morning Josefa Revankar, DO      sodium chloride  3 mL Nebulization Q8H PRN Josefa Revankar, DO      tiotropium  18 mcg Inhalation Daily Josefa Revankar, DO            Today, Patient Was Seen By: Maribel Adkins DO    ** Please Note: "This note has been constructed using a voice recognition system  Therefore there may be syntax, spelling, and/or grammatical errors   Please call if you have any questions  "**

## 2020-12-30 NOTE — CONSULTS
Consultation - Cardiology   Andi Motley 78 y o  female MRN: [id number]  [place]/Bed[place] [address] [facility] Encounter: [id number]    Assessment/Plan     Assessment:  1  TIA in patient with history of bilateral CVA  2  Rapid atrial fibrillation  3  Severe COPD on chronic oxygen  4  Hypertension  5  History of transapical aortic valve replacement  6  Chronic diastolic heart failure:  Patient appears to be euvolemic      Plan:  Patient has been admitted to the hospitalist service  1  Cardizem drip initiated with 15 mg bolus and drip started at 5 milligrams/hour  Will titrate as patient tolerates  2  Discussed with Neurology, loop recorder and CATHY are not necessary at this time and they are agreeable that we will start Eliquis at 2 5 mg b i d  Due to patient's age and weight  We will continue aspirin 81 mg once a day but discontinue Plavix  3  Will also reinitiate patient's low-dose Lopressor with parameters for rate control  4  Continue telemetry and vital signs    5  Continue monitor electrolytes      History of Present Illness   Physician Requesting Consult: Bri Bunch,   Reason for Consult / Principal Problem:  Patient with bilateral CVA, question cardioembolic cause, now with rapid atrial fibrillation      HPI: Andi Motley is a 78y o  year old female who presented to the emergency room on day of admission with complaints of left eye blurring, left-sided headache, slurring of her speech and left-sided facial weakness  She states the symptoms had been going on intermittently for 3 week period of time  Stroke alert was called, but patient did not receive tPA  Patient does have a history of previous CVA of 1 to the left PCA territory and then also bilateral cerebellar CVAs  Patient states that she has never been on long-term anticoagulation  Consultation initially was for CATHY and loop recorder  At approximately 1:30 a m , patient went into a rapid atrial fibrillation rates have been 130s to 160s  She denied any palpitations, but she did state she felt as if she had headache and also some intermittent chest heaviness  Patient has been started on a Cardizem drip and her Lopressor has been re-initiated for rate control  I have discussed with Neurology, and at this time loop recorder and CATHY are not needed  They also are agreeable that we may start Eliquis and due to patient's age in weight it will be at 2 5 mg twice a day  Patient has a history of hypertension, severe COPD for which she uses 3 L of oxygen chronically at home  Patient also has a history of left-sided lung cancer for which she had radiation therapy  The previous CVA, diastolic heart failure, and approximately 3 years ago patient had a transapical aortic valve replacement performed at  [facility]  Echocardiogram performed in December 10, 2020 demonstrated ejection fraction 55% with grade 1 diastolic dysfunction, mild mitral valve regurgitation, mild tricuspid valve regurgitation and function of her aortic valve replacement was stable  Also in December 2020 patient had a Lexiscan nuclear stress test which demonstrated a fixed anterior defect  Trans ischemic dilatation was noted to be 1 36, which lends question possible balanced ischemia  Troponins this admission have been negative  Inpatient consult to Cardiology  Consult performed by: HILDA Nichols  Consult ordered by: Ken Bauer DO          Review of Systems   Constitutional: Positive for appetite change  Negative for activity change and fever  HENT: Negative for congestion, ear pain, facial swelling, nosebleeds, postnasal drip, sinus pressure, sinus pain, sneezing, tinnitus, trouble swallowing and voice change  Left-sided facial tingling   Eyes: Positive for visual disturbance  Blurred vision in left eye intermittently   Respiratory: Positive for chest tightness  Negative for shortness of breath  Cardiovascular: Negative    Negative for chest pain, palpitations and leg swelling  Gastrointestinal: Negative  Negative for abdominal distention, constipation, diarrhea, nausea and vomiting  Endocrine: Negative  Negative for polydipsia, polyphagia and polyuria  Genitourinary: Negative  Negative for difficulty urinating  Musculoskeletal: Negative  Negative for arthralgias and gait problem  Skin: Negative  Neurological: Positive for weakness, numbness and headaches  Negative for dizziness, syncope and speech difficulty  Hematological: Negative  Psychiatric/Behavioral: Negative          Historical Information   Past Medical History:   Diagnosis Date    Arthritis     [facility]congest[facility]) [facility]     COPD [facility]chronic obstructive pulmonary disease) [facility]     Coronary artery disease     aortic valve replacement    Heart murmur     Hypertension      Past Surgical History:   Procedure Laterality Date    AORTIC VALVE REPLACEMENT      APPENDECTOMY      CHOLECYSTECTOMY      JOINT REPLACEMENT      bilaterl hip and right knee    TONSILECTOMY AND ADNOIDECTOMY      TONSILLECTOMY      TUBAL LIGATION       Social History     Substance and Sexual Activity   Alcohol Use Yes    Alcohol/week: 1 0 standard drinks    Types: 1 Glasses of wine per week    Frequency: 2-3 times a week    Drinks per session: 1 or 2    Binge frequency: Never    Comment: rare     Social History     Substance and Sexual Activity   Drug Use No     E-Cigarette/Vaping    E-Cigarette Use Never User      E-Cigarette/Vaping Substances     Social History     Tobacco Use   Smoking Status Former Smoker    Packs/day: 1 00    Years: 25 00    Pack years: 25 00    Types: Cigarettes    Quit date: 2000    Years since quittin 0   Smokeless Tobacco Never Used     Family History:   Family History   Problem Relation Age of Onset    Heart defect Father        Meds/Allergies   all current active meds have been reviewed, current meds:   Current Facility-Administered Medications   Medication Dose Route Frequency    aluminum-magnesium hydroxide-simethicone (MYLANTA) oral suspension 30 mL  30 mL Oral Q4H PRN    apixaban (ELIQUIS) tablet 2 5 mg  2 5 mg Oral BID    [START ON 12/31/2020] aspirin (ECOTRIN LOW STRENGTH) EC tablet 81 mg  81 mg Oral Daily    atorvastatin (LIPITOR) tablet 80 mg  80 mg Oral Daily With Dinner    diltiazem (CARDIZEM) 125 mg in sodium chloride 0 9 % 125 mL infusion  7 5 mg/hr Intravenous Continuous    FLUoxetine (PROzac) capsule 20 mg  20 mg Oral Daily    levalbuterol (XOPENEX) inhalation solution 1 25 mg  1 25 mg Nebulization Q8H    melatonin tablet 3 mg  3 mg Oral HS    metoprolol tartrate (LOPRESSOR) tablet 25 mg  25 mg Oral Q12H [address]    pantoprazole (PROTONIX) EC tablet 40 mg  40 mg Oral Early Morning    sodium chloride 0 9 % inhalation solution 3 mL  3 mL Nebulization Q8H PRN    tiotropium (SPIRIVA) capsule for inhaler 18 mcg  18 mcg Inhalation Daily    and PTA meds:   Prior to Admission Medications   Prescriptions Last Dose Informant Patient Reported? Taking?    FLUoxetine (PROzac) 20 mg capsule 12/29/2020 at Unknown time  Yes Yes   Sig: Take 20 mg by mouth daily   aspirin 325 mg tablet 12/29/2020 at Unknown time  Yes Yes   Sig: Take 325 mg by mouth daily   benazepril (LOTENSIN) 20 mg tablet 12/29/2020 at Unknown time  Yes Yes   Sig: Take 20 mg by mouth daily   furosemide (LASIX) 20 mg tablet 12/29/2020 at Unknown time  No Yes   Sig: Take 1 tablet (20 mg total) by mouth daily   levalbuterol (XOPENEX) 1 25 mg/0 5 mL nebulizer solution Not Taking at Unknown time  No No   Sig: Take 0 5 mL (1 25 mg total) by nebulization every 8 (eight) hours   Patient not taking: Reported on 12/29/2020   melatonin 3 mg 12/29/2020 at Unknown time  No Yes   Sig: Take 1 tablet (3 mg total) by mouth daily at bedtime   metoprolol tartrate (LOPRESSOR) 25 mg tablet 12/29/2020 at Unknown time  No Yes   Sig: Take 1 tablet (25 mg total) by mouth every 12 (twelve) hours   omeprazole (PriLOSEC) 40 MG capsule 12/29/2020 at Unknown time  Yes Yes   Sig: Take 40 mg by mouth daily   tiotropium (SPIRIVA) 18 mcg inhalation capsule 12/29/2020 at Unknown time  No Yes   Sig: Place 1 capsule (18 mcg total) into inhaler and inhale daily      Facility-Administered Medications: None     No Known Allergies    Objective   Vitals: Blood pressure 109/84, pulse (!) 131, temperature 97 5 °F (36 4 °C), temperature source Oral, resp  rate 19, height 5' 5" (1 651 m), weight 52 3 kg (115 lb 6 4 oz), SpO2 91 %  Orthostatic Blood Pressures      Most Recent Value   Blood Pressure  109/84 filed at 12/30/2020 1350   Patient Position - Orthostatic VS  Sitting filed at 12/30/2020 0816            Intake/Output Summary (Last 24 hours) at 12/30/2020 1545  Last data filed at 12/30/2020 1300  Gross per 24 hour   Intake 480 ml   Output    Net 480 ml       Invasive Devices     Peripheral Intravenous Line            Peripheral IV 12/30/20 Right Hand less than 1 day                Physical Exam  Vitals signs and nursing note reviewed  Constitutional:       Appearance: Normal appearance  She is normal weight  She is ill-appearing  HENT:      Head: Normocephalic and atraumatic  Right Ear: External ear normal       Left Ear: External ear normal       Nose: Nose normal       Mouth/Throat:      Mouth: Mucous membranes are moist    Eyes:      General: No scleral icterus  Right eye: No discharge  Left eye: No discharge  Conjunctiva/sclera: Conjunctivae normal       Pupils: Pupils are equal, round, and reactive to light  Neck:      Musculoskeletal: Normal range of motion and neck supple  Cardiovascular:      Rate and Rhythm: Tachycardia present  Rhythm irregularly irregular  Pulses: Normal pulses  Heart sounds: Murmur present  Systolic murmur present with a grade of 1/6     Pulmonary:      Effort: Pulmonary effort is normal  No accessory muscle usage or respiratory distress  Breath sounds: Examination of the right-middle field reveals decreased breath sounds  Examination of the right-lower field reveals decreased breath sounds  Examination of the left-lower field reveals decreased breath sounds  Decreased breath sounds present  No wheezing  Comments: Chronically on O2 at 3 L  Abdominal:      General: Bowel sounds are normal  There is no distension  Palpations: Abdomen is soft  Tenderness: There is no abdominal tenderness  Musculoskeletal:      Right lower leg: No edema  Left lower leg: No edema  Skin:     General: Skin is warm and dry  Capillary Refill: Capillary refill takes less than 2 seconds  Neurological:      General: No focal deficit present  Mental Status: She is alert and oriented to person, place, and time  Mental status is at baseline  Psychiatric:         Mood and Affect: Mood normal          Behavior: Behavior normal          Thought Content: Thought content normal          Judgment: Judgment normal          Lab Results:   I have personally reviewed pertinent lab results      CBC with diff:   Results from last 7 days   Lab Units 12/29/20  1449   WBC Thousand/uL 9 47   RBC Million/uL 4 77   HEMOGLOBIN g/dL 12 5   HEMATOCRIT % 42 0   MCV fL 88   MCH pg 26 2*   MCHC g/dL 29 8*   RDW % 14 0   MPV fL 9 7   PLATELETS Thousands/uL 243     CMP:   Results from last 7 days   Lab Units 12/30/20  0607 12/29/20  1449   SODIUM mmol/L 138 136   POTASSIUM mmol/L 4 8 4 6   CHLORIDE mmol/L 102 97*   CO2 mmol/L 29 29   BUN mg/dL 35* 39*   CREATININE mg/dL 1 07 1 19   CALCIUM mg/dL 8 8 9 0   AST U/L  --  12   ALT U/L  --  15   ALK PHOS U/L  --  76   EGFR ml/min/1 73sq m 48 43     Troponin:   0   Lab Value Date/Time    TROPONINI 0 04 12/30/2020 1405    TROPONINI 0 02 12/29/2020 1449    TROPONINI 0 66 (H) 12/10/2020 1018    TROPONINI 0 70 (H) 12/10/2020 0637    TROPONINI 0 56 (H) 12/10/2020 0347    TROPONINI 0 34 (H) 12/10/2020 0031 TROPONINI 0 19 (H) 20207    TROPONINI <0 02 2019 1112    TROPONINI 0 02 2017 1409    TROPONINI 0 02 10/08/2016 1628    TROPONINI 0 03 12/15/2015 0802    TROPONINI 0 02 12/15/2015 0130    TROPONINI <0 02 2015 1940     BNP:   Results from last 7 days   Lab Units 20  0607   POTASSIUM mmol/L 4 8   CHLORIDE mmol/L 102   CO2 mmol/L 29   BUN mg/dL 35*   CREATININE mg/dL 1 07   CALCIUM mg/dL 8 8   EGFR ml/min/1 73sq m 48     Coags:   Results from last 7 days   Lab Units 20  1449   PTT seconds 29   INR  0 94     Lipid Profile:   Results from last 7 days   Lab Units 20  0607   HDL mg/dL 58   LDL CALC mg/dL 81   TRIGLYCERIDES mg/dL 137     Imaging: I have personally reviewed pertinent reports      EK lead EKG demonstrates atrial fibrillation at a rate of 140 beats per minute  VTE Prophylaxis: Sequential compression device Brittany Siad)     Code Status: Level 3 - DNAR and DNI  Advance Directive and Living Will:      Power of :    POLST:      Alecia Haas, [address] [facility]

## 2020-12-31 VITALS
SYSTOLIC BLOOD PRESSURE: 139 MMHG | DIASTOLIC BLOOD PRESSURE: 62 MMHG | OXYGEN SATURATION: 99 % | TEMPERATURE: 98.7 F | RESPIRATION RATE: 18 BRPM | WEIGHT: 115.4 LBS | BODY MASS INDEX: 19.22 KG/M2 | HEART RATE: 76 BPM | HEIGHT: 65 IN

## 2020-12-31 LAB
ANION GAP SERPL CALCULATED.3IONS-SCNC: 5 MMOL/L (ref 4–13)
ATRIAL RATE: 133 BPM
ATRIAL RATE: 153 BPM
ATRIAL RATE: 77 BPM
BUN SERPL-MCNC: 23 MG/DL (ref 5–25)
CALCIUM SERPL-MCNC: 8.9 MG/DL (ref 8.3–10.1)
CHLORIDE SERPL-SCNC: 104 MMOL/L (ref 100–108)
CO2 SERPL-SCNC: 29 MMOL/L (ref 21–32)
CREAT SERPL-MCNC: 0.82 MG/DL (ref 0.6–1.3)
GFR SERPL CREATININE-BSD FRML MDRD: 67 ML/MIN/1.73SQ M
GLUCOSE SERPL-MCNC: 114 MG/DL (ref 65–140)
MAGNESIUM SERPL-MCNC: 2 MG/DL (ref 1.6–2.6)
P AXIS: 56 DEGREES
POTASSIUM SERPL-SCNC: 4.8 MMOL/L (ref 3.5–5.3)
PR INTERVAL: 168 MS
PROCALCITONIN SERPL-MCNC: <0.05 NG/ML
QRS AXIS: -20 DEGREES
QRS AXIS: -26 DEGREES
QRS AXIS: -30 DEGREES
QRSD INTERVAL: 86 MS
QT INTERVAL: 260 MS
QT INTERVAL: 294 MS
QT INTERVAL: 340 MS
QTC INTERVAL: 384 MS
QTC INTERVAL: 413 MS
QTC INTERVAL: 472 MS
SODIUM SERPL-SCNC: 138 MMOL/L (ref 136–145)
T WAVE AXIS: 120 DEGREES
T WAVE AXIS: 128 DEGREES
T WAVE AXIS: 93 DEGREES
VENTRICULAR RATE: 152 BPM
VENTRICULAR RATE: 155 BPM
VENTRICULAR RATE: 77 BPM

## 2020-12-31 PROCEDURE — 99232 SBSQ HOSP IP/OBS MODERATE 35: CPT | Performed by: PSYCHIATRY & NEUROLOGY

## 2020-12-31 PROCEDURE — 94760 N-INVAS EAR/PLS OXIMETRY 1: CPT

## 2020-12-31 PROCEDURE — 93010 ELECTROCARDIOGRAM REPORT: CPT | Performed by: INTERNAL MEDICINE

## 2020-12-31 PROCEDURE — 83735 ASSAY OF MAGNESIUM: CPT | Performed by: FAMILY MEDICINE

## 2020-12-31 PROCEDURE — 80048 BASIC METABOLIC PNL TOTAL CA: CPT | Performed by: FAMILY MEDICINE

## 2020-12-31 PROCEDURE — 99232 SBSQ HOSP IP/OBS MODERATE 35: CPT | Performed by: INTERNAL MEDICINE

## 2020-12-31 PROCEDURE — 84145 PROCALCITONIN (PCT): CPT | Performed by: FAMILY MEDICINE

## 2020-12-31 PROCEDURE — 97530 THERAPEUTIC ACTIVITIES: CPT

## 2020-12-31 PROCEDURE — 94640 AIRWAY INHALATION TREATMENT: CPT

## 2020-12-31 PROCEDURE — 99239 HOSP IP/OBS DSCHRG MGMT >30: CPT | Performed by: FAMILY MEDICINE

## 2020-12-31 RX ORDER — DIGOXIN 125 MCG
125 TABLET ORAL EVERY OTHER DAY
Status: DISCONTINUED | OUTPATIENT
Start: 2021-01-01 | End: 2020-12-31 | Stop reason: HOSPADM

## 2020-12-31 RX ORDER — ASPIRIN 81 MG/1
81 TABLET ORAL DAILY
Qty: 30 TABLET | Refills: 0 | Status: SHIPPED | OUTPATIENT
Start: 2021-01-01 | End: 2022-07-11

## 2020-12-31 RX ORDER — ATORVASTATIN CALCIUM 80 MG/1
80 TABLET, FILM COATED ORAL
Qty: 30 TABLET | Refills: 0 | Status: SHIPPED | OUTPATIENT
Start: 2020-12-31 | End: 2021-03-09

## 2020-12-31 RX ORDER — DIGOXIN 125 MCG
125 TABLET ORAL EVERY OTHER DAY
Qty: 15 TABLET | Refills: 0 | Status: SHIPPED | OUTPATIENT
Start: 2021-01-01 | End: 2021-07-26 | Stop reason: HOSPADM

## 2020-12-31 RX ADMIN — ATORVASTATIN CALCIUM 80 MG: 80 TABLET ORAL at 15:32

## 2020-12-31 RX ADMIN — ASPIRIN 81 MG: 81 TABLET, COATED ORAL at 10:20

## 2020-12-31 RX ADMIN — TIOTROPIUM BROMIDE 18 MCG: 18 CAPSULE ORAL; RESPIRATORY (INHALATION) at 10:21

## 2020-12-31 RX ADMIN — APIXABAN 2.5 MG: 2.5 TABLET, FILM COATED ORAL at 10:20

## 2020-12-31 RX ADMIN — PANTOPRAZOLE SODIUM 40 MG: 40 TABLET, DELAYED RELEASE ORAL at 07:08

## 2020-12-31 RX ADMIN — LEVALBUTEROL HYDROCHLORIDE 1.25 MG: 1.25 SOLUTION, CONCENTRATE RESPIRATORY (INHALATION) at 07:28

## 2020-12-31 RX ADMIN — METOPROLOL TARTRATE 25 MG: 25 TABLET, FILM COATED ORAL at 10:20

## 2020-12-31 RX ADMIN — FLUOXETINE 20 MG: 20 CAPSULE ORAL at 10:20

## 2020-12-31 RX ADMIN — ACETAMINOPHEN 650 MG: 325 TABLET, FILM COATED ORAL at 15:32

## 2020-12-31 RX ADMIN — APIXABAN 2.5 MG: 2.5 TABLET, FILM COATED ORAL at 17:04

## 2020-12-31 RX ADMIN — ISODIUM CHLORIDE 3 ML: 0.03 SOLUTION RESPIRATORY (INHALATION) at 07:28

## 2020-12-31 NOTE — CASE MANAGEMENT
BAILEE was notified by [facility] that patient is written for discharge and requests a prescription for a portable O2 concentrator  Patient is current with [employer] for home O2  Prescription obtained from [facility] and uploaded to [facility] via [employer] for the [address], setting of 2, pulse dose  BAILEE called marilin Marshall from [facility] at [phone] who advised on prescription details  Le to follow up with patient at home for home delivery of the POC which takes a couple of days

## 2020-12-31 NOTE — NURSING NOTE
The patient discharged home  All the discharge instructions provided to the patient regarding new meds, the diet, daily weight and the follow up appointments

## 2020-12-31 NOTE — PROGRESS NOTES
Progress Note - Cardiology   [facility]     Roseline Ayon 78 y o  female MRN: [id number]  : 1940  Unit/Bed#: [address] [place] [address] Encounter: [id number]    Assessment and Plan:   1  Left cerebellar CVA:  Confirmed with MRI  Patient with bout of atrial fibrillation yesterday, initially was not aware of abnormality in heart rate until it became fast       -  Concerning that she has most likely been having episodes of silent atrial fibrillation as a cause of her stroke  -  initially started on aspirin and Plavix, transitioned to Eliquis per neurology's approval     2  Rapid atrial fibrillation:  Patient initially started on Cardizem, she did not tolerate as it caused hypotension     -  patient given digoxin 0 25 mg IV x2 with successful conversion back to sinus rhythm  -  continue digoxin 0 125 mg every other day due to renal function    -  continue beta-blocker    -  continue Eliquis 2 5 mg b i d     3  Severe COPD on chronic oxygen:  Appears to be at baseline    4  Hypertension:  Blood pressures improved with rate control  Continue to monitor    5  History of transapical aortic valve replacement    6  Chronic diastolic heart failure:  Patient appears to be euvolemic    Subjective / Objective:   Patient seen and examined  She converted back to sinus rhythm at approximately 12:30 this morning  She states she feels much better, sensation on left side of the face has improved  Still has mild headache she states but is better than yesterday  MRI of the brain demonstrated small recent infarct in the left posterior temporal/occipital region with additional punctate focus in the right posterior parietal lobe  There was also chronic ischemic changes  Vitals: Blood pressure 139/62, pulse 76, temperature 98 7 °F (37 1 °C), temperature source Oral, resp  rate 18, height 5' 5" (1 651 m), weight 52 3 kg (115 lb 6 4 oz), SpO2 99 %    Vitals:    20 1437   Weight: 52 3 kg (115 lb 6 4 oz)     Body mass index is 19 2 kg/m²  BP Readings from Last 3 Encounters:   12/31/20 139/62   12/12/20 124/59   09/20/19 147/69     Orthostatic Blood Pressures      Most Recent Value   Blood Pressure  139/62 filed at 12/31/2020 1000   Patient Position - Orthostatic VS  Lying filed at 12/30/2020 2126        I/O       12/29 0701 - 12/30 0700 12/30 0701 - 12/31 0700 12/31 0701 - 01/01 0700    P  O   720     Total Intake(mL/kg)  720 (13 8)     Urine (mL/kg/hr)  401 (0 3)     Total Output  401     Net  +319            Unmeasured Urine Occurrence 2 x          Invasive Devices     Peripheral Intravenous Line            Peripheral IV 12/30/20 Right Hand less than 1 day                  Intake/Output Summary (Last 24 hours) at 12/31/2020 1030  Last data filed at 12/30/2020 1801  Gross per 24 hour   Intake 480 ml   Output 401 ml   Net 79 ml         Physical Exam:   Physical Exam  Vitals signs and nursing note reviewed  Constitutional:       General: She is not in acute distress  Appearance: Normal appearance  She is well-developed and normal weight  HENT:      Head: Normocephalic  Right Ear: External ear normal       Left Ear: External ear normal       Nose: Nose normal    Eyes:      General: No scleral icterus  Right eye: No discharge  Left eye: No discharge  Pupils: Pupils are equal, round, and reactive to light  Neck:      Musculoskeletal: Normal range of motion and neck supple  Thyroid: No thyromegaly  Cardiovascular:      Rate and Rhythm: Normal rate and regular rhythm  Pulses: Normal pulses  Heart sounds: Murmur present  Systolic murmur present with a grade of 1/6  Pulmonary:      Effort: Pulmonary effort is normal  No accessory muscle usage or respiratory distress  Breath sounds: Examination of the right-lower field reveals decreased breath sounds  Examination of the left-lower field reveals decreased breath sounds  Decreased breath sounds present   No wheezing  Abdominal:      General: Bowel sounds are normal  There is no distension  Palpations: Abdomen is soft  Musculoskeletal:      Right lower leg: No edema  Left lower leg: No edema  Skin:     General: Skin is warm and dry  Capillary Refill: Capillary refill takes less than 2 seconds  Neurological:      General: No focal deficit present  Mental Status: She is alert and oriented to person, place, and time  Mental status is at baseline  Psychiatric:         Mood and Affect: Mood normal          Behavior: Behavior normal          Thought Content:  Thought content normal          Judgment: Judgment normal                    Medications/ Allergies:     Current Facility-Administered Medications   Medication Dose Route Frequency Provider Last Rate    acetaminophen  650 mg Oral Q6H PRN Siddhartha Saldivar MD      aluminum-magnesium hydroxide-simethicone  30 mL Oral Q4H PRN Josefa Revankar, DO      apixaban  2 5 mg Oral BID HILDA Chavarria      aspirin  81 mg Oral Daily HILDA Chavarria      atorvastatin  80 mg Oral Daily With [employer] Revankar, DO      [START ON 1/1/2021] digoxin  125 mcg Oral Every Other Day HILDA Chavarria      FLUoxetine  20 mg Oral Daily Josefa Revankar, DO      levalbuterol  1 25 mg Nebulization Q8H Josefa Revankar, DO      melatonin  3 mg Oral HS Josefa Revankar, DO      metoprolol tartrate  25 mg Oral Q12H [address] HILDA Chavarria      pantoprazole  40 mg Oral Early Morning Josefa Revankar, DO      sodium chloride  50 mL/hr Intravenous Continuous Josefa Revankar, DO 50 mL/hr (12/30/20 2008)    sodium chloride  3 mL Nebulization Q8H PRN Josefa Revankar, DO      tiotropium  18 mcg Inhalation Daily Josefa Revankar, DO       acetaminophen, 650 mg, Q6H PRN  aluminum-magnesium hydroxide-simethicone, 30 mL, Q4H PRN  sodium chloride, 3 mL, Q8H PRN      No Known Allergies    VTE Pharmacologic Prophylaxis:   Sequential compression device (Venodyne) Labs:   Troponins:  Results from last 7 days   Lab Units 12/30/20  1758 12/30/20  1405 12/29/20  1449   TROPONIN I ng/mL 0 08* 0 04 0 02     CBC with diff:  Results from last 7 days   Lab Units 12/30/20  1758 12/29/20  1449   WBC Thousand/uL 5 06 9 47   HEMOGLOBIN g/dL 10 5* 12 5   HEMATOCRIT % 37 4 42 0   MCV fL 91 88   PLATELETS Thousands/uL 141* 243   MCH pg 25 5* 26 2*   MCHC g/dL 28 1* 29 8*   RDW % 14 4 14 0   MPV fL 10 5 9 7   NRBC AUTO /100 WBCs  --  0     CMP:  Results from last 7 days   Lab Units 12/31/20  0624 12/30/20  0607 12/29/20  1449   SODIUM mmol/L 138 138 136   POTASSIUM mmol/L 4 8 4 8 4 6   CHLORIDE mmol/L 104 102 97*   CO2 mmol/L 29 29 29   ANION GAP mmol/L 5 7 10   BUN mg/dL 23 35* 39*   CREATININE mg/dL 0 82 1 07 1 19   CALCIUM mg/dL 8 9 8 8 9 0   AST U/L  --   --  12   ALT U/L  --   --  15   ALK PHOS U/L  --   --  76   TOTAL PROTEIN g/dL  --   --  7 7   ALBUMIN g/dL  --   --  3 9   TOTAL BILIRUBIN mg/dL  --   --  0 20   EGFR ml/min/1 73sq m 67 48 43     Magnesium:  Results from last 7 days   Lab Units 12/31/20  0624   MAGNESIUM mg/dL 2 0     Coags:  Results from last 7 days   Lab Units 12/29/20  1449   PTT seconds 29   INR  0 94     Lipid Profile:  Results from last 7 days   Lab Units 12/30/20  0607   CHOLESTEROL mg/dL 166   TRIGLYCERIDES mg/dL 137   HDL mg/dL 58   LDL CALC mg/dL 81     Hgb A1c:  Results from last 7 days   Lab Units 12/29/20  1449   HEMOGLOBIN A1C % 5 3        Imaging & Testing   I have personally reviewed pertinent reports  Xr Chest 1 View Portable    Result Date: 12/30/2020  Narrative: CHEST INDICATION:   CVA  COMPARISON:  12/09/2020 EXAM PERFORMED/VIEWS:  XR CHEST PORTABLE 1 image FINDINGS: Cardiomediastinal silhouette appears unremarkable  A median sternotomy has been performed  Right perihilar opacity appears stable, this may represent scarring related to treated lung cancer  The right hemidiaphragm is elevated    Both costophrenic angles are blunted suggesting pleural effusions  No focal infiltrates  No pneumothorax  Arthritic changes in both shoulders  Impression: Scarring in the right lung  No acute infiltrates  Workstation performed: [id number]       Mri Brain Wo Contrast    Result Date: 12/30/2020  Narrative: MRI BRAIN WITHOUT CONTRAST INDICATION: left sided numbness  COMPARISON:   CT/CTA performed yesterday and MRI dated 10/10/2016  Elias Espinoza TECHNIQUE:  Sagittal T1, axial T2, axial FLAIR, axial T1, axial [place] and axial diffusion imaging  IMAGE QUALITY:  Diagnostic  FINDINGS: BRAIN PARENCHYMA:  Small focus of increased signal on DWI in the left occipital/posterior temporal region that is at least partially hypointense on ADC suggesting but possibly subacute infarct  Additional punctate focus of recent ischemia in the posterior right parietal lobe  There are 2 tiny left parieto-occipital foci of less pronounced increased signal on DWI favored to represent T2 shine through  There is encephalomalacia in the left occipital lobe due to old infarct  There are multiple chronic infarcts in the cerebellum bilaterally  T2/flair hyperintensities in the periventricular and subcortical white matter related to mild chronic microangiopathy  Small chronic lacunar infarct in the left thalamus  Chronic ischemic changes in the quinn  There is no discrete mass, mass effect or midline shift  There is no intracranial hemorrhage  Stable foci of hemosiderin the right basal ganglia and thalamus  VENTRICLES:  Age appropriate volume loss  No hydrocephalus  SELLA AND PITUITARY GLAND:  Normal  ORBITS:  Normal  PARANASAL SINUSES:  Normal  VASCULATURE:  Flow void of the hypoplastic right vertebral artery not seen  Please refer to yesterday's CTA  CALVARIUM AND SKULL BASE:  Normal  EXTRACRANIAL SOFT TISSUES:  Normal      Impression: Small recent infarct in the left posterior temporal/occipital region additional punctate focus in the right posterior parietal lobe  No acute hemorrhage   Chronic ischemic changes  The study was marked in [facility] for immediate notification  Workstation performed: [id number]     Ct Stroke Alert Brain    Result Date: 12/29/2020  Narrative: CT BRAIN - STROKE ALERT PROTOCOL INDICATION:   stroke  COMPARISON:  None  TECHNIQUE:  CT examination of the brain was performed  In addition to axial images, coronal reformatted images were created and submitted for interpretation  Radiation dose length product (DLP) for this visit:  919 02 mGy-cm   This examination, like all CT scans performed in the [facility], was performed utilizing techniques to minimize radiation dose exposure, including the use of iterative  reconstruction and automated exposure control  IMAGE QUALITY:  Diagnostic  FINDINGS:  PARENCHYMA:  Old left cerebellar infarcts are stable when compared with the prior examination  Mild chronic microangiopathic change within the supratentorial brain parenchyma with a small old left caudate body lacunar infarct  No mass, mass effect or midline shift  No hemorrhage  No definitive signs of acute territorial infarction  Moderate vascular calcification is stable  VENTRICLES AND EXTRA-AXIAL SPACES:  Age-appropriate volume loss is noted  No hydrocephalus  VISUALIZED ORBITS AND PARANASAL SINUSES:  Unremarkable  CALVARIUM AND EXTRACRANIAL SOFT TISSUES:   Normal      Impression: Stable chronic microangiopathic change  Stable old infarcts within the left cerebellum  No mass, hemorrhage or definitive signs of acute territorial infarction  Findings were directly discussed with Dr Yung German on 12/29/2020 3:13 PM  Workstation performed: [id number]       Cta Stroke Alert (head/neck)    Result Date: 12/29/2020  Narrative: CTA NECK AND BRAIN WITH CONTRAST INDICATION: Stroke alert   Dysarthria and left-sided numbness  Prior history of CVA  COMPARISON:   10/9/2016 TECHNIQUE:   Post contrast imaging was performed after administration of iodinated contrast through the neck and brain  Post contrast axial 0 625 mm images timed to opacify the arterial system  3D rendering was performed on an independent workstation  MIP reconstructions performed  Coronal reconstructions were performed of the noncontrast portion of the brain  Radiation dose length product (DLP) for this visit:  676 89 mGy-cm   This examination, like all CT scans performed in the [facility], was performed utilizing techniques to minimize radiation dose exposure, including the use of iterative  reconstruction and automated exposure control  IV Contrast:  85 mL of iohexol (OMNIPAQUE)  IMAGE QUALITY:   Diagnostic FINDINGS: CERVICAL VASCULATURE AORTIC ARCH AND GREAT VESSELS:  Moderate atherosclerotic disease of the aortic arch and proximal great vessels  There is a high-grade stenosis at the left common carotid artery origin and moderate atherosclerotic disease of both subclavian's distal to the vertebral artery origins  RIGHT VERTEBRAL ARTERY CERVICAL SEGMENT:  Normal origin  The vessel is diffusely hypoplastic but patent to the skull base, unchanged  LEFT VERTEBRAL ARTERY CERVICAL SEGMENT:  Mild stenosis at the origin  The vessel is patent and demonstrates mild scattered atherosclerotic disease within the neck  RIGHT EXTRACRANIAL CAROTID SEGMENT:  Atherosclerotic disease of the common carotid artery including an approximately 40% stenosis within the mid common carotid artery  There is more advanced atherosclerotic disease with thick calcification anteriorly within the distal common carotid artery extending to the ICA terminus  There is approximately 50% narrowing of the distal common carotid artery and, 50% narrowing at the ICA origin  Best seen on series 3, image 320  The more distal cervical internal carotid artery on the right just proximal to the skull base demonstrates 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, unchanged from 2016  See series 3 image 233   LEFT EXTRACRANIAL CAROTID SEGMENT:  As described above there is severe stenosis at the origin of the left common carotid artery from the aortic arch similar to the prior examination  Just distal to its origin there is focal noncalcified atherosclerotic disease resulting in approximately 50% narrowing of the vessel  Additional scattered mild atherosclerotic narrowing of the mid to distal common carotid artery  Evaluation of the bifurcation demonstrates eccentric calcification within the ICA bulb extending from its origin throughout the bulb  There is mild, less than 50% narrowing of the distal aspect of the bulb  NASCET criteria was used to determine the degree of internal carotid artery diameter stenosis  INTRACRANIAL VASCULATURE INTERNAL CAROTID ARTERIES:  Calcification of the intracranial internal carotid arteries primarily involving the vessel within the distal carotid canal and cavernous segment  Mild bilateral narrowing  ANTERIOR CIRCULATION:  Symmetric A1 segments and anterior cerebral arteries with normal enhancement  Normal anterior communicating artery  MIDDLE CEREBRAL ARTERY CIRCULATION:  Both M1 segments are patent  No disc stenosis or occlusion of the M2 branches  DISTAL VERTEBRAL ARTERIES:  Distal right vertebral artery is markedly hypoplastic as it extends through the foramen magnum and the vessel does appear to occlude distally just proximal to the vertebrobasilar junction  The distal left vertebral artery demonstrates atherosclerotic disease of the V4 segment with mild narrowing  BASILAR ARTERY:  Mild atherosclerotic disease of the basilar artery primarily proximally  POSTERIOR CEREBRAL ARTERIES: There is fetal origin of the right posterior cerebral artery  The left posterior cerebral artery arises from the basilar tip  Both demonstrate no focal stenosis  Normal left posterior communicating artery  No appreciable  right P1 segment identified   DURAL VENOUS SINUSES:  Normal  NON VASCULAR ANATOMY BONY STRUCTURES: Cervical degenerative disc disease most prominent C3-4 through C6-7  Osteomalacia of the maxilla and mandible  SOFT TISSUES OF THE NECK:  Unremarkable  VISUALIZED CHEST:  Severe emphysema within the lung fields  There is a consolidation within the right middle lobe and a larger consolidation with air bronchograms in the right lower lobe  Patient does have a history of lung carcinoma prior radiation therapy  This finding is similar to the prior recent study from 12/9/2010 of the chest and may represent parenchymal scarring and treatment related change  The possibility of superimposed infection is not excluded  Impression: Moderate atherosclerotic disease of the aortic arch and proximal great vessels  There is a high-grade stenosis of the left common carotid artery origin similar to the prior examination with moderate narrowing of the subclavian's distal to the vertebral artery origins  Atherosclerotic disease of the right distal common carotid artery and proximal cervical internal carotid artery is stable compared to the prior exam, approximately 50%  Stable atherosclerotic disease of the intracranial internal carotid arteries  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 just proximal to the vertebrobasilar junction, unchanged  Persistent consolidation within the right lower lobe when compared with CT of the chest dated 12/9/2020 may represent sequela of patient's lung carcinoma with prior radiation therapy  Superimposed infection not excluded  Findings were directly discussed with Dr Hammad Hillman on 12/29/2020 3:13 PM  Workstation performed: [id number]        EKG / Monitor: Personally reviewed      Sinus rhythm    Cardiac testing:   Results for orders placed during the hospital encounter of 12/09/20   Echo complete with contrast if indicated    Narrative [address]  [address] [facility]   [phone]    Transthoracic Echocardiogram  2D, M-mode, Doppler, and Color Doppler    Study date:  10-Dec-2020    Patient: Av Lopez  MR number: [id number]  Account number: [de-identified]  : 1940  Age: 78 years  Gender: Female  Status: Inpatient  Location: Bedside  Height: 65 in  Weight: 120 8 lb  BP: 124/ 68 mmHg    Indications: Heart Failure    Diagnoses: I50 9 - Heart failure, unspecified    Sonographer:  HELDER Nevarez  Primary Physician:  Radha Johnson MD  Referring Physician:  Elena Amaya MD  Group:  [id number]  Interpreting Physician:  Brittany Mcgowan MD    SUMMARY    LEFT VENTRICLE:  Systolic function was normal  Ejection fraction was estimated in the range of 55 % to 60 % to be 55 %  Although no diagnostic regional wall motion abnormality was identified, this possibility cannot be completely excluded on the basis of this study  Wall thickness was mildly to moderately increased  There was mild concentric hypertrophy  Doppler parameters were consistent with abnormal left ventricular relaxation (grade 1 diastolic dysfunction)  LEFT ATRIUM:  The atrium was mildly dilated  MITRAL VALVE:  There was moderate to marked annular calcification  There was mild stenosis  By continuity valve area is 1 4 cm2    AORTIC VALVE:  A TAVR bioprosthesis was present  Peak gradient across it around 16 mmHg mean about 8 mmHg with normal valve function  No significant paravalvular leak noted    TRICUSPID VALVE:  There was mild regurgitation  Estimated peak PA pressure was 30 mmHg  IVC, HEPATIC VEINS:  The respirophasic change in diameter was more than 50%  COMPARISONS:  There has been no significant interval change  Comparison was made with the previous study of 20-Sep-2019  HISTORY: PRIOR HISTORY: HTN, CAD, Prosthetic Aortic Valve, CHF, COPD, Pleural Effusion, CVA    PROCEDURE: The procedure was performed at the bedside  This was a routine study  The transthoracic approach was used   The study included complete 2D imaging, M-mode, complete spectral Doppler, and color Doppler  The heart rate was 100 bpm,  at the start of the study  Image quality was adequate  LEFT VENTRICLE: Size was normal  Systolic function was normal  Ejection fraction was estimated in the range of 55 % to 60 % to be 55 %  Although no diagnostic regional wall motion abnormality was identified, this possibility cannot be  completely excluded on the basis of this study  Wall thickness was mildly to moderately increased  There was mild concentric hypertrophy  DOPPLER: Doppler parameters were consistent with abnormal left ventricular relaxation (grade 1  diastolic dysfunction)  RIGHT VENTRICLE: The size was normal  Systolic function was normal     LEFT ATRIUM: The atrium was mildly dilated  RIGHT ATRIUM: Size was normal     MITRAL VALVE: There was moderate to marked annular calcification  There was mild-moderate thickening  There was normal leaflet separation  DOPPLER: Transmitral velocity was minimally increased  There was mild stenosis  By continuity valve  area is 1 4 cm2 There was no regurgitation  AORTIC VALVE: A TAVR bioprosthesis was present  Peak gradient across it around 16 mmHg mean about 8 mmHg with normal valve function  No significant paravalvular leak noted DOPPLER: Transaortic velocity was minimally increased  There was no  evidence for stenosis  There was no significant regurgitation  TRICUSPID VALVE: DOPPLER: There was mild regurgitation  Estimated peak PA pressure was 30 mmHg  PULMONIC VALVE: DOPPLER: There was no significant regurgitation  PERICARDIUM: There was no thickening or calcification  There was no pericardial effusion  AORTA: The root exhibited normal size  SYSTEMIC VEINS: IVC: The inferior vena cava was normal in size  The respirophasic change in diameter was more than 50%      SYSTEM MEASUREMENT TABLES    2D  EF (Teich): 58 36 %    PW  MV E/A Ratio: 0 77    [facility]redited [facility]    Prepared and electronically signed by    Valerie Reid MD  Signed 10-Dec-2020 14:34:31         [facility]        "This note has been constructed using a voice recognition system  Therefore there may be syntax, spelling, and/or grammatical errors   Please call if you have any questions  "

## 2020-12-31 NOTE — QUICK NOTE
Patient's rate noted to improve on telemetry  Nurse performed repeat EKG which showed NSR with a rate of 77bpm  Continue to monitor on telemetry

## 2020-12-31 NOTE — PLAN OF CARE
Problem: RESPIRATORY - ADULT  Goal: Achieves optimal ventilation and oxygenation  Description: INTERVENTIONS:  - Assess for changes in respiratory status  - Assess for changes in mentation and behavior  - Position to facilitate oxygenation and minimize respiratory effort  - Oxygen administered by appropriate delivery if ordered  - Initiate smoking cessation education as indicated  - Encourage broncho-pulmonary hygiene including cough, deep breathe, Incentive Spirometry  - Assess the need for suctioning and aspirate as needed  - Assess and instruct to report SOB or any respiratory difficulty  - Respiratory Therapy support as indicated  Outcome: Progressing     Problem: PAIN - ADULT  Goal: Verbalizes/displays adequate comfort level or baseline comfort level  Description: Interventions:  - Encourage patient to monitor pain and request assistance  - Assess pain using appropriate pain scale  - Administer analgesics based on type and severity of pain and evaluate response  - Implement non-pharmacological measures as appropriate and evaluate response  - Consider cultural and social influences on pain and pain management  - Notify physician/advanced practitioner if interventions unsuccessful or patient reports new pain  Outcome: Progressing     Problem: INFECTION - ADULT  Goal: Absence or prevention of progression during hospitalization  Description: INTERVENTIONS:  - Assess and monitor for signs and symptoms of infection  - Monitor lab/diagnostic results  - Monitor all insertion sites, i e  indwelling lines, tubes, and drains  - Monitor endotracheal if appropriate and nasal secretions for changes in amount and color  - [place] appropriate cooling/warming therapies per order  - Administer medications as ordered  - Instruct and encourage patient and family to use good hand hygiene technique  - Identify and instruct in appropriate isolation precautions for identified infection/condition  Outcome: Progressing     Problem: SAFETY ADULT  Goal: Patient will remain free of falls  Description: INTERVENTIONS:  - Assess patient frequently for physical needs  -  Identify cognitive and physical deficits and behaviors that affect risk of falls    -  [place] fall precautions as indicated by assessment   - Educate patient/family on patient safety including physical limitations  - Instruct patient to call for assistance with activity based on assessment  - Modify environment to reduce risk of injury  - Consider OT/PT consult to assist with strengthening/mobility  Outcome: Progressing  Goal: Maintain or return to baseline ADL function  Description: INTERVENTIONS:  -  Assess patient's ability to carry out ADLs; assess patient's baseline for ADL function and identify physical deficits which impact ability to perform ADLs (bathing, care of mouth/teeth, toileting, grooming, dressing, etc )  - Assess/evaluate cause of self-care deficits   - Assess range of motion  - Assess patient's mobility; develop plan if impaired  - Assess patient's need for assistive devices and provide as appropriate  - Encourage maximum independence but intervene and supervise when necessary  - Involve family in performance of ADLs  - Assess for home care needs following discharge   - Consider OT consult to assist with ADL evaluation and planning for discharge  - Provide patient education as appropriate  Outcome: Progressing  Goal: Maintain or return mobility status to optimal level  Description: INTERVENTIONS:  - Assess patient's baseline mobility status (ambulation, transfers, stairs, etc )    - Identify cognitive and physical deficits and behaviors that affect mobility  - Identify mobility aids required to assist with transfers and/or ambulation (gait belt, sit-to-stand, lift, walker, cane, etc )  - [place] fall precautions as indicated by assessment  - Record patient progress and toleration of activity level on Mobility SBAR; progress patient to next Phase/Stage  - Instruct patient to call for assistance with activity based on assessment  - Consider rehabilitation consult to assist with strengthening/weightbearing, etc   Outcome: Progressing     Problem: DISCHARGE PLANNING  Goal: Discharge to home or other facility with appropriate resources  Description: INTERVENTIONS:  - Identify barriers to discharge w/patient and caregiver  - Arrange for needed discharge resources and transportation as appropriate  - Identify discharge learning needs (meds, wound care, etc )  - Arrange for interpretive services to assist at discharge as needed  - Refer to Case Management Department for coordinating discharge planning if the patient needs post-hospital services based on physician/advanced practitioner order or complex needs related to functional status, cognitive ability, or social support system  Outcome: Progressing     Problem: Knowledge Deficit  Goal: Patient/family/caregiver demonstrates understanding of disease process, treatment plan, medications, and discharge instructions  Description: Complete learning assessment and assess knowledge base    Interventions:  - Provide teaching at level of understanding  - Provide teaching via preferred learning methods  Outcome: Progressing     Problem: CARDIOVASCULAR - ADULT  Goal: Maintains optimal cardiac output and hemodynamic stability  Description: INTERVENTIONS:  - Monitor I/O, vital signs and rhythm  - Monitor for S/S and trends of decreased cardiac output  - Administer and titrate ordered vasoactive medications to optimize hemodynamic stability  - Assess quality of pulses, skin color and temperature  - Assess for signs of decreased coronary artery perfusion  - Instruct patient to report change in severity of symptoms  Outcome: Progressing  Goal: Absence of cardiac dysrhythmias or at baseline rhythm  Description: INTERVENTIONS:  - Continuous cardiac monitoring, vital signs, obtain 12 lead EKG if ordered  - Administer antiarrhythmic and heart rate control medications as ordered  - Monitor electrolytes and administer replacement therapy as ordered  Outcome: Progressing

## 2020-12-31 NOTE — PROGRESS NOTES
Neurology Consult Follow Up      Kentrell Vincent is a 78 y o  female  Iva 34-*    [id number]        Assessment/Recommendations:    1  Acute Lt Cerebellar ischemic infarct  2  Chronic bilateral cerebellar infarcts with Lt PCA territory infarcts  3  HTN  4  Atherosclerotic Disease  5  Atrial Fibrillation  6  AVR with bioprosthetic valve     -monitor on telemetry   -neuro assessments  -aspirin 81 milligrams daily-continue on discharge  -Lipitor 80 milligrams daily-continue on discharge  -DC Plavix   -Eliquis 2 5mg bid  -MRI of the Brain-completed  -Cardio following for AFib   -PT/OT-home PT recommended as well as use of RW   -Speech Therapy     Pt is an 80yr old female who lives at home with her  and has multiple co morbidities including HTN, COPD, CAD, Atherosclerotic Dz,  CVA and Aortic Valve replacement  Pt was brought to the ED with complaints of Lt facial numbness with weakness in the LUE and LLE  Pt was found to have poss subacute infarct on CT and received ASA and Plavix load in the ED  Pt also received Lipitor 80mg at that time  Pt was admitted to Telemetry on Stroke pathway  MRI completed, indicative Acute Ischemic Lt Cerebellar infarct with multiple punctate foci bilaterally  Suspect for cardioembolic etiology  Pt was on DAPT, however, since that time pt has converted into UCAF on Telemetry  Pt received IV Cardizem and did have dip in BP during this time to 86/63 , received IVF hydration to maintain BP  Since that time, pt has improved sensation of the face and hands  Continues to have UE weakness, limited by arthritis and pain bilaterally  LE strength equal  PT continues to work with her on gait trainging  Pt did self convert into Sinus Rhythm, Cardiology continues to follow for this episode  Pt will remains on Eliquis at 2 5mg bid due to age and weight, as well as BASA and Lipitor 80mg  For atherosclerotic disease     Continue with PT/OT, encourage use of RW for gait stability      Leticia Rodriguez will need follow up in 8-10 weeks with general attending or advance practitioner  She will not require outpatient neurological testing        Chief Complaint:    Subjective:   "I think I am doing better today"  She is mentally improved but feels more tired today  Pt reports less numbness to the face and fingers  Reports that her fingers continue to be cold  This is baseline for her  Past Medical History:   Diagnosis Date    Arthritis     [facility]     COPD [facility]chronic obstructive pulmonary disease[facility]     Coronary artery disease     aortic valve replacement    Heart murmur     Hypertension      Social History     Socioeconomic History    Marital status: /Civil Union     Spouse name: Not on file    Number of children: Not on file    Years of education: Not on file    Highest education level: Not on file   Occupational History    Not on file   Social Needs    Financial resource strain: Not on file    Food insecurity     Worry: Not on file     Inability: Not on file   [employer] needs     Medical: Not on file     Non-medical: Not on file   Tobacco Use    Smoking status: Former Smoker     Packs/day: 1 00     Years: 25 00     Pack years: 25 00     Types: Cigarettes     Quit date: 2000     Years since quittin 0    Smokeless tobacco: Never Used   Substance and Sexual Activity    Alcohol use:  Yes     Alcohol/week: 1 0 standard drinks     Types: 1 Glasses of wine per week     Frequency: 2-3 times a week     Drinks per session: 1 or 2     Binge frequency: Never     Comment: rare    Drug use: No    Sexual activity: Not on file   Lifestyle    Physical activity     Days per week: Not on file     Minutes per session: Not on file    Stress: Not on file   Relationships    Social connections     Talks on phone: Not on file     Gets together: Not on file     Attends [employer] service: Not on file     Active member of club or organization: Not on file     Attends meetings of clubs or organizations: Not on file     Relationship status: Not on file    Intimate partner violence     Fear of current or ex partner: Not on file     Emotionally abused: Not on file     Physically abused: Not on file     Forced sexual activity: Not on file   Other Topics Concern    Not on file   Social History Narrative    Not on file     Family History   Problem Relation Age of Onset    Heart defect Father        ROS:  Please see HPI for positive symptoms  No fever, no chills, no weight change  Ocular: No diplopia, no blurred vision, spot/zigzag lines   HEENT:  No sore throat or congestion  No neck pain  +headache  COR:  No chest pain  No palpitations  Lungs:  no sob  GI:  no  nausea, no vomiting, no diarrhea, no constipation, no anorexia  :  No dysuria, frequency, or urgency  No hematuria  Musculoskeletal:  No joint pain or swelling   Skin:  No rash or itching  Psychiatric:  no anxiety, no depression  Endocrine:  No polyuria or polydipsia  Objective:  /62   Pulse 76   Temp 98 7 °F (37 1 °C) (Oral)   Resp 18   Ht 5' 5" (1 651 m)   Wt 52 3 kg (115 lb 6 4 oz)   SpO2 99%   BMI 19 20 kg/m²     General: alert   Mental status: oriented x3  Attention: normal  Knowledge: fair  Language and Speech: normal  Cranial nerves:   CN II: Visual fields are full to confrontation  Fundoscopic exam is normal with sharp discs and no vascular changes  Pupils are 4 mm and briskly reactive to light  CN III, IV, VI: At primary gaze, there is no eye deviation  CN V: Facial sensation is intact in all 3 divisions bilaterally  Corneal responses are intact  CN VII: Face is symmetrical, with normal eye closure and smile  CN VIII: Hearing is normal to rubbing fingers  CN IX, X: Palate elevates symmetrically   Phonation is normal   CN XI: Head turning and shoulder shrug are intact  CN XII: Tongue is midline with normal movements and no atrophy  Motor: There is no pronator drift of out-stretched arms  Pt only able to raise approx  45deg  Muscle bulk and tone is unchanged:  Continues with UE weakness due to pain and arthritis  Muscle exam  Arm Right Left Leg Right Left   Deltoid 4/5 3+/5 Iliopsoas 4+/5 4+/5   Biceps 4/5 3+/5 Quads 4+/5 4+/5   Triceps 4/5 3+/5 Hamstrings 4+/5 4+/5   Wrist Extension 4/5 3+/5 Ankle Dorsi Flexion 4+/5 4+/5   Wrist Flexion 4/5 3+/5 Ankle Plantar Flexion 4+/5 4+/5       Sensory: normal to light touch, temperature, vibration  Proprioception intact  Improved sensation to face today  Gait: unsteady-working with PT  Coordination: finger to nose and heel to toe with slight dysmetria bilaterally     Reflexes    RJ BJ TJ KJ AJ Plantars Galindo's   Right 2+ 2+ 2+ 1+ 1+ Downgoing Not present   Left 2+ 2+ 2+ 1+ 1+ Downgoing Not present      Heart: Regular rate and rhythm  Lung: clear to auscultation   Abd: soft, non-tender, non-distended with positive bowel sounds in all quads  Skin: dry and intact    Labs:      Lab Results   Component Value Date    WBC 5 06 12/30/2020    HGB 10 5 (L) 12/30/2020    HCT 37 4 12/30/2020    MCV 91 12/30/2020     (L) 12/30/2020     Lab Results   Component Value Date    HGBA1C 5 3 12/29/2020     Lab Results   Component Value Date    ALT 15 12/29/2020    AST 12 12/29/2020    ALKPHOS 76 12/29/2020    BILITOT 0 6 12/17/2015     Lab Results   Component Value Date    GLUCOSE 105 12/17/2015    CALCIUM 8 9 12/31/2020     12/17/2015    K 4 8 12/31/2020    CO2 29 12/31/2020     12/31/2020    BUN 23 12/31/2020    CREATININE 0 82 12/31/2020     Chol 166   LDL 81    Review of reports and notes reveal:   Independent review of films/reports:  Xr Chest 1 View Portable    Result Date: 12/30/2020  Scarring in the right lung  No acute infiltrates   Workstation performed: [id number]     Mri Brain Wo Contrast  Result Date: 12/30/2020  Small recent infarct in the left posterior temporal/occipital region additional punctate focus in the right posterior parietal lobe  No acute hemorrhage  Chronic ischemic changes  The study was marked in [facility] for immediate notification  Workstation performed: [id number]     Ct Stroke Alert Brain    Result Date: 12/29/2020  Stable chronic microangiopathic change  Stable old infarcts within the left cerebellum  No mass, hemorrhage or definitive signs of acute territorial infarction  Findings were directly discussed with Dr Renay Little on 12/29/2020 3:13 PM  Workstation performed: [id number]     Cta Stroke Alert (head/neck)  Result Date: 12/29/2020  Moderate atherosclerotic disease of the aortic arch and proximal great vessels  There is a high-grade stenosis of the left common carotid artery origin similar to the prior examination with moderate narrowing of the subclavian's distal to the vertebral artery origins  Atherosclerotic disease of the right distal common carotid artery and proximal cervical internal carotid artery is stable compared to the prior exam, approximately 50%  Stable atherosclerotic disease of the intracranial internal carotid arteries  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

## 2020-12-31 NOTE — DISCHARGE SUMMARY
Discharge- Deidre Tru 1940, 78 y o  female MRN: [id number]    Unit/Bed#: [address] [place] [address] Encounter: [id number]    Primary Care Provider: Deo Jimenez MD   Date and time admitted to hospital: 12/29/2020  2:28 PM        * Left sided numbness  Assessment & Plan  Patient presented to the ER with left-sided numbness which has improved although not fully resolved   Patient had been on telemetry, on 12/30 had gone into uncontrolled atrial fibrillation, was on Cardizem drip, did have period of hypotension, spontaneously converted back into normal sinus rhythm   CT of head revealed old infarcts but no acute infarction  CTA head/reviewed   Stroke alert was called while in the ER  As per Neurology patient not tPA candidate  Patient received full-dose aspirin and loaded with 300 mg of Plavix    Per neurology, on baby ASA and 75 mg Plavix  High-dose  statin   Neuro checks showed improvement   Lipid panel shows LDL 81, HgA1C 5 13   MRI shows small recent infarct left posterior temporal/occipital regional, additional punctate focus right posterior parietal lobe, no ICH, chronic ischemic changes  As per radiology report     As per Neurology patient with infarcts bilaterally suggestive embolic stroke   Patient recently had Echo done on 12/10 which showed normal EF with grade 1 diastolic dysfunction  Bioprosthesis TAVR was present  No repeat echo per Neurology     PT/OT evaluation performed, recommending home with previous support   Patient will be discharged home today   Will follow-up with primary care physician 1-2 weeks post discharge   Patient is also recommended to follow up with her Cardiology in 1-2 weeks          Atrial fibrillation with rapid ventricular response [facility])  Assessment & Plan  Initially plan was to obtain CATHY and loop recorder placement per Neurology and Cardiology consulted  Later notified by RN that patient reporting chest pressure and was noted to be tachycardic on monitor  Last troponin mildly elevated 0 08, suspect may be secondary to AFib with RVR  EKG confirmed AFib with RVR  Started on Cardizem drip  Patient had a brief period of hypotension while on drip, discontinued and patient spontaneously returned to sinus rhythm  Suspect patient may be having periods of proximal atrial fibrillation  Discussed with Cardiology  Will continue with Eliquis 2 5 mg p o  B i d , E scribed to patient's pharmacy  As per discussion with Neuro will continue with a baby aspirin and Plavix, high-dose statin also  Discussed with patient  Follow-up with PCP and Cardiology outpatient    CKD (chronic kidney disease) stage 3, GFR 30-59 ml/min  Assessment & Plan  Lab Results   Component Value Date    EGFR 48 12/30/2020    EGFR 43 12/29/2020    EGFR 66 12/12/2020    CREATININE 1 07 12/30/2020    CREATININE 1 19 12/29/2020    CREATININE 0 83 12/12/2020     Creatinine appears close to baseline  Follow-up with PCP 1-2 weeks post discharge    Hypertension  Assessment & Plan  Held antihypertensives to allow for permissive hypertension during hospitalization  Discussed with Cardiology, continue with her home medications Lotensin, metoprolol and Lasix  Close follow-up with outpatient Cardiology 1-2 weeks discussed with patient  Chronic diastolic (congestive) heart failure (HCC)  Assessment & Plan  Wt Readings from Last 3 Encounters:   12/29/20 52 3 kg (115 lb 6 4 oz)   12/09/20 55 kg (121 lb 4 1 oz)   09/19/19 55 5 kg (122 lb 5 7 oz)     Resume Lasix, Lotensin, Lopressor as per discussion with Cardiology  Follow-up with outpatient PCP and Cardiology        Chronic respiratory failure with hypoxia [facility])  Assessment & Plan  Patient uses 2 L of oxygen continuously at home except does not use oxygen when she goes out as she does not have a portable  Provided script to case management in order to obtain portable concentrator for patient to use when leaving the home      COPD (chronic obstructive pulmonary disease) ([facility] [address] )  Assessment & Plan  Continue Xopenex treatments along with Spiriva  No evidence of exacerbation  Patient indicated that her nebulizer machine at home is not functioning  Indicated that she has been in contact with [facility] and they will be bringing her a new 1  Discharging Physician / Practitioner: Sarah George  PCP: Amelia Cope MD  Admission Date:   Admission Orders (From admission, onward)     Ordered        12/30/20 1521  Inpatient Admission  Once         12/29/20 1926  Place in Observation  Once         12/29/20 1608  Inpatient Admission  Once                   Discharge Date: 12/31/20    Resolved Problems  Date Reviewed: 12/31/2020    None          Consultations During Hospital Stay:  · Neurology  · Cardiology  · PT/OT    Procedures Performed:     · None    Significant Findings / Test Results:     · CT of the head was performed on 12/29 which showed stable chronic microangiopathic changes, stable old infarcts within the left elbow colon, no mass, hemorrhage or definitive sign of a few infarct as per radiology report  · CTA head and neck performed on 12/29 shows moderate atherosclerotic disease of the aortic arch and proximal great vessels, high-grade stenosis left common carotid with moderate narrowing of subclavian distal to vertebral artery origins, atherosclerotic disease right distal common carotid artery and proximal cervical internal carotid arteries stable compared to prior exam approximately 50%  Stable atherosclerotic disease intracranial internal carotid arteries, right vertebral artery diffusely hypoplastic and appears to be just proximal to the vertebral basilar junction unchanged, persistent consolidation right lower lobe when compared with CT of chest dated 12/09/2020 may represent sequela of patient's lung carcinoma with prior radiation therapy, superimposed infection not excluded per radiology report    · Chest x-ray performed on 12/29 shows scarring and right lung, no acute infiltrate as per radiology report  · MRI brain performed on 12/30 showed small recent infarct in left posterior temporal/occipital region, no acute hemorrhage, chronic ischemic changes as per radiology report  Incidental Findings:   · See above     Test Results Pending at Discharge (will require follow up): · None     Outpatient Tests Requested:  · None    Complications:  None    Reason for Admission:  Left-sided numbness    Hospital Course:     Alberta Hamilton is a 78 y o  female patient past medical history of arthritis, CHF, Chronic obstrucive pulmonary disease, CAD with aortic valve replacement, hypertension who originally presented to the hospital on 12/29/2020 due to headache and left-sided numbness  Patient reported that when she woke up she was experiencing a headache  Around lunchtime she noticed numbness left side of her face, left-sided worked on, felt like her lips were swollen and non and her speech felt slurred to her  She also reported left arm and left leg numbness  She presented to the emergency department for further evaluation treatment  CT of the brain was performed which showed old left cerebral infarcts, mild chronic microangiopathic changes, no mass, hemorrhage or definitive sign of acute territorial   CTA of also performed with atherosclerotic disease noted, high-grade stenosis left common carotid artery, atherosclerotic disease of right distal common carotid artery, right vertebral artery diffusely hypoplastic in appears to occlude just proximal to the vertebrobasilar junction as per radiology report  MRI of the brain was performed which demonstrated a small recent infarct in the left posterior temporal/occipital region with additional punctate focus in right posterior parietal lobe, no acute hemorrhage and chronic ischemic changes as per radiology report    Neurology was consulted, recommending patient be placed on aspirin, Plavix and high-dose statin  During hospitalization patient was noted to go into uncontrolled atrial fibrillation with RVR; shows placed on Cardizem drip and became hypotensive, drip was discontinued and patient spontaneously converted to sinus rhythm  Cardiology was consulted, recommending that patient continue with metoprolol, Lotensin and Lasix on discharge with close follow-up with her cardiologist 1-2 weeks post discharge  Patient was also placed on Eliquis 2 5 mg p o  B i d  As it was suspected that patient may be having intermittent proximal atrial fibrillation  The patient will be discharged home today  She will follow-up with her primary care physician 1-2 weeks post discharge  This was discussed with the patient at the bedside, she verbalized understanding and is agreeable to the plan  Please see above list of diagnoses and related plan for additional information  Condition at Discharge: good     Discharge Day Visit / Exam:     Subjective:  Patient seen sitting up in bed resting comfortably  She states that she feels a little anxious about possibly going home today, has concerns that she will go home and have her heart racing like it did yesterday  Did discuss with patient that she is going to be taking her metoprolol, and that overnight she has been stable  She reports that she had a good breakfast, good appetite no nausea or vomiting  Feels that the numbness in her left arm and leg has almost resolved  She denies headache this morning  No nausea or vomiting  Vitals: Blood Pressure: 139/62 (12/31/20 1000)  Pulse: 76 (12/31/20 1000)  Temperature: 98 7 °F (37 1 °C) (12/31/20 1000)  Temp Source: Oral (12/31/20 1000)  Respirations: 18 (12/31/20 1000)  Height: 5' 5" (165 1 cm) (12/29/20 1859)  Weight - Scale: 52 3 kg (115 lb 6 4 oz) (12/29/20 1437)  SpO2: 99 % (12/31/20 1000)     Exam:   Physical Exam  Vitals signs and nursing note reviewed     Constitutional:       Comments: Chronically ill-appearing elderly female  HENT:      Head: Normocephalic  Nose: Nose normal       Mouth/Throat:      Mouth: Mucous membranes are moist       Pharynx: Oropharynx is clear  Eyes:      Extraocular Movements: Extraocular movements intact  Pupils: Pupils are equal, round, and reactive to light  Neck:      Musculoskeletal: Normal range of motion  Cardiovascular:      Rate and Rhythm: Normal rate and regular rhythm  Pulses: Normal pulses  Heart sounds: Normal heart sounds  Pulmonary:      Effort: Pulmonary effort is normal       Breath sounds: Normal breath sounds  Abdominal:      General: Bowel sounds are normal  There is no distension  Palpations: Abdomen is soft  Genitourinary:     Comments: Voiding spontaneously  Musculoskeletal:      Comments: Patient has limited range of motion upper extremities very to arthritis in bilateral shoulders   Skin:     General: Skin is warm and dry  Capillary Refill: Capillary refill takes less than 2 seconds  Coloration: Skin is pale  Neurological:      Mental Status: She is oriented to person, place, and time  Comments: Patient reports that she continues to have small amount numbness  in her left arm, however mostly resolved  Psychiatric:         Mood and Affect: Mood is anxious  Behavior: Behavior normal          Thought Content: Thought content normal          Judgment: Judgment normal            Discussion with Family:  I spoke with the patient at the bedside, I have answered all questions to the best of my ability  Attempted to reach family by phone, no answer  Discharge instructions/Information to patient and family:   See after visit summary for information provided to patient and family  Provisions for Follow-Up Care:  See after visit summary for information related to follow-up care and any pertinent home health orders        Disposition:     Home    For Discharges to [facility] SNF:   · Not Applicable to this Patient - Not Applicable to this Patient    Planned Readmission:  No     Discharge Statement:  I spent greater than 30 minutes discharging the patient  This time was spent on the day of discharge  I had direct contact with the patient on the day of discharge  Greater than 50% of the total time was spent examining patient, answering all patient questions, arranging and discussing plan of care with patient as well as directly providing post-discharge instructions  Additional time then spent on discharge activities  Discharge Medications:  See after visit summary for reconciled discharge medications provided to patient and family        ** Please Note: This note has been constructed using a voice recognition system **

## 2020-12-31 NOTE — DISCHARGE INSTRUCTIONS
A-fib (Atrial Fibrillation)   WHAT YOU NEED TO KNOW:   A-fib may come and go, or it may be a long-term condition  A-fib can cause blood clots, stroke, or heart failure  These conditions may become life-threatening  It is important to treat and manage a-fib to help prevent a blood clot, stroke, or heart failure  DISCHARGE INSTRUCTIONS:   Call your local emergency number (911 in the [address] [facility]) if:   · You have any of the following signs of a heart attack:      ? Squeezing, pressure, or pain in your chest    ? You may  also have any of the following:     ? Discomfort or pain in your back, neck, jaw, stomach, or arm    ? Shortness of breath    ? Nausea or vomiting    ? Lightheadedness or a sudden cold sweat    · You have any of the following signs of a stroke:      ? Numbness or drooping on one side of your face     ? Weakness in an arm or leg    ? Confusion or difficulty speaking    ? Dizziness, a severe headache, or vision loss    Call your cardiologist if:   · Your arm or leg feels warm, tender, and painful  It may look swollen and red  · Your heart rate is more than 110 beats per minute  · You have new or worsening swelling in your legs, feet, ankles, or abdomen  · You are short of breath, even at rest      · You have questions or concerns about your condition or care  Medicines: You may need any of the following:  · Heart medicines  help control your heart rate or rhythm  You may need more than one medicine to treat your symptoms  · Blood thinners  help prevent blood clots  Clots can cause strokes, heart attacks, and death  The following are general safety guidelines to follow while you are taking a blood thinner:    ? Watch for bleeding and bruising while you take blood thinners  Watch for bleeding from your gums or nose  Watch for blood in your urine and bowel movements  Use a soft washcloth on your skin, and a soft toothbrush to brush your teeth  This can keep your skin and gums from bleeding  If you shave, use an electric shaver  Do not play contact sports  ? Tell your dentist and other healthcare providers that you take a blood thinner  Wear a bracelet or necklace that says you take this medicine  ? Do not start or stop any other medicines unless your healthcare provider tells you to  Many medicines cannot be used with blood thinners  ? Take your blood thinner exactly as prescribed by your healthcare provider  Do not skip does or take less than prescribed  Tell your provider right away if you forget to take your blood thinner, or if you take too much  ? Warfarin  is a blood thinner that you may need to take  The following are things you should be aware of if you take warfarin:     § Foods and medicines can affect the amount of warfarin in your blood  Do not make major changes to your diet while you take warfarin  Warfarin works best when you eat about the same amount of vitamin K every day  Vitamin K is found in green leafy vegetables and certain other foods  Ask for more information about what to eat when you are taking warfarin  § You will need to see your healthcare provider for follow-up visits when you are on warfarin  You will need regular blood tests  These tests are used to decide how much medicine you need  · Antiplatelets , such as aspirin, help prevent blood clots  Take your antiplatelet medicine exactly as directed  These medicines make it more likely for you to bleed or bruise  If you are told to take aspirin, do not take acetaminophen or ibuprofen instead  · Take your medicine as directed  Contact your healthcare provider if you think your medicine is not helping or if you have side effects  Tell him or her if you are allergic to any medicine  Keep a list of the medicines, vitamins, and herbs you take  Include the amounts, and when and why you take them  Bring the list or the pill bottles to follow-up visits   Carry your medicine list with you in case of an emergency  Manage A-fib:   · Know your target heart rate  Learn how to check your pulse and monitor your heart rate  · Know the risks if you choose to drink alcohol  Alcohol can make a-fib hard to manage  Ask your healthcare provider if it is safe for you to drink alcohol  A drink of alcohol is 12 ounces of beer, 5 ounces of wine, or 1½ ounces of liquor  · Do not smoke  Nicotine can cause heart damage and make it more difficult to manage your a-fib  Do not use e-cigarettes or smokeless tobacco in place of cigarettes or to help you quit  They still contain nicotine  Ask your healthcare provider for information if you currently smoke and need help quitting  · Eat heart-healthy foods  Heart healthy foods will help keep your cholesterol low  These include fruits, vegetables, whole-grain breads, low-fat dairy products, beans, lean meats, and fish  Replace butter and margarine with heart-healthy oils such as olive oil and canola oil  · Maintain a healthy weight  Ask your healthcare provider how much you should weigh  Ask him or her to help you create a safe weight loss plan if you are overweight  Even a small goal of a 10% weight loss can improve your heart health  · Get regular physical activity  Physical activity helps improve your heart health  Get at least 150 minutes of moderate aerobic physical activity each week  Your healthcare provider can help you create an activity plan  · Manage other health conditions  This includes high blood pressure or cholesterol, sleep apnea, diabetes, and other heart conditions  Take medicine as directed and follow your treatment plan  Follow up with your cardiologist as directed: You will need regular blood tests and monitoring  Write down your questions so you remember to ask them during your visits     © Copyright [address] Information is for End User's use only and may not be sold, redistributed or otherwise used for commercial purposes  All illustrations and images included in CareNotes® are the copyrighted property of [employer]  or Kathi Tipton   The above information is an  only  It is not intended as medical advice for individual conditions or treatments  Talk to your doctor, nurse or pharmacist before following any medical regimen to see if it is safe and effective for you  Stroke   WHAT YOU NEED TO KNOW:   A stroke happens when blood flow to part of the brain is interrupted  This can cause serious brain damage from a lack of oxygen  Your healthcare providers will help you create goals for your recovery  They will help you and your family or care providers make a plan for care at home to help you reach your goals  The plan will include lifestyle changes you can make to help you manage your health and prevent another stroke  Your discharge instructions will include information on services and equipment you or your family may need  DISCHARGE INSTRUCTIONS:   Call your local emergency number (911 in the [address] [facility]) for any of the following:   · You have any of the following signs of a stroke:      ? Numbness or drooping on one side of your face     ? Weakness in an arm or leg    ? Confusion or difficulty speaking    ? Dizziness, a severe headache, or vision loss       · You have a seizure  · You have chest pain or shortness of breath  Seek care immediately if:   · Your arm or leg feels warm, tender, and painful  It may look swollen and red  · You have loss of balance or coordination  · You have double vision or vision loss  · You have unusual or heavy bleeding  Call your doctor or neurologist if:   · Your blood sugar level or blood pressure is higher or lower than usual     · You have trouble swallowing  · You have trouble having a bowel movement or urinating  · You have questions or concerns about your condition or care      Medicines:  Medicines may be needed to treat high cholesterol, high blood pressure, or diabetes  You may also need any of the following, depending on the kind of stroke you had:  · Antiplatelets , such as aspirin, help prevent blood clots  Take your antiplatelet medicine exactly as directed  These medicines make it more likely for you to bleed or bruise  If you are told to take aspirin, do not take acetaminophen or ibuprofen instead  · Blood thinners  help prevent blood clots  Clots can cause strokes, heart attacks, and death  The following are general safety guidelines to follow while you are taking a blood thinner:    ? Watch for bleeding and bruising while you take blood thinners  Watch for bleeding from your gums or nose  Watch for blood in your urine and bowel movements  Use a soft washcloth on your skin, and a soft toothbrush to brush your teeth  This can keep your skin and gums from bleeding  If you shave, use an electric shaver  Do not play contact sports  ? Tell your dentist and other healthcare providers that you take a blood thinner  Wear a bracelet or necklace that says you take this medicine  ? Do not start or stop any other medicines unless your healthcare provider tells you to  Many medicines cannot be used with blood thinners  ? Take your blood thinner exactly as prescribed by your healthcare provider  Do not skip does or take less than prescribed  Tell your provider right away if you forget to take your blood thinner, or if you take too much  ? Warfarin  is a blood thinner that you may need to take  The following are things you should be aware of if you take warfarin:     § Foods and medicines can affect the amount of warfarin in your blood  Do not make major changes to your diet while you take warfarin  Warfarin works best when you eat about the same amount of vitamin K every day  Vitamin K is found in green leafy vegetables and certain other foods  Ask for more information about what to eat when you are taking warfarin      § You will need to see your healthcare provider for follow-up visits when you are on warfarin  You will need regular blood tests  These tests are used to decide how much medicine you need  · Take your medicine as directed  Contact your healthcare provider if you think your medicine is not helping or if you have side effects  Tell him or her if you are allergic to any medicine  Keep a list of the medicines, vitamins, and herbs you take  Include the amounts, and when and why you take them  Bring the list or the pill bottles to follow-up visits  Carry your medicine list with you in case of an emergency  Know the warning signs of a stroke: The words BE FAST can help you remember and recognize warning signs of a stroke:  · B = Balance:  Sudden loss of balance    · E = Eyes:  Loss of vision in one or both eyes    · F = Face:  Face droops on one side    · A = Arms:  Arm drops when both arms are raised    · S = Speech:  Speech is slurred or sounds different    · T = Time:  Time to get help immediately     Recovery testing: Your healthcare provider will test your recovery 90 days (3 months) after your stroke  This may be done over the phone or in person  Your provider will ask how well you can do the activities you did before the stroke  He or she will also ask how well you can do your daily activities without help  Your provider may make recommendations for you based on your test  For example, you may need someone to help you walk safely  You may also need help with daily activities, such as getting dressed  Based on your answers, your provider may do this test again over time  Manage the effects of a stroke:   · Go to stroke rehabilitation (rehab) if directed  Rehab is a program run by specialists who will help you recover abilities you may have lost  Specialists include physical, occupational, and speech therapists  Physical therapists help you gain strength or keep your balance   Occupational therapists teach you new ways to do daily activities  Your therapy may include movements for everyday activities  An example is being able to raise yourself from a chair  A speech therapist helps you improve your ability to talk and swallow  · Make your home safe  Remove anything you might trip over  Tape electrical cords down  Keep paths clear throughout your home  Make sure your home is well lit  Put nonslip materials on surfaces that might be slippery  An example is your bathtub or shower floor  A cane or walker may help you keep your balance as you walk  Prevent another stroke:   · Manage health conditions  A condition such as diabetes can increase your risk for a stroke  Control your blood sugar level if you have hyperglycemia or diabetes  Take your prescribed medicines and check your blood sugar level as directed  · Check your blood pressure as directed  High blood pressure can increase your risk for a stroke  Follow your healthcare provider's directions for controlling your blood pressure  · Do not use nicotine products or illegal drugs  Nicotine and other chemicals in cigarettes and cigars can cause blood vessel damage  Nicotine and illegal drugs both increase your risk for a stroke  Ask your healthcare provider for information if you currently smoke or use drugs and need help to quit  E- cigarettes or smokeless tobacco still contain nicotine  Talk to your healthcare provider before you use these products  · Talk to your healthcare provider about alcohol  Alcohol can raise your blood pressure  The recommended limit is 2 drinks in a day for men and 1 drink in a day for women  Do not binge drink or save a week's worth of alcohol to drink in 1 or 2 days  Limit weekly amounts as directed by your provider  · Eat a variety of healthy foods  Healthy foods include whole-grain breads, low-fat dairy products, beans, lean meats, and fish  Eat at least 5 servings of fruits and vegetables each day   Choose foods that are low in fat, cholesterol, salt, and sugar  Eat foods that are high in potassium, such as potatoes and bananas  A dietitian can help you create healthy meal plans  · Maintain a healthy weight  Ask your healthcare provider how much you should weigh  Ask him or her to help you create a weight loss plan if you are overweight  He or she can help you create small goals if you have a lot of weight to lose  · Exercise as directed  Exercise can lower your blood pressure, cholesterol, weight, and blood sugar levels  Healthcare providers will help you create exercise goals  They can also help you make a plan to reach your goals  For example, you can break exercise into 10 minute periods, 3 times in the day  Find an exercise that you enjoy  This will make it easier for you to reach your exercise goals  · Manage stress  Stress can raise your blood pressure  Find new ways to relax, such as deep breathing or listening to music  What you need to know about depression after a stroke:  Talk to your healthcare provider if you have depression that continues or is getting worse  Your provider may be able to help treat your depression  Your provider can also recommend support groups for you to join  A support group is a place to talk with others who have had a stroke  It may also help to talk to friends and family members about how you are feeling  Tell your family and friends to let your healthcare provider know if they see any signs of depression:  · Extreme sadness    · Avoiding social interaction with family or friends    · A lack of interest in things you once enjoyed    · Irritability    · Trouble sleeping    · Low energy levels    · A change in eating habits or sudden weight gain or loss    Follow up with your doctor or neurologist as directed: You may need to come in for regular tests of your brain function  Your provider may refer you to a specialist, or to other kinds of care   An example is palliative (comfort) care  Your provider can also give information about respite care to anyone who helps care for you  Respite care is a service to help caregivers take a break or get more rest  Write down your questions so you remember to ask them during your visits  For support and more information:   · National Stroke Association  [address] , [address]  Phone: 5- 410 - [phone]  Web Address: [employer]  [address] Information is for End User's use only and may not be sold, redistributed or otherwise used for commercial purposes  All illustrations and images included in CareNotes® are the copyrighted property of [employer]  or [facility] Moreno Tipton   The above information is an  only  It is not intended as medical advice for individual conditions or treatments  Talk to your doctor, nurse or pharmacist before following any medical regimen to see if it is safe and effective for you

## 2021-01-02 ENCOUNTER — TELEPHONE (OUTPATIENT)
Dept: INPATIENT UNIT | Facility: HOSPITAL | Age: 81
End: 2021-01-02

## 2021-01-02 NOTE — TELEPHONE ENCOUNTER
Callback completed for [facility] [facility]    Identified Risk for Readmission Per Risk Stratification: Low Risk    Current Disposition: Home    Discharge Date:12/31/2020  Primary Learner:Paola      Communication:  PCP: Dr Carlie Cordova (Switching on Mon  To [facility])  Cardiologist: Dr Rm Bartholomew    Has patient seen or have their follow up appointment schedule with their PCP since discharge? No    Has patient seen or have been scheduled with their cardiologist since discharge? No    Does that patient have a means of transportation? Yes      Have you picked up your new medications? Yes    Symptoms reported? None    Daily Weights done? Yes  Discharge weight: 115 lbs  Most recent weight:115 lbs    Following Diet: Foods to [facility] name of medication/water pill? Yes/Lasix    Understands Activity/Exercise      Yes    Knows when to report symptoms? Yes    Plan in place to call for worsening symptoms? Yes        Narrative Summary:   Spoke with Patient Today which is 2 days Post Discharge   Will follow up with patient this week

## 2021-01-04 ENCOUNTER — TELEPHONE (OUTPATIENT)
Dept: NEUROLOGY | Facility: CLINIC | Age: 81
End: 2021-01-04

## 2021-01-04 ENCOUNTER — TELEPHONE (OUTPATIENT)
Dept: INPATIENT UNIT | Facility: HOSPITAL | Age: 81
End: 2021-01-04

## 2021-01-04 ENCOUNTER — PATIENT OUTREACH (OUTPATIENT)
Dept: CASE MANAGEMENT | Facility: HOSPITAL | Age: 81
End: 2021-01-04

## 2021-01-04 NOTE — PROGRESS NOTES
12/31/20:  ADT notification received for patient discharge from hospital    ------------------------------------------------------------    Chart review completed  Outside records through [address] [place] [address] Loop requested and refreshed  Upon discharge, patient requested prescription for a portable O2 concentrator  Patient is current with [facility] for home O2  Patient recommended for home PT/OT/ ST along with use of RW  BPCI form and care coordination note updated  Patient report she does not feel well  "Feels dizzy and not bouncing back " Denies changes to visual field adding she has macular degeneration but states no changes  She reports she feels about same since discharge and thinks they discharged her too soon  Patient states she does not have any therapy coming in as she refused it  Medications reviewed patient and assistance of   Patient reports that someone is supposed to be calling her about the nebulizer machine  CM also reviewed red flag symptoms of stroke  CM encouraged patient to alert doctor or visit nearest ED for 1) Sudden numbness or weakness of face, arm or leg - especially on one side of the body, 2) new onset of confusion, trouble speaking or understanding  , 3) changes in vision, 4) trouble walking, dizziness, loss of balance or coordination, or 5) Sudden severe headache with no known cause  Patient verbalized understanding  CM encouraged patient to call and schedule cardiology and pulmonology appointments as recommended  This CM will continue to monitor electronically via chart review, outreach and [facility]

## 2021-01-04 NOTE — TELEPHONE ENCOUNTER
Spoke to patient and sherry HFU in [place] for stroke with Dr Neo Gudino  Note from Chart:  Alissa Merritt will need follow up in 8-10 weeks with general attending or advance practitioner  She will not require outpatient neurological testing

## 2021-01-06 NOTE — PHYSICIAN ADVISOR
Current patient class: Inpatient  The patient is currently on Hospital Day: 3 at [address] [place], [place] [address]y      The patient was admitted to the hospital at  on 12/30/20 for the following diagnosis:  CVA (cerebral vascular accident) [facility]) [I63 9]  Slurred speech [R47 81]       There is documentation in the medical record of an expected length of stay of at least 2 midnights  The patient is therefore expected to satisfy the 2 midnight benchmark and given the 2 midnight presumption is appropriate for INPATIENT ADMISSION  Given this expectation of a satisfying stay, CMS instructs us that the patient is most often appropriate for inpatient admission under part A provided medical necessity is documented in the chart  Patient reported that when she woke up she was experiencing a headache  Around lunchtime she noticed numbness left side of her face, left-sided worked on, felt like her lips were swollen and non and her speech felt slurred to her  She also reported left arm and left leg numbness  She presented to the emergency department for further evaluation treatment  CT of the brain was performed which showed old left cerebral infarcts, mild chronic microangiopathic changes, no mass, hemorrhage or definitive sign of acute territorial   CTA of also performed with atherosclerotic disease noted, high-grade stenosis left common carotid artery, atherosclerotic disease of right distal common carotid artery, right vertebral artery diffusely hypoplastic in appears to occlude just proximal to the vertebrobasilar junction as per radiology report  MRI of the brain was performed which demonstrated a small recent infarct in the left posterior temporal/occipital region with additional punctate focus in right posterior parietal lobe, no acute hemorrhage  Neurology was consulted and aspirin, Plavix and high-dose statin were started    During hospitalization patient was noted to go into uncontrolled atrial fibrillation with RVR; shows placed on Cardizem drip and became hypotensive, drip was discontinued and patient spontaneously converted to sinus rhythm  Cardiology was consulted, recommending that patient continue with metoprolol and eliquis started  She was discharged home in stabe condition  After review of the relevant documentation, labs, vital signs and test results, the patient is appropriate for INPATIENT ADMISSION  Admission to the hospital as an inpatient is a complex decision making process which requires the practitioner to consider the patients presenting complaint, history and physical examination and all relevant testing  With this in mind, in this case, the patient was deemed appropriate for INPATIENT ADMISSION  After review of the documentation and testing available at the time of the admission I concur with this clinical determination of medical necessity  Rationale is as follows: The patient is a 78 yrs old Female who presented to the ED at 12/29/2020  2:28 PM with a chief complaint of [address] (States she awoke with a severe headache  States at 1230 pm she started with slurred speech, left face weakness and numbness fingers left hand    Hx of stroke in past  Stroke alert called by Dr Cindy Jose at [phone])    The patients vitals on arrival were   ED Triage Vitals [12/29/20 1437]   Temperature Pulse Respirations Blood Pressure SpO2   (!) 96 7 °F (35 9 °C) 79 20 (!) 164/113 94 %      Temp Source Heart Rate Source Patient Position - Orthostatic VS BP Location FiO2 (%)   Tympanic Monitor Lying Right arm --      Pain Score       4           Past Medical History:   Diagnosis Date    Arthritis     CHF (congestive heart failure) (HCC)     COPD (chronic obstructive pulmonary disease) (HCC)     Coronary artery disease     aortic valve replacement    Heart murmur     Hypertension      Past Surgical History:   Procedure Laterality Date    AORTIC VALVE REPLACEMENT      APPENDECTOMY  CHOLECYSTECTOMY      JOINT REPLACEMENT      bilaterl hip and right knee    TONSILECTOMY AND ADNOIDECTOMY      TONSILLECTOMY      TUBAL LIGATION             Consults have been placed to:   IP CONSULT TO NEUROLOGY  IP CONSULT TO CASE MANAGEMENT  IP CONSULT TO NUTRITION SERVICES  IP CONSULT TO CARDIOLOGY    Vitals:    12/31/20 0730 12/31/20 0900 12/31/20 0954 12/31/20 1000   BP:   139/62 139/62   Pulse:   74 76   Resp:   21 18   Temp:   98 °F (36 7 °C) 98 7 °F (37 1 °C)   TempSrc:    Oral   SpO2: 99% 97% 97% 99%   Weight:       Height:           Most recent labs:    No results for input(s): WBC, HGB, HCT, PLT, K, NA, CALCIUM, BUN, CREATININE, LIPASE, AMYLASE, INR, TROPONINI, CKTOTAL, AST, ALT, ALKPHOS, BILITOT in the last 72 hours  Scheduled Meds:  Continuous Infusions:No current facility-administered medications for this encounter  PRN Meds:      Surgical procedures (if appropriate):

## 2021-01-15 ENCOUNTER — PATIENT OUTREACH (OUTPATIENT)
Dept: CASE MANAGEMENT | Facility: HOSPITAL | Age: 81
End: 2021-01-15

## 2021-01-15 NOTE — PROGRESS NOTES
Chart review completed  Outside records through [facility] requested and refreshed  CM spoke with patient who reports that she is feeling better  Patient saw PCP Monday and reports all went well  Weighs 113 pounds  Admits to some SOB with activity  Telephonic assessment completed during this encounter    This CM will continue to monitor electronically via chart review, outreach and [facility]

## 2021-01-25 ENCOUNTER — PATIENT OUTREACH (OUTPATIENT)
Dept: CASE MANAGEMENT | Facility: HOSPITAL | Age: 81
End: 2021-01-25

## 2021-01-25 NOTE — PROGRESS NOTES
Chart review completed  Unsuccessful attempt at reaching patient  Left name, call back number and message requesting return call of this CM  This CM will continue to monitor electronically via chart review, outreach and [facility]

## 2021-02-11 ENCOUNTER — PATIENT OUTREACH (OUTPATIENT)
Dept: CASE MANAGEMENT | Facility: HOSPITAL | Age: 81
End: 2021-02-11

## 2021-02-11 NOTE — PROGRESS NOTES
Chart review completed  Outside records through [facility] requested and refreshed  Patient scheduled to see neuro 3/9  CM spoke with patient who reports that arthritis in both shoulders that has been bothersome  Takes tramadol but reports it is not providing relief  She reports both hips replaced and right knee replaced  Patient is unsure if therapy will help or not  Has bean hot pack and applies bengay  Patient reports that she has not given any thought to the covid vaccine further replying that she has never received a flu vaccine  CM encouraged patient to talk with PCP about possibly a referral for pain management if the pain in her shoulders continue  She does admit to just seeing her family doctor recently Marifer Jordan will not see him again for a little while yet         This CM will continue to monitor electronically via chart review, outreach and [facility]

## 2021-03-02 ENCOUNTER — PATIENT OUTREACH (OUTPATIENT)
Dept: CASE MANAGEMENT | Facility: HOSPITAL | Age: 81
End: 2021-03-02

## 2021-03-02 NOTE — PROGRESS NOTES
Chart review completed  Outside records through [facility] requested and refreshed  Patient scheduled 3/9 with neuro  CM spoke with patient to remind her of appointment for  and the patient replied if it's not a Monday she cannot make the appointment  States that the appointment has to be a Monday for her son to be able to take her  CM provided patient with callback number for office and also asked permission to call son again for which patient agreed  Patient denies weight gain or edema  Does admit to some SOB when not wearing her oxygen  Unsuccessful attempt at reaching patient's son Kerry Lino  Left name, appointment date and  call back number for Dr Linton Hudson office  Also left call back number for this CM  This CM will continue to monitor electronically via chart review, outreach and [facility]        This CM will continue to monitor electronically via chart review, outreach and [facility]

## 2021-03-03 ENCOUNTER — PATIENT OUTREACH (OUTPATIENT)
Dept: CASE MANAGEMENT | Facility: HOSPITAL | Age: 81
End: 2021-03-03

## 2021-03-03 NOTE — PROGRESS NOTES
CM spoke with patient's son who states he is on vacation this week and is due to return to the office for 9:00 on Tuesday March 9th  CM offered neurology office phone number and son requested CM call back an leave number on his voicemail  Son requested reason for appointment and CM informed son that appointment is for hospital follow up  Son states that patient is doing well with the exception of a bit of a struggle taking medications  He expressed interest in pill pack and CM offered to call pharmacy to inquire  CM spoke with staff member at [facility] who states pharmacy does not offer pill pack or blister packs at this time  CM spoke with staff member at [address] [place] [address] who states that they do have two options for patients at no cost:   1) bingo pack which would have one month of 1 medication in foil with a card indicating the medication and dose and frequency      2) have a monthly planner filled by the pharmacy staff in AM and PM doses

## 2021-03-03 NOTE — PROGRESS NOTES
3/2/21:  VM received from patient's son requesting possible appointment change stating he is on vacation this week and his first day back is Tuesday next week

## 2021-03-09 ENCOUNTER — OFFICE VISIT (OUTPATIENT)
Dept: NEUROLOGY | Facility: CLINIC | Age: 81
End: 2021-03-09
Payer: MEDICARE

## 2021-03-09 VITALS
HEIGHT: 65 IN | HEART RATE: 142 BPM | BODY MASS INDEX: 18.66 KG/M2 | SYSTOLIC BLOOD PRESSURE: 113 MMHG | WEIGHT: 112 LBS | DIASTOLIC BLOOD PRESSURE: 85 MMHG

## 2021-03-09 DIAGNOSIS — I67.2 ATHEROSCLEROTIC CEREBROVASCULAR DISEASE: ICD-10-CM

## 2021-03-09 DIAGNOSIS — I63.442 CEREBRAL INFARCTION DUE TO EMBOLISM OF LEFT CEREBELLAR ARTERY (HCC): Primary | ICD-10-CM

## 2021-03-09 DIAGNOSIS — G44.1 VASCULAR HEADACHE: ICD-10-CM

## 2021-03-09 DIAGNOSIS — I10 ESSENTIAL HYPERTENSION: ICD-10-CM

## 2021-03-09 DIAGNOSIS — I48.91 ATRIAL FIBRILLATION, UNSPECIFIED TYPE (HCC): ICD-10-CM

## 2021-03-09 DIAGNOSIS — I69.30 CHRONIC ARTERIAL ISCHEMIC STROKE, MULTIFOCAL, MULTIPLE VASCULAR TERRITORIES: ICD-10-CM

## 2021-03-09 PROCEDURE — 99215 OFFICE O/P EST HI 40 MIN: CPT | Performed by: PSYCHIATRY & NEUROLOGY

## 2021-03-09 RX ORDER — TRAMADOL HYDROCHLORIDE 50 MG/1
50 TABLET ORAL 4 TIMES DAILY PRN
COMMUNITY
Start: 2021-01-12

## 2021-03-09 RX ORDER — ATORVASTATIN CALCIUM 40 MG/1
40 TABLET, FILM COATED ORAL DAILY
Qty: 90 TABLET | Refills: 2 | Status: SHIPPED | OUTPATIENT
Start: 2021-03-09 | End: 2022-02-07 | Stop reason: SDUPTHER

## 2021-03-09 RX ORDER — VIT A/VIT C/VIT E/ZINC/COPPER 2148-113
TABLET ORAL
Status: ON HOLD | COMMUNITY
End: 2022-07-11 | Stop reason: ALTCHOICE

## 2021-03-09 NOTE — PROGRESS NOTES
Return NeuroOutpatient Note        Jaycob Rae  [id number]  78 y o   1940       Stroke, Breathing Difficulty (has oxygen at home but none to carry around with her), Numbness (gets numbness on the left side of her lip, left arm/hand, and left leg every day), and Headache (about 3x per day)        History obtained from:  Patient     HPI/Subjective:    Jaycob Rae is a 79 yo F with PMH of recent stroke presents as f/u  Patient had presented on 12/30/20 with left facial numbness and left arm weakness  Patient was found to have left cerebellar stroke  She had evidence of chronic b/l cerebellar strokes as well  She's on eliquis and aspirin for stroke prevention  Her CTA had revealed moderate atherosclerotic disease of aortic arch and proximal great vessels  There was a high grade stenosis of left CCA  Her LDL was 61  She was on lipitor but stopped taking it when she ran out of supply  Today she reports headache in left frontal region, behind eye  Associated with nausea, dry heaving  Denies associated light or noise sensitivity  Today she also reports sob with exertion  She is supposed to have home O2 but hasn't gotten it       Past Medical History:   Diagnosis Date    Arthritis     CHF (congestive heart failure) (HCC)     COPD (chronic obstructive pulmonary disease) (HCC)     Coronary artery disease     aortic valve replacement    Heart murmur     Hypertension      Social History     Socioeconomic History    Marital status: /Civil Union     Spouse name: Curtis Ramirez Number of children: 3    Years of education: Not on file    Highest education level: 12th grade   Occupational History    Not on file   Social Needs    Financial resource strain: Not hard at all   Daniel-Mark insecurity     Worry: Never true     Inability: Never true   [employer] needs     Medical: No     Non-medical: No   Tobacco Use    Smoking status: Former Smoker     Packs/day: 1 00     Years: 25 00     Pack years: 25 00     Types: Cigarettes     Quit date: 2000     Years since quittin 2    Smokeless tobacco: Never Used   Substance and Sexual Activity    Alcohol use: Yes     Alcohol/week: 1 0 standard drinks     Types: 1 Glasses of wine per week     Frequency: 2-3 times a week     Drinks per session: 1 or 2     Binge frequency: Never     Comment: rare    Drug use: No    Sexual activity: Not Currently   Lifestyle    Physical activity     Days per week: 0 days     Minutes per session: 0 min    Stress:  To some extent   Relationships    Social connections     Talks on phone: Once a week     Gets together: Once a week     Attends Shinto service: 1 to 4 times per year     Active member of club or organization: No     Attends meetings of clubs or organizations: Never     Relationship status:     Intimate partner violence     Fear of current or ex partner: No     Emotionally abused: No     Physically abused: No     Forced sexual activity: No   Other Topics Concern    Not on file   Social History Narrative    Not on file     Family History   Problem Relation Age of Onset    Heart defect Father      No Known Allergies  Current Outpatient Medications on File Prior to Visit   Medication Sig Dispense Refill    aspirin (ECOTRIN LOW STRENGTH) 81 mg EC tablet Take 1 tablet (81 mg total) by mouth daily 30 tablet 0    benazepril (LOTENSIN) 20 mg tablet Take 20 mg by mouth daily      FLUoxetine (PROzac) 20 mg capsule Take 20 mg by mouth daily      furosemide (LASIX) 20 mg tablet Take 1 tablet (20 mg total) by mouth daily 30 tablet 0    melatonin 3 mg Take 1 tablet (3 mg total) by mouth daily at bedtime 30 tablet 0    metoprolol tartrate (LOPRESSOR) 25 mg tablet Take 1 tablet (25 mg total) by mouth every 12 (twelve) hours 60 tablet 0    Multiple Vitamins-Minerals (PreserVision AREDS) TABS Take by mouth      omeprazole (PriLOSEC) 40 MG capsule Take 40 mg by mouth daily      traMADol (ULTRAM) 50 mg tablet Take 50 mg by mouth 4 (four) times a day as needed      apixaban (ELIQUIS) 2 5 mg Take 1 tablet (2 5 mg total) by mouth 2 (two) times a day (Patient not taking: Reported on 3/9/2021) 60 tablet 0    digoxin (LANOXIN) 0 125 mg tablet Take 1 tablet (125 mcg total) by mouth every other day 15 tablet 0    tiotropium (SPIRIVA) 18 mcg inhalation capsule Place 1 capsule (18 mcg total) into inhaler and inhale daily (Patient not taking: Reported on 3/9/2021) 30 each 0    [DISCONTINUED] atorvastatin (LIPITOR) 80 mg tablet Take 1 tablet (80 mg total) by mouth daily with dinner 30 tablet 0     No current facility-administered medications on file prior to visit  Review of Systems   Refer to positive review of systems in HPI  Review of Systems    Constitutional- No fever  Eyes- No visual change  ENT- Hearing normal  CV- No chest pain  Resp- No Shortness of breath  GI- No diarrhea  - Bladder normal  MS- No Arthritis   Skin- No rash  Psych- No depression  Endo- No DM  Heme- No nodes    Vitals:    03/09/21 1443   BP: 113/85   BP Location: Left arm   Patient Position: Sitting   Cuff Size: Adult   Pulse: (!) 142   Weight: 50 8 kg (112 lb)   Height: 5' 5" (1 651 m)       PHYSICAL EXAM:  Appearance: No Acute Distress  Ophthalmoscopic: Disc Flat, Normal fundus  Mental status:  Orientation: Awake, Alert, and Orientedx3  Memory: Registation 3/3 Recall 3/3  Attention: normal  Knowledge: good  Language: No aphasia  Speech: No dysarthria  Cranial Nerves:  2 No Visual Defect on Confrontation, Pupils round, equal, reactive to light  3,4,6 Extraocular Movements Intact, no nystagmus  5 Facial Sensation Intact  7 No facial asymmetry  8 Intact hearing  9,10 Palate symmetric, normal gag  11 Good shoulder shrug  12 Tongue Midline  Gait: Stable  Coordination: mild dysmetria in LUE     Sensory: Intact, Symmetric to pinprick, light touch, vibration, and joint position  Muscle Tone: Normal              Muscle exam:  Arm Right Left Leg Right Left   Deltoid 5/5 4+/5 Iliopsoas 5/5 4+/5   Biceps 5/5 5/5 Quads 5/5 5/5   Triceps 5/5 5/5 Hamstrings 5/5 5/5   Wrist Extension 5/5 5/5 Ankle Dorsi Flexion 5/5 5/5   Wrist Flexion 5/5 5/5 Ankle Plantar Flexion 5/5 5/5   Interossei 5/5 5/5 Ankle Eversion 5/5 5/5   APB 5/5 5/5 Ankle Inversion 5/5 5/5       Reflexes   RJ BJ TJ KJ AJ Plantars Galindo's   Right 2+ 2+ 2+ 2+ 2+ Downgoing Not present   Left 2+ 2+ 2+ 2+ 2+ Downgoing Not present     Personal review of  Labs:                  Diagnoses and all orders for this visit:      1  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 [facility] artery [facility] )     2  Chronic arterial ischemic stroke, multifocal, multiple vascular territories  atorvastatin (LIPITOR) 40 mg tablet   3  Atrial fibrillation, unspecified type ([facility] )     4  Essential hypertension     5  Atherosclerotic cerebrovascular disease     6  Vascular headache  verapamil (CALAN-SR) 120 mg CR tablet       Patient has remained stable from stroke stand point  She is to resume aspirin 81mg and eliquis for stroke prevention  She is to resume lipitor 40mg daily  For headaches, will place her on verapamil 120mg qhs  For sob, asked her to see pulmonary clinic               Total time of encounter:  40 min  More than 50% of the time was used in counseling and/or coordination of care  Extent of counseling and/or coordination of care        Trinidad Perez MD  [facility]  [address]  [address]  [phone]

## 2021-03-11 ENCOUNTER — PATIENT OUTREACH (OUTPATIENT)
Dept: CASE MANAGEMENT | Facility: HOSPITAL | Age: 81
End: 2021-03-11

## 2021-03-11 NOTE — PROGRESS NOTES
Medicare Bundle episode ended  BPCI form updated, care coordination note removed and bundle episode resolved  CM removed self from care team and sent Inbasket message to Transitional Care Specialist regarding bundle closure

## 2021-07-12 DIAGNOSIS — G44.1 VASCULAR HEADACHE: ICD-10-CM

## 2021-07-23 ENCOUNTER — HOSPITAL ENCOUNTER (INPATIENT)
Facility: HOSPITAL | Age: 81
LOS: 3 days | Discharge: HOME | DRG: 309 | End: 2021-07-26
Attending: EMERGENCY MEDICINE | Admitting: INTERNAL MEDICINE
Payer: MEDICARE

## 2021-07-23 ENCOUNTER — APPOINTMENT (EMERGENCY)
Dept: RADIOLOGY | Facility: HOSPITAL | Age: 81
DRG: 309 | End: 2021-07-23
Payer: MEDICARE

## 2021-07-23 DIAGNOSIS — I63.9 CVA (CEREBRAL VASCULAR ACCIDENT) (HCC): ICD-10-CM

## 2021-07-23 DIAGNOSIS — Z22.322 MRSA COLONIZATION: ICD-10-CM

## 2021-07-23 DIAGNOSIS — I48.91 ATRIAL FIBRILLATION (HCC): Primary | ICD-10-CM

## 2021-07-23 PROBLEM — R79.89 ELEVATED BRAIN NATRIURETIC PEPTIDE (BNP) LEVEL: Status: ACTIVE | Noted: 2021-07-23

## 2021-07-23 PROBLEM — N17.9 ACUTE KIDNEY INJURY (HCC): Status: ACTIVE | Noted: 2021-07-23

## 2021-07-23 LAB
ALBUMIN SERPL BCP-MCNC: 3.8 G/DL (ref 3.5–5)
ALP SERPL-CCNC: 79 U/L (ref 46–116)
ALT SERPL W P-5'-P-CCNC: 20 U/L (ref 12–78)
ANION GAP SERPL CALCULATED.3IONS-SCNC: 1 MMOL/L (ref 4–13)
ANION GAP SERPL CALCULATED.3IONS-SCNC: 14 MMOL/L (ref 4–13)
APTT PPP: 38 SECONDS (ref 23–37)
AST SERPL W P-5'-P-CCNC: 38 U/L (ref 5–45)
ATRIAL RATE: 166 BPM
BACTERIA UR QL AUTO: ABNORMAL /HPF
BASOPHILS # BLD AUTO: 0.01 THOUSANDS/ΜL (ref 0–0.1)
BASOPHILS NFR BLD AUTO: 0 % (ref 0–1)
BILIRUB SERPL-MCNC: 0.32 MG/DL (ref 0.2–1)
BILIRUB UR QL STRIP: NEGATIVE
BUN SERPL-MCNC: 31 MG/DL (ref 5–25)
BUN SERPL-MCNC: 33 MG/DL (ref 5–25)
CALCIUM SERPL-MCNC: 8.3 MG/DL (ref 8.3–10.1)
CALCIUM SERPL-MCNC: 9.2 MG/DL (ref 8.3–10.1)
CHLORIDE SERPL-SCNC: 101 MMOL/L (ref 100–108)
CHLORIDE SERPL-SCNC: 103 MMOL/L (ref 100–108)
CLARITY UR: ABNORMAL
CO2 SERPL-SCNC: 24 MMOL/L (ref 21–32)
CO2 SERPL-SCNC: 28 MMOL/L (ref 21–32)
COLOR UR: YELLOW
CREAT SERPL-MCNC: 1.52 MG/DL (ref 0.6–1.3)
CREAT SERPL-MCNC: 1.59 MG/DL (ref 0.6–1.3)
D DIMER PPP FEU-MCNC: 2.31 UG/ML FEU
EOSINOPHIL # BLD AUTO: 0.04 THOUSAND/ΜL (ref 0–0.61)
EOSINOPHIL NFR BLD AUTO: 1 % (ref 0–6)
ERYTHROCYTE [DISTWIDTH] IN BLOOD BY AUTOMATED COUNT: 15.3 % (ref 11.6–15.1)
GFR SERPL CREATININE-BSD FRML MDRD: 30 ML/MIN/1.73SQ M
GFR SERPL CREATININE-BSD FRML MDRD: 31 ML/MIN/1.73SQ M
GLUCOSE SERPL-MCNC: 100 MG/DL (ref 65–140)
GLUCOSE SERPL-MCNC: 129 MG/DL (ref 65–140)
GLUCOSE UR STRIP-MCNC: NEGATIVE MG/DL
HCT VFR BLD AUTO: 42.2 % (ref 34.8–46.1)
HGB BLD-MCNC: 12.4 G/DL (ref 11.5–15.4)
HGB UR QL STRIP.AUTO: NEGATIVE
HYALINE CASTS #/AREA URNS LPF: ABNORMAL /LPF
IMM GRANULOCYTES # BLD AUTO: 0.01 THOUSAND/UL (ref 0–0.2)
IMM GRANULOCYTES NFR BLD AUTO: 0 % (ref 0–2)
INR PPP: 1.01 (ref 0.84–1.19)
KETONES UR STRIP-MCNC: NEGATIVE MG/DL
LEUKOCYTE ESTERASE UR QL STRIP: NEGATIVE
LYMPHOCYTES # BLD AUTO: 0.32 THOUSANDS/ΜL (ref 0.6–4.47)
LYMPHOCYTES NFR BLD AUTO: 6 % (ref 14–44)
MAGNESIUM SERPL-MCNC: 1.7 MG/DL (ref 1.6–2.6)
MCH RBC QN AUTO: 26.8 PG (ref 26.8–34.3)
MCHC RBC AUTO-ENTMCNC: 29.4 G/DL (ref 31.4–37.4)
MCV RBC AUTO: 91 FL (ref 82–98)
MONOCYTES # BLD AUTO: 0.1 THOUSAND/ΜL (ref 0.17–1.22)
MONOCYTES NFR BLD AUTO: 2 % (ref 4–12)
NEUTROPHILS # BLD AUTO: 4.92 THOUSANDS/ΜL (ref 1.85–7.62)
NEUTS SEG NFR BLD AUTO: 91 % (ref 43–75)
NITRITE UR QL STRIP: NEGATIVE
NON-SQ EPI CELLS URNS QL MICRO: ABNORMAL /HPF
NRBC BLD AUTO-RTO: 0 /100 WBCS
NT-PROBNP SERPL-MCNC: 2527 PG/ML
PH UR STRIP.AUTO: 5.5 [PH]
PLATELET # BLD AUTO: 178 THOUSANDS/UL (ref 149–390)
PMV BLD AUTO: 10.8 FL (ref 8.9–12.7)
POTASSIUM SERPL-SCNC: 4.6 MMOL/L (ref 3.5–5.3)
POTASSIUM SERPL-SCNC: 5.7 MMOL/L (ref 3.5–5.3)
PROT SERPL-MCNC: 7.9 G/DL (ref 6.4–8.2)
PROT UR STRIP-MCNC: NEGATIVE MG/DL
PROTHROMBIN TIME: 13.2 SECONDS (ref 11.6–14.5)
QRS AXIS: -43 DEGREES
QRSD INTERVAL: 82 MS
QT INTERVAL: 290 MS
QTC INTERVAL: 477 MS
RBC # BLD AUTO: 4.62 MILLION/UL (ref 3.81–5.12)
RBC #/AREA URNS AUTO: ABNORMAL /HPF
SODIUM SERPL-SCNC: 132 MMOL/L (ref 136–145)
SODIUM SERPL-SCNC: 139 MMOL/L (ref 136–145)
SP GR UR STRIP.AUTO: 1.02 (ref 1–1.03)
T WAVE AXIS: 75 DEGREES
TROPONIN I SERPL-MCNC: 0.04 NG/ML
TROPONIN I SERPL-MCNC: <0.02 NG/ML
TSH SERPL DL<=0.05 MIU/L-ACNC: 6.84 UIU/ML (ref 0.36–3.74)
UROBILINOGEN UR QL STRIP.AUTO: 0.2 E.U./DL
VENTRICULAR RATE: 163 BPM
WBC # BLD AUTO: 5.4 THOUSAND/UL (ref 4.31–10.16)
WBC #/AREA URNS AUTO: ABNORMAL /HPF

## 2021-07-23 PROCEDURE — 96375 TX/PRO/DX INJ NEW DRUG ADDON: CPT

## 2021-07-23 PROCEDURE — 36415 COLL VENOUS BLD VENIPUNCTURE: CPT | Performed by: EMERGENCY MEDICINE

## 2021-07-23 PROCEDURE — 99291 CRITICAL CARE FIRST HOUR: CPT | Performed by: EMERGENCY MEDICINE

## 2021-07-23 PROCEDURE — 96376 TX/PRO/DX INJ SAME DRUG ADON: CPT

## 2021-07-23 PROCEDURE — 81001 URINALYSIS AUTO W/SCOPE: CPT | Performed by: NURSE PRACTITIONER

## 2021-07-23 PROCEDURE — 93010 ELECTROCARDIOGRAM REPORT: CPT | Performed by: INTERNAL MEDICINE

## 2021-07-23 PROCEDURE — 84484 ASSAY OF TROPONIN QUANT: CPT | Performed by: NURSE PRACTITIONER

## 2021-07-23 PROCEDURE — 84439 ASSAY OF FREE THYROXINE: CPT | Performed by: NURSE PRACTITIONER

## 2021-07-23 PROCEDURE — 85730 THROMBOPLASTIN TIME PARTIAL: CPT | Performed by: EMERGENCY MEDICINE

## 2021-07-23 PROCEDURE — 83880 ASSAY OF NATRIURETIC PEPTIDE: CPT | Performed by: EMERGENCY MEDICINE

## 2021-07-23 PROCEDURE — 85025 COMPLETE CBC W/AUTO DIFF WBC: CPT | Performed by: EMERGENCY MEDICINE

## 2021-07-23 PROCEDURE — 85730 THROMBOPLASTIN TIME PARTIAL: CPT | Performed by: NURSE PRACTITIONER

## 2021-07-23 PROCEDURE — 85610 PROTHROMBIN TIME: CPT | Performed by: EMERGENCY MEDICINE

## 2021-07-23 PROCEDURE — 93005 ELECTROCARDIOGRAM TRACING: CPT

## 2021-07-23 PROCEDURE — 87081 CULTURE SCREEN ONLY: CPT | Performed by: INTERNAL MEDICINE

## 2021-07-23 PROCEDURE — 84484 ASSAY OF TROPONIN QUANT: CPT | Performed by: EMERGENCY MEDICINE

## 2021-07-23 PROCEDURE — 85610 PROTHROMBIN TIME: CPT | Performed by: NURSE PRACTITIONER

## 2021-07-23 PROCEDURE — 80048 BASIC METABOLIC PNL TOTAL CA: CPT | Performed by: NURSE PRACTITIONER

## 2021-07-23 PROCEDURE — 96365 THER/PROPH/DIAG IV INF INIT: CPT

## 2021-07-23 PROCEDURE — 99285 EMERGENCY DEPT VISIT HI MDM: CPT

## 2021-07-23 PROCEDURE — 80053 COMPREHEN METABOLIC PANEL: CPT | Performed by: EMERGENCY MEDICINE

## 2021-07-23 PROCEDURE — 99223 1ST HOSP IP/OBS HIGH 75: CPT | Performed by: INTERNAL MEDICINE

## 2021-07-23 PROCEDURE — 83735 ASSAY OF MAGNESIUM: CPT | Performed by: EMERGENCY MEDICINE

## 2021-07-23 PROCEDURE — 71045 X-RAY EXAM CHEST 1 VIEW: CPT

## 2021-07-23 PROCEDURE — 84443 ASSAY THYROID STIM HORMONE: CPT | Performed by: NURSE PRACTITIONER

## 2021-07-23 PROCEDURE — 85379 FIBRIN DEGRADATION QUANT: CPT | Performed by: EMERGENCY MEDICINE

## 2021-07-23 RX ORDER — METOPROLOL TARTRATE 5 MG/5ML
2.5 INJECTION INTRAVENOUS EVERY 6 HOURS PRN
Status: DISCONTINUED | OUTPATIENT
Start: 2021-07-23 | End: 2021-07-26 | Stop reason: HOSPADM

## 2021-07-23 RX ORDER — DILTIAZEM HYDROCHLORIDE 5 MG/ML
INJECTION INTRAVENOUS
Status: COMPLETED
Start: 2021-07-23 | End: 2021-07-23

## 2021-07-23 RX ORDER — ONDANSETRON 2 MG/ML
4 INJECTION INTRAMUSCULAR; INTRAVENOUS ONCE
Status: COMPLETED | OUTPATIENT
Start: 2021-07-23 | End: 2021-07-23

## 2021-07-23 RX ORDER — HEPARIN SODIUM 1000 [USP'U]/ML
3000 INJECTION, SOLUTION INTRAVENOUS; SUBCUTANEOUS
Status: DISCONTINUED | OUTPATIENT
Start: 2021-07-23 | End: 2021-07-24

## 2021-07-23 RX ORDER — HEPARIN SODIUM 10000 [USP'U]/100ML
3-20 INJECTION, SOLUTION INTRAVENOUS
Status: DISCONTINUED | OUTPATIENT
Start: 2021-07-23 | End: 2021-07-24

## 2021-07-23 RX ORDER — PANTOPRAZOLE SODIUM 40 MG/1
40 TABLET, DELAYED RELEASE ORAL
Status: DISCONTINUED | OUTPATIENT
Start: 2021-07-24 | End: 2021-07-26 | Stop reason: HOSPADM

## 2021-07-23 RX ORDER — HEPARIN SODIUM 1000 [USP'U]/ML
1500 INJECTION, SOLUTION INTRAVENOUS; SUBCUTANEOUS
Status: DISCONTINUED | OUTPATIENT
Start: 2021-07-23 | End: 2021-07-24

## 2021-07-23 RX ORDER — TRAMADOL HYDROCHLORIDE 50 MG/1
50 TABLET ORAL EVERY 6 HOURS PRN
Status: DISCONTINUED | OUTPATIENT
Start: 2021-07-23 | End: 2021-07-26 | Stop reason: HOSPADM

## 2021-07-23 RX ORDER — FLUOXETINE HYDROCHLORIDE 20 MG/1
20 CAPSULE ORAL DAILY
Status: DISCONTINUED | OUTPATIENT
Start: 2021-07-24 | End: 2021-07-26 | Stop reason: HOSPADM

## 2021-07-23 RX ORDER — LANOLIN ALCOHOL/MO/W.PET/CERES
3 CREAM (GRAM) TOPICAL
Status: DISCONTINUED | OUTPATIENT
Start: 2021-07-23 | End: 2021-07-26 | Stop reason: HOSPADM

## 2021-07-23 RX ORDER — ATORVASTATIN CALCIUM 40 MG/1
40 TABLET, FILM COATED ORAL DAILY
Status: DISCONTINUED | OUTPATIENT
Start: 2021-07-24 | End: 2021-07-26 | Stop reason: HOSPADM

## 2021-07-23 RX ORDER — FUROSEMIDE 20 MG/1
20 TABLET ORAL DAILY
Status: DISCONTINUED | OUTPATIENT
Start: 2021-07-24 | End: 2021-07-23

## 2021-07-23 RX ORDER — DILTIAZEM HYDROCHLORIDE 5 MG/ML
15 INJECTION INTRAVENOUS ONCE
Status: DISCONTINUED | OUTPATIENT
Start: 2021-07-23 | End: 2021-07-23

## 2021-07-23 RX ORDER — ASPIRIN 81 MG/1
81 TABLET ORAL DAILY
Status: DISCONTINUED | OUTPATIENT
Start: 2021-07-24 | End: 2021-07-26 | Stop reason: HOSPADM

## 2021-07-23 RX ORDER — DIGOXIN 0.25 MG/ML
250 INJECTION INTRAMUSCULAR; INTRAVENOUS ONCE
Status: COMPLETED | OUTPATIENT
Start: 2021-07-23 | End: 2021-07-23

## 2021-07-23 RX ORDER — MAGNESIUM SULFATE HEPTAHYDRATE 40 MG/ML
2 INJECTION, SOLUTION INTRAVENOUS ONCE
Status: COMPLETED | OUTPATIENT
Start: 2021-07-23 | End: 2021-07-23

## 2021-07-23 RX ORDER — DILTIAZEM HYDROCHLORIDE 5 MG/ML
15 INJECTION INTRAVENOUS ONCE
Status: COMPLETED | OUTPATIENT
Start: 2021-07-23 | End: 2021-07-23

## 2021-07-23 RX ORDER — DIGOXIN 0.25 MG/ML
125 INJECTION INTRAMUSCULAR; INTRAVENOUS EVERY 6 HOURS
Status: COMPLETED | OUTPATIENT
Start: 2021-07-24 | End: 2021-07-24

## 2021-07-23 RX ADMIN — HEPARIN SODIUM 1500 UNITS: 1000 INJECTION INTRAVENOUS; SUBCUTANEOUS at 19:59

## 2021-07-23 RX ADMIN — DIGOXIN 125 MCG: 0.25 INJECTION INTRAMUSCULAR; INTRAVENOUS at 23:15

## 2021-07-23 RX ADMIN — DIGOXIN 250 MCG: 0.25 INJECTION INTRAMUSCULAR; INTRAVENOUS at 17:30

## 2021-07-23 RX ADMIN — ENOXAPARIN SODIUM 50 MG: 60 INJECTION SUBCUTANEOUS at 17:32

## 2021-07-23 RX ADMIN — ONDANSETRON 4 MG: 2 INJECTION INTRAMUSCULAR; INTRAVENOUS at 16:44

## 2021-07-23 RX ADMIN — TRAMADOL HYDROCHLORIDE 50 MG: 50 TABLET, FILM COATED ORAL at 23:20

## 2021-07-23 RX ADMIN — Medication 3 MG: at 23:00

## 2021-07-23 RX ADMIN — DILTIAZEM HYDROCHLORIDE 15 MG: 5 INJECTION INTRAVENOUS at 16:21

## 2021-07-23 RX ADMIN — HEPARIN SODIUM 12 UNITS/KG/HR: 10000 INJECTION, SOLUTION INTRAVENOUS at 20:00

## 2021-07-23 RX ADMIN — DILTIAZEM HYDROCHLORIDE 15 MG: 5 INJECTION, SOLUTION INTRAVENOUS at 16:21

## 2021-07-23 RX ADMIN — METOPROLOL TARTRATE 2.5 MG: 5 INJECTION INTRAVENOUS at 19:55

## 2021-07-23 RX ADMIN — MAGNESIUM SULFATE HEPTAHYDRATE 2 G: 40 INJECTION, SOLUTION INTRAVENOUS at 20:00

## 2021-07-23 RX ADMIN — DILTIAZEM HYDROCHLORIDE 5 MG/HR: 5 INJECTION INTRAVENOUS at 16:58

## 2021-07-23 RX ADMIN — SODIUM CHLORIDE 500 ML: 0.9 INJECTION, SOLUTION INTRAVENOUS at 17:19

## 2021-07-23 NOTE — H&P
Trisha 43  H&P- Kranthi Lane 1940, 78 y o  female MRN: [id number]  Unit/Bed#: ED CT2 Encounter: [id number]  Primary Care Provider: Chadd Benedict MD   Date and time admitted to hospital: 7/23/2021  4:06 PM    Elevated brain natriuretic peptide (BNP) level  Assessment & Plan  · Likely related to rapid heart rate  · Cardiology consult    Acute kidney injury [facility])  Assessment & Plan  · Potentially related to atrial fibrillation with rapid ventricular response  · Closely follow intake and output  · Daily weights  · Trend serum creatinine    Atrial fibrillation with rapid ventricular response (HCC)  Assessment & Plan  · Transition off diltiazem infusion  · Would recommend low dose metoprolol IV  · Complete digoxin  · Will start heparin infusion given acute kidney injury    Chronic respiratory failure with hypoxia ([address] )  Assessment & Plan  · Continue oxygen at home dose 3L    COPD (chronic obstructive pulmonary disease) ([address] )  Assessment & Plan  · Resume home Spiriva    Chronic diastolic (congestive) heart failure ([address] )  Assessment & Plan  Wt Readings from Last 3 Encounters:   07/23/21 49 9 kg (110 lb)   03/09/21 50 8 kg (112 lb)   12/29/20 52 3 kg (115 lb 6 4 oz)     · Hold Lasix at present given hypotension  · Daily weights  · Consult cardiology        GERD (gastroesophageal reflux disease)  Assessment & Plan  · Resume home PPI      -------------------------------------------------------------------------------------------------------------  Chief Complaint: Chest pain    History of Present Illness   HX and PE limited by: None  Kranthi Lane is a 78 y o  female who presents on 7/23 with a complaint of shortness of breath and chest pain beginning last night   She has a past medical history of atrial fibrillation not on anticoagulation for an unknown reason, emphysema, chronic hypoxic respiratory failure on 3L NC, gastroesophageal reflux disease, heart failure, history of aortic valve replacement, history of lung cancer, and cerebrovascular accident approximately 6 months ago  The patient does not provide dosages or frequencies of medications but reports taking them as prescribed  She endorses a 5 pound weight loss unintentionally over 6 months and has had headaches since her CVA in December  She reports developing shortness of breath with some chest pain last night that resolved with sleep  When she awoke this morning, she had worsening symptoms and presented to the emergency room  In the ER, she was found to be in atrial fibrillation with rapid ventricular response  She received diltiazem which resulted in hypotension  She was given one dose of digoxin [facility]  History obtained from chart review and the patient   -------------------------------------------------------------------------------------------------------------  Dispo: Admit to Stepdown Level 1    Code Status: Prior  --------------------------------------------------------------------------------------------------------------  Review of Systems   Constitutional: Positive for fatigue and unexpected weight change (5 pound weight loss over 6 months)  Negative for activity change, appetite change, chills, diaphoresis and fever  HENT: Negative for trouble swallowing  Respiratory: Positive for cough and chest tightness  Negative for shortness of breath  Cardiovascular: Positive for chest pain  Negative for palpitations and leg swelling  Gastrointestinal: Negative for nausea and vomiting  Genitourinary: Negative for difficulty urinating  Musculoskeletal: Negative for arthralgias  Neurological: Negative for weakness  Physical Exam  Constitutional:       General: She is not in acute distress  Appearance: She is ill-appearing  Interventions: Nasal cannula in place  HENT:      Head: Normocephalic and atraumatic  Mouth/Throat:      Mouth: Mucous membranes are dry  Eyes:      Pupils: Pupils are equal, round, and reactive to light  Cardiovascular:      Rate and Rhythm: Tachycardia present  Rhythm irregular  Pulses: Normal pulses  Pulmonary:      Effort: Pulmonary effort is normal       Breath sounds: Wheezing present  Abdominal:      General: Abdomen is flat  Bowel sounds are normal  There is no distension  Palpations: Abdomen is soft  Tenderness: There is no abdominal tenderness  Musculoskeletal:         General: No tenderness or signs of injury  Right lower leg: No edema  Left lower leg: No edema  Skin:     General: Skin is warm and dry  Neurological:      Mental Status: She is alert  GCS: GCS eye subscore is 4  GCS verbal subscore is 5  GCS motor subscore is 6        --------------------------------------------------------------------------------------------------------------  Vitals:   Vitals:    07/23/21 1718 07/23/21 1730 07/23/21 1745 07/23/21 1800   BP: (!) 92/42   104/52   BP Location:       Pulse:  (!) 142 (!) 129 78   Resp:  18 (!) 25 18   Temp:    97 8 °F (36 6 °C)   TempSrc:       SpO2:    98%   Weight:         Temp  Min: 97 8 °F (36 6 °C)  Max: 97 8 °F (36 6 °C)        Body mass index is 18 3 kg/m²      Laboratory and Diagnostics:  Results from last 7 days   Lab Units 07/23/21  1625   WBC Thousand/uL 5 40   HEMOGLOBIN g/dL 12 4   HEMATOCRIT % 42 2   PLATELETS Thousands/uL 178   NEUTROS PCT % 91*   MONOS PCT % 2*     Results from last 7 days   Lab Units 07/23/21  1625   SODIUM mmol/L 139   POTASSIUM mmol/L 5 7*   CHLORIDE mmol/L 101   CO2 mmol/L 24   ANION GAP mmol/L 14*   BUN mg/dL 31*   CREATININE mg/dL 1 59*   CALCIUM mg/dL 9 2   GLUCOSE RANDOM mg/dL 100   ALT U/L 20   AST U/L 38   ALK PHOS U/L 79   ALBUMIN g/dL 3 8   TOTAL BILIRUBIN mg/dL 0 32     Results from last 7 days   Lab Units 07/23/21  1625   MAGNESIUM mg/dL 1 7      Results from last 7 days   Lab Units 07/23/21  1710   INR  1 01   PTT seconds 38*      Results from last 7 days   Lab Units 21  1726   TROPONIN I ng/mL <0 02         ABG:    VBG:          Micro:        EKG: Atrial fibrillation with rapid ventricular response  Imaging: I have personally reviewed pertinent films in PACS      Historical Information   Past Medical History:   Diagnosis Date    Arthritis     CHF (congestive heart failure) (HCC)     COPD (chronic obstructive pulmonary disease) (HCC)     Coronary artery disease     aortic valve replacement    Heart murmur     Hypertension      Past Surgical History:   Procedure Laterality Date    AORTIC VALVE REPLACEMENT      APPENDECTOMY      CHOLECYSTECTOMY      JOINT REPLACEMENT      bilaterl hip and right knee    TONSILECTOMY AND ADNOIDECTOMY      TONSILLECTOMY      TUBAL LIGATION       Social History   Social History     Substance and Sexual Activity   Alcohol Use Yes    Alcohol/week: 1 0 standard drinks    Types: 1 Glasses of wine per week    Comment: rare     Social History     Substance and Sexual Activity   Drug Use No     Social History     Tobacco Use   Smoking Status Former Smoker    Packs/day: 1     Years: 25 00    Pack years: 25 00    Types: Cigarettes    Quit date: 2000    Years since quittin 5   Smokeless Tobacco Never Used     Exercise History: Independent in ADLs  Family History:   Family History   Problem Relation Age of Onset    Heart defect Father      I have reviewed this patient's family history and commented on sigificant items within the HPI      Medications:  Current Facility-Administered Medications   Medication Dose Route Frequency    heparin (ACS LOW)   Intravenous Once    sodium chloride 0 9 % bolus 500 mL  500 mL Intravenous Once     Home medications:  Prior to Admission Medications   Prescriptions Last Dose Informant Patient Reported? Taking?    FLUoxetine (PROzac) 20 mg capsule   Yes No   Sig: Take 20 mg by mouth daily   Multiple Vitamins-Minerals (PreserVision AREDS) TABS   Yes No   Sig: Take by mouth   apixaban (ELIQUIS) 2 5 mg   No No   Sig: Take 1 tablet (2 5 mg total) by mouth 2 (two) times a day   Patient not taking: Reported on 3/9/2021   aspirin (ECOTRIN LOW STRENGTH) 81 mg EC tablet   No No   Sig: Take 1 tablet (81 mg total) by mouth daily   atorvastatin (LIPITOR) 40 mg tablet   No No   Sig: Take 1 tablet (40 mg total) by mouth daily   benazepril (LOTENSIN) 20 mg tablet   Yes No   Sig: Take 20 mg by mouth daily   digoxin (LANOXIN) 0 125 mg tablet   No No   Sig: Take 1 tablet (125 mcg total) by mouth every other day   furosemide (LASIX) 20 mg tablet   No No   Sig: Take 1 tablet (20 mg total) by mouth daily   melatonin 3 mg   No No   Sig: Take 1 tablet (3 mg total) by mouth daily at bedtime   metoprolol tartrate (LOPRESSOR) 25 mg tablet   No No   Sig: Take 1 tablet (25 mg total) by mouth every 12 (twelve) hours   omeprazole (PriLOSEC) 40 MG capsule   Yes No   Sig: Take 40 mg by mouth daily   tiotropium (SPIRIVA) 18 mcg inhalation capsule   No No   Sig: Place 1 capsule (18 mcg total) into inhaler and inhale daily   Patient not taking: Reported on 3/9/2021   traMADol (ULTRAM) 50 mg tablet   Yes No   Sig: Take 50 mg by mouth 4 (four) times a day as needed   verapamil (CALAN-SR) 120 mg CR tablet   No No   Sig: take 1 tablet by mouth at bedtime      Facility-Administered Medications: None     Allergies:  No Known Allergies    ------------------------------------------------------------------------------------------------------------  Advance Directive and Living Will:      Power of :    POLST:    ------------------------------------------------------------------------------------------------------------  Anticipated Length of Stay is > 2 midnights    Care Time Delivered:   No Critical Care time spent       HILDA Jorgensen        Portions of the record may have been created with voice recognition software    Occasional wrong word or "sound a like" substitutions may have occurred due to the inherent limitations of voice recognition software    Read the chart carefully and recognize, using context, where substitutions have occurred

## 2021-07-23 NOTE — ED PROVIDER NOTES
History  Chief Complaint   Patient presents with    Chest Pain     pt c/o chest pain since last night  comes into ER with HR in 16s     61-year-old female presents with the chest pain shortness of breath and rapid heart rate since last night  History of atrial fibrillation CAD aortic valve replacement currently not on Eliquis  Denies alcohol use  No nausea vomiting diarrhea fevers chills cough congestion or other symptoms  History provided by:  Patient   used: No        Prior to Admission Medications   Prescriptions Last Dose Informant Patient Reported? Taking?    FLUoxetine (PROzac) 20 mg capsule   Yes No   Sig: Take 20 mg by mouth daily   Multiple Vitamins-Minerals (PreserVision AREDS) TABS   Yes No   Sig: Take by mouth   apixaban (ELIQUIS) 2 5 mg   No No   Sig: Take 1 tablet (2 5 mg total) by mouth 2 (two) times a day   Patient not taking: Reported on 3/9/2021   aspirin (ECOTRIN LOW STRENGTH) 81 mg EC tablet   No No   Sig: Take 1 tablet (81 mg total) by mouth daily   atorvastatin (LIPITOR) 40 mg tablet   No No   Sig: Take 1 tablet (40 mg total) by mouth daily   benazepril (LOTENSIN) 20 mg tablet   Yes No   Sig: Take 20 mg by mouth daily   digoxin (LANOXIN) 0 125 mg tablet   No No   Sig: Take 1 tablet (125 mcg total) by mouth every other day   furosemide (LASIX) 20 mg tablet   No No   Sig: Take 1 tablet (20 mg total) by mouth daily   melatonin 3 mg   No No   Sig: Take 1 tablet (3 mg total) by mouth daily at bedtime   metoprolol tartrate (LOPRESSOR) 25 mg tablet   No No   Sig: Take 1 tablet (25 mg total) by mouth every 12 (twelve) hours   omeprazole (PriLOSEC) 40 MG capsule   Yes No   Sig: Take 40 mg by mouth daily   tiotropium (SPIRIVA) 18 mcg inhalation capsule   No No   Sig: Place 1 capsule (18 mcg total) into inhaler and inhale daily   Patient not taking: Reported on 3/9/2021   traMADol (ULTRAM) 50 mg tablet   Yes No   Sig: Take 50 mg by mouth 4 (four) times a day as needed verapamil (CALAN-SR) 120 mg CR tablet   No No   Sig: take 1 tablet by mouth at bedtime      Facility-Administered Medications: None       Past Medical History:   Diagnosis Date    Arthritis     [facility]congestive heart failure) [facility])     COPD (chronic obstructive pulmonary disease) ([facility])     Coronary artery disease     aortic valve replacement    Heart murmur     Hypertension        Past Surgical History:   Procedure Laterality Date    AORTIC VALVE REPLACEMENT      APPENDECTOMY      CHOLECYSTECTOMY      JOINT REPLACEMENT      bilaterl hip and right knee    TONSILECTOMY AND ADNOIDECTOMY      TONSILLECTOMY      TUBAL LIGATION         Family History   Problem Relation Age of Onset    Heart defect Father      I have reviewed and agree with the history as documented  E-Cigarette/Vaping    E-Cigarette Use Never User      E-Cigarette/Vaping Substances     Social History     Tobacco Use    Smoking status: Former Smoker     Packs/day:      Years: 25 00     Pack years: 25 00     Types: Cigarettes     Quit date: 2000     Years since quittin 5    Smokeless tobacco: Never Used   Vaping Use    Vaping Use: Never used   Substance Use Topics    Alcohol use: Yes     Alcohol/week: 1 0 standard drinks     Types: 1 Glasses of wine per week     Comment: rare    Drug use: No       Review of Systems   Constitutional: Negative  HENT: Negative  Eyes: Negative  Respiratory: Positive for chest tightness and shortness of breath  Cardiovascular: Positive for chest pain and palpitations  Gastrointestinal: Negative  Endocrine: Negative  Genitourinary: Negative  Musculoskeletal: Negative  Skin: Negative  Allergic/Immunologic: Negative  Neurological: Negative  Hematological: Negative  Psychiatric/Behavioral: Negative  All other systems reviewed and are negative  Physical Exam  Physical Exam  Constitutional:       Appearance: Normal appearance     HENT:      Head: Normocephalic and atraumatic  Nose: Nose normal       Mouth/Throat:      Mouth: Mucous membranes are moist    Eyes:      Extraocular Movements: Extraocular movements intact  Pupils: Pupils are equal, round, and reactive to light  Cardiovascular:      Rate and Rhythm: Tachycardia present  Rhythm irregular  Pulmonary:      Effort: Pulmonary effort is normal       Breath sounds: Normal breath sounds  No decreased breath sounds, wheezing, rhonchi or rales  Abdominal:      General: Abdomen is flat  Bowel sounds are normal       Palpations: Abdomen is soft  Musculoskeletal:         General: Normal range of motion  Cervical back: Normal range of motion and neck supple  Skin:     General: Skin is warm  Capillary Refill: Capillary refill takes less than 2 seconds  Neurological:      General: No focal deficit present  Mental Status: She is alert and oriented to person, place, and time  Mental status is at baseline  Psychiatric:         Mood and Affect: Mood normal          Thought Content:  Thought content normal          Vital Signs  ED Triage Vitals   Temperature Pulse Respirations Blood Pressure SpO2   07/23/21 1711 07/23/21 1613 07/23/21 1613 07/23/21 1613 07/23/21 1613   97 8 °F (36 6 °C) (!) 176 20 147/53 97 %      Temp Source Heart Rate Source Patient Position - Orthostatic VS BP Location FiO2 (%)   07/23/21 1711 07/23/21 1613 07/23/21 1613 07/23/21 1613 --   Tympanic Monitor Lying Right arm       Pain Score       --                  Vitals:    07/23/21 1711 07/23/21 1715 07/23/21 1718 07/23/21 1745   BP:  101/58 (!) 92/42    Pulse: (!) 179 (!) 158  (!) 129   Patient Position - Orthostatic VS:             Visual Acuity      ED Medications  Medications   diltiazem (CARDIZEM) 125 mg in sodium chloride 0 9 % 125 mL infusion (5 mg/hr Intravenous New Bag 7/23/21 1658)   sodium chloride 0 9 % bolus 500 mL (500 mL Intravenous New Bag 7/23/21 1719)   diltiazem (CARDIZEM) injection 15 mg (15 mg Intravenous Given 7/23/21 1621)   ondansetron (ZOFRAN) injection 4 mg (4 mg Intravenous Given 7/23/21 1644)   enoxaparin (LOVENOX) subcutaneous injection 50 mg (50 mg Subcutaneous Given 7/23/21 1732)   digoxin (LANOXIN) injection 250 mcg (250 mcg Intravenous Given 7/23/21 1730)       Diagnostic Studies  Results Reviewed     Procedure Component Value Units Date/Time    Troponin I [[id number]] Collected: 07/23/21 1726    Lab Status:  In process Specimen: Blood from Arm, Right Updated: 07/23/21 1729    D-Dimer [[id number]]  (Abnormal) Collected: 07/23/21 1710    Lab Status: Final result Specimen: Blood from Arm, Right Updated: 07/23/21 1728     D-Dimer, Quant 2 31 ug/ml FEU     APTT [[id number]]  (Abnormal) Collected: 07/23/21 1710    Lab Status: Final result Specimen: Blood from Arm, Right Updated: 07/23/21 1726     PTT 38 seconds     Protime-INR [[id number]]  (Normal) Collected: 07/23/21 1710    Lab Status: Final result Specimen: Blood from Arm, Right Updated: 07/23/21 1726     Protime 13 2 seconds      INR 1 01    Comprehensive metabolic panel [[id number]]  (Abnormal) Collected: 07/23/21 1625    Lab Status: Final result Specimen: Blood from Arm, Right Updated: 07/23/21 1654     Sodium 139 mmol/L      Potassium 5 7 mmol/L      Chloride 101 mmol/L      CO2 24 mmol/L      ANION GAP 14 mmol/L      BUN 31 mg/dL      Creatinine 1 59 mg/dL      Glucose 100 mg/dL      Calcium 9 2 mg/dL      AST 38 U/L      ALT 20 U/L      Alkaline Phosphatase 79 U/L      Total Protein 7 9 g/dL      Albumin 3 8 g/dL      Total Bilirubin 0 32 mg/dL      eGFR 30 ml/min/1 73sq m     Narrative:      [place] guidelines for Chronic Kidney Disease (CKD):     Stage 1 with normal or high GFR (GFR > 90 mL/min/1 73 square meters)    Stage 2 Mild CKD (GFR = 60-89 mL/min/1 73 square meters)    Stage 3A Moderate CKD (GFR = 45-59 mL/min/1 73 square meters)    Stage 3B Moderate CKD (GFR = 30-44 mL/min/1 73 square meters)   Stage 4 Severe CKD (GFR = 15-29 mL/min/1 73 square meters)    Stage 5 End Stage CKD (GFR <15 mL/min/1 73 square meters)  Note: GFR calculation is accurate only with a steady state creatinine    Magnesium [[id number]]  (Normal) Collected: 07/23/21 1625    Lab Status: Final result Specimen: Blood from Arm, Right Updated: 07/23/21 1654     Magnesium 1 7 mg/dL     NT-BNP PRO [[id number]]  (Abnormal) Collected: 07/23/21 1625    Lab Status: Final result Specimen: Blood from Arm, Right Updated: 07/23/21 1654     NT-proBNP 2,527 pg/mL     CBC and differential [[id number]]  (Abnormal) Collected: 07/23/21 1625    Lab Status: Final result Specimen: Blood from Arm, Right Updated: 07/23/21 1650     WBC 5 40 Thousand/uL      RBC 4 62 Million/uL      Hemoglobin 12 4 g/dL      Hematocrit 42 2 %      MCV 91 fL      MCH 26 8 pg      MCHC 29 4 g/dL      RDW 15 3 %      MPV 10 8 fL      Platelets 367 Thousands/uL      nRBC 0 /100 WBCs      Neutrophils Relative 91 %      Immat GRANS % 0 %      Lymphocytes Relative 6 %      Monocytes Relative 2 %      Eosinophils Relative 1 %      Basophils Relative 0 %      Neutrophils Absolute 4 92 Thousands/µL      Immature Grans Absolute 0 01 Thousand/uL      Lymphocytes Absolute 0 32 Thousands/µL      Monocytes Absolute 0 10 Thousand/µL      Eosinophils Absolute 0 04 Thousand/µL      Basophils Absolute 0 01 Thousands/µL     Narrative: This is an appended report  These results have been appended to a previously verified report                   XR chest 1 view portable    (Results Pending)              Procedures  ECG 12 Lead Documentation Only  Performed by: Gael Mcgowan DO  Authorized by: Gael Mcgowan DO     ECG reviewed by me, the ED Provider: yes    Patient location:  ED  Previous ECG:     Previous ECG:  Unavailable    Comparison to cardiac monitor: Yes    Interpretation:     Interpretation: abnormal    Rate:     ECG rate assessment: tachycardic    Rhythm:     Rhythm: atrial fibrillation Ectopy:     Ectopy: none    QRS:     QRS axis:  Normal  Conduction:     Conduction: normal    ST segments:     ST segments:  Non-specific  T waves:     T waves: non-specific    CriticalCare Time  Performed by: Christie Sánchez DO  Authorized by: Christie Sánchez DO     Critical care provider statement:     Critical care time (minutes):  45    Critical care was necessary to treat or prevent imminent or life-threatening deterioration of the following conditions:  Circulatory failure and cardiac failure    Critical care was time spent personally by me on the following activities:  Blood draw for specimens, obtaining history from patient or surrogate, development of treatment plan with patient or surrogate, discussions with consultants, evaluation of patient's response to treatment, examination of patient, interpretation of cardiac output measurements, ordering and performing treatments and interventions, ordering and review of laboratory studies, ordering and review of radiographic studies, re-evaluation of patient's condition and review of old charts             ED Course                                           MDM  Number of Diagnoses or Management Options  Atrial fibrillation [facility])  Diagnosis management comments: Spoke with cardiology on call Dr Mccauley Listen  Diltiazem, diltiazem drip, digoxin, fluids given   Admitted to ICU       Amount and/or Complexity of Data Reviewed  Clinical lab tests: ordered and reviewed  Tests in the radiology section of CPT®: ordered and reviewed  Tests in the medicine section of CPT®: reviewed and ordered    Patient Progress  Patient progress: stable      Disposition  Final diagnoses:   Atrial fibrillation ([address] )     Time reflects when diagnosis was documented in both MDM as applicable and the Disposition within this note     Time User Action Codes Description Comment    7/23/2021  5:29 PM Arti Smith Add [I48 91] Atrial fibrillation [facility])       ED Disposition     ED Disposition Condition Date/Time Comment    Admit Stable Fri Jul 23, 2021  5:29 PM          Follow-up Information    None         Patient's Medications   Discharge Prescriptions    No medications on file     No discharge procedures on file      PDMP Review     None          ED Provider  Electronically Signed by           Kenji Moore DO  07/23/21 0305

## 2021-07-23 NOTE — ASSESSMENT & PLAN NOTE
· Potentially related to atrial fibrillation with rapid ventricular response  · Closely follow intake and output  · Daily weights  · Trend serum creatinine

## 2021-07-23 NOTE — ASSESSMENT & PLAN NOTE
· Transition off diltiazem infusion  · Would recommend low dose metoprolol IV  · Complete digoxin  · Will start heparin infusion given acute kidney injury

## 2021-07-23 NOTE — ASSESSMENT & PLAN NOTE
Wt Readings from Last 3 Encounters:   07/23/21 49 9 kg (110 lb)   03/09/21 50 8 kg (112 lb)   12/29/20 52 3 kg (115 lb 6 4 oz)     · Hold Lasix at present given hypotension  · Daily weights  · Consult cardiology

## 2021-07-24 ENCOUNTER — APPOINTMENT (INPATIENT)
Dept: NON INVASIVE DIAGNOSTICS | Facility: HOSPITAL | Age: 81
DRG: 309 | End: 2021-07-24
Payer: MEDICARE

## 2021-07-24 LAB
ANION GAP SERPL CALCULATED.3IONS-SCNC: 12 MMOL/L (ref 4–13)
APTT PPP: 206 SECONDS (ref 23–37)
APTT PPP: 58 SECONDS (ref 23–37)
APTT PPP: >210 SECONDS (ref 23–37)
BUN SERPL-MCNC: 32 MG/DL (ref 5–25)
CALCIUM SERPL-MCNC: 8.9 MG/DL (ref 8.3–10.1)
CHLORIDE SERPL-SCNC: 103 MMOL/L (ref 100–108)
CO2 SERPL-SCNC: 23 MMOL/L (ref 21–32)
CREAT SERPL-MCNC: 1.44 MG/DL (ref 0.6–1.3)
ERYTHROCYTE [DISTWIDTH] IN BLOOD BY AUTOMATED COUNT: 15.2 % (ref 11.6–15.1)
GFR SERPL CREATININE-BSD FRML MDRD: 34 ML/MIN/1.73SQ M
GLUCOSE SERPL-MCNC: 108 MG/DL (ref 65–140)
HCT VFR BLD AUTO: 35.3 % (ref 34.8–46.1)
HGB BLD-MCNC: 10.1 G/DL (ref 11.5–15.4)
INR PPP: 1.05 (ref 0.84–1.19)
MAGNESIUM SERPL-MCNC: 2.6 MG/DL (ref 1.6–2.6)
MCH RBC QN AUTO: 27 PG (ref 26.8–34.3)
MCHC RBC AUTO-ENTMCNC: 28.6 G/DL (ref 31.4–37.4)
MCV RBC AUTO: 94 FL (ref 82–98)
PHOSPHATE SERPL-MCNC: 3.6 MG/DL (ref 2.3–4.1)
PLATELET # BLD AUTO: 113 THOUSANDS/UL (ref 149–390)
PMV BLD AUTO: 11.5 FL (ref 8.9–12.7)
POTASSIUM SERPL-SCNC: 4.8 MMOL/L (ref 3.5–5.3)
PROTHROMBIN TIME: 13.6 SECONDS (ref 11.6–14.5)
RBC # BLD AUTO: 3.74 MILLION/UL (ref 3.81–5.12)
SODIUM SERPL-SCNC: 138 MMOL/L (ref 136–145)
T4 FREE SERPL-MCNC: 1.13 NG/DL (ref 0.76–1.46)
TROPONIN I SERPL-MCNC: 0.5 NG/ML
TROPONIN I SERPL-MCNC: 0.54 NG/ML
TROPONIN I SERPL-MCNC: 0.79 NG/ML
WBC # BLD AUTO: 4.18 THOUSAND/UL (ref 4.31–10.16)

## 2021-07-24 PROCEDURE — 85027 COMPLETE CBC AUTOMATED: CPT | Performed by: NURSE PRACTITIONER

## 2021-07-24 PROCEDURE — 84484 ASSAY OF TROPONIN QUANT: CPT | Performed by: NURSE PRACTITIONER

## 2021-07-24 PROCEDURE — 93306 TTE W/DOPPLER COMPLETE: CPT

## 2021-07-24 PROCEDURE — 84484 ASSAY OF TROPONIN QUANT: CPT | Performed by: INTERNAL MEDICINE

## 2021-07-24 PROCEDURE — 83735 ASSAY OF MAGNESIUM: CPT | Performed by: NURSE PRACTITIONER

## 2021-07-24 PROCEDURE — 99223 1ST HOSP IP/OBS HIGH 75: CPT | Performed by: INTERNAL MEDICINE

## 2021-07-24 PROCEDURE — 85730 THROMBOPLASTIN TIME PARTIAL: CPT | Performed by: INTERNAL MEDICINE

## 2021-07-24 PROCEDURE — 99232 SBSQ HOSP IP/OBS MODERATE 35: CPT | Performed by: INTERNAL MEDICINE

## 2021-07-24 PROCEDURE — 80048 BASIC METABOLIC PNL TOTAL CA: CPT | Performed by: NURSE PRACTITIONER

## 2021-07-24 PROCEDURE — 93306 TTE W/DOPPLER COMPLETE: CPT | Performed by: INTERNAL MEDICINE

## 2021-07-24 PROCEDURE — 84100 ASSAY OF PHOSPHORUS: CPT | Performed by: NURSE PRACTITIONER

## 2021-07-24 RX ORDER — ONDANSETRON 2 MG/ML
4 INJECTION INTRAMUSCULAR; INTRAVENOUS EVERY 8 HOURS PRN
Status: DISCONTINUED | OUTPATIENT
Start: 2021-07-24 | End: 2021-07-26 | Stop reason: HOSPADM

## 2021-07-24 RX ORDER — ACETAMINOPHEN 325 MG/1
650 TABLET ORAL EVERY 6 HOURS PRN
Status: DISCONTINUED | OUTPATIENT
Start: 2021-07-24 | End: 2021-07-26 | Stop reason: HOSPADM

## 2021-07-24 RX ORDER — ACETAMINOPHEN 325 MG/1
650 TABLET ORAL EVERY 6 HOURS PRN
Status: DISCONTINUED | OUTPATIENT
Start: 2021-07-24 | End: 2021-07-24

## 2021-07-24 RX ORDER — ALBUMIN, HUMAN INJ 5% 5 %
12.5 SOLUTION INTRAVENOUS ONCE
Status: COMPLETED | OUTPATIENT
Start: 2021-07-24 | End: 2021-07-24

## 2021-07-24 RX ADMIN — ASPIRIN 81 MG: 81 TABLET, COATED ORAL at 08:17

## 2021-07-24 RX ADMIN — DIGOXIN 125 MCG: 0.25 INJECTION INTRAMUSCULAR; INTRAVENOUS at 05:23

## 2021-07-24 RX ADMIN — ONDANSETRON 4 MG: 2 INJECTION INTRAMUSCULAR; INTRAVENOUS at 05:19

## 2021-07-24 RX ADMIN — HEPARIN SODIUM 1500 UNITS: 1000 INJECTION INTRAVENOUS; SUBCUTANEOUS at 15:40

## 2021-07-24 RX ADMIN — ACETAMINOPHEN 650 MG: 325 TABLET, FILM COATED ORAL at 05:24

## 2021-07-24 RX ADMIN — METOPROLOL TARTRATE 25 MG: 25 TABLET, FILM COATED ORAL at 21:16

## 2021-07-24 RX ADMIN — ALBUMIN (HUMAN) 12.5 G: 12.5 INJECTION, SOLUTION INTRAVENOUS at 03:22

## 2021-07-24 RX ADMIN — APIXABAN 2.5 MG: 2.5 TABLET, FILM COATED ORAL at 17:00

## 2021-07-24 RX ADMIN — ACETAMINOPHEN 650 MG: 325 TABLET, FILM COATED ORAL at 13:32

## 2021-07-24 RX ADMIN — PANTOPRAZOLE SODIUM 40 MG: 40 TABLET, DELAYED RELEASE ORAL at 05:24

## 2021-07-24 RX ADMIN — FLUOXETINE 20 MG: 20 CAPSULE ORAL at 08:17

## 2021-07-24 RX ADMIN — Medication 3 MG: at 21:16

## 2021-07-24 RX ADMIN — ATORVASTATIN CALCIUM 40 MG: 40 TABLET, FILM COATED ORAL at 08:17

## 2021-07-24 RX ADMIN — TIOTROPIUM BROMIDE 18 MCG: 18 CAPSULE ORAL; RESPIRATORY (INHALATION) at 08:18

## 2021-07-24 NOTE — PROGRESS NOTES
[facility] 43  Progress Note - Lashaun Ibarra 1940, 78 y o  female MRN: [id number]  Unit/Bed#: ICU 11 Encounter: [id number]  Primary Care Provider: Joni Curtis MD   Date and time admitted to hospital: 7/23/2021  4:06 PM    * Atrial fibrillation with rapid ventricular response ([address] )  Assessment & Plan  · POA, appears to have hx of same   · Cardizem gtt d/c'ed given hypotension   · Bolused digoxin 250mcg in ER, received 125mcg digoxin q6h x2 doses overnight to complete loading dose   · Lopressor 2 5mg q6H PRN for HR persistently >130  · AC: Heparin gtt given LEOLA   · Echo pending   · Cardiology consult pending   · Current goal is HR control, <120    Elevated brain natriuretic peptide (BNP) level  Assessment & Plan  · Likely related to rapid heart rate  · Appears euvolemic on exam   · Cardiology consult    Acute kidney injury [facility])  Assessment & Plan  · Baseline creat appears to be 0 8-1 1  · Potentially related to atrial fibrillation with rapid ventricular response  · Closely follow I&O  · Avoid hypotension, nephrotoxic agents   · Trend BMP    CKD (chronic kidney disease) stage 3, GFR 30-59 ml/min [facility])  Assessment & Plan  Lab Results   Component Value Date    EGFR 30 07/23/2021    EGFR 67 12/31/2020    EGFR 48 12/30/2020    CREATININE 1 59 (H) 07/23/2021    CREATININE 0 82 12/31/2020    CREATININE 1 07 12/30/2020           Chronic respiratory failure with hypoxia (HCC)  Assessment & Plan  · Continue oxygen at home dose 3L  · Goal to maintain spo2 > 88%  · IS, pulmonary toileting     COPD (chronic obstructive pulmonary disease) (HCC)  Assessment & Plan  · Without acute exacerbation   · Continue home Spiriva  · Avoiding nebs given above AF RVR   · Cont home 3L NC   · Goal to maintain spo2 > 88%    Elevated troponin  Assessment & Plan  · Troponin trending up since arrival, now at 0 79  · Likely 2/2 demand ischemia given AF RVR and hypotension   · Echo, Cardiology consult pending   · Heparin gtt as above   · No ST changes on EKG  · Continue tele, trend trop    Chronic diastolic (congestive) heart failure (HCC)  Assessment & Plan  Wt Readings from Last 3 Encounters:   07/23/21 49 4 kg (108 lb 14 5 oz)   03/09/21 50 8 kg (112 lb)   12/29/20 52 3 kg (115 lb 6 4 oz)     · Echo 12/10/20 EF 55%, G1DD, mild TR  · Hold home Lasix at present given hypotension, LEOLA   · Daily weights, strict I&O  · Appears euvolemic at present   · Consult cardiology pending         S/P TAVR (transcatheter aortic valve replacement)  Assessment & Plan  · Pt unclear of year performed   · Cont home ASA, statin     Depression  Assessment & Plan  · Cont home prozac     GERD (gastroesophageal reflux disease)  Assessment & Plan  · Cont home PPI    Hypertension  Assessment & Plan  · Holding home benazepril given hypotension, LEOLA   · Unclear if she takes verapamil at home   · Lopressor IV q6H PRN HR >130 AF RVR   · Will restart home metoprolol 25mg Q12H in am       ----------------------------------------------------------------------------------------  HPI/24hr events: She arrived on the ICU unit in AF RVR  Cardizem was stopped given hypotension, and she was administered Lopressor with improvement of HR to 70-80s  She reports she feels "okay" and has no overt complaints  She did convert to NSR, then ST for a time, however this morning is in AF, rate controlled on tele  Notably, she stated that she was recently at her PCP and diagnosed with a UTI, however cannot recall name of abx  She reports continued foul-smelling urine  No notes or UA available in Care Everywhere  UA performed not suspicious for UTI       Disposition: Transfer to Med Surg with Telemetry   Code Status: Level 3 - DNAR and DNI  ---------------------------------------------------------------------------------------  SUBJECTIVE  "I feel okay"    Review of Systems  Review of systems was reviewed and negative unless stated above in HPI/24-hour events ---------------------------------------------------------------------------------------  OBJECTIVE    Vitals   Vitals:    21 0130 21 0310 21 0330 21 0400   BP: 106/54 (!) 88/52 121/59    Pulse: (!) 128 (!) 127 (!) 126    Resp: (!) 23 16 (!) 26    Temp:    97 7 °F (36 5 °C)   TempSrc:    Temporal   SpO2: 99% 96% 97%    Weight:         Temp (24hrs), Av 7 °F (36 5 °C), Min:97 6 °F (36 4 °C), Max:97 8 °F (36 6 °C)  Current: Temperature: 97 7 °F (36 5 °C)          Respiratory:  SpO2: SpO2: 97 %, SpO2 Activity: SpO2 Activity: At Rest, SpO2 Device: O2 Device: Nasal cannula  Nasal Cannula O2 Flow Rate (L/min): 3 L/min    Invasive/non-invasive ventilation settings   Respiratory    Lab Data (Last 4 hours)    None         O2/Vent Data (Last 4 hours)    None                Physical Exam  Vitals and nursing note reviewed  Constitutional:       General: She is awake  She is not in acute distress  Appearance: She is underweight  She is not ill-appearing, toxic-appearing or diaphoretic  Interventions: Nasal cannula in place  HENT:      Head: Normocephalic and atraumatic  Eyes:      General: Lids are normal    Cardiovascular:      Rate and Rhythm: Normal rate  Rhythm irregularly irregular  Heart sounds: Normal heart sounds  Pulmonary:      Effort: Pulmonary effort is normal       Breath sounds: Normal breath sounds  Abdominal:      General: Abdomen is flat  Bowel sounds are normal       Palpations: Abdomen is soft  Tenderness: There is abdominal tenderness (intermittently on palpation) in the left upper quadrant  Genitourinary:     Comments: Voiding independently   Skin:     General: Skin is warm  Capillary Refill: Capillary refill takes less than 2 seconds  Neurological:      General: No focal deficit present  Mental Status: She is alert and oriented to person, place, and time  GCS: GCS eye subscore is 4  GCS verbal subscore is 5  GCS motor subscore is 6  Cranial Nerves: Cranial nerves are intact  Psychiatric:         Mood and Affect: Mood and affect normal          Speech: Speech normal          Behavior: Behavior is cooperative  Laboratory and Diagnostics:  Results from last 7 days   Lab Units 07/24/21  0518 07/23/21  1625   WBC Thousand/uL 4 18* 5 40   HEMOGLOBIN g/dL 10 1* 12 4   HEMATOCRIT % 35 3 42 2   PLATELETS Thousands/uL 113* 178   NEUTROS PCT %  --  91*   MONOS PCT %  --  2*     Results from last 7 days   Lab Units 07/23/21  2328 07/23/21  1625   SODIUM mmol/L 132* 139   POTASSIUM mmol/L 4 6 5 7*   CHLORIDE mmol/L 103 101   CO2 mmol/L 28 24   ANION GAP mmol/L 1* 14*   BUN mg/dL 33* 31*   CREATININE mg/dL 1 52* 1 59*   CALCIUM mg/dL 8 3 9 2   GLUCOSE RANDOM mg/dL 129 100   ALT U/L  --  20   AST U/L  --  38   ALK PHOS U/L  --  79   ALBUMIN g/dL  --  3 8   TOTAL BILIRUBIN mg/dL  --  0 32     Results from last 7 days   Lab Units 07/23/21  1625   MAGNESIUM mg/dL 1 7      Results from last 7 days   Lab Units 07/23/21  2344 07/23/21  1710   INR  1 05 1 01   PTT seconds >210* 38*      Results from last 7 days   Lab Units 07/24/21  0153 07/23/21  2328 07/23/21  1949 07/23/21  1726   TROPONIN I ng/mL 0 79* 0 50* 0 04 <0 02         ABG:    VBG:          Micro        EKG: AF, rate controlled on tele   Imaging: I have personally reviewed pertinent reports  Intake and Output  I/O       07/22 0701 - 07/23 0700 07/23 0701 - 07/24 0700    I V  (mL/kg)  20 (0 4)    IV Piggyback  500    Total Intake(mL/kg)  520 (10 5)    Urine (mL/kg/hr)  30    Total Output  30    Net  +490                Height and Weights         Body mass index is 18 12 kg/m²    Weight (last 2 days)     Date/Time   Weight    07/23/21 1900   49 4 (108 91)    07/23/21 1613   49 9 (110)                Nutrition       Diet Orders   (From admission, onward)             Start     Ordered    07/23/21 1856  Diet NPO; Sips with meds  Diet effective now     Question Answer Comment   Diet Type NPO NPO Except: Sips with meds    RD to adjust diet per protocol?  No        07/23/21 7877                  Active Medications  Scheduled Meds:  Current Facility-Administered Medications   Medication Dose Route Frequency Provider Last Rate    acetaminophen  650 mg Oral Q6H PRN Tali Newness, CRNP      aspirin  81 mg Oral Daily Tali Newness, CRNP      atorvastatin  40 mg Oral Daily Tali Newness, CRNP      FLUoxetine  20 mg Oral Daily Tali Newness, CRNP      heparin (porcine)  3-20 Units/kg/hr (Order-Specific) Intravenous Titrated Tali Newness, CRNP 9 Units/kg/hr (07/24/21 0121)    heparin (porcine)  1,500 Units Intravenous Q1H PRN Tali Newness, CRNP      heparin (porcine)  3,000 Units Intravenous Q1H PRN Tali Newness, CRNP      melatonin  3 mg Oral HS Tali Newness, CRNP      metoprolol  2 5 mg Intravenous Q6H PRN Tali Newness, CRNP      ondansetron  4 mg Intravenous Q8H PRN Tali Newness, CRNP      pantoprazole  40 mg Oral Early Morning Tali Newness, CRNP      tiotropium  18 mcg Inhalation Daily Tali Newness, CRNP      traMADol  50 mg Oral Q6H PRN Tali Newness, CRNP       Continuous Infusions:  heparin (porcine), 3-20 Units/kg/hr (Order-Specific), Last Rate: 9 Units/kg/hr (07/24/21 0121)      PRN Meds:   acetaminophen, 650 mg, Q6H PRN  heparin (porcine), 1,500 Units, Q1H PRN  heparin (porcine), 3,000 Units, Q1H PRN  metoprolol, 2 5 mg, Q6H PRN  ondansetron, 4 mg, Q8H PRN  traMADol, 50 mg, Q6H PRN        Invasive Devices Review  Invasive Devices     Peripheral Intravenous Line            Peripheral IV 07/23/21 Left Antecubital 1 day    Peripheral IV 07/23/21 Right Forearm <1 day                ---------------------------------------------------------------------------------------  Advance Directive and Living Will:      Power of :    POLST:    ---------------------------------------------------------------------------------------  Care Time Delivered:   No Critical Care time spent       Tika Rajan, CRNP      Portions of the record may have been created with voice recognition software  Occasional wrong word or "sound a like" substitutions may have occurred due to the inherent limitations of voice recognition software    Read the chart carefully and recognize, using context, where substitutions have occurred

## 2021-07-24 NOTE — ASSESSMENT & PLAN NOTE
Lab Results   Component Value Date    EGFR 30 07/23/2021    EGFR 67 12/31/2020    EGFR 48 12/30/2020    CREATININE 1 59 (H) 07/23/2021    CREATININE 0 82 12/31/2020    CREATININE 1 07 12/30/2020

## 2021-07-24 NOTE — CONSULTS
Consultation - Cardiology   [facility]     Ibeth Grover 78 y o  female MRN: [id number]  : 1940  Unit/Bed#: ICU 11 Encounter: [id number]      ASSESSMENT:   Paroxysmal atrial fibrillation with rapid ventricular response   now in sinus rhythm  Was hypotensive on IV Cardizem therefore discontinued and started on digoxin and p r n  Lopressor  Anticoagulated with IV heparin     Acute on chronic diastolic heart failure      Echo, 2020:EF 55%, G1 DD, mild mitral stenosis    Lexiscan 2020:Equivocal study after pharmacologic vasodilation without reproduction of symptoms  There is a fixed anterior apical defect which is most likely due to body attenuation artifact  However t i d  Was 1 36  Hence cannot rule out  balance ischemia  Left ventricular systolic function was normal      Elevated  Troponin, probably non MI elevation   0 05--> 0 79 -->0  47   probably secondary to prolonged tachycardia and LEOLA     S/p TAVR at [facility]    History of CVA/TIA    COPD,   Chronic respiratory failure with hypoxia   History of lung cancer, status post radiation therapy in 2018  On continuous home O2 at 3 L     History of chronic smoking    Recently treated UTI      RECOMMENDATIONS:    Continue low-dose digoxin 125 mcg daily     Transition to oral anticoagulation     Management of COPD and chronic respiratory failure as per primary service      Thank you for consulting me in the management and care of this patient  Physician Requesting Consult: Denise Ott DO    Reason for Consult / Principal Problem:  Atrial fibrillation with rapid ventricular response    Consults    HPI: Ibeth Grover is a 78y o  year old female who  Presented with shortness of breath and was found to be in rapid atrial fibrillation  Initially IV Cardizem what tried which resulted in hypotension  Patient was then started on IV digoxin and p r n  IV metoprolol    Currently she has converted to sinus rhythm  She is awake alert in no acute distress  She had a recent UTI infection that was treated as an outpatient    Review of Systems   Respiratory: Positive for shortness of breath  Cardiovascular: Negative for chest pain, palpitations and leg swelling         Historical Information   Past Medical History:   Diagnosis Date    Arthritis     CHF (congestive heart failure) ([address] )     COPD [facility] obstructive [facility] disease[facility]     Coronary artery disease     aortic valve replacement    Heart murmur     Hypertension      Past Surgical History:   Procedure Laterality Date    AORTIC VALVE REPLACEMENT      APPENDECTOMY      CHOLECYSTECTOMY      JOINT REPLACEMENT      bilaterl hip and right knee    TONSILECTOMY AND ADNOIDECTOMY      TONSILLECTOMY      TUBAL LIGATION       Social History     Substance and Sexual Activity   Alcohol Use Yes    Alcohol/week: 1 0 standard drinks    Types: 1 Glasses of wine per week    Comment: rare     Social History     Substance and Sexual Activity   Drug Use No     Social History     Tobacco Use   Smoking Status Former Smoker    Packs/day:     Years: 25 00    Pack years: 25     Types: Cigarettes    Quit date: 2000    Years since quittin 5   Smokeless Tobacco Never Used     Family History:   Family History   Problem Relation Age of Onset    Heart defect Father        Meds/Allergies    PTA meds:    Medications Prior to Admission   Medication    aspirin (ECOTRIN LOW STRENGTH) 81 mg EC tablet    benazepril (LOTENSIN) 20 mg tablet    FLUoxetine (PROzac) 20 mg capsule    melatonin 3 mg    apixaban (ELIQUIS) 2 5 mg    atorvastatin (LIPITOR) 40 mg tablet    digoxin (LANOXIN) 0 125 mg tablet    furosemide (LASIX) 20 mg tablet    metoprolol tartrate (LOPRESSOR) 25 mg tablet    Multiple Vitamins-Minerals (PreserVision AREDS) TABS    omeprazole (PriLOSEC) 40 MG capsule    tiotropium (SPIRIVA) 18 mcg inhalation capsule    traMADol (ULTRAM) 50 mg tablet    verapamil (CALAN-SR) 120 mg CR tablet      No Known Allergies    Current Facility-Administered Medications:     acetaminophen (TYLENOL) tablet 650 mg, 650 mg, Oral, Q6H PRN, Isamar Easter, CRNP, 650 mg at 07/24/21 0524    aspirin (ECOTRIN LOW STRENGTH) EC tablet 81 mg, 81 mg, Oral, Daily, Isamar Easter, CRNP, 81 mg at 07/24/21 0817    atorvastatin (LIPITOR) tablet 40 mg, 40 mg, Oral, Daily, Isamar Easter, CRNP, 40 mg at 07/24/21 0817    FLUoxetine (PROzac) capsule 20 mg, 20 mg, Oral, Daily, Isamar Easter, CRNP, 20 mg at 07/24/21 0817    heparin (porcine) 25,000 units in 0 45% NaCl 250 mL infusion (premix), 3-20 Units/kg/hr (Order-Specific), Intravenous, Titrated, Isamar Easter, CRNP, Last Rate: 3 mL/hr at 07/24/21 1004, 6 Units/kg/hr at 07/24/21 1004    heparin (porcine) injection 1,500 Units, 1,500 Units, Intravenous, Q1H PRN, Isamar Easter, CRNP, 1,500 Units at 07/23/21 1959    heparin (porcine) injection 3,000 Units, 3,000 Units, Intravenous, Q1H PRN, Isamar Easter, CRNP    melatonin tablet 3 mg, 3 mg, Oral, HS, Isamar Easter, CRNP, 3 mg at 07/23/21 2300    metoprolol (LOPRESSOR) injection 2 5 mg, 2 5 mg, Intravenous, Q6H PRN, Isamar Easter, CRNP, 2 5 mg at 07/23/21 1955    ondansetron (ZOFRAN) injection 4 mg, 4 mg, Intravenous, Q8H PRN, Isamar Easter, CRNP, 4 mg at 07/24/21 0519    pantoprazole (PROTONIX) EC tablet 40 mg, 40 mg, Oral, Early Morning, Isamar Easter, CRNP, 40 mg at 07/24/21 0524    tiotropium (SPIRIVA) capsule for inhaler 18 mcg, 18 mcg, Inhalation, Daily, Isamar Easter, CRNP, 18 mcg at 07/24/21 0818    traMADol (ULTRAM) tablet 50 mg, 50 mg, Oral, Q6H PRN, HILDA Patel, 50 mg at 07/23/21 2320    VTE Pharmacologic Prophylaxis:   Heparin    Objective:   Vitals: Blood pressure 124/58, pulse 95, temperature 98 6 °F (37 °C), temperature source Temporal, resp  rate (!) 27, weight 49 4 kg (108 lb 14 5 oz), SpO2 99 %, not currently breastfeeding  Body mass index is 18 12 kg/m²    BP Readings from Last 3 Encounters:   07/24/21 124/58   03/09/21 113/85   12/31/20 139/62     Orthostatic Blood Pressures      Most Recent Value   Blood Pressure  124/58 filed at 07/24/2021 1100   Patient Position - Orthostatic VS  Lying filed at 07/23/2021 1613          Intake/Output Summary (Last 24 hours) at 7/24/2021 1117  Last data filed at 7/24/2021 1110  Gross per 24 hour   Intake 626 43 ml   Output 210 ml   Net 416 43 ml       Invasive Devices     Peripheral Intravenous Line            Peripheral IV 07/23/21 Left Antecubital 1 day    Peripheral IV 07/23/21 Right Forearm <1 day                  Physical Exam:   Physical Exam    Neurologic:  Alert & oriented x 3, no new focal deficits, Not in any acute distress,  Constitutional:  Well developed, well nourished, non-toxic appearance   Eyes:  Pupil equal and reacting to light, conjunctiva normal   HENT:  Atraumatic, oropharynx moist, Neck- normal range of motion, no tenderness, supple   Respiratory:   Decreased air entry bilaterally  Cardiovascular: S1-S2 regular with a 1/6 ejection systolic murmur  GI:  Soft, nondistended, normal bowel sounds, nontender, no hepatosplenomegaly appreciated  Musculoskeletal:  No edema, no tenderness, no deformities     Skin:  Well hydrated, no rash   Lymphatic:  No lymphadenopathy noted   Extremities:  Mild edema and distal pulses are present    Labs:   Troponins:   Results from last 7 days   Lab Units 07/24/21 0518 07/24/21  0153 07/23/21  2328   TROPONIN I ng/mL 0 54* 0 79* 0 50*       CBC with diff:   Results from last 7 days   Lab Units 07/24/21  0518 07/23/21  1625   WBC Thousand/uL 4 18* 5 40   HEMOGLOBIN g/dL 10 1* 12 4   HEMATOCRIT % 35 3 42 2   MCV fL 94 91   PLATELETS Thousands/uL 113* 178   MCH pg 27 0 26 8   MCHC g/dL 28 6* 29 4*   RDW % 15 2* 15 3*   MPV fL 11 5 10 8   NRBC AUTO /100 WBCs  --  0       CMP:   Results from last 7 days   Lab Units 07/24/21  0518 07/23/21  2328 07/23/21  1625   SODIUM mmol/L 138 132* 139   POTASSIUM mmol/L 4 8 4 6 5 7*   CHLORIDE mmol/L 103 103 101   CO2 mmol/L 23 28 24   ANION GAP mmol/L 12 1* 14*   BUN mg/dL 32* 33* 31*   CREATININE mg/dL 1 44* 1 52* 1 59*   CALCIUM mg/dL 8 9 8 3 9 2   AST U/L  --   --  38   ALT U/L  --   --  20   ALK PHOS U/L  --   --  79   TOTAL PROTEIN g/dL  --   --  7 9   ALBUMIN g/dL  --   --  3 8   TOTAL BILIRUBIN mg/dL  --   --  0 32   EGFR ml/min/1 73sq m 34 31 30   GLUCOSE RANDOM mg/dL 108 129 100       Magnesium:   Results from last 7 days   Lab Units 21  0518 21  1625   MAGNESIUM mg/dL 2 6 1 7     Coags:   Results from last 7 days   Lab Units 21  0803 21  2344 21  1710   PTT seconds 206* >210* 38*   INR   --  1 05 1 01     TSH:    Results from last 7 days   Lab Units 21  1625   TSH 3RD GENERATON uIU/mL 6 836*     No components found for: TSH3  Lipid Profile:     Lipid Profile:   Lab Results   Component Value Date    CHOLESTEROL 166 2020    HDL 58 2020    LDLCALC 81 2020    TRIG 137 2020     Hgb A1c:     NT-proBNP:   Recent Labs     21  1625   NTBNP 2,527*        Imaging & Testing   Cardiac testing:   Results for orders placed during the hospital encounter of 20    Echo complete with contrast if indicated    [place] [address]  [phone]    Transthoracic Echocardiogram  2D, M-mode, Doppler, and Color Doppler    Study date:  10-Dec-2020    Patient: Halima Mccall  MR number: [id number]  Account number: [de-identified]  : 1940  Age: 78 years  Gender: Female  Status: Inpatient  Location: Bedside  Height: 65 in  Weight: 120 8 lb  BP: 124/ 68 mmHg    Indications: Heart Failure    Diagnoses: I50 9 - Heart failure, unspecified    Sonographer:  HELDER Luna  Primary Physician:  Kun Stewart MD  Referring Physician:  Opal Carney MD  Group:  [facility]  Interpreting Physician:  Leo Alanis, MD    SUMMARY    LEFT VENTRICLE:  Systolic function was normal  Ejection fraction was estimated in the range of 55 % to 60 % to be 55 %  Although no diagnostic regional wall motion abnormality was identified, this possibility cannot be completely excluded on the basis of this study  Wall thickness was mildly to moderately increased  There was mild concentric hypertrophy  Doppler parameters were consistent with abnormal left ventricular relaxation (grade 1 diastolic dysfunction)  LEFT ATRIUM:  The atrium was mildly dilated  MITRAL VALVE:  There was moderate to marked annular calcification  There was mild stenosis  By continuity valve area is 1 4 cm2    AORTIC VALVE:  A TAVR bioprosthesis was present  Peak gradient across it around 16 mmHg mean about 8 mmHg with normal valve function  No significant paravalvular leak noted    TRICUSPID VALVE:  There was mild regurgitation  Estimated peak PA pressure was 30 mmHg  IVC, HEPATIC VEINS:  The respirophasic change in diameter was more than 50%  COMPARISONS:  There has been no significant interval change  Comparison was made with the previous study of 20-Sep-2019  HISTORY: PRIOR HISTORY: HTN, CAD, Prosthetic Aortic Valve, CHF, COPD, Pleural Effusion, CVA    PROCEDURE: The procedure was performed at the bedside  This was a routine study  The transthoracic approach was used  The study included complete 2D imaging, M-mode, complete spectral Doppler, and color Doppler  The heart rate was 100 bpm,  at the start of the study  Image quality was adequate  LEFT VENTRICLE: Size was normal  Systolic function was normal  Ejection fraction was estimated in the range of 55 % to 60 % to be 55 %  Although no diagnostic regional wall motion abnormality was identified, this possibility cannot be  completely excluded on the basis of this study  Wall thickness was mildly to moderately increased  There was mild concentric hypertrophy   DOPPLER: Doppler parameters were consistent with abnormal left ventricular relaxation (grade 1  diastolic dysfunction)  RIGHT VENTRICLE: The size was normal  Systolic function was normal     LEFT ATRIUM: The atrium was mildly dilated  RIGHT ATRIUM: Size was normal     MITRAL VALVE: There was moderate to marked annular calcification  There was mild-moderate thickening  There was normal leaflet separation  DOPPLER: Transmitral velocity was minimally increased  There was mild stenosis  By continuity valve  area is 1 4 cm2 There was no regurgitation  AORTIC VALVE: A TAVR bioprosthesis was present  Peak gradient across it around 16 mmHg mean about 8 mmHg with normal valve function  No significant paravalvular leak noted DOPPLER: Transaortic velocity was minimally increased  There was no  evidence for stenosis  There was no significant regurgitation  TRICUSPID VALVE: DOPPLER: There was mild regurgitation  Estimated peak PA pressure was 30 mmHg  PULMONIC VALVE: DOPPLER: There was no significant regurgitation  PERICARDIUM: There was no thickening or calcification  There was no pericardial effusion  AORTA: The root exhibited normal size  SYSTEMIC VEINS: IVC: The inferior vena cava was normal in size  The respirophasic change in diameter was more than 50%  SYSTEM MEASUREMENT TABLES    2D  EF (Teich): 58 36 %    PW  MV E/A Ratio: 0 77    [employer] Accredited Echocardiography Laboratory    Prepared and electronically signed by    Eulalia Kohler MD  Signed 10-Dec-2020 14:34:31    No results found for this or any previous visit  No results found for this or any previous visit  No results found for this or any previous visit  Imaging:   XR chest 1 view portable    Result Date: 7/24/2021  Narrative: CHEST INDICATION:   atrial fibrillation  History of lung cancer  COMPARISON: Chest radiograph from 12/29/2020 and chest CT from 12/9/2020  EXAM PERFORMED/VIEWS:  XR CHEST PORTABLE  FINDINGS: Normal heart size  Median sternotomy  TAVR  Emphysema  Mild pulmonary venous congestion  Right lower lobe scar at the site of treated tumor  Osseous structures normal for age  Impression: Mild pulmonary venous congestion  Right lower lobe scar at the site of treated tumor  Workstation performed: [id number]     Monitor: Personally reviewed  sinus rhythm, heart rate 91 per minute     Medications/ Allergies:     Current Facility-Administered Medications   Medication Dose Route Frequency Provider Last Rate    acetaminophen  650 mg Oral Q6H PRN Quique Poet, CRNP      aspirin  81 mg Oral Daily Quique Poet, CRNP      atorvastatin  40 mg Oral Daily Quique Poet, CRNP      FLUoxetine  20 mg Oral Daily Quique Poet, CRNP      heparin (porcine)  3-20 Units/kg/hr (Order-Specific) Intravenous Titrated Quique Poet, CRNP 6 Units/kg/hr (07/24/21 1004)    heparin (porcine)  1,500 Units Intravenous Q1H PRN Quique Poet, CRNP      heparin (porcine)  3,000 Units Intravenous Q1H PRN Quique Poet, CRNP      melatonin  3 mg Oral HS Quique Poet, CRNP      metoprolol  2 5 mg Intravenous Q6H PRN Quique Poet, CRNP      ondansetron  4 mg Intravenous Q8H PRN Quique Poet, CRNP      pantoprazole  40 mg Oral Early Morning Quique Poet, CRNP      tiotropium  18 mcg Inhalation Daily Quique Poet, CRNP      traMADol  50 mg Oral Q6H PRN Quique Poet, CRNP       acetaminophen, 650 mg, Q6H PRN  heparin (porcine), 1,500 Units, Q1H PRN  heparin (porcine), 3,000 Units, Q1H PRN  metoprolol, 2 5 mg, Q6H PRN  ondansetron, 4 mg, Q8H PRN  traMADol, 50 mg, Q6H PRN      No Known Allergies    Code Status: Level 3 - DNAR and DNI  Advance Directive and Living Will:      POLST:        Jairon Ma MD, [facility]      "This note has been constructed using a voice recognition system  Therefore there may be syntax, spelling, and/or grammatical errors   Please call if you have any questions  "

## 2021-07-24 NOTE — ASSESSMENT & PLAN NOTE
· Baseline creat appears to be 0 8-1 1  · Potentially related to atrial fibrillation with rapid ventricular response  · Closely follow I&O  · Avoid hypotension, nephrotoxic agents   · Trend BMP

## 2021-07-24 NOTE — ASSESSMENT & PLAN NOTE
· Without acute exacerbation   · Continue home Spiriva  · Avoiding nebs given above AF RVR   · Cont home 3L NC   · Goal to maintain spo2 > 88%

## 2021-07-24 NOTE — ASSESSMENT & PLAN NOTE
· Troponin trending up since arrival, now at 0 79  · Likely 2/2 demand ischemia given AF RVR and hypotension   · Echo, Cardiology consult pending   · Heparin gtt as above   · No ST changes on EKG  · Continue tele, trend trop

## 2021-07-24 NOTE — ASSESSMENT & PLAN NOTE
· Holding home benazepril given hypotension, LEOLA   · Unclear if she takes verapamil at home   · Lopressor IV q6H PRN HR >130 AF RVR   · Will restart home metoprolol 25mg Q12H in am

## 2021-07-24 NOTE — ASSESSMENT & PLAN NOTE
· POA, appears to have hx of same   · Cardizem gtt d/c'ed given hypotension   · Bolused digoxin 250mcg in ER, received 125mcg digoxin q6h x2 doses overnight to complete loading dose   · Lopressor 2 5mg q6H PRN for HR persistently >130  · AC: Heparin gtt given LEOLA   · Echo pending   · Cardiology consult pending   · Current goal is HR control, <120

## 2021-07-24 NOTE — ASSESSMENT & PLAN NOTE
Wt Readings from Last 3 Encounters:   07/23/21 49 4 kg (108 lb 14 5 oz)   03/09/21 50 8 kg (112 lb)   12/29/20 52 3 kg (115 lb 6 4 oz)     · Echo 12/10/20 EF 55%, G1DD, mild TR  · Hold home Lasix at present given hypotension, LEOLA   · Daily weights, strict I&O  · Appears euvolemic at present   · Consult cardiology pending

## 2021-07-25 PROBLEM — R79.89 ELEVATED BRAIN NATRIURETIC PEPTIDE (BNP) LEVEL: Status: RESOLVED | Noted: 2021-07-23 | Resolved: 2021-07-25

## 2021-07-25 LAB
ALBUMIN SERPL BCP-MCNC: 2.8 G/DL (ref 3.5–5)
ALP SERPL-CCNC: 55 U/L (ref 46–116)
ALT SERPL W P-5'-P-CCNC: 18 U/L (ref 12–78)
ANION GAP SERPL CALCULATED.3IONS-SCNC: 5 MMOL/L (ref 4–13)
AST SERPL W P-5'-P-CCNC: 14 U/L (ref 5–45)
BACTERIA UR QL AUTO: ABNORMAL /HPF
BASOPHILS # BLD AUTO: 0.01 THOUSANDS/ΜL (ref 0–0.1)
BASOPHILS NFR BLD AUTO: 0 % (ref 0–1)
BILIRUB SERPL-MCNC: 0.18 MG/DL (ref 0.2–1)
BILIRUB UR QL STRIP: NEGATIVE
BUN SERPL-MCNC: 27 MG/DL (ref 5–25)
CA-I BLD-SCNC: 1.22 MMOL/L (ref 1.12–1.32)
CALCIUM ALBUM COR SERPL-MCNC: 9.8 MG/DL (ref 8.3–10.1)
CALCIUM SERPL-MCNC: 8.8 MG/DL (ref 8.3–10.1)
CHLORIDE SERPL-SCNC: 106 MMOL/L (ref 100–108)
CLARITY UR: ABNORMAL
CO2 SERPL-SCNC: 29 MMOL/L (ref 21–32)
COLOR UR: YELLOW
CREAT SERPL-MCNC: 1.13 MG/DL (ref 0.6–1.3)
DIGOXIN SERPL-MCNC: 2 NG/ML (ref 0.8–2)
EOSINOPHIL # BLD AUTO: 0.05 THOUSAND/ΜL (ref 0–0.61)
EOSINOPHIL NFR BLD AUTO: 2 % (ref 0–6)
ERYTHROCYTE [DISTWIDTH] IN BLOOD BY AUTOMATED COUNT: 14.6 % (ref 11.6–15.1)
GFR SERPL CREATININE-BSD FRML MDRD: 45 ML/MIN/1.73SQ M
GLUCOSE SERPL-MCNC: 103 MG/DL (ref 65–140)
GLUCOSE UR STRIP-MCNC: NEGATIVE MG/DL
HCT VFR BLD AUTO: 32.3 % (ref 34.8–46.1)
HGB BLD-MCNC: 9.3 G/DL (ref 11.5–15.4)
HGB UR QL STRIP.AUTO: NEGATIVE
IMM GRANULOCYTES # BLD AUTO: 0.01 THOUSAND/UL (ref 0–0.2)
IMM GRANULOCYTES NFR BLD AUTO: 0 % (ref 0–2)
KETONES UR STRIP-MCNC: NEGATIVE MG/DL
LEUKOCYTE ESTERASE UR QL STRIP: ABNORMAL
LYMPHOCYTES # BLD AUTO: 0.26 THOUSANDS/ΜL (ref 0.6–4.47)
LYMPHOCYTES NFR BLD AUTO: 12 % (ref 14–44)
MAGNESIUM SERPL-MCNC: 2.2 MG/DL (ref 1.6–2.6)
MCH RBC QN AUTO: 26.7 PG (ref 26.8–34.3)
MCHC RBC AUTO-ENTMCNC: 28.8 G/DL (ref 31.4–37.4)
MCV RBC AUTO: 93 FL (ref 82–98)
MONOCYTES # BLD AUTO: 0.29 THOUSAND/ΜL (ref 0.17–1.22)
MONOCYTES NFR BLD AUTO: 13 % (ref 4–12)
MRSA NOSE QL CULT: ABNORMAL
MRSA NOSE QL CULT: ABNORMAL
NEUTROPHILS # BLD AUTO: 1.64 THOUSANDS/ΜL (ref 1.85–7.62)
NEUTS SEG NFR BLD AUTO: 73 % (ref 43–75)
NITRITE UR QL STRIP: POSITIVE
NON-SQ EPI CELLS URNS QL MICRO: ABNORMAL /HPF
NRBC BLD AUTO-RTO: 0 /100 WBCS
PH UR STRIP.AUTO: 6 [PH]
PHOSPHATE SERPL-MCNC: 3.7 MG/DL (ref 2.3–4.1)
PLATELET # BLD AUTO: 124 THOUSANDS/UL (ref 149–390)
PMV BLD AUTO: 10.1 FL (ref 8.9–12.7)
POTASSIUM SERPL-SCNC: 5 MMOL/L (ref 3.5–5.3)
PROT SERPL-MCNC: 5.8 G/DL (ref 6.4–8.2)
PROT UR STRIP-MCNC: NEGATIVE MG/DL
RBC # BLD AUTO: 3.48 MILLION/UL (ref 3.81–5.12)
RBC #/AREA URNS AUTO: ABNORMAL /HPF
SODIUM SERPL-SCNC: 140 MMOL/L (ref 136–145)
SP GR UR STRIP.AUTO: 1.02 (ref 1–1.03)
UROBILINOGEN UR QL STRIP.AUTO: 0.2 E.U./DL
WBC # BLD AUTO: 2.26 THOUSAND/UL (ref 4.31–10.16)
WBC #/AREA URNS AUTO: ABNORMAL /HPF

## 2021-07-25 PROCEDURE — 99233 SBSQ HOSP IP/OBS HIGH 50: CPT | Performed by: INTERNAL MEDICINE

## 2021-07-25 PROCEDURE — NC001 PR NO CHARGE: Performed by: PHYSICIAN ASSISTANT

## 2021-07-25 PROCEDURE — 99232 SBSQ HOSP IP/OBS MODERATE 35: CPT | Performed by: INTERNAL MEDICINE

## 2021-07-25 PROCEDURE — 85025 COMPLETE CBC W/AUTO DIFF WBC: CPT | Performed by: PHYSICIAN ASSISTANT

## 2021-07-25 PROCEDURE — 80053 COMPREHEN METABOLIC PANEL: CPT | Performed by: PHYSICIAN ASSISTANT

## 2021-07-25 PROCEDURE — 82330 ASSAY OF CALCIUM: CPT | Performed by: PHYSICIAN ASSISTANT

## 2021-07-25 PROCEDURE — 81001 URINALYSIS AUTO W/SCOPE: CPT | Performed by: PHYSICIAN ASSISTANT

## 2021-07-25 PROCEDURE — 83735 ASSAY OF MAGNESIUM: CPT | Performed by: PHYSICIAN ASSISTANT

## 2021-07-25 PROCEDURE — 80162 ASSAY OF DIGOXIN TOTAL: CPT | Performed by: PHYSICIAN ASSISTANT

## 2021-07-25 PROCEDURE — 84100 ASSAY OF PHOSPHORUS: CPT | Performed by: PHYSICIAN ASSISTANT

## 2021-07-25 RX ORDER — DIGOXIN 125 MCG
62.5 TABLET ORAL DAILY
Status: DISCONTINUED | OUTPATIENT
Start: 2021-07-25 | End: 2021-07-25

## 2021-07-25 RX ORDER — METOPROLOL TARTRATE 50 MG/1
50 TABLET, FILM COATED ORAL EVERY 12 HOURS SCHEDULED
Status: DISCONTINUED | OUTPATIENT
Start: 2021-07-25 | End: 2021-07-26 | Stop reason: HOSPADM

## 2021-07-25 RX ORDER — CEFAZOLIN SODIUM 1 G/50ML
1000 SOLUTION INTRAVENOUS EVERY 12 HOURS
Status: DISCONTINUED | OUTPATIENT
Start: 2021-07-25 | End: 2021-07-26 | Stop reason: HOSPADM

## 2021-07-25 RX ADMIN — TRAMADOL HYDROCHLORIDE 50 MG: 50 TABLET, FILM COATED ORAL at 16:10

## 2021-07-25 RX ADMIN — DIGOXIN 62.5 MCG: 125 TABLET ORAL at 08:51

## 2021-07-25 RX ADMIN — Medication 3 MG: at 21:17

## 2021-07-25 RX ADMIN — METOPROLOL TARTRATE 25 MG: 25 TABLET, FILM COATED ORAL at 08:51

## 2021-07-25 RX ADMIN — ATORVASTATIN CALCIUM 40 MG: 40 TABLET, FILM COATED ORAL at 08:51

## 2021-07-25 RX ADMIN — TIOTROPIUM BROMIDE 18 MCG: 18 CAPSULE ORAL; RESPIRATORY (INHALATION) at 08:53

## 2021-07-25 RX ADMIN — ACETAMINOPHEN 650 MG: 325 TABLET, FILM COATED ORAL at 19:55

## 2021-07-25 RX ADMIN — PANTOPRAZOLE SODIUM 40 MG: 40 TABLET, DELAYED RELEASE ORAL at 05:40

## 2021-07-25 RX ADMIN — MUPIROCIN 1 APPLICATION: 20 OINTMENT TOPICAL at 21:21

## 2021-07-25 RX ADMIN — ASPIRIN 81 MG: 81 TABLET, COATED ORAL at 08:51

## 2021-07-25 RX ADMIN — APIXABAN 2.5 MG: 2.5 TABLET, FILM COATED ORAL at 08:51

## 2021-07-25 RX ADMIN — CEFAZOLIN SODIUM 1000 MG: 1 SOLUTION INTRAVENOUS at 13:48

## 2021-07-25 RX ADMIN — TRAMADOL HYDROCHLORIDE 50 MG: 50 TABLET, FILM COATED ORAL at 22:20

## 2021-07-25 RX ADMIN — APIXABAN 2.5 MG: 2.5 TABLET, FILM COATED ORAL at 16:56

## 2021-07-25 RX ADMIN — FLUOXETINE 20 MG: 20 CAPSULE ORAL at 08:52

## 2021-07-25 RX ADMIN — METOPROLOL TARTRATE 50 MG: 50 TABLET, FILM COATED ORAL at 21:17

## 2021-07-25 NOTE — ASSESSMENT & PLAN NOTE
· No evidence acute exacerbation   · Continue home Spiriva  · Was avoiding nebs given Afib RVR  · Cont home 3L NC   · Goal to maintain spo2 > 88%

## 2021-07-25 NOTE — PLAN OF CARE
Problem: MOBILITY - ADULT  Goal: Maintain or return to baseline ADL function  Description: INTERVENTIONS:  -  Assess patient's ability to carry out ADLs; assess patient's baseline for ADL function and identify physical deficits which impact ability to perform ADLs (bathing, care of mouth/teeth, toileting, grooming, dressing, etc )  - Assess/evaluate cause of self-care deficits   - Assess range of motion  - Assess patient's mobility; develop plan if impaired  - Assess patient's need for assistive devices and provide as appropriate  - Encourage maximum independence but intervene and supervise when necessary  - Involve family in performance of ADLs  - Assess for home care needs following discharge   - Consider OT consult to assist with ADL evaluation and planning for discharge  - Provide patient education as appropriate  Outcome: Progressing  Goal: Maintains/Returns to pre admission functional level  Description: INTERVENTIONS:  - Perform BMAT or MOVE assessment daily    - Set and communicate daily mobility goal to care team and patient/family/caregiver  - Collaborate with rehabilitation services on mobility goals if consulted  - Perform Range of Motion 4 times a day  - Reposition patient every 2 hours    - Dangle patient 2 times a day  - Stand patient 2 times a day  - Ambulate patient 2 times a day  - Out of bed to chair 2 times a day   - Out of bed for meals 3 times a day  - Out of bed for toileting  - Record patient progress and toleration of activity level   Outcome: Progressing     Problem: Prexisting or High Potential for Compromised Skin Integrity  Goal: Skin integrity is maintained or improved  Description: INTERVENTIONS:  - Identify patients at risk for skin breakdown  - Assess and monitor skin integrity  - Assess and monitor nutrition and hydration status  - Monitor labs   - Assess for incontinence   - Turn and reposition patient  - Assist with mobility/ambulation  - Relieve pressure over bony prominences  - Avoid friction and shearing  - Provide appropriate hygiene as needed including keeping skin clean and dry  - Evaluate need for skin moisturizer/barrier cream  - Collaborate with interdisciplinary team   - Patient/family teaching  - Consider wound care consult   Outcome: Progressing     Problem: Potential for Falls  Goal: Patient will remain free of falls  Description: INTERVENTIONS:  - Educate patient/family on patient safety including physical limitations  - Instruct patient to call for assistance with activity   - Consult OT/PT to assist with strengthening/mobility   - Keep Call bell within reach  - Keep bed low and locked with side rails adjusted as appropriate  - Keep care items and personal belongings within reach  - Initiate and maintain comfort rounds  - Make Fall Risk Sign visible to staff  - Offer Toileting every 2 Hours, in advance of need  - Initiate/Maintain alarm  - Obtain necessary fall risk management equipment:   - Apply yellow socks and bracelet for high fall risk patients  - Consider moving patient to room near nurses station  Outcome: Progressing

## 2021-07-25 NOTE — PROGRESS NOTES
[facility] 43  Progress Note - Chio Biljennifer 1940, 78 y o  female MRN: [id number]  Unit/Bed#: ICU 11 Encounter: [id number]  Primary Care Provider: Manuel Lynch MD   Date and time admitted to hospital: 7/23/2021  4:06 PM    * Atrial fibrillation with rapid ventricular response ([facility] )  Assessment & Plan  · POA, with Hx of same  · Initially placed on Cardizem gtt however d/c'ed given hypotension   · Bolused digoxin 250mcg in ER, received 125mcg digoxin q6h x2 doses   · Noted to continue digoxin daily per cardiology note; however adjusted dose to 62 5 mcg - patient BMI 18 - additionally follow up patient renal function for today as she had component of LEOLA  · Lopressor 2 5mg q6H PRN for HR persistently >130  · Previously this admission Heparin gtt which has been transitioned to Eliquis  · 7/24 ECHO: EF 75% G1DD with Severe PAH with pressure now increased to 70 - previously 30  · Continue Telemetry and hemodynamic monitoring      Acute kidney injury ([facility] )  Assessment & Plan  · Baseline creat appears to be 0 8-1 1  · Potentially related to atrial fibrillation with rapid ventricular response  · Avoid hypotension, nephrotoxic agents   · Continue to closely monitor in setting of initiation of digoxin    Chronic respiratory failure with hypoxia (HCC)  Assessment & Plan  · Continue SpO2 monitoring and maintain goal >88%  · Continue oxygen at home dose 3L  · IS, pulmonary toileting   · Encourage OOB to chair today    COPD (chronic obstructive pulmonary disease) ([facility] )  Assessment & Plan  · No evidence acute exacerbation   · Continue home Spiriva  · Was avoiding nebs given Afib RVR  · Cont home 3L NC   · Goal to maintain spo2 > 88%    Chronic diastolic (congestive) heart failure ([facility] )  Assessment & Plan  Wt Readings from Last 3 Encounters:   07/24/21 49 4 kg (108 lb 14 5 oz)   03/09/21 50 8 kg (112 lb)   12/29/20 52 3 kg (115 lb 6 4 oz)     · Previous Echo 12/10/20 EF 55%, G1DD, mild TR- repeat Echo 7/24 reveals EF 75% g1DD with now severe PAH  · Hold home Lasix at present given hypotension, LEOLA   · Daily weights, strict I&O  · Appears euvolemic at present   · Cardiology following in consult    S/P TAVR (transcatheter aortic valve replacement)  Assessment & Plan  · Pt unclear of year performed   · Cont home ASA, statin     Depression  Assessment & Plan  · Cont home prozac     Elevated brain natriuretic peptide (BNP) level-resolved as of 7/25/2021  Assessment & Plan  · Likely related to rapid heart rate  · Appears euvolemic on exam   · Cardiology consult      ----------------------------------------------------------------------------------------  HPI/24hr events:   Patient admitted 7/23 after presenting in Afib with RVR  Was on Cardizem gtt; however hypotensive  She was loaded with and placed on digoxin with conversion to NSR  Overnight she has remained in NSR  Disposition: Transfer to Med Surg with Telemetry   Code Status: Level 3 - DNAR and DNI  ---------------------------------------------------------------------------------------  SUBJECTIVE  Patient seen and evaluated this am   She denies any new complaints, reporting that she complained of burning with urination and foul smelling urine yesterday; advising this continues today  Review of Systems   Constitutional: Negative for activity change, appetite change, chills, diaphoresis, fatigue and fever  HENT: Negative  Respiratory: Negative for cough, choking and shortness of breath  Cardiovascular: Negative for chest pain  Gastrointestinal: Negative for abdominal pain, nausea and vomiting  Genitourinary: Positive for dysuria  Negative for frequency and urgency  Musculoskeletal: Negative for back pain  Skin: Negative      Neurological: Negative for dizziness and headaches      ---------------------------------------------------------------------------------------  OBJECTIVE    Vitals   Vitals:    07/24/21 2200 07/25/21 0000 07/25/21 0100 21 0200   BP: 147/67 142/63  140/65   BP Location:       Pulse: 78 68 73 68   Resp: 17 18 (!) 34 18   Temp:   97 8 °F (36 6 °C)    TempSrc:       SpO2: 98% 97% 95% 98%   Weight:       Height:         Temp (24hrs), Av 4 °F (36 9 °C), Min:97 8 °F (36 6 °C), Max:98 6 °F (37 °C)  Current: Temperature: 97 8 °F (36 6 °C)          Respiratory:  SpO2: SpO2: 98 %  Nasal Cannula O2 Flow Rate (L/min): 3 L/min    Invasive/non-invasive ventilation settings   Respiratory    Lab Data (Last 4 hours)    None         O2/Vent Data (Last 4 hours)    None                Physical Exam     General:  77 y/o F in bed, awake, alert, and oriented, non-toxic appearing  HEENT: Normocephalic, atraumatic, PERR, Sclera anicteric, conjunctiva pink, oropharynx patent, tolerating secretions  Neck: supple, trachea midline  Heart: RRR without murmur, rub, or gallop  Lungs: decreased breath sounds with an end expiratory wheeze, no rales or rhonchi  Abd: soft, non-tender, no guarding, rebound, or peritoneal signs, active BS noted  : no hanson, self voiding  Back: no CVA tenderness  Skin: warm and dry  Neuro: GCS 15, conscious, alert, and oriented, non-focal exam    Laboratory and Diagnostics:  Results from last 7 days   Lab Units 21  0503 21  0518 21  1625   WBC Thousand/uL 2 26* 4 18* 5 40   HEMOGLOBIN g/dL 9 3* 10 1* 12 4   HEMATOCRIT % 32 3* 35 3 42 2   PLATELETS Thousands/uL 124* 113* 178   NEUTROS PCT % 73  --  91*   MONOS PCT % 13*  --  2*     Results from last 7 days   Lab Units 21  0518 21  2328 21  1625   SODIUM mmol/L 138 132* 139   POTASSIUM mmol/L 4 8 4 6 5 7*   CHLORIDE mmol/L 103 103 101   CO2 mmol/L 23 28 24   ANION GAP mmol/L 12 1* 14*   BUN mg/dL 32* 33* 31*   CREATININE mg/dL 1 44* 1 52* 1 59*   CALCIUM mg/dL 8 9 8 3 9 2   GLUCOSE RANDOM mg/dL 108 129 100   ALT U/L  --   --  20   AST U/L  --   --  38   ALK PHOS U/L  --   --  79   ALBUMIN g/dL  --   --  3 8   TOTAL BILIRUBIN mg/dL  -- --  0 32     Results from last 7 days   Lab Units 07/24/21  0518 07/23/21  1625   MAGNESIUM mg/dL 2 6 1 7   PHOSPHORUS mg/dL 3 6  --       Results from last 7 days   Lab Units 07/24/21  1505 07/24/21  0803 07/23/21  2344 07/23/21  1710   INR   --   --  1 05 1 01   PTT seconds 58* 206* >210* 38*      Results from last 7 days   Lab Units 07/24/21  0518 07/24/21  0153 07/23/21  2328 07/23/21  1949 07/23/21  1726   TROPONIN I ng/mL 0 54* 0 79* 0 50* 0 04 <0 02         Micro  7/23 MRSA: Pending  7/25 UA: Pending    EKG: Telemetry monitor reviewed noting NSR without ectopy  Imaging: I have personally reviewed pertinent reports  and I have personally reviewed pertinent films in PACS    Intake and Output  I/O       07/23 0701 - 07/24 0700 07/24 0701 - 07/25 0700    P  O   590    I V  (mL/kg) 66 4 (1 3) 64 4 (1 3)    IV Piggyback 550     Total Intake(mL/kg) 616 4 (12 5) 654 4 (13 2)    Urine (mL/kg/hr) 130 630 (0 5)    Total Output 130 630    Net +486 4 +24 4                Height and Weights   Height: 5' 5" (165 1 cm)  IBW (Ideal Body Weight): 57 kg  Body mass index is 18 12 kg/m²  Weight (last 2 days)     Date/Time   Weight    07/24/21 0800   49 4 (108 91)    07/23/21 1900   49 4 (108 91)    07/23/21 1613   49 9 (110)                Nutrition       Diet Orders   (From admission, onward)             Start     Ordered    07/24/21 1054  Diet Cardiovascular; Sodium 2 GM  Diet effective now     Question Answer Comment   Diet Type Cardiovascular    Cardiac Sodium 2 GM    RD to adjust diet per protocol?  No        07/24/21 1053                  Active Medications  Scheduled Meds:  Current Facility-Administered Medications   Medication Dose Route Frequency Provider Last Rate    acetaminophen  650 mg Oral Q6H PRN Gearldine Finely, CRNP      apixaban  2 5 mg Oral BID Sue Singletary PA-C      aspirin  81 mg Oral Daily Gearldine Finely, CRNP      atorvastatin  40 mg Oral Daily Gearldine Finely, CRNP      digoxin  62 5 mcg Oral Daily Ramila Stern PA-C      FLUoxetine  20 mg Oral Daily HILDA Saul      melatonin  3 mg Oral HS HILDA Saul      metoprolol  2 5 mg Intravenous Q6H PRN HILDA Saul      metoprolol tartrate  25 mg Oral Q12H [address] [place]      ondansetron  4 mg Intravenous Q8H PRN HILDA Saul      pantoprazole  40 mg Oral Early Morning HILDA Saul      tiotropium  18 mcg Inhalation Daily HILDA Saul      traMADol  50 mg Oral Q6H PRN HILDA Saul       Continuous Infusions:     PRN Meds:   acetaminophen, 650 mg, Q6H PRN  metoprolol, 2 5 mg, Q6H PRN  ondansetron, 4 mg, Q8H PRN  traMADol, 50 mg, Q6H PRN        Invasive Devices Review  Invasive Devices     Peripheral Intravenous Line            Peripheral IV 07/23/21 Left Antecubital 2 days    Peripheral IV 07/23/21 Right Forearm 1 day                Rationale for remaining devices: Peripheral IV access only  ---------------------------------------------------------------------------------------  Advance Directive and Living Will:      Power of :    POLST:    ---------------------------------------------------------------------------------------  Care Time Delivered:   No Critical Care time spent       Radha Holcomb PA-C      Portions of the record may have been created with voice recognition software  Occasional wrong word or "sound a like" substitutions may have occurred due to the inherent limitations of voice recognition software    Read the chart carefully and recognize, using context, where substitutions have occurred

## 2021-07-25 NOTE — ASSESSMENT & PLAN NOTE
· Baseline creat appears to be 0 8-1 1  · Potentially related to atrial fibrillation with rapid ventricular response  · Avoid hypotension, nephrotoxic agents   · Continue to closely monitor in setting of initiation of digoxin

## 2021-07-25 NOTE — ASSESSMENT & PLAN NOTE
· POA, with Hx of same  · Initially placed on Cardizem gtt however d/c'ed given hypotension   · Bolused digoxin 250mcg in ER, received 125mcg digoxin q6h x2 doses   · Noted to continue digoxin daily per cardiology note; however adjusted dose to 62 5 mcg - patient BMI 18 - additionally follow up patient renal function for today as she had component of LEOLA  · Lopressor 2 5mg q6H PRN for HR persistently >130  · Previously this admission Heparin gtt which has been transitioned to Eliquis  · 7/24 ECHO: EF 75% G1DD with Severe PAH with pressure now increased to 70 - previously 30  · Continue Telemetry and hemodynamic monitoring

## 2021-07-25 NOTE — ASSESSMENT & PLAN NOTE
Wt Readings from Last 3 Encounters:   07/24/21 49 4 kg (108 lb 14 5 oz)   03/09/21 50 8 kg (112 lb)   12/29/20 52 3 kg (115 lb 6 4 oz)     · Previous Echo 12/10/20 EF 55%, G1DD, mild TR- repeat Echo 7/24 reveals EF 75% g1DD with now severe PAH  · Hold home Lasix at present given hypotension, LEOLA   · Daily weights, strict I&O  · Appears euvolemic at present   · Cardiology following in consult

## 2021-07-25 NOTE — PROGRESS NOTES
Progress Note - Cardiology   [facility]     Ibeth Grover 78 y o  female MRN: [id number]  : 1940  Unit/Bed#: ICU 11 Encounter: [id number]    ASSESSMENT:  Paroxysmal atrial fibrillation with rapid ventricular response   now in sinus rhythm  Was hypotensive on IV Cardizem therefore discontinued and started on digoxin and p r n  Lopressor,   now on oral metoprolol and Eliquis      Acute on chronic diastolic heart failure   Echo, 2020:EF 55%, G1 DD, mild  To moderatemitral stenosis     Lexiscan 2020:Equivocal study after pharmacologic vasodilation without reproduction of symptoms  There is a fixed anterior apical defect which is most likely due to body attenuation artifact  However t i d  Was 1 36  Hence cannot rule out  balance ischemia  Left ventricular systolic function was normal       Elevated  Troponin, probably non MI elevation   0 05--> 0 79 -->0  54   probably secondary to prolonged tachycardia and LEOLA      S/p TAVR at [facility]     History of CVA/TIA     COPD,   Chronic respiratory failure with hypoxia   History of lung cancer, status post radiation therapy in 2018  On continuous home O2 at 3 L      History of chronic smoking     Recently treated UTI         RECOMMENDATIONS:  Increase metoprolol to 50 mg q 12 hours from tomorrow and  discontinue digoxin     continue Eliquis 2 5 mg b i d  and atorvastatin          Subjective / Objective:     Review of Systems   Feels weak otherwise has no symptoms  Denies chest pain, palpitations, syncope   Complains of dysuria  Vitals: Blood pressure 155/67, pulse 74, temperature 98 4 °F (36 9 °C), temperature source Temporal, resp  rate (!) 24, height 5' 5" (1 651 m), weight 48 3 kg (106 lb 7 7 oz), SpO2 96 %, not currently breastfeeding  Vitals:    21 0800 21 0500   Weight: 49 4 kg (108 lb 14 5 oz) 48 3 kg (106 lb 7 7 oz)     Body mass index is 17 72 kg/m²    BP Readings from Last 3 Encounters: 07/25/21 155/67   03/09/21 113/85   12/31/20 139/62     Orthostatic Blood Pressures      Most Recent Value   Blood Pressure  155/67 filed at 07/25/2021 1200   Patient Position - Orthostatic VS  Sitting filed at 07/25/2021 1200        I/O       07/23 0701 - 07/24 0700 07/24 0701 - 07/25 0700 07/25 0701 - 07/26 0700    P  O   590 650    I V  (mL/kg) 66 4 (1 3) 64 4 (1 3)     IV Piggyback 550      Total Intake(mL/kg) 616 4 (12 5) 654 4 (13 5) 650 (13 5)    Urine (mL/kg/hr) 130 680 (0 6) 250 (1)    Total Output 130 680 250    Net +486 4 -25 7 +400               Invasive Devices     Peripheral Intravenous Line            Peripheral IV 07/23/21 Left Antecubital 2 days    Peripheral IV 07/23/21 Right Forearm 1 day                  Intake/Output Summary (Last 24 hours) at 7/25/2021 1218  Last data filed at 7/25/2021 1215  Gross per 24 hour   Intake 1284 35 ml   Output 850 ml   Net 434 35 ml         Physical Exam:   Physical Exam    Neurologic:  Alert & oriented x 3,  no focal deficits noted   Constitutional:  in no acute distress  Eyes:  PERRL, conjunctiva normal   HENT:  Atraumatic, external ears normal, nose normal,   NECK: Normal range of motion, no tenderness, neck is supple , No JVP  Respiratory:  Decreased air entry with expiratory rhonchi  Cardiovascular: Regular S1-S2 with a I/VI ejection systolic murmur  No pericardial rub or gallop audible  GI:  Soft, nondistended, audible bowel sounds, nontender, no hepatosplenomegaly appreciated  Musculoskeletal:  No tenderness, no deformities  Extremities:  No appreciable pitting edema   Distal pulses are present  Psychiatric:  Speech and behavior appropriate             Medications/ Allergies:     Current Facility-Administered Medications   Medication Dose Route Frequency Provider Last Rate    acetaminophen  650 mg Oral Q6H PRN Loralyn Kettle, CRNP      apixaban  2 5 mg Oral BID Sue Singletary PA-C      aspirin  81 mg Oral Daily Loralyn Kettle, CRNP      atorvastatin 40 mg Oral Daily Blondell Haritha, CRNP      cefazolin  1,500 mg Intravenous Q12H Sue Singletary PA-C      digoxin  62 5 mcg Oral Daily Kathleen Schaeffer PA-C      FLUoxetine  20 mg Oral Daily Blondell [place], CRNP      melatonin  3 mg Oral HS Blondell [place], CRNP      metoprolol  2 5 mg Intravenous Q6H PRN Blondell Haritha, CRNP      metoprolol tartrate  25 mg Oral Q12H [facility] Araceli PradoMARIBEL      ondansetron  4 mg Intravenous Q8H PRN Blondell [place], CRNP      pantoprazole  40 mg Oral Early Morning Blondell [place], CRNP      tiotropium  18 mcg Inhalation Daily Blondell Haritha, CRNP      traMADol  50 mg Oral Q6H PRN Blondell Haritha, CRNP       acetaminophen, 650 mg, Q6H PRN  metoprolol, 2 5 mg, Q6H PRN  ondansetron, 4 mg, Q8H PRN  traMADol, 50 mg, Q6H PRN      No Known Allergies    VTE Pharmacologic Prophylaxis:   Eliquis    Labs:   Troponins:  Results from last 7 days   Lab Units 07/24/21  0518 07/24/21  0153 07/23/21  2328   TROPONIN I ng/mL 0 54* 0 79* 0 50*       CBC with diff:  Results from last 7 days   Lab Units 07/25/21  0503 07/24/21  0518 07/23/21  1625   WBC Thousand/uL 2 26* 4 18* 5 40   HEMOGLOBIN g/dL 9 3* 10 1* 12 4   HEMATOCRIT % 32 3* 35 3 42 2   MCV fL 93 94 91   PLATELETS Thousands/uL 124* 113* 178   MCH pg 26 7* 27 0 26 8   MCHC g/dL 28 8* 28 6* 29 4*   RDW % 14 6 15 2* 15 3*   MPV fL 10 1 11 5 10 8   NRBC AUTO /100 WBCs 0  --  0       CMP:  Results from last 7 days   Lab Units 07/25/21  0503 07/24/21  0518 07/23/21  2328 07/23/21  1625   SODIUM mmol/L 140 138 132* 139   POTASSIUM mmol/L 5 0 4 8 4 6 5 7*   CHLORIDE mmol/L 106 103 103 101   CO2 mmol/L 29 23 28 24   ANION GAP mmol/L 5 12 1* 14*   BUN mg/dL 27* 32* 33* 31*   CREATININE mg/dL 1 13 1 44* 1 52* 1 59*   CALCIUM mg/dL 8 8 8 9 8 3 9 2   AST U/L 14  --   --  38   ALT U/L 18  --   --  20   ALK PHOS U/L 55  --   --  79   TOTAL PROTEIN g/dL 5 8*  --   --  7 9   ALBUMIN g/dL 2 8*  --   --  3 8   TOTAL BILIRUBIN mg/dL 0 18*  --   --  0 32   EGFR ml/min/1 73sq m 45 34 31 30       Magnesium:  Results from last 7 days   Lab Units 21  0503 21  0518 21  1625   MAGNESIUM mg/dL 2 2 2 6 1 7     Coags:  Results from last 7 days   Lab Units 21  1505 21  0803 21  2344 21  1710   PTT seconds 58* 206* >210* 38*   INR   --   --  1 05 1 01     TSH:  Results from last 7 days   Lab Units 21  1625   TSH 3RD GENERATON uIU/mL 6 836*     No components found for: TSH3  Lipid Profile:    Hgb A1c:    NT-proBNP:   Recent Labs     21  1625   NTBNP 2,527*        Imaging & Testing   I have personally reviewed pertinent reports  Echo complete with contrast if indicated    Result Date: 2021  Narrative: [address] [facility] [place] 6 [phone] Transthoracic Echocardiogram 2D, M-mode, Doppler, and Color Doppler Study date:  2021 Patient: Yazan Carpenter MR number: [id number] Account number: [de-identified] : 1940 Age: 78 years Gender: Female Status: Inpatient Location: Bedside Height: 65 in Weight: 107 8 lb BP: 110/ 53 mmHg Indications: Chest pain,dizziness   Diagnoses: I42 9 - Cardiomyopathy, unspecified Sonographer:  HELDER Brown Primary Physician:  Latonia Snow MD Group:  [facility] Interpreting Physician:  Ene Castro MD SUMMARY COMPARISONS: Compared to previous study, patient now has severe pulmonary hypertension and peak PA pressure is increased from 30 to 70 mm Hg LEFT VENTRICLE: LV measurements were taken in apical 2 chamber views Normal left ventricular chamber dimension There is mild concentric left ventricular hypertrophy Systolic function is hyperdynamic with an ejection fraction of 75% No definite regional wall motion abnormality seen although it cannot be absolutely excluded on the basis of this study Grade 1 diastolic dysfunction RIGHT VENTRICLE: Right ventricular free wall was not adequately visualized Visually RV size appears borderline to mildly enlarged Systolic function also appears borderline with no TAPSE measurement LEFT ATRIUM: Mild to moderately dilated left atrium MITRAL VALVE: There is mild to moderate thickening of mitral valve leaflets There is moderate to severe mitral annular calcification Leaflet separation is mildly decreased There is mild to moderate mitral stenosis with mitral valve area of 2 1 cm2 and mean gradient of 6 72 mm Hg There is mild to moderate mitral regurgitation AORTIC VALVE: Aortic valve was not well visualized  A bioprosthesis/TAVR is present No evidence of significant aortic stenosis  Mean gradient is 5 and peak gradient is 9 mm Hg No paravalvular leak or aortic valve regurgitation seen TRICUSPID VALVE: There was mild to moderate regurgitation  There is severe pulmonary hypertension with peak PA pressure of 70 mm Hg PULMONIC VALVE: There was mild regurgitation  IVC, HEPATIC VEINS: IVC size and respirophasic variations appear preserved COMPARISONS: Compared to previous study, patient now has severe pulmonary hypertension and peak PA pressure is increased from 30 to 70 mm Hg HISTORY: PRIOR HISTORY: chronic diastolic CHF,A Fib ,CVA,HTN,Gastroesophageal reflux disease,Prosthetic Aortic Valve,Lung Cancer,COPD,Chronic kidney disease  PROCEDURE: The procedure was performed at the bedside  This was a routine study  Technically difficult study resulting in off axis measurements The transthoracic approach was used  The study included complete 2D imaging, M-mode, complete spectral Doppler, and color Doppler  The heart rate was 94 bpm, at the start of the study  Images were obtained from the parasternal, apical, subcostal, and suprasternal notch acoustic windows  Echocardiographic views were limited due to chest wall deformity, poor patient compliance, poor acoustic window availability, decreased penetration, and lung interference  This was a technically difficult study   LEFT VENTRICLE: LV measurements were taken in apical 2 chamber views Normal left ventricular chamber dimension There is mild concentric left ventricular hypertrophy Systolic function is hyperdynamic with an ejection fraction of 75% No definite regional wall motion abnormality seen although it cannot be absolutely excluded on the basis of this study Grade 1 diastolic dysfunction RIGHT VENTRICLE: Right ventricular free wall was not adequately visualized Visually RV size appears borderline to mildly enlarged Systolic function also appears borderline with no TAPSE measurement LEFT ATRIUM: Mild to moderately dilated left atrium RIGHT ATRIUM: Size was normal  MITRAL VALVE: There is mild to moderate thickening of mitral valve leaflets There is moderate to severe mitral annular calcification Leaflet separation is mildly decreased There is mild to moderate mitral stenosis with mitral valve area of 2 1 cm2 and mean gradient of 6 72 mm Hg There is mild to moderate mitral regurgitation AORTIC VALVE: Aortic valve was not well visualized  A bioprosthesis/TAVR is present No evidence of significant aortic stenosis  Mean gradient is 5 and peak gradient is 9 mm Hg No paravalvular leak or aortic valve regurgitation seen TRICUSPID VALVE: DOPPLER: There was mild to moderate regurgitation  There is severe pulmonary hypertension with peak PA pressure of 70 mm Hg PULMONIC VALVE: DOPPLER: There was mild regurgitation  PERICARDIUM: There was no pericardial effusion  The pericardium was normal in appearance  AORTA: The root exhibited normal size   SYSTEMIC VEINS: IVC: IVC size and respirophasic variations appear preserved SYSTEM MEASUREMENT TABLES 2D EF (Teich): 58 98 % %FS: 30 59 % Ao Diam: 2 82 cm EDV(Teich): 56 52 ml ESV(Teich): 23 19 ml IVSd: 1 28 cm LA Area: 23 84 cm2 LA Diam: 4 46 cm LVEDV MOD A4C: 70 18 ml LVEF MOD A4C: 67 91 % LVESV MOD A4C: 22 52 ml LVIDd: 3 66 cm LVIDs: 2 54 cm LVLd A4C: 7 05 cm LVLs A4C: 5 64 cm LVOT Diam: 1 98 cm LVPWd: 1 34 cm RA Area: 10 98 cm2 RVIDd: 2 68 cm SV (Teich): 33 34 ml SV MOD A4C: 47 66 ml CW AV Env  Ti: 220 74 ms AV VTI: 23 26 cm AV Vmax: 1 5 m/s AV Vmean: 1 05 m/s AV maxP 06 mmHg AV meanP 05 mmHg TR Vmax: 4 09 m/s TR maxP 94 mmHg PW MV E/A Ratio: 0 84 E' Sept: 0 06 m/s E/E' Sept: 22 95 LVOT Env  Ti: 281 64 ms LVOT VTI: 25 72 cm LVOT Vmax: 1 3 m/s LVOT Vmean: 0 91 m/s LVOT maxP 79 mmHg LVOT meanPG: 3 81 mmHg MV A Ermias: 1 6 m/s MV Dec Bleckley: 6 99 m/s2 MV DecT: 193 88 ms MV E Ermias: 1 35 m/s MV PHT: 56 23 ms MVA By PHT: 3 91 cm2 [address] Accredited Echocardiography Laboratory Prepared and electronically signed by Huber Manjarrez MD Signed 2021 12:31:58     XR chest 1 view portable    Result Date: 2021  Narrative: CHEST INDICATION:   atrial fibrillation  History of lung cancer  COMPARISON: Chest radiograph from 2020 and chest CT from 2020  EXAM PERFORMED/VIEWS:  XR CHEST PORTABLE  FINDINGS: Normal heart size  Median sternotomy  TAVR  Emphysema  Mild pulmonary venous congestion  Right lower lobe scar at the site of treated tumor  Osseous structures normal for age  Impression: Mild pulmonary venous congestion  Right lower lobe scar at the site of treated tumor  Workstation performed: [id number]        EKG / Monitor: Personally reviewed  sinus rhythm, heart rate 77 per minute    Cardiac testing:   Results for orders placed during the hospital encounter of 21    Echo complete with contrast if indicated    Narrative  [address]  [address] [facility] [phone]    Transthoracic Echocardiogram  2D, M-mode, Doppler, and Color Doppler    Study date:  2021    Patient: Halima Mccall  MR number: [id number]  Account number: [de-identified]  : 1940  Age: 78 years  Gender: Female  Status: Inpatient  Location: Bedside  Height: 65 in  Weight: 107 8 lb  BP: 110/ 53 mmHg    Indications: Chest pain,dizziness      Diagnoses: I42 9 - Cardiomyopathy, unspecified    Sonographer:  HELDER Dietz  Primary Physician:  Lennox Hilton MD  Group:  [facility]  Interpreting Physician:  Carla Velez MD    SUMMARY    COMPARISONS:  Compared to previous study, patient now has severe pulmonary hypertension and peak PA pressure is increased from 30 to 70 mm Hg    LEFT VENTRICLE:  LV measurements were taken in apical 2 chamber views  Normal left ventricular chamber dimension  There is mild concentric left ventricular hypertrophy  Systolic function is hyperdynamic with an ejection fraction of 75%  No definite regional wall motion abnormality seen although it cannot be absolutely excluded on the basis of this study  Grade 1 diastolic dysfunction    RIGHT VENTRICLE:  Right ventricular free wall was not adequately visualized  Visually RV size appears borderline to mildly enlarged  Systolic function also appears borderline with no TAPSE measurement    LEFT ATRIUM:  Mild to moderately dilated left atrium    MITRAL VALVE:  There is mild to moderate thickening of mitral valve leaflets  There is moderate to severe mitral annular calcification  Leaflet separation is mildly decreased  There is mild to moderate mitral stenosis with mitral valve area of 2 1 cm2 and mean gradient of 6 72 mm Hg  There is mild to moderate mitral regurgitation    AORTIC VALVE:  Aortic valve was not well visualized  A bioprosthesis/TAVR is present  No evidence of significant aortic stenosis  Mean gradient is 5 and peak gradient is 9 mm Hg  No paravalvular leak or aortic valve regurgitation seen    TRICUSPID VALVE:  There was mild to moderate regurgitation  There is severe pulmonary hypertension with peak PA pressure of 70 mm Hg    PULMONIC VALVE:  There was mild regurgitation      IVC, HEPATIC VEINS:  IVC size and respirophasic variations appear preserved    COMPARISONS:  Compared to previous study, patient now has severe pulmonary hypertension and peak PA pressure is increased from 30 to 70 mm Hg    HISTORY: PRIOR HISTORY: chronic diastolic CHF,A Fib ,CVA,HTN,Gastroesophageal reflux disease,Prosthetic Aortic Valve,Lung Cancer,COPD,Chronic kidney disease  PROCEDURE: The procedure was performed at the bedside  This was a routine study  Technically difficult study resulting in off axis measurements The transthoracic approach was used  The study included complete 2D imaging, M-mode, complete  spectral Doppler, and color Doppler  The heart rate was 94 bpm, at the start of the study  Images were obtained from the parasternal, apical, subcostal, and suprasternal notch acoustic windows  Echocardiographic views were limited due to  chest wall deformity, poor patient compliance, poor acoustic window availability, decreased penetration, and lung interference  This was a technically difficult study  LEFT VENTRICLE: LV measurements were taken in apical 2 chamber views  Normal left ventricular chamber dimension  There is mild concentric left ventricular hypertrophy  Systolic function is hyperdynamic with an ejection fraction of 75%  No definite regional wall motion abnormality seen although it cannot be absolutely excluded on the basis of this study  Grade 1 diastolic dysfunction    RIGHT VENTRICLE: Right ventricular free wall was not adequately visualized  Visually RV size appears borderline to mildly enlarged  Systolic function also appears borderline with no TAPSE measurement    LEFT ATRIUM: Mild to moderately dilated left atrium    RIGHT ATRIUM: Size was normal     MITRAL VALVE: There is mild to moderate thickening of mitral valve leaflets  There is moderate to severe mitral annular calcification  Leaflet separation is mildly decreased  There is mild to moderate mitral stenosis with mitral valve area of 2 1 cm2 and mean gradient of 6 72 mm Hg  There is mild to moderate mitral regurgitation    AORTIC VALVE: Aortic valve was not well visualized    A bioprosthesis/TAVR is present  No evidence of significant aortic stenosis  Mean gradient is 5 and peak gradient is 9 mm Hg  No paravalvular leak or aortic valve regurgitation seen    TRICUSPID VALVE: DOPPLER: There was mild to moderate regurgitation  There is severe pulmonary hypertension with peak PA pressure of 70 mm Hg    PULMONIC VALVE: DOPPLER: There was mild regurgitation  PERICARDIUM: There was no pericardial effusion  The pericardium was normal in appearance  AORTA: The root exhibited normal size  SYSTEMIC VEINS: IVC: IVC size and respirophasic variations appear preserved    SYSTEM MEASUREMENT TABLES    2D  EF (Teich): 58 98 %  %FS: 30 59 %  Ao Diam: 2 82 cm  EDV(Teich): 56 52 ml  ESV(Teich): 23 19 ml  IVSd: 1 28 cm  LA Area: 23 84 cm2  LA Diam: 4 46 cm  LVEDV MOD A4C: 70 18 ml  LVEF MOD A4C: 67 91 %  LVESV MOD A4C: 22 52 ml  LVIDd: 3 66 cm  LVIDs: 2 54 cm  LVLd A4C: 7 05 cm  LVLs A4C: 5 64 cm  LVOT Diam: 1 98 cm  LVPWd: 1 34 cm  RA Area: 10 98 cm2  RVIDd: 2 68 cm  SV (Teich): 33 34 ml  SV MOD A4C: 47 66 ml    CW  AV Env  Ti: 220 74 ms  AV VTI: 23 26 cm  AV Vmax: 1 5 m/s  AV Vmean: 1 05 m/s  AV maxP 06 mmHg  AV meanP 05 mmHg  TR Vmax: 4 09 m/s  TR maxP 94 mmHg    PW  MV E/A Ratio: 0 84  E' Sept: 0 06 m/s  E/E' Sept: 22 95  LVOT Env  Ti: 281 64 ms  LVOT VTI: 25 72 cm  LVOT Vmax: 1 3 m/s  LVOT Vmean: 0 91 m/s  LVOT maxP 79 mmHg  LVOT meanPG: 3 81 mmHg  MV A Ermias: 1 6 m/s  MV Dec [place]: 6 99 m/s2  MV DecT: 193 88 ms  MV E Ermias: 1 35 m/s  MV PHT: 56 23 ms  MVA By PHT: 3 91 cm2    [employer] Accredited Echocardiography Laboratory    Prepared and electronically signed by    Rebecca Johnson MD  Signed 2021 12:31:58          Dr Rebecca Johnson MD, [facility]      "This note has been constructed using a voice recognition system  Therefore there may be syntax, spelling, and/or grammatical errors   Please call if you have any questions  "

## 2021-07-25 NOTE — ASSESSMENT & PLAN NOTE
· Continue SpO2 monitoring and maintain goal >88%  · Continue oxygen at home dose 3L  · IS, pulmonary toileting   · Encourage OOB to chair today

## 2021-07-26 VITALS
HEIGHT: 65 IN | SYSTOLIC BLOOD PRESSURE: 161 MMHG | WEIGHT: 107.14 LBS | RESPIRATION RATE: 32 BRPM | BODY MASS INDEX: 17.85 KG/M2 | OXYGEN SATURATION: 100 % | DIASTOLIC BLOOD PRESSURE: 71 MMHG | HEART RATE: 65 BPM | TEMPERATURE: 98.1 F

## 2021-07-26 PROBLEM — E44.0 MODERATE PROTEIN-CALORIE MALNUTRITION (HCC): Status: ACTIVE | Noted: 2021-07-26

## 2021-07-26 LAB
ALBUMIN SERPL BCP-MCNC: 2.7 G/DL (ref 3.5–5)
ALP SERPL-CCNC: 53 U/L (ref 46–116)
ALT SERPL W P-5'-P-CCNC: 15 U/L (ref 12–78)
ANION GAP SERPL CALCULATED.3IONS-SCNC: 4 MMOL/L (ref 4–13)
AST SERPL W P-5'-P-CCNC: 11 U/L (ref 5–45)
BASOPHILS # BLD AUTO: 0.01 THOUSANDS/ΜL (ref 0–0.1)
BASOPHILS NFR BLD AUTO: 0 % (ref 0–1)
BILIRUB SERPL-MCNC: 0.18 MG/DL (ref 0.2–1)
BUN SERPL-MCNC: 23 MG/DL (ref 5–25)
CALCIUM ALBUM COR SERPL-MCNC: 9.7 MG/DL (ref 8.3–10.1)
CALCIUM SERPL-MCNC: 8.7 MG/DL (ref 8.3–10.1)
CHLORIDE SERPL-SCNC: 104 MMOL/L (ref 100–108)
CO2 SERPL-SCNC: 30 MMOL/L (ref 21–32)
CREAT SERPL-MCNC: 0.86 MG/DL (ref 0.6–1.3)
EOSINOPHIL # BLD AUTO: 0.09 THOUSAND/ΜL (ref 0–0.61)
EOSINOPHIL NFR BLD AUTO: 3 % (ref 0–6)
ERYTHROCYTE [DISTWIDTH] IN BLOOD BY AUTOMATED COUNT: 14.3 % (ref 11.6–15.1)
GFR SERPL CREATININE-BSD FRML MDRD: 63 ML/MIN/1.73SQ M
GLUCOSE SERPL-MCNC: 109 MG/DL (ref 65–140)
HCT VFR BLD AUTO: 29.6 % (ref 34.8–46.1)
HGB BLD-MCNC: 8.7 G/DL (ref 11.5–15.4)
IMM GRANULOCYTES # BLD AUTO: 0 THOUSAND/UL (ref 0–0.2)
IMM GRANULOCYTES NFR BLD AUTO: 0 % (ref 0–2)
LYMPHOCYTES # BLD AUTO: 0.34 THOUSANDS/ΜL (ref 0.6–4.47)
LYMPHOCYTES NFR BLD AUTO: 11 % (ref 14–44)
MAGNESIUM SERPL-MCNC: 1.8 MG/DL (ref 1.6–2.6)
MCH RBC QN AUTO: 26.9 PG (ref 26.8–34.3)
MCHC RBC AUTO-ENTMCNC: 29.4 G/DL (ref 31.4–37.4)
MCV RBC AUTO: 92 FL (ref 82–98)
MONOCYTES # BLD AUTO: 0.32 THOUSAND/ΜL (ref 0.17–1.22)
MONOCYTES NFR BLD AUTO: 11 % (ref 4–12)
NEUTROPHILS # BLD AUTO: 2.24 THOUSANDS/ΜL (ref 1.85–7.62)
NEUTS SEG NFR BLD AUTO: 75 % (ref 43–75)
NRBC BLD AUTO-RTO: 0 /100 WBCS
PLATELET # BLD AUTO: 138 THOUSANDS/UL (ref 149–390)
PMV BLD AUTO: 10.5 FL (ref 8.9–12.7)
POTASSIUM SERPL-SCNC: 5.1 MMOL/L (ref 3.5–5.3)
PROT SERPL-MCNC: 5.6 G/DL (ref 6.4–8.2)
RBC # BLD AUTO: 3.23 MILLION/UL (ref 3.81–5.12)
SODIUM SERPL-SCNC: 138 MMOL/L (ref 136–145)
WBC # BLD AUTO: 3 THOUSAND/UL (ref 4.31–10.16)

## 2021-07-26 PROCEDURE — 83735 ASSAY OF MAGNESIUM: CPT | Performed by: PHYSICIAN ASSISTANT

## 2021-07-26 PROCEDURE — 80053 COMPREHEN METABOLIC PANEL: CPT | Performed by: PHYSICIAN ASSISTANT

## 2021-07-26 PROCEDURE — 99239 HOSP IP/OBS DSCHRG MGMT >30: CPT | Performed by: INTERNAL MEDICINE

## 2021-07-26 PROCEDURE — 99232 SBSQ HOSP IP/OBS MODERATE 35: CPT | Performed by: INTERNAL MEDICINE

## 2021-07-26 PROCEDURE — 97530 THERAPEUTIC ACTIVITIES: CPT

## 2021-07-26 PROCEDURE — 85025 COMPLETE CBC W/AUTO DIFF WBC: CPT | Performed by: PHYSICIAN ASSISTANT

## 2021-07-26 PROCEDURE — 97163 PT EVAL HIGH COMPLEX 45 MIN: CPT

## 2021-07-26 RX ORDER — METOPROLOL TARTRATE 50 MG/1
50 TABLET, FILM COATED ORAL EVERY 12 HOURS SCHEDULED
Qty: 60 TABLET | Refills: 0 | Status: SHIPPED | OUTPATIENT
Start: 2021-07-26 | End: 2021-10-08 | Stop reason: SDUPTHER

## 2021-07-26 RX ADMIN — METOPROLOL TARTRATE 50 MG: 50 TABLET, FILM COATED ORAL at 08:09

## 2021-07-26 RX ADMIN — TIOTROPIUM BROMIDE 18 MCG: 18 CAPSULE ORAL; RESPIRATORY (INHALATION) at 08:11

## 2021-07-26 RX ADMIN — CEFAZOLIN SODIUM 1000 MG: 1 SOLUTION INTRAVENOUS at 00:41

## 2021-07-26 RX ADMIN — FLUOXETINE 20 MG: 20 CAPSULE ORAL at 08:11

## 2021-07-26 RX ADMIN — PANTOPRAZOLE SODIUM 40 MG: 40 TABLET, DELAYED RELEASE ORAL at 05:10

## 2021-07-26 RX ADMIN — MUPIROCIN 1 APPLICATION: 20 OINTMENT TOPICAL at 08:11

## 2021-07-26 RX ADMIN — ASPIRIN 81 MG: 81 TABLET, COATED ORAL at 08:10

## 2021-07-26 RX ADMIN — APIXABAN 2.5 MG: 2.5 TABLET, FILM COATED ORAL at 08:09

## 2021-07-26 RX ADMIN — ATORVASTATIN CALCIUM 40 MG: 40 TABLET, FILM COATED ORAL at 08:10

## 2021-07-26 NOTE — ASSESSMENT & PLAN NOTE
Troponins continued remained flat  Likely non MI troponin elevation in the setting of acute kidney injury and AFib  2D echo showed no significant wall motion abnormalities  Appreciate cardiology input

## 2021-07-26 NOTE — ASSESSMENT & PLAN NOTE
Malnutrition Findings:   Adult Malnutrition type: Chronic illness (Moderate malnutrition of chronic illness related to inadequate energy intake as evidenced by somewhat hollow orbits, some depression at the temples and some protrusion of the clavicles  Treated with 4 gram Na diet)  Adult Degree of Malnutrition: Malnutrition of moderate degree    BMI Findings:  Adult BMI Classifications: Underweight < 18 5     Body mass index is 17 83 kg/m²     Continue diet and nutritional supplementation

## 2021-07-26 NOTE — MALNUTRITION/BMI
This medical record reflects one or more clinical indicators suggestive of malnutrition  Malnutrition Findings:   Adult Malnutrition type: Chronic illness (Moderate malnutrition of chronic illness related to inadequate energy intake as evidenced by somewhat hollow orbits, some depression at the temples and some protrusion of the clavicles  Treated with 4 gram Na diet)  Adult Degree of Malnutrition: Malnutrition of moderate degree  Malnutrition Characteristics: Fat loss, Muscle loss    BMI Findings:  Adult BMI Classifications: Underweight < 18 5     Body mass index is 17 83 kg/m²  See Nutrition note dated 7/26/2021 for additional details  Completed nutrition assessment is viewable in the nutrition documentation

## 2021-07-26 NOTE — ASSESSMENT & PLAN NOTE
Echocardiogram and admission showed normal functioning bioprosthetic aortic valve without any liquor stenosis

## 2021-07-26 NOTE — PHYSICAL THERAPY NOTE
PT EVALUATION       07/26/21 1135   PT Last Visit   PT Visit Date 07/26/21   Note Type   Note type Evaluation   Pain Assessment   Pain Assessment Tool Pain Assessment not indicated - pt denies pain   Home Living   Type of [address] [facility] Two level;Bed/bath upstairs;Stairs to enter with rails  (3 EZRA with a rail)   [address] [facility]  (RW; home O2 3 L 24/7)   Prior Function   Level of [place] Independent with ADLs and functional mobility   Lives With Spouse   Receives Help From Family   ADL Assistance Independent   Comments Pt ambulates with/without her cane inside and outside  Restrictions/Precautions   Other Precautions Fall Risk;O2   General   Additional Pertinent History Pt admitted with shortness of breath and chest pain   Family/Caregiver Present No   Cognition   Overall Cognitive Status WFL   Arousal/Participation Cooperative   Orientation Level Oriented X4   Following Commands Follows all commands and directions without difficulty   RLE Assessment   RLE Assessment WFL  (3+ to 4-/5)   LLE Assessment   LLE Assessment WFL  (3+ to 4-/5)   Light Touch   RLE Light Touch Grossly intact   LLE Light Touch Grossly intact   Transfers   Sit to Stand   (Min assist/S)   Additional items Assist x 1;Verbal cues   Stand to Sit   (Min assist/S)   Ambulation/Elevation   Gait pattern   (Mild, generalized multi directional unsteadiness)   Gait Assistance 4  Minimal assist   Additional items Assist x 1;Verbal cues; Tactile cues   Assistive Device None   Distance 25 ft with change in direction and 40 ft with change in direction with extended rest in between due to minimal shortness of breath  Balance   Static Sitting Fair +   Static Standing Fair   Dynamic Standing Fair -   Ambulatory   (P+/F-)   Activity Tolerance   Activity Tolerance Patient limited by fatigue  (Min SOB, subjective dizziness; resolve w rest)   Assessment   Problem List Decreased strength;Decreased range of motion;Decreased endurance; Impaired balance;Decreased mobility; Decreased coordination;Decreased safety awareness   Assessment Patient seen for Physical Therapy evaluation  Patient admitted with Atrial fibrillation with rapid ventricular response ([facility] [address] )  Comorbidities affecting patient's physical performance include:  AFib with RVR, hypertension, status post TAVR, CHF, COPD, chronic respiratory failure with hypoxia, LEOLA, CVA, moderate protein calorie malnutrition, fall history  Personal factors affecting patient at time of initial evaluation include: lives in two story house, ambulating with assistive device, stairs to enter home, inability to navigate community distances, inability to navigate level surfaces without external assistance, inability to perform dynamic tasks in community and positive fall history  Prior to admission, patient was independent with functional mobility with cane, independent with functional mobility without assistive device, independent with ADLS, living with spouse in a two level home with 3 steps to enter and ambulating household distance  Please find objective findings from Physical Therapy assessment regarding body systems outlined above with impairments and limitations including weakness, decreased ROM, impaired balance, decreased endurance, impaired coordination, gait deviations, decreased activity tolerance, decreased functional mobility tolerance, decreased safety awareness, fall risk and SOB upon exertion  The Barthel Index was used as a functional outcome tool presenting with a score of 45 today indicating marked limitations of functional mobility and ADLS  Patient's clinical presentation is currently unstable/unpredictable as seen in patient's presentation of vital sign response, increased fall risk, new onset of impairment of functional mobility, decreased endurance and new onset of weakness   Pt would benefit from continued Physical Therapy treatment to address deficits as defined above and maximize level of functional mobility  As demonstrated by objective findings, the assigned level of complexity for this evaluation is high  The patient's [facility] Basic Mobility Inpatient Short Form Raw Score is 17, Standardized Score is 39 67  A standardized score less than 42 9 suggests the patient may benefit from discharge to post-acute rehabilitation services  Please also refer to the recommendation of the Physical Therapist for safe discharge planning  Despite ampac score, pt is safe for discharge home with assist of her spouse, continued ambulation with a RW and home PT  Goals   Patient Goals "go home"   STG Expiration Date 08/02/21   Short Term Goal #1 Transfers and bed mobility-S   Short Term Goal #2 Pt will ambulate with a RW functional household distances-S; up/down 3 steps with a railing so pt can enter/exit her home in order to meet her goal of going home; balance with a RW will be F/F+ for safe gait and mobility and to decrease fall risk   LTG Expiration Date 09/09/21   Long Term Goal #1 Transfers and bed mobility-I; pt will ambulate with/without a RW or cane functional household distances-I   Long Term Goal #2 Balance with/without RW or cane will be F+/good for safe gait and mobility and to decrease fall risk; strength lower extremities 4-/5; up/down a full flight of steps so pt can access all areas of her home-S/I   Plan   Treatment/Interventions ADL retraining;Functional transfer training;LE strengthening/ROM; Elevations; Therapeutic exercise; Endurance training;Patient/family training;Equipment eval/education; Bed mobility;Gait training; Compensatory technique education   PT Frequency 5x/wk   Recommendation   PT Discharge Recommendation Home with home health rehabilitation   [facility] Basic Mobility Inpatient   Turning in Bed Without Bedrails 3   Lying on Back to Sitting on Edge of Flat Bed 3   Moving Bed to Chair 3   Standing Up From Chair 3   Walk in Room 3   Climb 3-5 Stairs 2   Basic Mobility Inpatient Raw Score 17 Basic Mobility Standardized Score 39 67   Barthel Index   Feeding 5   Bathing 0   Grooming Score 0   Dressing Score 5   Bladder Score 10   Bowels Score 10   Toilet Use Score 5   Transfers (Bed/Chair) Score 10   Mobility (Level Surface) Score 0   Stairs Score 0   Barthel Index Score 39   Licensure   [place] License Number  Nicolas Calderon PT [id number]       Time In:1135  Time AGA:1636  Total Time: 10 mins      S:  I hope I can go home today but I am not sure if I am completely ready  O:  Pt transfers sit to stand with S and ambulates 50 ft with change in direction with the RW with S  Pt transfers stand to sit with S remained out of bed in chair with all needs in reach  A:  Gait and gait balance/endurance much improved with RW versus no assistive device  Pt states that she really does not want to use a RW but is agreeable to use it when 1st at home  Even with the RW, pt is noted with minimal SOB with ambulation  Pt will benefit from continued skilled physical therapy services to increase her strength, endurance, balance, functional mobility and gait  P:  Continue per PT POC  DCP-home with assist of spouse, continued ambulation with a RW and home PT  Pt reports she does have a RW and a cane at home  Nicolas [place], [place]   [id number]    Portions of the documentation may have been created using voice recognition software  Occasional wrong word or sound alike substitutions may have occurred due to the inherent limitation of the voice recognition software  Read the chart carefully and recognize, using context, where substitutions have occurred

## 2021-07-26 NOTE — CASE MANAGEMENT
CM s/w patient's spouse by phone to review Kelley Wells recommendation  Spouse adamantly refusing at this time  Spouse denying any needs from this CM at this time

## 2021-07-26 NOTE — DISCHARGE SUMMARY
Trisha 43  Discharge- [facility] 1940, 78 y o  female MRN: [id number]  Unit/Bed#: ICU 11 Encounter: [id number]  Primary Care Provider: Malvin Treviño MD   Date and time admitted to hospital: 7/23/2021  4:06 PM    * Atrial fibrillation with rapid ventricular response [facility])  Assessment & Plan  Patient has history of atrial fibrillation  Patient was started on Cardizem drip and later developed hypotension  Patient was loaded with digoxin and was continued on digoxin  Patient was on heparin drip and was later transitioned to Eliquis  2D echo showed EF of 70% with grade 1 diastolic dysfunction with severe pulmonary artery hypertension  Later digoxin was discontinued metoprolol dose was increased by Cardiology  Continue metoprolol 50 milligram p o  B i d  And anticoagulation with Eliquis  COPD (chronic obstructive pulmonary disease) ([facility])  Assessment & Plan  No evidence of acute exacerbation  Continue Spiriva  Patient has history of chronic respiratory failure and uses 3 liters oxygen    Elevated troponin  Assessment & Plan  Troponins continued remained flat  Likely non MI troponin elevation in the setting of acute kidney injury and AFib  2D echo showed no significant wall motion abnormalities  Appreciate cardiology input    Chronic diastolic (congestive) heart failure (HCC)  Assessment & Plan  Wt Readings from Last 3 Encounters:   07/26/21 48 6 kg (107 lb 2 3 oz)   03/09/21 50 8 kg (112 lb)   12/29/20 52 3 kg (115 lb 6 4 oz)     Previous Echo showed EF of 55% with grade 1 diastolic dysfunction with mild MR in 12 20   Repeat 2D echo showed EF of 75% with grade 1 diastolic dysfunction with PA pressure of 70 millimeters of mercury  No clinical evidence of fluid overload  Resume Lasix upon discharge  Lasix has been on hold during hospitalization due to hypotension and acute kidney injury        Chronic respiratory failure with hypoxia [facility])  Assessment & Plan  Patient uses 3 liters of oxygen chronically  Continue the same    Moderate protein-calorie malnutrition (HCC)  Assessment & Plan  Malnutrition Findings:   Adult Malnutrition type: Chronic illness (Moderate malnutrition of chronic illness related to inadequate energy intake as evidenced by somewhat hollow orbits, some depression at the temples and some protrusion of the clavicles  Treated with 4 gram Na diet)  Adult Degree of Malnutrition: Malnutrition of moderate degree    BMI Findings:  Adult BMI Classifications: Underweight < 18 5     Body mass index is 17 83 kg/m²  Continue diet and nutritional supplementation      S/P TAVR (transcatheter aortic valve replacement)  Assessment & Plan  Echocardiogram and admission showed normal functioning bioprosthetic aortic valve without any liquor stenosis    Depression  Assessment & Plan  Continue Prozac    GERD (gastroesophageal reflux disease)  Assessment & Plan  Continue PPI    Hypertension  Assessment & Plan  Benazepril was on hold initially due to hypotension  Patient to be discharged on higher dose of Lopressor along with benazepril as blood pressure has improved        Discharging Physician / Practitioner: Autumn Nuñez MD  PCP: Malena Leyva MD  Admission Date:   Admission Orders (From admission, onward)     Ordered        07/23/21 [address]  Once                   Discharge Date: 07/26/21    Medical Problems     Resolved Problems  Date Reviewed: 7/26/2021        Resolved    Elevated brain natriuretic peptide (BNP) level 7/25/2021     Resolved by  Phuong Chirinos, [address] [place] [address] Stay:  · Cardiology    Procedures Performed:   · 2D echo showed EF of 75% with grade 1 diastolic dysfunction with normal functioning aortic valve and severe pulmonary hypertension with PA pressure of 70 millimeters Hg    Outpatient Tests Requested:  · Follow-up with cardiology and pulmonology    Complications:  None    Reason for Admission:  Chest pain    Hospital Course:     Kranthi Lane is a 78 y o  female patient with past medical history of severe COPD, chronic respiratory failure, lung cancer status post radiation, aortic stenosis status post valve replacement who originally presented to the hospital on 7/23/2021 due to chest pressure  In the ED patient was noted to be in atrial fibrillation with rapid ventricular rate and patient was started on Cardizem drip with which patient became hypotensive  Patient also noted to be in acute kidney injury  Patient was later loaded with digoxin and was admitted to step-down unit  Patient was started on IV heparin drip was eventually transitioned to Eliquis  Patient was seen in consult with Cardiology who discontinue digoxin and increase the dose of metoprolol  Later patient converted to sinus rhythm and continued remained in sinus  Patient also seen by physical therapy and was able to ambulate without any further shortness of breath  Patient be discharged home with outpatient follow-up with Cardiology and pulmonology      Please see above list of diagnoses and related plan for additional information  Condition at Discharge: stable     Discharge Day Visit / Exam:     Subjective:  Patient denies any shortness of breath, palpitations chest pain  Vitals: Blood Pressure: 161/71 (07/26/21 1154)  Pulse: 65 (07/26/21 1154)  Temperature: 98 1 °F (36 7 °C) (07/26/21 1142)  Temp Source: Temporal (07/26/21 1142)  Respirations: (!) 32 (07/26/21 0800)  Height: 5' 5" (165 1 cm) (07/24/21 0800)  Weight - Scale: 48 6 kg (107 lb 2 3 oz) (07/26/21 0143)  SpO2: 100 % (07/26/21 1154)  Exam:   Physical Exam  Constitutional:       Appearance: Normal appearance  HENT:      Head: Normocephalic and atraumatic  Nose: Nose normal       Mouth/Throat:      Mouth: Mucous membranes are moist       Pharynx: Oropharynx is clear  Eyes:      Extraocular Movements: Extraocular movements intact        Pupils: Pupils are equal, round, and reactive to light  Cardiovascular:      Rate and Rhythm: Normal rate and regular rhythm  Pulmonary:      Effort: Pulmonary effort is normal       Breath sounds: Normal breath sounds  Abdominal:      General: Bowel sounds are normal  There is no distension  Palpations: Abdomen is soft  Tenderness: There is no abdominal tenderness  Musculoskeletal:         General: No swelling  Cervical back: Normal range of motion and neck supple  Skin:     General: Skin is warm and dry  Neurological:      General: No focal deficit present  Mental Status: She is alert  Discharge instructions/Information to patient and family:   See after visit summary for information provided to patient and family  Provisions for Follow-Up Care:  See after visit summary for information related to follow-up care and any pertinent home health orders  Disposition:     Home     Planned Readmission: No   Discharge Statement:  I spent 35 minutes discharging the patient  This time was spent on the day of discharge  I had direct contact with the patient on the day of discharge  Greater than 50% of the total time was spent examining patient, answering all patient questions, arranging and discussing plan of care with patient as well as directly providing post-discharge instructions  Additional time then spent on discharge activities  Discharge Medications:  See after visit summary for reconciled discharge medications provided to patient and family        ** Please Note: This note has been constructed using a voice recognition system **

## 2021-07-26 NOTE — PROGRESS NOTES
Progress Note - Cardiology   [facility]     Dione Aschoff 78 y o  female MRN: [id number]  : 1940  Unit/Bed#: [address] [place] [address] Encounter: [id number]    Assessment and Plan:   1  Paroxysmal atrial fibrillation:  Patient now sinus rhythm  Digoxin discontinued yesterday and patient now on Lopressor 50 mg q 12 hours and Eliquis 2 5 mg twice a day    -  previously followed with Dr Caroline Day, has not followed with Cardiology in over year  -  will give her information regarding follow-up options  2  Chronic diastolic heart failure:  Appears to be euvolemic    3  Non troponin MI elevation secondary to acute kidney injury, shortness of breath and tachycardia    4  History of TAVR at [facility]:  Echocardiogram on admission demonstrates a normal ejection fraction, presence of a bioprosthetic aortic valve without leak or stenosis  5  COPD:  Continue chronic oxygen at home and her treatments    6  History of CVA    Subjective / Objective:   Patient seen and examined  She appears to be maintaining sinus rhythm at this time  She states her breathing has improved from when she came to the hospital, she does note she is not very physically active at home due to her chronic shortness of breath  Discussed possible subacute rehab versus outpatient pulmonary rehab with patient and she did not seem to be interested in either  Vitals: Blood pressure 147/74, pulse 73, temperature 97 9 °F (36 6 °C), temperature source Temporal, resp  rate (!) 32, height 5' 5" (1 651 m), weight 48 6 kg (107 lb 2 3 oz), SpO2 99 %, not currently breastfeeding  Vitals:    21 0500 21 0143   Weight: 48 3 kg (106 lb 7 7 oz) 48 6 kg (107 lb 2 3 oz)     Body mass index is 17 83 kg/m²    BP Readings from Last 3 Encounters:   21 147/74   21 113/85   20 139/62     Orthostatic Blood Pressures      Most Recent Value   Blood Pressure  147/74 filed at 2021 0809   Patient Position - Orthostatic VS  Lying filed at 07/25/2021 1950        I/O       07/24 0701 - 07/25 0700 07/25 0701 - 07/26 0700 07/26 0701 - 07/27 0700    P  O  590 1200     I V  (mL/kg) 64 4 (1 3)  10 (0 2)    IV Piggyback  106 7     Total Intake(mL/kg) 654 4 (13 5) 1306 7 (26 9) 10 (0 2)    Urine (mL/kg/hr) 680 (0 6) 900 (0 8)     Total Output 680 900     Net -25 7 +406 7 +10           Unmeasured Urine Occurrence   1 x        Invasive Devices     Peripheral Intravenous Line            Peripheral IV 07/25/21 Right Antecubital <1 day                  Intake/Output Summary (Last 24 hours) at 7/26/2021 1040  Last data filed at 7/26/2021 0800  Gross per 24 hour   Intake 966 67 ml   Output 650 ml   Net 316 67 ml         Physical Exam:   Physical Exam  Vitals and nursing note reviewed  Constitutional:       General: She is not in acute distress  Appearance: Normal appearance  She is well-developed and normal weight  HENT:      Head: Normocephalic  Right Ear: External ear normal       Left Ear: External ear normal    Eyes:      General: No scleral icterus  Right eye: No discharge  Left eye: No discharge  Neck:      Thyroid: No thyromegaly  Cardiovascular:      Rate and Rhythm: Normal rate and regular rhythm  Pulses: Normal pulses  Heart sounds: Normal heart sounds  Pulmonary:      Effort: Pulmonary effort is normal  No respiratory distress  Breath sounds: Normal breath sounds  No wheezing, rhonchi or rales  Abdominal:      General: Bowel sounds are normal  There is no distension  Palpations: Abdomen is soft  Musculoskeletal:      Cervical back: Normal range of motion and neck supple  Right lower leg: No edema  Left lower leg: No edema  Skin:     General: Skin is warm and dry  Capillary Refill: Capillary refill takes less than 2 seconds  Neurological:      General: No focal deficit present  Mental Status: She is alert  Mental status is at baseline  Psychiatric:         Mood and Affect: Mood normal          Behavior: Behavior normal          Thought Content:  Thought content normal          Judgment: Judgment normal                    Medications/ Allergies:     Current Facility-Administered Medications   Medication Dose Route Frequency Provider Last Rate    [MAR Hold] acetaminophen  650 mg Oral Q6H PRN Paralee Birks, CRNP      [facility] Hold] apixaban  2 5 mg Oral BID Sue Singletary PA-C      aspirin  81 mg Oral Daily Paralee Birks, CRNP      atorvastatin  40 mg Oral Daily Paralee Birks, CRNP      [facility] Hold] cefazolin  1,000 mg Intravenous Q12H Kitty Arevalo PA-C Stopped (07/26/21 0115)    FLUoxetine  20 mg Oral Daily Paralee Birks, CRNP      melatonin  3 mg Oral HS Paralee Birks, CRNP      [facility] Hold] metoprolol  2 5 mg Intravenous Q6H PRN Paralee Birks, CRNP      [facility] Hold] metoprolol tartrate  50 mg Oral Q12H [facility] Kitty Arevalo PA-C      [facility] Hold] mupirocin   Nasal Q12H [facility] Kitty Arevalo PA-C      [facility] Hold] ondansetron  4 mg Intravenous Q8H PRN Paralee Birks, CRNP      pantoprazole  40 mg Oral Early Morning Paralee Birks, CRNP      tiotropium  18 mcg Inhalation Daily Paralee Birks, CRNP      traMADol  50 mg Oral Q6H PRN Paralee Birks, CRNP       [facility] Hold] acetaminophen, 650 mg, Q6H PRN  [MAR Hold] metoprolol, 2 5 mg, Q6H PRN  [MAR Hold] ondansetron, 4 mg, Q8H PRN  traMADol, 50 mg, Q6H PRN      No Known Allergies    VTE Pharmacologic Prophylaxis:   Sequential compression device (Venodyne)     Labs:   Troponins:  Results from last 7 days   Lab Units 07/24/21  0518 07/24/21  0153 07/23/21  2328   TROPONIN I ng/mL 0 54* 0 79* 0 50*       CBC with diff:  Results from last 7 days   Lab Units 07/26/21  0505 07/25/21  0503 07/24/21  0518 07/23/21  1625   WBC Thousand/uL 3 00* 2 26* 4 18* 5 40   HEMOGLOBIN g/dL 8 7* 9 3* 10 1* 12 4   HEMATOCRIT % 29 6* 32 3* 35 3 42 2   MCV fL 92 93 94 91   PLATELETS Thousands/uL 138* 124* 113* 178   MCH pg 26 9 26 7* 27 0 26 8   MCHC g/dL 29 4* 28 8* 28 6* 29 4*   RDW % 14 3 14 6 15 2* 15 3*   MPV fL 10 5 10 1 11 5 10 8   NRBC AUTO /100 WBCs 0 0  --  0       CMP:  Results from last 7 days   Lab Units 07/26/21  0505 07/25/21  0503 07/24/21  0518 07/23/21  2328 07/23/21  1625   SODIUM mmol/L 138 140 138 132* 139   POTASSIUM mmol/L 5 1 5 0 4 8 4 6 5 7*   CHLORIDE mmol/L 104 106 103 103 101   CO2 mmol/L 30 29 23 28 24   ANION GAP mmol/L 4 5 12 1* 14*   BUN mg/dL 23 27* 32* 33* 31*   CREATININE mg/dL 0 86 1 13 1 44* 1 52* 1 59*   CALCIUM mg/dL 8 7 8 8 8 9 8 3 9 2   AST U/L 11 14  --   --  38   ALT U/L 15 18  --   --  20   ALK PHOS U/L 53 55  --   --  79   TOTAL PROTEIN g/dL 5 6* 5 8*  --   --  7 9   ALBUMIN g/dL 2 7* 2 8*  --   --  3 8   TOTAL BILIRUBIN mg/dL 0 18* 0 18*  --   --  0 32   EGFR ml/min/1 73sq m 63 45 34 31 30       Magnesium:  Results from last 7 days   Lab Units 07/26/21  0505 07/25/21  0503 07/24/21  0518 07/23/21  1625   MAGNESIUM mg/dL 1 8 2 2 2 6 1 7     Coags:  Results from last 7 days   Lab Units 07/24/21  1505 07/24/21  0803 07/23/21  2344 07/23/21  1710   PTT seconds 58* 206* >210* 38*   INR   --   --  1 05 1 01     TSH:  Results from last 7 days   Lab Units 07/23/21  1625   TSH 3RD GENERATON uIU/mL 6 836*     NT-proBNP:   Recent Labs     07/23/21  1625   NTBNP 2,527*        Imaging & Testing   I have personally reviewed pertinent reports  XR chest 1 view portable    Result Date: 7/24/2021  Narrative: CHEST INDICATION:   atrial fibrillation  History of lung cancer  COMPARISON: Chest radiograph from 12/29/2020 and chest CT from 12/9/2020  EXAM PERFORMED/VIEWS:  XR CHEST PORTABLE  FINDINGS: Normal heart size  Median sternotomy  TAVR  Emphysema  Mild pulmonary venous congestion  Right lower lobe scar at the site of treated tumor  Osseous structures normal for age  Impression: Mild pulmonary venous congestion  Right lower lobe scar at the site of treated tumor   Workstation performed: [id number]        EKG / Monitor: Personally reviewed  Sinus rhythm    Cardiac testing:   Results for orders placed during the hospital encounter of 21    Echo complete with contrast if indicated    Narrative  [address]  [address] [facility]  [place] [phone]    Transthoracic Echocardiogram  2D, M-mode, Doppler, and Color Doppler    Study date:  2021    Patient: Gumaro Rubio  MR number: [id number]  Account number: [de-identified]  : 1940  Age: 78 years  Gender: Female  Status: Inpatient  Location: Bedside  Height: 65 in  Weight: 107 8 lb  BP: 110/ 53 mmHg    Indications: Chest pain,dizziness      Diagnoses: I42 9 - Cardiomyopathy, unspecified    Sonographer:  HELDER Bradford  Primary Physician:  Stan Reyez MD  Group:  [facility]  Interpreting Physician:  Jose Luis Chavis MD    SUMMARY    COMPARISONS:  Compared to previous study, patient now has severe pulmonary hypertension and peak PA pressure is increased from 30 to 70 mm Hg    LEFT VENTRICLE:  LV measurements were taken in apical 2 chamber views  Normal left ventricular chamber dimension  There is mild concentric left ventricular hypertrophy  Systolic function is hyperdynamic with an ejection fraction of 75%  No definite regional wall motion abnormality seen although it cannot be absolutely excluded on the basis of this study  Grade 1 diastolic dysfunction    RIGHT VENTRICLE:  Right ventricular free wall was not adequately visualized  Visually RV size appears borderline to mildly enlarged  Systolic function also appears borderline with no TAPSE measurement    LEFT ATRIUM:  Mild to moderately dilated left atrium    MITRAL VALVE:  There is mild to moderate thickening of mitral valve leaflets  There is moderate to severe mitral annular calcification  Leaflet separation is mildly decreased  There is mild to moderate mitral stenosis with mitral valve area of 2 1 cm2 and mean gradient of 6 72 mm Hg  There is mild to moderate mitral regurgitation    AORTIC VALVE:  Aortic valve was not well visualized  A bioprosthesis/TAVR is present  No evidence of significant aortic stenosis  Mean gradient is 5 and peak gradient is 9 mm Hg  No paravalvular leak or aortic valve regurgitation seen    TRICUSPID VALVE:  There was mild to moderate regurgitation  There is severe pulmonary hypertension with peak PA pressure of 70 mm Hg    PULMONIC VALVE:  There was mild regurgitation  IVC, HEPATIC VEINS:  IVC size and respirophasic variations appear preserved    COMPARISONS:  Compared to previous study, patient now has severe pulmonary hypertension and peak PA pressure is increased from 30 to 70 mm Hg    HISTORY: PRIOR HISTORY: chronic diastolic CHF,A Fib ,CVA,HTN,Gastroesophageal reflux disease,Prosthetic Aortic Valve,Lung Cancer,COPD,Chronic kidney disease  PROCEDURE: The procedure was performed at the bedside  This was a routine study  Technically difficult study resulting in off axis measurements The transthoracic approach was used  The study included complete 2D imaging, M-mode, complete  spectral Doppler, and color Doppler  The heart rate was 94 bpm, at the start of the study  Images were obtained from the parasternal, apical, subcostal, and suprasternal notch acoustic windows  Echocardiographic views were limited due to  chest wall deformity, poor patient compliance, poor acoustic window availability, decreased penetration, and lung interference  This was a technically difficult study      LEFT VENTRICLE: LV measurements were taken in apical 2 chamber views  Normal left ventricular chamber dimension  There is mild concentric left ventricular hypertrophy  Systolic function is hyperdynamic with an ejection fraction of 75%  No definite regional wall motion abnormality seen although it cannot be absolutely excluded on the basis of this study  Grade 1 diastolic dysfunction    RIGHT VENTRICLE: Right ventricular free wall was not adequately visualized  Visually RV size appears borderline to mildly enlarged  Systolic function also appears borderline with no TAPSE measurement    LEFT ATRIUM: Mild to moderately dilated left atrium    RIGHT ATRIUM: Size was normal     MITRAL VALVE: There is mild to moderate thickening of mitral valve leaflets  There is moderate to severe mitral annular calcification  Leaflet separation is mildly decreased  There is mild to moderate mitral stenosis with mitral valve area of 2 1 cm2 and mean gradient of 6 72 mm Hg  There is mild to moderate mitral regurgitation    AORTIC VALVE: Aortic valve was not well visualized  A bioprosthesis/TAVR is present  No evidence of significant aortic stenosis  Mean gradient is 5 and peak gradient is 9 mm Hg  No paravalvular leak or aortic valve regurgitation seen    TRICUSPID VALVE: DOPPLER: There was mild to moderate regurgitation  There is severe pulmonary hypertension with peak PA pressure of 70 mm Hg    PULMONIC VALVE: DOPPLER: There was mild regurgitation  PERICARDIUM: There was no pericardial effusion  The pericardium was normal in appearance  AORTA: The root exhibited normal size  SYSTEMIC VEINS: IVC: IVC size and respirophasic variations appear preserved    SYSTEM MEASUREMENT TABLES    2D  EF (Teich): 58 98 %  %FS: 30 59 %  Ao Diam: 2 82 cm  EDV(Teich): 56 52 ml  ESV(Teich): 23 19 ml  IVSd: 1 28 cm  LA Area: 23 84 cm2  LA Diam: 4 46 cm  LVEDV MOD A4C: 70 18 ml  LVEF MOD A4C: 67 91 %  LVESV MOD A4C: 22 52 ml  LVIDd: 3 66 cm  LVIDs: 2 54 cm  LVLd A4C: 7 05 cm  LVLs A4C: 5 64 cm  LVOT Diam: 1 98 cm  LVPWd: 1 34 cm  RA Area: 10 98 cm2  RVIDd: 2 68 cm  SV (Teich): 33 34 ml  SV MOD A4C: 47 66 ml    CW  AV Env  Ti: 220 74 ms  AV VTI: 23 26 cm  AV Vmax: 1 5 m/s  AV Vmean: 1 05 m/s  AV maxP 06 mmHg  AV meanP 05 mmHg  TR Vmax: 4 09 m/s  TR maxP 94 mmHg    PW  MV E/A Ratio: 0 84  E' Sept: 0 06 m/s  E/E' Sept: 22 95  LVOT Env  Ti: 281 64 ms  LVOT VTI: 25 72 cm  LVOT Vmax: 1 3 m/s  LVOT Vmean: 0 91 m/s  LVOT maxP 79 mmHg  LVOT meanPG: 3 81 mmHg  MV A [place]: 1 6 m/s  MV Dec [place]: 6 99 m/s2  MV DecT: 193 88 ms  MV E [place]: 1 35 m/s  MV PHT: 56 23 ms  MVA By PHT: 3 91 cm2    [facility]    Prepared and electronically signed by    Diana Ambriz MD  Signed 2021 12:31:58          Jer Fields        "This note has been constructed using a voice recognition system  Therefore there may be syntax, spelling, and/or grammatical errors   Please call if you have any questions  "

## 2021-07-26 NOTE — ASSESSMENT & PLAN NOTE
Benazepril was on hold initially due to hypotension  Patient to be discharged on higher dose of Lopressor along with benazepril as blood pressure has improved

## 2021-07-26 NOTE — ASSESSMENT & PLAN NOTE
Patient has history of atrial fibrillation  Patient was started on Cardizem drip and later developed hypotension  Patient was loaded with digoxin and was continued on digoxin  Patient was on heparin drip and was later transitioned to Eliquis  2D echo showed EF of 70% with grade 1 diastolic dysfunction with severe pulmonary artery hypertension  Later digoxin was discontinued metoprolol dose was increased by Cardiology  Continue metoprolol 50 milligram p o  B i d  And anticoagulation with Eliquis

## 2021-07-26 NOTE — ASSESSMENT & PLAN NOTE
Wt Readings from Last 3 Encounters:   07/26/21 48 6 kg (107 lb 2 3 oz)   03/09/21 50 8 kg (112 lb)   12/29/20 52 3 kg (115 lb 6 4 oz)     Previous Echo showed EF of 55% with grade 1 diastolic dysfunction with mild MR in 12 20   Repeat 2D echo showed EF of 75% with grade 1 diastolic dysfunction with PA pressure of 70 millimeters of mercury  No clinical evidence of fluid overload  Resume Lasix upon discharge  Lasix has been on hold during hospitalization due to hypotension and acute kidney injury

## 2021-07-26 NOTE — ASSESSMENT & PLAN NOTE
No evidence of acute exacerbation  Continue Spiriva  Patient has history of chronic respiratory failure and uses 3 liters oxygen

## 2021-07-27 ENCOUNTER — PATIENT OUTREACH (OUTPATIENT)
Dept: CASE MANAGEMENT | Facility: HOSPITAL | Age: 81
End: 2021-07-27

## 2021-07-27 NOTE — PROGRESS NOTES
Justin notification received for new Medicare CHF Bundle patient  Patient familiar to this CM from previous bundle episode  Chart review completed  Outside records through [facility] requested and refreshed  Patient hospitalized   7/23/2021 - 7/26/2021 (3 days) at [place] [address] for Atrial fibrillation with rapid ventricular response  Patient presented with Afib  Patient refused recommendations for [facility]  Past Medical History  CKD3, depression, CHF, CVA, GERD, COPD and s/p TAVR  See problem list for complete list of medical diagnosis  Future appointments:  None      Reminder sent to self with closure date of medicare bundle  This CM will continue to monitor through chart review, outreach and Wendy Caruso

## 2021-07-28 ENCOUNTER — PATIENT OUTREACH (OUTPATIENT)
Dept: CASE MANAGEMENT | Facility: HOSPITAL | Age: 81
End: 2021-07-28

## 2021-07-28 NOTE — PROGRESS NOTES
Chart review completed  CM contacted patient to introduce self, explain the role of the OP CM and purpose of this phone call is to assess patient's general wellbeing or for any assistance needed with follow-up care  CM attempted to explain BPCI-A program but patient fixated on medication not received from pharmacy  Giovanni Coulter informed CM that she worn out and tired feeling  Patient seems confused while speaking with this CM redirecting the conversation back to medications she hasn't received but told her she was to restart upon discharge  [place] Yfn reports the pharmacy did not giver her Aspirin 81 mg and a certain medication that started with the letter "e"  CM questioned if it was Eliquis and patient did not know and was speaking top her  while CM asking her questions  CM informed patient that ASA is an OTC medication and patient then put her  on the phone who spelled out the medication Eliquis  CM offered to contact pharmacy about medication and  and patient agreed  Patient reports seeing something on TV that medication should be used with caution in patient's with valve replacements adding she had a Pig valve replacement about 5 years ago  CM encouraged her to discuss with her cardiologist which she plans to make an appointment with  CM spoke with Damari Stinson from [facility] who reports that patient does not have automatic refill enabled for medications and the eliquis can be filled and delivered tomorrow  CM requested that medication be filled and delivered as per pharmacy staff member  CM called patient back regarding above conversation with pharmacy  Eliquis education provided with the importance of blood thinning medication for the prevention of blood clot formation  When CM mentioned blood thinning medication she reported that she understands what the medication is and will take it as prescribed  She then thanked this CM for her call and preceded to hang up    CM will attempt outreach again later this week

## 2021-08-05 ENCOUNTER — PATIENT OUTREACH (OUTPATIENT)
Dept: CASE MANAGEMENT | Facility: HOSPITAL | Age: 81
End: 2021-08-05

## 2021-08-05 NOTE — PROGRESS NOTES
Unsuccessful attempt at reaching patient  Left name, call back number and message requesting return call of this CM  This CM will continue to monitor electronically via chart review, outreach and [facility]

## 2021-08-18 ENCOUNTER — PATIENT OUTREACH (OUTPATIENT)
Dept: CASE MANAGEMENT | Facility: HOSPITAL | Age: 81
End: 2021-08-18

## 2021-08-18 NOTE — PROGRESS NOTES
Chart review completed  Outside records through [facility] requested and refreshed  No new appts since last review and no future appts scheduled  Unsuccessful attempt at reaching patient  Left name, call back number and message requesting return call of this CM  This CM will continue to monitor electronically via chart review, outreach and [facility]

## 2021-08-25 ENCOUNTER — PATIENT OUTREACH (OUTPATIENT)
Dept: CASE MANAGEMENT | Facility: HOSPITAL | Age: 81
End: 2021-08-25

## 2021-08-25 NOTE — LETTER
Date: 08/25/21    Dear Bryant Monroy,   My name is Angela Laura; I am a registered nurse care manager working with [facility]  I have not been able to reach you and would like to set a time that I can talk with you over the phone  My work is to help patients that have complex medical conditions get the care they need  This includes patients who may have been in the hospital or emergency room  I have enclosed information for you  Please call me with any questions you may have  I look forward to meeting with you    Sincerely,    Angela Laura, RN  [phone]  Outpatient Care Manager

## 2021-08-25 NOTE — PROGRESS NOTES
Chart review completed  Outside records through [facility] requested and refreshed  No future appointments scheduled within Epic  3rd Unsuccessful attempt at reaching patient  Left name, call back number and message requesting return call of this CM  Unable to reach letter mailed to patient today  This CM will continue to monitor electronically via chart review, outreach and [facility]

## 2021-08-27 ENCOUNTER — APPOINTMENT (EMERGENCY)
Dept: RADIOLOGY | Facility: HOSPITAL | Age: 81
DRG: 872 | End: 2021-08-27
Payer: MEDICARE

## 2021-08-27 ENCOUNTER — HOSPITAL ENCOUNTER (INPATIENT)
Facility: HOSPITAL | Age: 81
LOS: 5 days | Discharge: HOME HEALTH | DRG: 872 | End: 2021-09-01
Attending: EMERGENCY MEDICINE | Admitting: ANESTHESIOLOGY
Payer: MEDICARE

## 2021-08-27 DIAGNOSIS — J96.01 ACUTE RESPIRATORY FAILURE WITH HYPOXIA (HCC): ICD-10-CM

## 2021-08-27 DIAGNOSIS — J90 PLEURAL EFFUSION, BILATERAL: ICD-10-CM

## 2021-08-27 DIAGNOSIS — I48.91 ATRIAL FIBRILLATION WITH RVR (HCC): ICD-10-CM

## 2021-08-27 DIAGNOSIS — E83.42 HYPOMAGNESEMIA: ICD-10-CM

## 2021-08-27 DIAGNOSIS — I95.9 HYPOTENSION: ICD-10-CM

## 2021-08-27 DIAGNOSIS — J96.11 CHRONIC RESPIRATORY FAILURE WITH HYPOXIA (HCC): ICD-10-CM

## 2021-08-27 DIAGNOSIS — J18.9 PNEUMONIA: Primary | ICD-10-CM

## 2021-08-27 DIAGNOSIS — I63.9 CVA (CEREBRAL VASCULAR ACCIDENT) (HCC): ICD-10-CM

## 2021-08-27 DIAGNOSIS — E44.0 MODERATE PROTEIN-CALORIE MALNUTRITION (HCC): ICD-10-CM

## 2021-08-27 DIAGNOSIS — N39.0 UTI (URINARY TRACT INFECTION): ICD-10-CM

## 2021-08-27 DIAGNOSIS — I48.91 ATRIAL FIBRILLATION WITH RAPID VENTRICULAR RESPONSE (HCC): ICD-10-CM

## 2021-08-27 DIAGNOSIS — J44.9 CHRONIC OBSTRUCTIVE PULMONARY DISEASE, UNSPECIFIED COPD TYPE (HCC): ICD-10-CM

## 2021-08-27 PROBLEM — A41.9 SEPSIS (HCC): Status: ACTIVE | Noted: 2021-08-27

## 2021-08-27 LAB
ALBUMIN SERPL BCP-MCNC: 3.5 G/DL (ref 3.5–5)
ALP SERPL-CCNC: 61 U/L (ref 46–116)
ALT SERPL W P-5'-P-CCNC: 20 U/L (ref 12–78)
AMORPH URATE CRY URNS QL MICRO: ABNORMAL /HPF
ANION GAP SERPL CALCULATED.3IONS-SCNC: 11 MMOL/L (ref 4–13)
APTT PPP: 37 SECONDS (ref 23–37)
AST SERPL W P-5'-P-CCNC: 19 U/L (ref 5–45)
ATRIAL RATE: 156 BPM
BACTERIA UR QL AUTO: ABNORMAL /HPF
BASOPHILS # BLD AUTO: 0.04 THOUSANDS/ΜL (ref 0–0.1)
BASOPHILS NFR BLD AUTO: 0 % (ref 0–1)
BILIRUB SERPL-MCNC: 0.21 MG/DL (ref 0.2–1)
BILIRUB UR QL STRIP: NEGATIVE
BUN SERPL-MCNC: 15 MG/DL (ref 5–25)
CALCIUM SERPL-MCNC: 9.2 MG/DL (ref 8.3–10.1)
CHLORIDE SERPL-SCNC: 102 MMOL/L (ref 100–108)
CLARITY UR: ABNORMAL
CO2 SERPL-SCNC: 27 MMOL/L (ref 21–32)
COLOR UR: YELLOW
CREAT SERPL-MCNC: 1.15 MG/DL (ref 0.6–1.3)
EOSINOPHIL # BLD AUTO: 0.02 THOUSAND/ΜL (ref 0–0.61)
EOSINOPHIL NFR BLD AUTO: 0 % (ref 0–6)
ERYTHROCYTE [DISTWIDTH] IN BLOOD BY AUTOMATED COUNT: 15.2 % (ref 11.6–15.1)
GFR SERPL CREATININE-BSD FRML MDRD: 44 ML/MIN/1.73SQ M
GLUCOSE SERPL-MCNC: 149 MG/DL (ref 65–140)
GLUCOSE UR STRIP-MCNC: NEGATIVE MG/DL
HCT VFR BLD AUTO: 39 % (ref 34.8–46.1)
HGB BLD-MCNC: 11.1 G/DL (ref 11.5–15.4)
HGB UR QL STRIP.AUTO: ABNORMAL
IMM GRANULOCYTES # BLD AUTO: 0.04 THOUSAND/UL (ref 0–0.2)
IMM GRANULOCYTES NFR BLD AUTO: 0 % (ref 0–2)
INR PPP: 1.12 (ref 0.84–1.19)
KETONES UR STRIP-MCNC: NEGATIVE MG/DL
LACTATE SERPL-SCNC: 1.9 MMOL/L (ref 0.5–2)
LACTATE SERPL-SCNC: 2.1 MMOL/L (ref 0.5–2)
LACTATE SERPL-SCNC: 3.2 MMOL/L (ref 0.5–2)
LEUKOCYTE ESTERASE UR QL STRIP: ABNORMAL
LYMPHOCYTES # BLD AUTO: 0.75 THOUSANDS/ΜL (ref 0.6–4.47)
LYMPHOCYTES NFR BLD AUTO: 8 % (ref 14–44)
MAGNESIUM SERPL-MCNC: 1.5 MG/DL (ref 1.6–2.6)
MCH RBC QN AUTO: 26.6 PG (ref 26.8–34.3)
MCHC RBC AUTO-ENTMCNC: 28.5 G/DL (ref 31.4–37.4)
MCV RBC AUTO: 94 FL (ref 82–98)
MONOCYTES # BLD AUTO: 0.5 THOUSAND/ΜL (ref 0.17–1.22)
MONOCYTES NFR BLD AUTO: 5 % (ref 4–12)
NEUTROPHILS # BLD AUTO: 7.9 THOUSANDS/ΜL (ref 1.85–7.62)
NEUTS SEG NFR BLD AUTO: 87 % (ref 43–75)
NITRITE UR QL STRIP: NEGATIVE
NON-SQ EPI CELLS URNS QL MICRO: ABNORMAL /HPF
NRBC BLD AUTO-RTO: 0 /100 WBCS
NT-PROBNP SERPL-MCNC: 1967 PG/ML
PH UR STRIP.AUTO: 6 [PH]
PHOSPHATE SERPL-MCNC: 2.2 MG/DL (ref 2.3–4.1)
PLATELET # BLD AUTO: 254 THOUSANDS/UL (ref 149–390)
PMV BLD AUTO: 10.3 FL (ref 8.9–12.7)
POTASSIUM SERPL-SCNC: 4.4 MMOL/L (ref 3.5–5.3)
PROCALCITONIN SERPL-MCNC: <0.05 NG/ML
PROT SERPL-MCNC: 7.6 G/DL (ref 6.4–8.2)
PROT UR STRIP-MCNC: NEGATIVE MG/DL
PROTHROMBIN TIME: 14.3 SECONDS (ref 11.6–14.5)
QRS AXIS: -34 DEGREES
QRSD INTERVAL: 78 MS
QT INTERVAL: 286 MS
QTC INTERVAL: 493 MS
RBC # BLD AUTO: 4.17 MILLION/UL (ref 3.81–5.12)
RBC #/AREA URNS AUTO: ABNORMAL /HPF
SARS-COV-2 RNA RESP QL NAA+PROBE: NEGATIVE
SODIUM SERPL-SCNC: 140 MMOL/L (ref 136–145)
SP GR UR STRIP.AUTO: 1.01 (ref 1–1.03)
T WAVE AXIS: 127 DEGREES
TROPONIN I SERPL-MCNC: 0.02 NG/ML
TROPONIN I SERPL-MCNC: 0.07 NG/ML
TROPONIN I SERPL-MCNC: 0.14 NG/ML
TSH SERPL DL<=0.05 MIU/L-ACNC: 1.64 UIU/ML (ref 0.36–3.74)
UROBILINOGEN UR QL STRIP.AUTO: 0.2 E.U./DL
VENTRICULAR RATE: 179 BPM
WBC # BLD AUTO: 9.25 THOUSAND/UL (ref 4.31–10.16)
WBC #/AREA URNS AUTO: ABNORMAL /HPF

## 2021-08-27 PROCEDURE — 84443 ASSAY THYROID STIM HORMONE: CPT | Performed by: ANESTHESIOLOGY

## 2021-08-27 PROCEDURE — 94640 AIRWAY INHALATION TREATMENT: CPT

## 2021-08-27 PROCEDURE — 94760 N-INVAS EAR/PLS OXIMETRY 1: CPT

## 2021-08-27 PROCEDURE — 96375 TX/PRO/DX INJ NEW DRUG ADDON: CPT

## 2021-08-27 PROCEDURE — 84100 ASSAY OF PHOSPHORUS: CPT | Performed by: INTERNAL MEDICINE

## 2021-08-27 PROCEDURE — 84145 PROCALCITONIN (PCT): CPT | Performed by: EMERGENCY MEDICINE

## 2021-08-27 PROCEDURE — 99285 EMERGENCY DEPT VISIT HI MDM: CPT

## 2021-08-27 PROCEDURE — 83605 ASSAY OF LACTIC ACID: CPT | Performed by: ANESTHESIOLOGY

## 2021-08-27 PROCEDURE — 99285 EMERGENCY DEPT VISIT HI MDM: CPT | Performed by: EMERGENCY MEDICINE

## 2021-08-27 PROCEDURE — 84484 ASSAY OF TROPONIN QUANT: CPT | Performed by: INTERNAL MEDICINE

## 2021-08-27 PROCEDURE — 80053 COMPREHEN METABOLIC PANEL: CPT | Performed by: EMERGENCY MEDICINE

## 2021-08-27 PROCEDURE — 87040 BLOOD CULTURE FOR BACTERIA: CPT | Performed by: EMERGENCY MEDICINE

## 2021-08-27 PROCEDURE — U0003 INFECTIOUS AGENT DETECTION BY NUCLEIC ACID (DNA OR RNA); SEVERE ACUTE RESPIRATORY SYNDROME CORONAVIRUS 2 (SARS-COV-2) (CORONAVIRUS DISEASE [COVID-19]), AMPLIFIED PROBE TECHNIQUE, MAKING USE OF HIGH THROUGHPUT TECHNOLOGIES AS DESCRIBED BY CMS-2020-01-R: HCPCS | Performed by: INTERNAL MEDICINE

## 2021-08-27 PROCEDURE — 84484 ASSAY OF TROPONIN QUANT: CPT | Performed by: NURSE PRACTITIONER

## 2021-08-27 PROCEDURE — 83605 ASSAY OF LACTIC ACID: CPT | Performed by: EMERGENCY MEDICINE

## 2021-08-27 PROCEDURE — 83880 ASSAY OF NATRIURETIC PEPTIDE: CPT | Performed by: ANESTHESIOLOGY

## 2021-08-27 PROCEDURE — 84484 ASSAY OF TROPONIN QUANT: CPT | Performed by: EMERGENCY MEDICINE

## 2021-08-27 PROCEDURE — 71045 X-RAY EXAM CHEST 1 VIEW: CPT

## 2021-08-27 PROCEDURE — 96365 THER/PROPH/DIAG IV INF INIT: CPT

## 2021-08-27 PROCEDURE — 83735 ASSAY OF MAGNESIUM: CPT | Performed by: INTERNAL MEDICINE

## 2021-08-27 PROCEDURE — 96366 THER/PROPH/DIAG IV INF ADDON: CPT

## 2021-08-27 PROCEDURE — 85730 THROMBOPLASTIN TIME PARTIAL: CPT | Performed by: EMERGENCY MEDICINE

## 2021-08-27 PROCEDURE — 87086 URINE CULTURE/COLONY COUNT: CPT | Performed by: NURSE PRACTITIONER

## 2021-08-27 PROCEDURE — U0005 INFEC AGEN DETEC AMPLI PROBE: HCPCS | Performed by: INTERNAL MEDICINE

## 2021-08-27 PROCEDURE — 36415 COLL VENOUS BLD VENIPUNCTURE: CPT | Performed by: EMERGENCY MEDICINE

## 2021-08-27 PROCEDURE — 85025 COMPLETE CBC W/AUTO DIFF WBC: CPT | Performed by: EMERGENCY MEDICINE

## 2021-08-27 PROCEDURE — 96376 TX/PRO/DX INJ SAME DRUG ADON: CPT

## 2021-08-27 PROCEDURE — 81001 URINALYSIS AUTO W/SCOPE: CPT | Performed by: INTERNAL MEDICINE

## 2021-08-27 PROCEDURE — 87081 CULTURE SCREEN ONLY: CPT | Performed by: ANESTHESIOLOGY

## 2021-08-27 PROCEDURE — 85610 PROTHROMBIN TIME: CPT | Performed by: EMERGENCY MEDICINE

## 2021-08-27 PROCEDURE — 93005 ELECTROCARDIOGRAM TRACING: CPT

## 2021-08-27 PROCEDURE — 93010 ELECTROCARDIOGRAM REPORT: CPT | Performed by: INTERNAL MEDICINE

## 2021-08-27 PROCEDURE — 99223 1ST HOSP IP/OBS HIGH 75: CPT | Performed by: ANESTHESIOLOGY

## 2021-08-27 RX ORDER — IPRATROPIUM BROMIDE AND ALBUTEROL SULFATE 2.5; .5 MG/3ML; MG/3ML
3 SOLUTION RESPIRATORY (INHALATION)
Status: DISCONTINUED | OUTPATIENT
Start: 2021-08-27 | End: 2021-08-28

## 2021-08-27 RX ORDER — METOPROLOL TARTRATE 5 MG/5ML
2.5 INJECTION INTRAVENOUS EVERY 6 HOURS PRN
Status: DISCONTINUED | OUTPATIENT
Start: 2021-08-27 | End: 2021-08-28

## 2021-08-27 RX ORDER — TRAMADOL HYDROCHLORIDE 50 MG/1
50 TABLET ORAL EVERY 8 HOURS PRN
Status: DISCONTINUED | OUTPATIENT
Start: 2021-08-27 | End: 2021-09-01 | Stop reason: HOSPADM

## 2021-08-27 RX ORDER — DIGOXIN 0.25 MG/ML
250 INJECTION INTRAMUSCULAR; INTRAVENOUS ONCE
Status: COMPLETED | OUTPATIENT
Start: 2021-08-27 | End: 2021-08-27

## 2021-08-27 RX ORDER — ATORVASTATIN CALCIUM 40 MG/1
40 TABLET, FILM COATED ORAL
Status: DISCONTINUED | OUTPATIENT
Start: 2021-08-28 | End: 2021-09-01 | Stop reason: HOSPADM

## 2021-08-27 RX ORDER — LANOLIN ALCOHOL/MO/W.PET/CERES
3 CREAM (GRAM) TOPICAL
Status: DISCONTINUED | OUTPATIENT
Start: 2021-08-27 | End: 2021-09-01 | Stop reason: HOSPADM

## 2021-08-27 RX ORDER — DILTIAZEM HYDROCHLORIDE 5 MG/ML
15 INJECTION INTRAVENOUS ONCE
Status: COMPLETED | OUTPATIENT
Start: 2021-08-27 | End: 2021-08-27

## 2021-08-27 RX ORDER — FLUOXETINE HYDROCHLORIDE 20 MG/1
20 CAPSULE ORAL DAILY
Status: DISCONTINUED | OUTPATIENT
Start: 2021-08-28 | End: 2021-09-01 | Stop reason: HOSPADM

## 2021-08-27 RX ORDER — DIGOXIN 0.25 MG/ML
125 INJECTION INTRAMUSCULAR; INTRAVENOUS ONCE
Status: COMPLETED | OUTPATIENT
Start: 2021-08-28 | End: 2021-08-28

## 2021-08-27 RX ORDER — ASPIRIN 81 MG/1
81 TABLET ORAL DAILY
Status: DISCONTINUED | OUTPATIENT
Start: 2021-08-28 | End: 2021-08-27

## 2021-08-27 RX ORDER — SODIUM CHLORIDE 9 MG/ML
125 INJECTION, SOLUTION INTRAVENOUS CONTINUOUS
Status: DISCONTINUED | OUTPATIENT
Start: 2021-08-27 | End: 2021-08-28

## 2021-08-27 RX ORDER — PANTOPRAZOLE SODIUM 40 MG/1
40 TABLET, DELAYED RELEASE ORAL DAILY
Status: DISCONTINUED | OUTPATIENT
Start: 2021-08-28 | End: 2021-09-01 | Stop reason: HOSPADM

## 2021-08-27 RX ORDER — DIGOXIN 0.25 MG/ML
125 INJECTION INTRAMUSCULAR; INTRAVENOUS ONCE
Status: DISCONTINUED | OUTPATIENT
Start: 2021-08-27 | End: 2021-08-27

## 2021-08-27 RX ADMIN — SODIUM CHLORIDE 125 ML/HR: 0.9 INJECTION, SOLUTION INTRAVENOUS at 14:46

## 2021-08-27 RX ADMIN — DILTIAZEM HYDROCHLORIDE 10 MG/HR: 5 INJECTION INTRAVENOUS at 14:54

## 2021-08-27 RX ADMIN — DILTIAZEM HYDROCHLORIDE 15 MG/HR: 5 INJECTION INTRAVENOUS at 22:02

## 2021-08-27 RX ADMIN — DILTIAZEM HYDROCHLORIDE 15 MG: 5 INJECTION INTRAVENOUS at 14:44

## 2021-08-27 RX ADMIN — APIXABAN 2.5 MG: 2.5 TABLET, FILM COATED ORAL at 20:33

## 2021-08-27 RX ADMIN — SODIUM CHLORIDE 500 ML: 0.9 INJECTION, SOLUTION INTRAVENOUS at 15:23

## 2021-08-27 RX ADMIN — Medication 3 MG: at 21:53

## 2021-08-27 RX ADMIN — PIPERACILLIN AND TAZOBACTAM 3.38 G: 36; 4.5 INJECTION, POWDER, FOR SOLUTION INTRAVENOUS at 21:53

## 2021-08-27 RX ADMIN — METOPROLOL TARTRATE 2.5 MG: 5 INJECTION INTRAVENOUS at 20:26

## 2021-08-27 RX ADMIN — TRAMADOL HYDROCHLORIDE 50 MG: 50 TABLET, FILM COATED ORAL at 21:52

## 2021-08-27 RX ADMIN — DIGOXIN 250 MCG: 0.25 INJECTION INTRAMUSCULAR; INTRAVENOUS at 18:14

## 2021-08-27 RX ADMIN — IPRATROPIUM BROMIDE AND ALBUTEROL SULFATE 3 ML: 2.5; .5 SOLUTION RESPIRATORY (INHALATION) at 20:54

## 2021-08-27 RX ADMIN — SODIUM CHLORIDE 1000 ML: 0.9 INJECTION, SOLUTION INTRAVENOUS at 15:59

## 2021-08-27 RX ADMIN — PIPERACILLIN AND TAZOBACTAM 3.38 G: 3; .375 INJECTION, POWDER, LYOPHILIZED, FOR SOLUTION INTRAVENOUS at 16:17

## 2021-08-27 NOTE — ASSESSMENT & PLAN NOTE
- CHRF secondary to COPD   -Patient uses 3 liters of oxygen chronically  Plan  - continue o2 as per at home and INH

## 2021-08-27 NOTE — H&P
Trisha 43  H&P- Suzette Espinoza 1940, 78 y o  female MRN: [id number]  Unit/Bed#: ED CT2 Encounter: [id number]  Primary Care Provider: Gabriela Devlin MD   Date and time admitted to hospital: 8/27/2021  2:25 PM    Sepsis likely 2/2 to UTI  Assessment & Plan  -Patient complains of dysuria  - Given zosyn and IVF as per protocol  -lactic 3 1  Plan  - follow UA/UC  -follow procal  -follow BC  -arik lactic acid  -continue zosyn since she was hospitalized one month ago    CKD (chronic kidney disease) stage 3, GFR 30-59 ml/min [facility])  Assessment & Plan  Lab Results   Component Value Date    EGFR 44 08/27/2021    EGFR 63 07/26/2021    EGFR 45 07/25/2021    CREATININE 1 15 08/27/2021    CREATININE 0 86 07/26/2021    CREATININE 1 13 07/25/2021   -Cr at baseline      Chronic respiratory failure with hypoxia 2/2 COPD  Assessment & Plan  - CHRF secondary to COPD   -Patient uses 3 liters of oxygen chronically  Plan  - continue o2 as per at home and INH    COPD (chronic obstructive pulmonary disease) ([facility] [address] )  Assessment & Plan  -No evidence of acute exacerbation  -Continue Spiriva  -Patient has history of chronic respiratory failure and uses 3 liters oxygen    Chronic diastolic (congestive) heart failure (HCC)  Assessment & Plan  Wt Readings from Last 3 Encounters:   08/27/21 47 7 kg (105 lb 2 6 oz)   07/26/21 48 6 kg (107 lb 2 3 oz)   03/09/21 50 8 kg (112 lb)       -CXR no edema  -follow pro-BNP  - Echo 7/24 showed EF 75%        GERD (gastroesophageal reflux disease)  Assessment & Plan  -Continue PPI    * Atrial fibrillation with rapid ventricular response ([address] )  Assessment & Plan  -Patient has history of atrial fibrillation on eliquis  -Patient was started on Cardizem drip and later developed hypotension  -2D echo 7/24  showed EF of 70% with grade 1 diastolic dysfunction with severe pulmonary artery hypertension peak 30-70  - normal trop x1   - EKG no signs of acute ischemia  Plan  --Patient was loaded with digoxin 250  and was continued on digoxin 125 x2 ( 6 hours apart) post check levels  -continue eliquis  -follow magnesium  -keep k>4 and mag >2        -------------------------------------------------------------------------------------------------------------  Chief Complaint: chest pain and SOB    History of Present Illness   HX and PE limited by: none  Luis Felipe Bolton is a 78 y o  female  With PMH of A fib recent hospitaliced with a  Fib RVR, h/o TAVR?, COPD home O2 3 l, depression, GERD who presents with a CC of CP and SOB  Patient was in her usual state of health until this morning when she woke up short of breath  She was also feeling some chest pain across the sternum  That was dull, with no radiation  Denies palpitations, lightheadedness or syncopal episodes  Of note patient was recently admitted for AFib RVR  Patient also complains of cough a couple of days ago by no fever or sputum production  No sick contacts  She reports having some dysuria but denies fever or flank pains  Otherwise no further complaints  On the ED patient was found to be on AFib RVR and mildly hypotensive after being started on Cardizem  drip     Sepsis alert was called secondary to possible UTI  Patient was given Zosyn and fluid hydration as per protocol  Lactate was 3 2  After initial management  she was transferred to ICU for better level of care  History obtained from the patient   -------------------------------------------------------------------------------------------------------------  Dispo: Admit to Stepdown Level 1    Code Status: Level 3 - DNAR and DNI  --------------------------------------------------------------------------------------------------------------  Review of Systems   Constitutional: Negative for fatigue and fever  HENT: Negative for congestion  Eyes: Negative for visual disturbance  Respiratory: Positive for cough and shortness of breath   Negative for chest tightness, wheezing and stridor  Cardiovascular: Positive for chest pain  Negative for palpitations and leg swelling  Gastrointestinal: Negative for abdominal pain, constipation, diarrhea, nausea and vomiting  Endocrine: Negative for polydipsia, polyphagia and polyuria  Genitourinary: Positive for dysuria and frequency  Negative for flank pain and urgency  Musculoskeletal: Negative for arthralgias and back pain  Skin: Negative for color change  Neurological: Negative for weakness, light-headedness and headaches  Psychiatric/Behavioral: Negative for confusion  A 12-point, complete review of systems was reviewed and negative except as stated above     Physical Exam  Constitutional:       General: She is in acute distress  Appearance: She is normal weight  HENT:      Head: Normocephalic and atraumatic  Eyes:      Conjunctiva/sclera: Conjunctivae normal    Cardiovascular:      Rate and Rhythm: Tachycardia present  Rhythm irregular  Pulses: Normal pulses  Heart sounds: Normal heart sounds  No murmur heard  Pulmonary:      Effort: Pulmonary effort is normal  No respiratory distress  Breath sounds: Normal breath sounds  No wheezing or rhonchi  Abdominal:      General: Abdomen is flat  Bowel sounds are normal       Palpations: Abdomen is soft  Musculoskeletal:         General: Normal range of motion  Cervical back: Normal range of motion  Right lower leg: No edema  Left lower leg: No edema  Skin:     General: Skin is warm  Capillary Refill: Capillary refill takes less than 2 seconds  Neurological:      General: No focal deficit present  Mental Status: She is alert and oriented to person, place, and time   Mental status is at baseline        --------------------------------------------------------------------------------------------------------------  Vitals:   Vitals:    08/27/21 1615 08/27/21 1630 08/27/21 1700 08/27/21 1745   BP: (!) 119/49 (!) 95/42 90/50    BP Location:       Pulse: (!) 148 (!) 158 (!) 154 (!) 140   Resp: (!) 25 (!) 23 (!) 27 20   Temp:       TempSrc:       SpO2: 100% 95% 99% 100%   Weight:         Temp  Min: 98 9 °F (37 2 °C)  Max: 98 9 °F (37 2 °C)        Body mass index is 17 5 kg/m²  Laboratory and Diagnostics:  Results from last 7 days   Lab Units 08/27/21  1443   WBC Thousand/uL 9 25   HEMOGLOBIN g/dL 11 1*   HEMATOCRIT % 39 0   PLATELETS Thousands/uL 254   NEUTROS PCT % 87*   MONOS PCT % 5     Results from last 7 days   Lab Units 08/27/21  1443   SODIUM mmol/L 140   POTASSIUM mmol/L 4 4   CHLORIDE mmol/L 102   CO2 mmol/L 27   ANION GAP mmol/L 11   BUN mg/dL 15   CREATININE mg/dL 1 15   CALCIUM mg/dL 9 2   GLUCOSE RANDOM mg/dL 149*   ALT U/L 20   AST U/L 19   ALK PHOS U/L 61   ALBUMIN g/dL 3 5   TOTAL BILIRUBIN mg/dL 0 21          Results from last 7 days   Lab Units 08/27/21  1443   INR  1 12   PTT seconds 37      Results from last 7 days   Lab Units 08/27/21  1443   TROPONIN I ng/mL 0 02     Results from last 7 days   Lab Units 08/27/21  1615   LACTIC ACID mmol/L 3 2*     ABG:    VBG:          Micro:        EKG:  No signs of ischemia  Imaging: I have personally reviewed pertinent reports          Historical Information   Past Medical History:   Diagnosis Date    Arthritis     Atrial fibrillation (HCC)     CHF (congestive heart failure) (HCC)     COPD (chronic obstructive pulmonary disease) (HCC)     Coronary artery disease     aortic valve replacement    Heart murmur     Hypertension      Past Surgical History:   Procedure Laterality Date    AORTIC VALVE REPLACEMENT      APPENDECTOMY      CHOLECYSTECTOMY      JOINT REPLACEMENT      bilaterl hip and right knee    TONSILECTOMY AND ADNOIDECTOMY      TONSILLECTOMY      TUBAL LIGATION       Social History   Social History     Substance and Sexual Activity   Alcohol Use Yes    Alcohol/week: 1 0 standard drinks    Types: 1 Glasses of wine per week    Comment: rare     Social History     Substance and Sexual Activity   Drug Use No     Social History     Tobacco Use   Smoking Status Former Smoker    Packs/day: 1 00    Years: 25 00    Pack years: 25 00    Types: Cigarettes    Quit date: 2000    Years since quittin 6   Smokeless Tobacco Never Used     Exercise History:  Patient is independent with activities of daily living  She is on 3 L oxygen for COPD  Family History:   Family History   Problem Relation Age of Onset    Heart defect Father      I have reviewed this patient's family history and commented on sigificant items within the HPI      Medications:  Current Facility-Administered Medications   Medication Dose Route Frequency    digoxin (LANOXIN) injection 125 mcg  125 mcg Intravenous Once    [START ON 2021] digoxin (LANOXIN) injection 125 mcg  125 mcg Intravenous Once    digoxin (LANOXIN) injection 250 mcg  250 mcg Intravenous Once    piperacillin-tazobactam (ZOSYN) IVPB 3 375 g  3 375 g Intravenous Q6H    sodium chloride 0 9 % infusion  125 mL/hr Intravenous Continuous     Home medications:  Prior to Admission Medications   Prescriptions Last Dose Informant Patient Reported? Taking?    FLUoxetine (PROzac) 20 mg capsule   Yes No   Sig: Take 20 mg by mouth daily   Multiple Vitamins-Minerals (PreserVision AREDS) TABS   Yes No   Sig: Take by mouth   apixaban (ELIQUIS) 2 5 mg   No No   Sig: Take 1 tablet (2 5 mg total) by mouth 2 (two) times a day   Patient not taking: Reported on 3/9/2021   aspirin (ECOTRIN LOW STRENGTH) 81 mg EC tablet   No No   Sig: Take 1 tablet (81 mg total) by mouth daily   atorvastatin (LIPITOR) 40 mg tablet   No No   Sig: Take 1 tablet (40 mg total) by mouth daily   benazepril (LOTENSIN) 20 mg tablet   Yes No   Sig: Take 20 mg by mouth daily   furosemide (LASIX) 20 mg tablet   No No   Sig: Take 1 tablet (20 mg total) by mouth daily   melatonin 3 mg   No No   Sig: Take 1 tablet (3 mg total) by mouth daily at bedtime   metoprolol tartrate (LOPRESSOR) 50 mg tablet   No No   Sig: Take 1 tablet (50 mg total) by mouth every 12 (twelve) hours   omeprazole (PriLOSEC) 40 MG capsule   Yes No   Sig: Take 40 mg by mouth daily   tiotropium (SPIRIVA) 18 mcg inhalation capsule   No No   Sig: Place 1 capsule (18 mcg total) into inhaler and inhale daily   Patient not taking: Reported on 3/9/2021   traMADol (ULTRAM) 50 mg tablet   Yes No   Sig: Take 50 mg by mouth 4 (four) times a day as needed      Facility-Administered Medications: None     Allergies:  No Known Allergies      Joleen Myers MD        Portions of the record may have been created with voice recognition software  Occasional wrong word or "sound a like" substitutions may have occurred due to the inherent limitations of voice recognition software    Read the chart carefully and recognize, using context, where substitutions have occurred

## 2021-08-27 NOTE — ASSESSMENT & PLAN NOTE
Lab Results   Component Value Date    EGFR 44 08/27/2021    EGFR 63 07/26/2021    EGFR 45 07/25/2021    CREATININE 1 15 08/27/2021    CREATININE 0 86 07/26/2021    CREATININE 1 13 07/25/2021   -Cr at baseline

## 2021-08-27 NOTE — ED NOTES
PT ate 1/2 turkey sandwich  No distress, AXOX3  Call bell within reach        Rosana Alexis RN  08/27/21 [id number]

## 2021-08-27 NOTE — ASSESSMENT & PLAN NOTE
-No evidence of acute exacerbation  -Continue Spiriva  -Patient has history of chronic respiratory failure and uses 3 liters oxygen

## 2021-08-27 NOTE — ASSESSMENT & PLAN NOTE
-Patient complains of dysuria  - Given zosyn and IVF as per protocol  -lactic 3 1  Plan  - follow UA/UC  -follow procal  -follow BC  -arik lactic acid  -continue zosyn since she was hospitalized one month ago

## 2021-08-27 NOTE — ED NOTES
Elias Arcos RN supervisor made aware that this RN awaiting a call from ICU to receive the pt, was told by Santhosh Hinson MD (resident) they don't have avail RN for the pt        Dolores Engel RN  08/27/21 [id number]

## 2021-08-27 NOTE — ASSESSMENT & PLAN NOTE
Wt Readings from Last 3 Encounters:   08/27/21 47 7 kg (105 lb 2 6 oz)   07/26/21 48 6 kg (107 lb 2 3 oz)   03/09/21 50 8 kg (112 lb)       -CXR no edema  -follow pro-BNP  - Echo 7/24 showed EF 75%

## 2021-08-27 NOTE — LETTER
To: [address] [place] [address]  From:  Arabella Awan [place] [phone]    AVS/F2F for Bairon Crockett Ear Wedge Repair Text: A wedge excision was completed by carrying down an excision through the full thickness of the ear and cartilage with an inward facing Burow's triangle. The wound was then closed in a layered fashion.

## 2021-08-27 NOTE — ASSESSMENT & PLAN NOTE
-Patient has history of atrial fibrillation on eliquis  -Patient was started on Cardizem drip and later developed hypotension  -2D echo 7/24  showed EF of 70% with grade 1 diastolic dysfunction with severe pulmonary artery hypertension peak 30-70  - normal trop x1   - EKG no signs of acute ischemia  Plan  --Patient was loaded with digoxin 250  and was continued on digoxin 125 x2 ( 6 hours apart) post check levels    -continue eliquis  -follow magnesium  -keep k>4 and mag >2

## 2021-08-27 NOTE — ED PROVIDER NOTES
History  Chief Complaint   Patient presents with    Shortness of Breath     c/o shakes like when her afib acts up  breathing is not any different than normal  had some chest pain earlier  none now on continuous 3 liters     Patient has a history of paroxysmal atrial fibrillation and is maintained on blood thinners  She states she has been having episodic shortness of breath recent days  Today she had pain across the chest at around 1000 hours,  4 hours prior to arrival associated with shortness of breath  Patient states she thinks she is in atrial fibrillation again  No fever          Prior to Admission Medications   Prescriptions Last Dose Informant Patient Reported? Taking?    FLUoxetine (PROzac) 20 mg capsule   Yes No   Sig: Take 20 mg by mouth daily   Multiple Vitamins-Minerals (PreserVision AREDS) TABS   Yes No   Sig: Take by mouth   apixaban (ELIQUIS) 2 5 mg   No No   Sig: Take 1 tablet (2 5 mg total) by mouth 2 (two) times a day   Patient not taking: Reported on 3/9/2021   aspirin (ECOTRIN LOW STRENGTH) 81 mg EC tablet   No No   Sig: Take 1 tablet (81 mg total) by mouth daily   atorvastatin (LIPITOR) 40 mg tablet   No No   Sig: Take 1 tablet (40 mg total) by mouth daily   benazepril (LOTENSIN) 20 mg tablet   Yes No   Sig: Take 20 mg by mouth daily   furosemide (LASIX) 20 mg tablet   No No   Sig: Take 1 tablet (20 mg total) by mouth daily   melatonin 3 mg   No No   Sig: Take 1 tablet (3 mg total) by mouth daily at bedtime   metoprolol tartrate (LOPRESSOR) 50 mg tablet   No No   Sig: Take 1 tablet (50 mg total) by mouth every 12 (twelve) hours   omeprazole (PriLOSEC) 40 MG capsule   Yes No   Sig: Take 40 mg by mouth daily   tiotropium (SPIRIVA) 18 mcg inhalation capsule   No No   Sig: Place 1 capsule (18 mcg total) into inhaler and inhale daily   Patient not taking: Reported on 3/9/2021   traMADol (ULTRAM) 50 mg tablet   Yes No   Sig: Take 50 mg by mouth 4 (four) times a day as needed Facility-Administered Medications: None       Past Medical History:   Diagnosis Date    Arthritis     Atrial fibrillation (HCC)     CHF (congestive heart failure) (HCC)     COPD (chronic obstructive pulmonary disease) (HCC)     Coronary artery disease     aortic valve replacement    Heart murmur     Hypertension        Past Surgical History:   Procedure Laterality Date    AORTIC VALVE REPLACEMENT      APPENDECTOMY      CHOLECYSTECTOMY      JOINT REPLACEMENT      bilaterl hip and right knee    TONSILECTOMY AND ADNOIDECTOMY      TONSILLECTOMY      TUBAL LIGATION         Family History   Problem Relation Age of Onset    Heart defect Father      I have reviewed and agree with the history as documented  E-Cigarette/Vaping    E-Cigarette Use Never User      E-Cigarette/Vaping Substances     Social History     Tobacco Use    Smoking status: Former Smoker     Packs/day:      Years: 25 00     Pack years: 25 00     Types: Cigarettes     Quit date: 2000     Years since quittin 6    Smokeless tobacco: Never Used   Vaping Use    Vaping Use: Never used   Substance Use Topics    Alcohol use: Yes     Alcohol/week: 1 0 standard drinks     Types: 1 Glasses of wine per week     Comment: rare    Drug use: No       Review of Systems   Constitutional: Positive for chills  Negative for fever  HENT: Negative for congestion and sore throat  Eyes: Negative for visual disturbance  Respiratory: Positive for shortness of breath  Negative for cough  Cardiovascular: Positive for chest pain and palpitations  Negative for leg swelling  Gastrointestinal: Negative for abdominal pain and vomiting  Genitourinary: Negative for dysuria  Musculoskeletal: Negative for back pain  Skin: Negative for rash  Neurological: Positive for light-headedness  Negative for headaches  Hematological: Does not bruise/bleed easily  Psychiatric/Behavioral: Negative for confusion     All other systems reviewed and are negative  Physical Exam  Physical Exam  Vitals and nursing note reviewed  Constitutional:       Appearance: She is well-developed  HENT:      Head: Normocephalic  Mouth/Throat:      Mouth: Mucous membranes are moist    Eyes:      Extraocular Movements: Extraocular movements intact  Pupils: Pupils are equal, round, and reactive to light  Cardiovascular:      Rate and Rhythm: Tachycardia present  Rhythm irregular  Pulmonary:      Effort: Pulmonary effort is normal       Breath sounds: Decreased breath sounds present  Chest:      Chest wall: No tenderness  Abdominal:      General: Bowel sounds are normal       Palpations: Abdomen is soft  Tenderness: There is no abdominal tenderness  Musculoskeletal:         General: Normal range of motion  Cervical back: Normal range of motion and neck supple  Right lower leg: No tenderness  No edema  Left lower leg: No tenderness  No edema  Skin:     General: Skin is warm and dry  Capillary Refill: Capillary refill takes less than 2 seconds  Neurological:      General: No focal deficit present  Mental Status: She is alert     Psychiatric:         Mood and Affect: Mood normal          Behavior: Behavior normal          Vital Signs  ED Triage Vitals [08/27/21 1431]   Temperature Pulse Respirations Blood Pressure SpO2   98 9 °F (37 2 °C) (!) 158 (!) 28 140/60 97 %      Temp Source Heart Rate Source Patient Position - Orthostatic VS BP Location FiO2 (%)   Tympanic Monitor Sitting Left arm --      Pain Score       --           Vitals:    08/27/21 1515 08/27/21 1530 08/27/21 1600 08/27/21 1615   BP: (!) 99/44 (!) 87/50 108/51 (!) 119/49   Pulse: (!) 166 (!) 168 (!) 156 (!) 148   Patient Position - Orthostatic VS:  Lying Lying          Visual Acuity      ED Medications  Medications   sodium chloride 0 9 % infusion (0 mL/hr Intravenous Stopped 8/27/21 1559)   piperacillin-tazobactam (ZOSYN) IVPB 3 375 g (3 375 g Intravenous New Bag 8/27/21 1617)   sodium chloride 0 9 % bolus 1,000 mL (1,000 mL Intravenous New Bag 8/27/21 1559)   diltiazem (CARDIZEM) injection 15 mg (15 mg Intravenous Given 8/27/21 1444)   diltiazem (CARDIZEM) 125 mg in sodium chloride 0 9 % 125 mL infusion (15 mg/hr Intravenous Rate/Dose Change 8/27/21 1509)   sodium chloride 0 9 % bolus 500 mL (0 mL Intravenous Stopped 8/27/21 1603)       Diagnostic Studies  Results Reviewed     Procedure Component Value Units Date/Time    Blood culture #2 [[id number]] Collected: 08/27/21 1615    Lab Status: In process Specimen: Blood from Arm, Right Updated: 08/27/21 1622    Blood culture #1 [[id number]] Collected: 08/27/21 1615    Lab Status: In process Specimen: Blood from Arm, Left Updated: 08/27/21 1622    Procalcitonin with AM Reflex [[id number]] Collected: 08/27/21 1615    Lab Status: In process Specimen: Blood from Arm, Right Updated: 08/27/21 1622    Lactic acid [[id number]] Collected: 08/27/21 1615    Lab Status:  In process Specimen: Blood from Arm, Right Updated: 08/27/21 1621    Troponin I repeat in 3hrs [[id number]]     Lab Status: No result Specimen: Blood     Comprehensive metabolic panel [[id number]]  (Abnormal) Collected: 08/27/21 1443    Lab Status: Final result Specimen: Blood from Arm, Left Updated: 08/27/21 1519     Sodium 140 mmol/L      Potassium 4 4 mmol/L      Chloride 102 mmol/L      CO2 27 mmol/L      ANION GAP 11 mmol/L      BUN 15 mg/dL      Creatinine 1 15 mg/dL      Glucose 149 mg/dL      Calcium 9 2 mg/dL      AST 19 U/L      ALT 20 U/L      Alkaline Phosphatase 61 U/L      Total Protein 7 6 g/dL      Albumin 3 5 g/dL      Total Bilirubin 0 21 mg/dL      eGFR 44 ml/min/1 73sq m     Narrative:      [place] guidelines for Chronic Kidney Disease (CKD):     Stage 1 with normal or high GFR (GFR > 90 mL/min/1 73 square meters)    Stage 2 Mild CKD (GFR = 60-89 mL/min/1 73 square meters)    Stage 3A Moderate CKD (GFR = 45-59 mL/min/1 73 square meters)    Stage 3B Moderate CKD (GFR = 30-44 mL/min/1 73 square meters)    Stage 4 Severe CKD (GFR = 15-29 mL/min/1 73 square meters)    Stage 5 End Stage CKD (GFR <15 mL/min/1 73 square meters)  Note: GFR calculation is accurate only with a steady state creatinine    Troponin I [[id number]]  (Normal) Collected: 08/27/21 1443    Lab Status: Final result Specimen: Blood from Arm, Left Updated: 08/27/21 1513     Troponin I 0 02 ng/mL     Protime-INR [[id number]]  (Normal) Collected: 08/27/21 1443    Lab Status: Final result Specimen: Blood from Arm, Left Updated: 08/27/21 1504     Protime 14 3 seconds      INR 1 12    APTT [[id number]]  (Normal) Collected: 08/27/21 1443    Lab Status: Final result Specimen: Blood from Arm, Left Updated: 08/27/21 1504     PTT 37 seconds     CBC and differential [[id number]]  (Abnormal) Collected: 08/27/21 1443    Lab Status: Final result Specimen: Blood from Arm, Left Updated: 08/27/21 1450     WBC 9 25 Thousand/uL      RBC 4 17 Million/uL      Hemoglobin 11 1 g/dL      Hematocrit 39 0 %      MCV 94 fL      MCH 26 6 pg      MCHC 28 5 g/dL      RDW 15 2 %      MPV 10 3 fL      Platelets 879 Thousands/uL      nRBC 0 /100 WBCs      Neutrophils Relative 87 %      Immat GRANS % 0 %      Lymphocytes Relative 8 %      Monocytes Relative 5 %      Eosinophils Relative 0 %      Basophils Relative 0 %      Neutrophils Absolute 7 90 Thousands/µL      Immature Grans Absolute 0 04 Thousand/uL      Lymphocytes Absolute 0 75 Thousands/µL      Monocytes Absolute 0 50 Thousand/µL      Eosinophils Absolute 0 02 Thousand/µL      Basophils Absolute 0 04 Thousands/µL                  XR chest 1 view portable    (Results Pending)              Procedures  ECG 12 Lead Documentation Only    Date/Time: 8/27/2021 2:35 PM  Performed by: González Winn MD  Authorized by: González Winn MD     Indications / Diagnosis:  Chest pain  ECG reviewed by me, the ED Provider: yes    Patient location:  ED  Previous ECG:     Previous ECG:  Compared to current  Interpretation:     Interpretation: abnormal    Rate:     ECG rate:  179    ECG rate assessment: tachycardic    Rhythm:     Rhythm: atrial fibrillation    Ectopy:     Ectopy: none    QRS:     QRS axis:  Left    QRS intervals:  Normal  Conduction:     Conduction: normal    ST segments:     ST segments:  Abnormal    Depression:  V3, V4, V5 and V6  T waves:     T waves: normal               ED Course                                           MDM  Number of Diagnoses or Management Options  Atrial fibrillation with RVR (HCC)  Hypotension  Pneumonia  Diagnosis management comments: Patient is in a recurrent atrial fibrillation with rapid ventricular response  Rule out MI protocol  Start Cardizem infusion  Patient blood pressure remained soft after infusion of calcium channel blocker  Right middle lobe pneumonia was an unexpected finding  Sepsis profile initiated, fluid resuscitation antibiotics      Disposition  Final diagnoses:   Pneumonia   Atrial fibrillation with RVR ([facility] )   Hypotension     Time reflects when diagnosis was documented in both MDM as applicable and the Disposition within this note     Time User Action Codes Description Comment    8/27/2021  4:31 PM Whit Ha Add [J18 9] Pneumonia     8/27/2021  4:31 PM Deann DURAN Add [I48 91] Atrial fibrillation with RVR ([facility] [address] )     8/27/2021  4:31 PM Deann DURAN Add [I95 9] Hypotension       ED Disposition     ED Disposition Condition Date/Time Comment    Admit Stable Fri Aug 27, 2021  4:30 PM Case was discussed with critical care and the patient's admission status was agreed to be Admission Status: inpatient status to the service of Dr Santino Reyes   Follow-up Information    None         Patient's Medications   Discharge Prescriptions    No medications on file     No discharge procedures on file      PDMP Review     None          ED Provider  Electronically Signed by           Hank Taveras MD  08/27/21 7674

## 2021-08-28 PROBLEM — E44.0 MODERATE PROTEIN-CALORIE MALNUTRITION (HCC): Chronic | Status: ACTIVE | Noted: 2021-07-26

## 2021-08-28 PROBLEM — J44.9 COPD (CHRONIC OBSTRUCTIVE PULMONARY DISEASE) (HCC): Chronic | Status: ACTIVE | Noted: 2020-12-09

## 2021-08-28 PROBLEM — N18.30 CKD (CHRONIC KIDNEY DISEASE) STAGE 3, GFR 30-59 ML/MIN (HCC): Chronic | Status: ACTIVE | Noted: 2020-12-12
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
## 2021-08-28 PROBLEM — N17.9 ACUTE KIDNEY INJURY (HCC): Status: RESOLVED | Noted: 2021-07-23 | Resolved: 2021-08-28

## 2021-08-28 PROBLEM — J18.9 PNEUMONIA: Status: RESOLVED | Noted: 2017-02-19 | Resolved: 2021-08-28

## 2021-08-28 PROBLEM — J10.1 INFLUENZA A: Status: RESOLVED | Noted: 2017-02-21 | Resolved: 2021-08-28

## 2021-08-28 PROBLEM — S22.49XA MULTIPLE RIB FRACTURES: Status: RESOLVED | Noted: 2019-09-19 | Resolved: 2021-08-28

## 2021-08-28 PROBLEM — J96.11 CHRONIC RESPIRATORY FAILURE WITH HYPOXIA (HCC): Chronic | Status: ACTIVE | Noted: 2020-12-09

## 2021-08-28 PROBLEM — Z95.2 S/P TAVR (TRANSCATHETER AORTIC VALVE REPLACEMENT): Chronic | Status: ACTIVE | Noted: 2020-12-09

## 2021-08-28 PROBLEM — F32.A DEPRESSION: Chronic | Status: ACTIVE | Noted: 2017-02-19

## 2021-08-28 PROBLEM — I50.32 CHRONIC DIASTOLIC (CONGESTIVE) HEART FAILURE (HCC): Chronic | Status: ACTIVE | Noted: 2020-12-09

## 2021-08-28 PROBLEM — R00.0 TACHYCARDIA: Status: RESOLVED | Noted: 2020-12-09 | Resolved: 2021-08-28

## 2021-08-28 PROBLEM — R77.8 ELEVATED TROPONIN: Status: RESOLVED | Noted: 2020-12-09 | Resolved: 2021-08-28

## 2021-08-28 PROBLEM — Z85.118 HISTORY OF LUNG CANCER: Chronic | Status: ACTIVE | Noted: 2020-12-09

## 2021-08-28 PROBLEM — D61.818 PANCYTOPENIA (HCC): Status: RESOLVED | Noted: 2017-02-21 | Resolved: 2021-08-28

## 2021-08-28 PROBLEM — N39.0 UTI (URINARY TRACT INFECTION): Status: ACTIVE | Noted: 2021-08-28

## 2021-08-28 LAB
ANION GAP SERPL CALCULATED.3IONS-SCNC: 8 MMOL/L (ref 4–13)
BASOPHILS # BLD AUTO: 0.01 THOUSANDS/ΜL (ref 0–0.1)
BASOPHILS NFR BLD AUTO: 0 % (ref 0–1)
BUN SERPL-MCNC: 11 MG/DL (ref 5–25)
CALCIUM SERPL-MCNC: 6.2 MG/DL (ref 8.3–10.1)
CHLORIDE SERPL-SCNC: 116 MMOL/L (ref 100–108)
CO2 SERPL-SCNC: 23 MMOL/L (ref 21–32)
CREAT SERPL-MCNC: 0.74 MG/DL (ref 0.6–1.3)
DIGOXIN SERPL-MCNC: 1.2 NG/ML (ref 0.8–2)
EOSINOPHIL # BLD AUTO: 0.03 THOUSAND/ΜL (ref 0–0.61)
EOSINOPHIL NFR BLD AUTO: 1 % (ref 0–6)
ERYTHROCYTE [DISTWIDTH] IN BLOOD BY AUTOMATED COUNT: 15.7 % (ref 11.6–15.1)
GFR SERPL CREATININE-BSD FRML MDRD: 75 ML/MIN/1.73SQ M
GLUCOSE SERPL-MCNC: 84 MG/DL (ref 65–140)
HCT VFR BLD AUTO: 24.5 % (ref 34.8–46.1)
HGB BLD-MCNC: 7.1 G/DL (ref 11.5–15.4)
IMM GRANULOCYTES # BLD AUTO: 0.02 THOUSAND/UL (ref 0–0.2)
IMM GRANULOCYTES NFR BLD AUTO: 1 % (ref 0–2)
LYMPHOCYTES # BLD AUTO: 0.52 THOUSANDS/ΜL (ref 0.6–4.47)
LYMPHOCYTES NFR BLD AUTO: 15 % (ref 14–44)
MAGNESIUM SERPL-MCNC: 1.1 MG/DL (ref 1.6–2.6)
MCH RBC QN AUTO: 27.1 PG (ref 26.8–34.3)
MCHC RBC AUTO-ENTMCNC: 28.6 G/DL (ref 31.4–37.4)
MCV RBC AUTO: 95 FL (ref 82–98)
MONOCYTES # BLD AUTO: 0.32 THOUSAND/ΜL (ref 0.17–1.22)
MONOCYTES NFR BLD AUTO: 9 % (ref 4–12)
NEUTROPHILS # BLD AUTO: 2.64 THOUSANDS/ΜL (ref 1.85–7.62)
NEUTS SEG NFR BLD AUTO: 74 % (ref 43–75)
NRBC BLD AUTO-RTO: 0 /100 WBCS
PHOSPHATE SERPL-MCNC: 2.1 MG/DL (ref 2.3–4.1)
PLATELET # BLD AUTO: 105 THOUSANDS/UL (ref 149–390)
PMV BLD AUTO: 9.7 FL (ref 8.9–12.7)
POTASSIUM SERPL-SCNC: 3.2 MMOL/L (ref 3.5–5.3)
PROCALCITONIN SERPL-MCNC: <0.05 NG/ML
RBC # BLD AUTO: 2.58 MILLION/UL (ref 3.81–5.12)
SODIUM SERPL-SCNC: 147 MMOL/L (ref 136–145)
TROPONIN I SERPL-MCNC: 0.11 NG/ML
WBC # BLD AUTO: 3.54 THOUSAND/UL (ref 4.31–10.16)

## 2021-08-28 PROCEDURE — 94760 N-INVAS EAR/PLS OXIMETRY 1: CPT

## 2021-08-28 PROCEDURE — 84145 PROCALCITONIN (PCT): CPT | Performed by: EMERGENCY MEDICINE

## 2021-08-28 PROCEDURE — 80162 ASSAY OF DIGOXIN TOTAL: CPT | Performed by: INTERNAL MEDICINE

## 2021-08-28 PROCEDURE — 94640 AIRWAY INHALATION TREATMENT: CPT

## 2021-08-28 PROCEDURE — 85025 COMPLETE CBC W/AUTO DIFF WBC: CPT | Performed by: INTERNAL MEDICINE

## 2021-08-28 PROCEDURE — 83735 ASSAY OF MAGNESIUM: CPT | Performed by: INTERNAL MEDICINE

## 2021-08-28 PROCEDURE — 99232 SBSQ HOSP IP/OBS MODERATE 35: CPT | Performed by: ANESTHESIOLOGY

## 2021-08-28 PROCEDURE — 84100 ASSAY OF PHOSPHORUS: CPT | Performed by: INTERNAL MEDICINE

## 2021-08-28 PROCEDURE — 80048 BASIC METABOLIC PNL TOTAL CA: CPT | Performed by: INTERNAL MEDICINE

## 2021-08-28 RX ORDER — MAGNESIUM SULFATE HEPTAHYDRATE 40 MG/ML
4 INJECTION, SOLUTION INTRAVENOUS ONCE
Status: COMPLETED | OUTPATIENT
Start: 2021-08-28 | End: 2021-08-28

## 2021-08-28 RX ORDER — POTASSIUM CHLORIDE 14.9 MG/ML
20 INJECTION INTRAVENOUS ONCE
Status: COMPLETED | OUTPATIENT
Start: 2021-08-28 | End: 2021-08-28

## 2021-08-28 RX ORDER — IPRATROPIUM BROMIDE AND ALBUTEROL SULFATE 2.5; .5 MG/3ML; MG/3ML
3 SOLUTION RESPIRATORY (INHALATION) EVERY 6 HOURS PRN
Status: DISCONTINUED | OUTPATIENT
Start: 2021-08-28 | End: 2021-08-30

## 2021-08-28 RX ORDER — ACETAMINOPHEN 325 MG/1
650 TABLET ORAL EVERY 6 HOURS PRN
Status: DISCONTINUED | OUTPATIENT
Start: 2021-08-28 | End: 2021-09-01 | Stop reason: HOSPADM

## 2021-08-28 RX ORDER — POTASSIUM CHLORIDE 20 MEQ/1
40 TABLET, EXTENDED RELEASE ORAL ONCE
Status: COMPLETED | OUTPATIENT
Start: 2021-08-28 | End: 2021-08-28

## 2021-08-28 RX ORDER — METOPROLOL TARTRATE 5 MG/5ML
5 INJECTION INTRAVENOUS EVERY 6 HOURS
Status: DISCONTINUED | OUTPATIENT
Start: 2021-08-28 | End: 2021-08-28

## 2021-08-28 RX ADMIN — Medication 3 MG: at 21:16

## 2021-08-28 RX ADMIN — IPRATROPIUM BROMIDE AND ALBUTEROL SULFATE 3 ML: 2.5; .5 SOLUTION RESPIRATORY (INHALATION) at 02:53

## 2021-08-28 RX ADMIN — TRAMADOL HYDROCHLORIDE 50 MG: 50 TABLET, FILM COATED ORAL at 21:16

## 2021-08-28 RX ADMIN — PIPERACILLIN AND TAZOBACTAM 3.38 G: 36; 4.5 INJECTION, POWDER, FOR SOLUTION INTRAVENOUS at 16:44

## 2021-08-28 RX ADMIN — POTASSIUM CHLORIDE 40 MEQ: 1500 TABLET, EXTENDED RELEASE ORAL at 09:26

## 2021-08-28 RX ADMIN — DIGOXIN 125 MCG: 250 INJECTION, SOLUTION INTRAMUSCULAR; INTRAVENOUS; PARENTERAL at 00:03

## 2021-08-28 RX ADMIN — APIXABAN 2.5 MG: 2.5 TABLET, FILM COATED ORAL at 16:46

## 2021-08-28 RX ADMIN — PIPERACILLIN AND TAZOBACTAM 3.38 G: 36; 4.5 INJECTION, POWDER, FOR SOLUTION INTRAVENOUS at 04:09

## 2021-08-28 RX ADMIN — PIPERACILLIN AND TAZOBACTAM 3.38 G: 36; 4.5 INJECTION, POWDER, FOR SOLUTION INTRAVENOUS at 21:10

## 2021-08-28 RX ADMIN — METOPROLOL TARTRATE 5 MG: 5 INJECTION INTRAVENOUS at 10:13

## 2021-08-28 RX ADMIN — SODIUM CHLORIDE 125 ML/HR: 0.9 INJECTION, SOLUTION INTRAVENOUS at 03:06

## 2021-08-28 RX ADMIN — METOPROLOL TARTRATE 5 MG: 5 INJECTION INTRAVENOUS at 15:47

## 2021-08-28 RX ADMIN — PANTOPRAZOLE SODIUM 40 MG: 40 TABLET, DELAYED RELEASE ORAL at 09:27

## 2021-08-28 RX ADMIN — PIPERACILLIN AND TAZOBACTAM 3.38 G: 36; 4.5 INJECTION, POWDER, FOR SOLUTION INTRAVENOUS at 09:32

## 2021-08-28 RX ADMIN — DIGOXIN 125 MCG: 250 INJECTION, SOLUTION INTRAMUSCULAR; INTRAVENOUS; PARENTERAL at 06:22

## 2021-08-28 RX ADMIN — IPRATROPIUM BROMIDE AND ALBUTEROL SULFATE 3 ML: 2.5; .5 SOLUTION RESPIRATORY (INHALATION) at 07:33

## 2021-08-28 RX ADMIN — MAGNESIUM SULFATE HEPTAHYDRATE 4 G: 40 INJECTION, SOLUTION INTRAVENOUS at 09:29

## 2021-08-28 RX ADMIN — ACETAMINOPHEN 650 MG: 325 TABLET, FILM COATED ORAL at 16:45

## 2021-08-28 RX ADMIN — POTASSIUM CHLORIDE 40 MEQ: 1500 TABLET, EXTENDED RELEASE ORAL at 17:05

## 2021-08-28 RX ADMIN — METOPROLOL TARTRATE 25 MG: 25 TABLET, FILM COATED ORAL at 21:16

## 2021-08-28 RX ADMIN — POTASSIUM CHLORIDE 20 MEQ: 14.9 INJECTION, SOLUTION INTRAVENOUS at 09:27

## 2021-08-28 RX ADMIN — FLUOXETINE 20 MG: 20 CAPSULE ORAL at 09:34

## 2021-08-28 RX ADMIN — APIXABAN 2.5 MG: 2.5 TABLET, FILM COATED ORAL at 09:27

## 2021-08-28 RX ADMIN — ATORVASTATIN CALCIUM 40 MG: 40 TABLET, FILM COATED ORAL at 15:47

## 2021-08-28 NOTE — PROGRESS NOTES
[facility] 43  Progress Note - Bryant Monroy 1940, 78 y o  female MRN: [id number]  Unit/Bed#: ICU 05 Encounter: [id number]  Primary Care Provider: Aleksander Gutierres MD   Date and time admitted to hospital: 8/27/2021  2:25 PM    * Atrial fibrillation with rapid ventricular response ([facility] [address] )  Assessment & Plan  · Hx of AF RVR on home eliquis  · Presented to ER with AF RVR, like exacerbated by UTI POA   · Cardizem gtt initiated by ER   · Echo 7/24  showed EF of 70% with grade 1 diastolic dysfunction with severe pulmonary artery hypertension peak 30-70  · EKG no signs of acute ischemia  · Dig load with 250 x1, then 125 q6h x2  · AC: continue eliquis   · Wean Cardizem with goal to off   · Goal HR <120  · Patient notably does have hx of hypotension on Cardizem     UTI (urinary tract infection)  Assessment & Plan  · POA AEB UA with mod bacteria, mod leuks, subjective suprapubic pressure, urgency, frequency, foul smelling urine, dysuria per patient   · Abx as above   · Urine cx pending     Sepsis without shock   Assessment & Plan  · POA AEB tachycardia, tachypnea, hypotension, LA 3 2  · Bolused with 1500mL, which satisfies her 30mL/kg bolus for hypotension in the setting of sepsis   · HD stable, has not required vasopressors   · Source appears to be UTI, POA   · Initial procal neg   · BC x2, urine cx pending   · Abx D2: Zosyn D2   · Trend fever, WBC curve   · Trend LA to ensure clearance     Moderate protein-calorie malnutrition ([facility] )  Assessment & Plan  Malnutrition Findings:           BMI Findings: Body mass index is 18 24 kg/m²       · POA, patient appears cachectic   · She admits to me that she has had little to no appetite for several weeks to months, and endorses a weight loss of approx 20lbs in the past several months   · Patient reports she does not enjoy meal replacement shakes or supplements   · Nutrition consult placed     CKD (chronic kidney disease) stage 3, GFR 30-59 ml/min [facility])  Assessment & Plan  Lab Results   Component Value Date    EGFR 44 08/27/2021    EGFR 63 07/26/2021    EGFR 45 07/25/2021    CREATININE 1 15 08/27/2021    CREATININE 0 86 07/26/2021    CREATININE 1 13 07/25/2021     · Baseline creat appears to be 0 8 to 1 1  · Admission creat at baseline   · Trend I&O   · Avoid hypotension, nephrotoxic agents   · Trend renal indices     Chronic respiratory failure with hypoxia 2/2 COPD  Assessment & Plan  · In the setting of COPD   · On home 3L NC O2 chronically     COPD (chronic obstructive pulmonary disease) ([address] )  Assessment & Plan  · Without AW  · Continue home Spiriva  · On 3L chronically -- continue while IP    Chronic diastolic (congestive) heart failure (HCC)  Assessment & Plan  Wt Readings from Last 3 Encounters:   08/27/21 48 2 kg (106 lb 4 2 oz)   07/26/21 48 6 kg (107 lb 2 3 oz)   03/09/21 50 8 kg (112 lb)       · Does not appear overtly overloaded on admission   · On home lasix, consider restarting when able   · Echo 7/24 showed EF 75%  · Strict I&O, daily weights     GERD (gastroesophageal reflux disease)  Assessment & Plan  · Continue home PPI    ----------------------------------------------------------------------------------------  HPI/24hr events: She was admitted to ICU overnight for AF RVR requiring Cardizem gtt and digoxin loads  She is being treat with BSA for UTI POA  She is pleasantly aao x3 and endorses no complaints       Patient appropriate for transfer out of the ICU today?: No  Disposition: Continue Stepdown Level 1 level of care   Code Status: Level 3 - DNAR and DNI  ---------------------------------------------------------------------------------------  SUBJECTIVE  "I'm great now"    Review of Systems  Review of systems was reviewed and negative unless stated above in HPI/24-hour events   ---------------------------------------------------------------------------------------  OBJECTIVE    Vitals   Vitals:    08/27/21 2100 08/27/21 2200 08/27/21 2300 21 0254   BP: 116/67 115/56 120/65    BP Location:       Pulse: (!) 112 (!) 120 90    Resp: (!)     Temp:       TempSrc:       SpO2: 100% 98% 98% 97%   Weight:       Height:         Temp (24hrs), Av 5 °F (36 9 °C), Min:98 2 °F (36 8 °C), Max:98 9 °F (37 2 °C)  Current: Temperature: 98 2 °F (36 8 °C)          Respiratory:  SpO2: SpO2: 97 %, SpO2 Activity: SpO2 Activity: At Rest, SpO2 Device: O2 Device: Nasal cannula  Nasal Cannula O2 Flow Rate (L/min): 2 L/min    Invasive/non-invasive ventilation settings   Respiratory    Lab Data (Last 4 hours)    None         O2/Vent Data (Last 4 hours)    None                Physical Exam  Vitals and nursing note reviewed  Constitutional:       General: She is awake  She is not in acute distress  Appearance: She is well-developed  She is cachectic  She is ill-appearing  She is not toxic-appearing or diaphoretic  Interventions: Nasal cannula in place  HENT:      Head: Normocephalic and atraumatic  Cardiovascular:      Rate and Rhythm: Tachycardia present  Rhythm irregularly irregular  Heart sounds: Normal heart sounds  Pulmonary:      Effort: No tachypnea, accessory muscle usage, prolonged expiration or respiratory distress  Breath sounds: Decreased breath sounds present  Abdominal:      General: Abdomen is flat  Bowel sounds are normal       Palpations: Abdomen is soft  Tenderness: There is no abdominal tenderness  Genitourinary:     Comments: Voiding independently   Skin:     General: Skin is warm  Capillary Refill: Capillary refill takes less than 2 seconds  Neurological:      General: No focal deficit present  Mental Status: She is alert and oriented to person, place, and time  GCS: GCS eye subscore is 4  GCS verbal subscore is 5  GCS motor subscore is 6  Cranial Nerves: Cranial nerves are intact     Psychiatric:         Mood and Affect: Mood and affect normal          Speech: Speech normal  Behavior: Behavior normal  Behavior is cooperative  Laboratory and Diagnostics:  Results from last 7 days   Lab Units 08/27/21  1443   WBC Thousand/uL 9 25   HEMOGLOBIN g/dL 11 1*   HEMATOCRIT % 39 0   PLATELETS Thousands/uL 254   NEUTROS PCT % 87*   MONOS PCT % 5     Results from last 7 days   Lab Units 08/27/21  1443   SODIUM mmol/L 140   POTASSIUM mmol/L 4 4   CHLORIDE mmol/L 102   CO2 mmol/L 27   ANION GAP mmol/L 11   BUN mg/dL 15   CREATININE mg/dL 1 15   CALCIUM mg/dL 9 2   GLUCOSE RANDOM mg/dL 149*   ALT U/L 20   AST U/L 19   ALK PHOS U/L 61   ALBUMIN g/dL 3 5   TOTAL BILIRUBIN mg/dL 0 21     Results from last 7 days   Lab Units 08/27/21  1615   MAGNESIUM mg/dL 1 5*   PHOSPHORUS mg/dL 2 2*      Results from last 7 days   Lab Units 08/27/21  1443   INR  1 12   PTT seconds 37      Results from last 7 days   Lab Units 08/27/21  2333 08/27/21  2116 08/27/21  1756 08/27/21  1443   TROPONIN I ng/mL 0 11* 0 14* 0 07* 0 02     Results from last 7 days   Lab Units 08/27/21  2115 08/27/21  1756 08/27/21  1615   LACTIC ACID mmol/L 1 9 2 1* 3 2*     ABG:    VBG:    Results from last 7 days   Lab Units 08/27/21  1615   PROCALCITONIN ng/ml <0 05       Micro  Results from last 7 days   Lab Units 08/27/21  1615   BLOOD CULTURE  Received in Microbiology Lab  Culture in Progress  Received in Microbiology Lab  Culture in Progress  EKG: AF RVR, rates  on tele   Imaging: I have personally reviewed pertinent reports  Intake and Output  I/O       08/26 0701 - 08/27 0700 08/27 0701 - 08/28 0700    IV Piggyback  1600    Total Intake(mL/kg)  1600 (33 2)    Urine (mL/kg/hr)  125    Total Output  125    Net  +1475                Height and Weights   Height: 5' 4" (162 6 cm)  IBW (Ideal Body Weight): 54 7 kg  Body mass index is 18 24 kg/m²    Weight (last 2 days)     Date/Time   Weight    08/27/21 2030   48 2 (106 26)    08/27/21 1450   47 7 (105 16)                Nutrition       Diet Orders   (From admission, onward)             Start     Ordered    08/27/21 1749  Diet Cardiovascular; Cardiac  Diet effective now     Question Answer Comment   Diet Type Cardiovascular    Cardiac Cardiac    RD to adjust diet per protocol? Yes        08/27/21 1748                  Active Medications  Scheduled Meds:  Current Facility-Administered Medications   Medication Dose Route Frequency Provider Last Rate    apixaban  2 5 mg Oral BID Gaudencio Pro MD      atorvastatin  40 mg Oral Daily With Jorge Cabral MD      digoxin  125 mcg Intravenous Once Gaudencio Pro MD      diltiazem  1-15 mg/hr Intravenous Titrated Doris Judith, CRNP 12 5 mg/hr (08/28/21 0230)    FLUoxetine  20 mg Oral Daily Gaudencio Pro MD      ipratropium-albuterol  3 mL Nebulization Q6H Gaudencio Pro MD      melatonin  3 mg Oral HS Gaudencio Pro MD      metoprolol  2 5 mg Intravenous Q6H PRN Doris Judith, CRNP      pantoprazole  40 mg Oral Daily Gaudencio Pro MD      piperacillin-tazobactam  3 375 g Intravenous Q6H Gaudencio Pro MD      sodium chloride  125 mL/hr Intravenous Continuous Kelley Tapia  mL/hr (08/28/21 0306)    tiotropium  18 mcg Inhalation Daily Gaudencio Pro MD      traMADol  50 mg Oral Q8H PRN Doris Judith, CRNP       Continuous Infusions:  diltiazem, 1-15 mg/hr, Last Rate: 12 5 mg/hr (08/28/21 0230)  sodium chloride, 125 mL/hr, Last Rate: 125 mL/hr (08/28/21 0306)      PRN Meds:   metoprolol, 2 5 mg, Q6H PRN  traMADol, 50 mg, Q8H PRN        Invasive Devices Review  Invasive Devices     Peripheral Intravenous Line            Peripheral IV 08/27/21 Dorsal (posterior); Right Forearm 1 day    Peripheral IV 08/27/21 Left Antecubital <1 day                ---------------------------------------------------------------------------------------  Advance Directive and Living Will:      Power of :    POLST:    ---------------------------------------------------------------------------------------  Care Time Delivered:   No Critical Care time spent       HILDA Chacon      Portions of the record may have been created with voice recognition software  Occasional wrong word or "sound a like" substitutions may have occurred due to the inherent limitations of voice recognition software    Read the chart carefully and recognize, using context, where substitutions have occurred

## 2021-08-28 NOTE — PLAN OF CARE
Problem: Potential for Falls  Goal: Patient will remain free of falls  Description: INTERVENTIONS:  - Educate patient/family on patient safety including physical limitations  - Instruct patient to call for assistance with activity   - Consult OT/PT to assist with strengthening/mobility   - Keep Call bell within reach  - Keep bed low and locked with side rails adjusted as appropriate  - Keep care items and personal belongings within reach  - Initiate and maintain comfort rounds  - Make Fall Risk Sign visible to staff  - Offer Toileting every 2 Hours, in advance of need  - Initiate/Maintain bed  alarm  - Obtain necessary fall risk management equipment:   - Apply yellow socks and bracelet for high fall risk patients  - Consider moving patient to room near nurses station  Outcome: Progressing     Problem: RESPIRATORY - ADULT  Goal: Achieves optimal ventilation and oxygenation  Description: INTERVENTIONS:  - Assess for changes in respiratory status  - Assess for changes in mentation and behavior  - Position to facilitate oxygenation and minimize respiratory effort  - Oxygen administered by appropriate delivery if ordered  - Initiate smoking cessation education as indicated  - Encourage broncho-pulmonary hygiene including cough, deep breathe, Incentive Spirometry  - Assess the need for suctioning and aspirate as needed  - Assess and instruct to report SOB or any respiratory difficulty  - Respiratory Therapy support as indicated  Outcome: Progressing     Problem: Nutrition/Hydration-ADULT  Goal: Nutrient/Hydration intake appropriate for improving, restoring or maintaining nutritional needs  Description: Monitor and assess patient's nutrition/hydration status for malnutrition  Collaborate with interdisciplinary team and initiate plan and interventions as ordered  Monitor patient's weight and dietary intake as ordered or per policy  Utilize nutrition screening tool and intervene as necessary   Determine patient's food preferences and provide high-protein, high-caloric foods as appropriate  INTERVENTIONS:  - Monitor oral intake, urinary output, labs, and treatment plans  - Assess nutrition and hydration status and recommend course of action  - Evaluate amount of meals eaten  - Assist patient with eating if necessary   - Allow adequate time for meals  - Recommend/ encourage appropriate diets, oral nutritional supplements, and vitamin/mineral supplements  - Order, calculate, and assess calorie counts as needed  - Recommend, monitor, and adjust tube feedings and TPN/PPN based on assessed needs  - Assess need for intravenous fluids  - Provide specific nutrition/hydration education as appropriate  - Include patient/family/caregiver in decisions related to nutrition  Outcome: Progressing     Problem: MOBILITY - ADULT  Goal: Maintain or return to baseline ADL function  Description: INTERVENTIONS:  -  Assess patient's ability to carry out ADLs; assess patient's baseline for ADL function and identify physical deficits which impact ability to perform ADLs (bathing, care of mouth/teeth, toileting, grooming, dressing, etc )  - Assess/evaluate cause of self-care deficits   - Assess range of motion  - Assess patient's mobility; develop plan if impaired  - Assess patient's need for assistive devices and provide as appropriate  - Encourage maximum independence but intervene and supervise when necessary  - Involve family in performance of ADLs  - Assess for home care needs following discharge   - Consider OT consult to assist with ADL evaluation and planning for discharge  - Provide patient education as appropriate  Outcome: Progressing  Goal: Maintains/Returns to pre admission functional level  Description: INTERVENTIONS:  - Perform BMAT or MOVE assessment daily    - Set and communicate daily mobility goal to care team and patient/family/caregiver     - Collaborate with rehabilitation services on mobility goals if consulted  - Perform Range of Motion 2 times a day  - Reposition patient every 2 hours    - Dangle patient 2  times a day  - Stand patient 2  times a day  - Ambulate patient 2  times a day  - Out of bed to chair 2  times a day   - Out of bed for meals 2 times a day  - Out of bed for toileting  - Record patient progress and toleration of activity level   Outcome: Progressing     Problem: Prexisting or High Potential for Compromised Skin Integrity  Goal: Skin integrity is maintained or improved  Description: INTERVENTIONS:  - Identify patients at risk for skin breakdown  - Assess and monitor skin integrity  - Assess and monitor nutrition and hydration status  - Monitor labs   - Assess for incontinence   - Turn and reposition patient  - Assist with mobility/ambulation  - Relieve pressure over bony prominences  - Avoid friction and shearing  - Provide appropriate hygiene as needed including keeping skin clean and dry  - Evaluate need for skin moisturizer/barrier cream  - Collaborate with interdisciplinary team   - Patient/family teaching  - Consider wound care consult   Outcome: Progressing     Problem: PAIN - ADULT  Goal: Verbalizes/displays adequate comfort level or baseline comfort level  Description: Interventions:  - Encourage patient to monitor pain and request assistance  - Assess pain using appropriate pain scale  - Administer analgesics based on type and severity of pain and evaluate response  - Implement non-pharmacological measures as appropriate and evaluate response  - Consider cultural and social influences on pain and pain management  - Notify physician/advanced practitioner if interventions unsuccessful or patient reports new pain  Outcome: Progressing     Problem: INFECTION - ADULT  Goal: Absence or prevention of progression during hospitalization  Description: INTERVENTIONS:  - Assess and monitor for signs and symptoms of infection  - Monitor lab/diagnostic results  - Monitor all insertion sites, i e  indwelling lines, tubes, and drains  - Monitor endotracheal if appropriate and nasal secretions for changes in amount and color  - [place] appropriate cooling/warming therapies per order  - Administer medications as ordered  - Instruct and encourage patient and family to use good hand hygiene technique  - Identify and instruct in appropriate isolation precautions for identified infection/condition  Outcome: Progressing     Problem: DISCHARGE PLANNING  Goal: Discharge to home or other facility with appropriate resources  Description: INTERVENTIONS:  - Identify barriers to discharge w/patient and caregiver  - Arrange for needed discharge resources and transportation as appropriate  - Identify discharge learning needs (meds, wound care, etc )  - Arrange for interpretive services to assist at discharge as needed  - Refer to Case Management Department for coordinating discharge planning if the patient needs post-hospital services based on physician/advanced practitioner order or complex needs related to functional status, cognitive ability, or social support system  Outcome: Progressing

## 2021-08-28 NOTE — ASSESSMENT & PLAN NOTE
Wt Readings from Last 3 Encounters:   08/27/21 48 2 kg (106 lb 4 2 oz)   07/26/21 48 6 kg (107 lb 2 3 oz)   03/09/21 50 8 kg (112 lb)       · Does not appear overtly overloaded on admission   · On home lasix, consider restarting when able   · Echo 7/24 showed EF 75%  · Strict I&O, daily weights

## 2021-08-28 NOTE — ASSESSMENT & PLAN NOTE
· Hx of AF RVR on home eliquis  · Presented to ER with AF RVR, like exacerbated by UTI POA   · Cardizem gtt initiated by ER   · Echo 7/24  showed EF of 70% with grade 1 diastolic dysfunction with severe pulmonary artery hypertension peak 30-70  · EKG no signs of acute ischemia  · Dig load with 250 x1, then 125 q6h x2  · AC: continue eliquis   · Wean Cardizem with goal to off   · Goal HR <120  · Patient notably does have hx of hypotension on Cardizem

## 2021-08-28 NOTE — ASSESSMENT & PLAN NOTE
Malnutrition Findings:           BMI Findings: Body mass index is 18 24 kg/m²       · POA, patient appears cachectic   · She admits to me that she has had little to no appetite for several weeks to months, and endorses a weight loss of approx 20lbs in the past several months   · Patient reports she does not enjoy meal replacement shakes or supplements   · Nutrition consult placed

## 2021-08-28 NOTE — ASSESSMENT & PLAN NOTE
Lab Results   Component Value Date    EGFR 44 08/27/2021    EGFR 63 07/26/2021    EGFR 45 07/25/2021    CREATININE 1 15 08/27/2021    CREATININE 0 86 07/26/2021    CREATININE 1 13 07/25/2021     · Baseline creat appears to be 0 8 to 1 1  · Admission creat at baseline   · Trend I&O   · Avoid hypotension, nephrotoxic agents   · Trend renal indices

## 2021-08-28 NOTE — ASSESSMENT & PLAN NOTE
· POA AEB tachycardia, tachypnea, hypotension, LA 3 2  · Bolused with 1500mL, which satisfies her 30mL/kg bolus for hypotension in the setting of sepsis   · HD stable, has not required vasopressors   · Source appears to be UTI, POA   · Initial procal neg   · BC x2, urine cx pending   · Abx D2: Zosyn D2   · Trend fever, WBC curve   · Trend LA to ensure clearance

## 2021-08-28 NOTE — ASSESSMENT & PLAN NOTE
· POA AEB UA with mod bacteria, mod leuks, subjective suprapubic pressure, urgency, frequency, foul smelling urine, dysuria per patient   · Abx as above   · Urine cx pending

## 2021-08-29 PROBLEM — A41.9 SEPSIS (HCC): Status: RESOLVED | Noted: 2021-08-27 | Resolved: 2021-08-29

## 2021-08-29 PROBLEM — N39.0 UTI (URINARY TRACT INFECTION): Status: RESOLVED | Noted: 2021-08-28 | Resolved: 2021-08-29

## 2021-08-29 PROBLEM — D64.9 ANEMIA: Status: ACTIVE | Noted: 2021-08-29

## 2021-08-29 LAB
ANION GAP SERPL CALCULATED.3IONS-SCNC: 6 MMOL/L (ref 4–13)
BACTERIA UR CULT: NORMAL
BASOPHILS # BLD AUTO: 0.03 THOUSANDS/ΜL (ref 0–0.1)
BASOPHILS NFR BLD AUTO: 1 % (ref 0–1)
BUN SERPL-MCNC: 11 MG/DL (ref 5–25)
CALCIUM SERPL-MCNC: 8.6 MG/DL (ref 8.3–10.1)
CHLORIDE SERPL-SCNC: 108 MMOL/L (ref 100–108)
CO2 SERPL-SCNC: 27 MMOL/L (ref 21–32)
CREAT SERPL-MCNC: 1.03 MG/DL (ref 0.6–1.3)
EOSINOPHIL # BLD AUTO: 0.1 THOUSAND/ΜL (ref 0–0.61)
EOSINOPHIL NFR BLD AUTO: 2 % (ref 0–6)
ERYTHROCYTE [DISTWIDTH] IN BLOOD BY AUTOMATED COUNT: 15.9 % (ref 11.6–15.1)
GFR SERPL CREATININE-BSD FRML MDRD: 50 ML/MIN/1.73SQ M
GLUCOSE SERPL-MCNC: 104 MG/DL (ref 65–140)
HCT VFR BLD AUTO: 34.4 % (ref 34.8–46.1)
HGB BLD-MCNC: 9.7 G/DL (ref 11.5–15.4)
IMM GRANULOCYTES # BLD AUTO: 0.01 THOUSAND/UL (ref 0–0.2)
IMM GRANULOCYTES NFR BLD AUTO: 0 % (ref 0–2)
LYMPHOCYTES # BLD AUTO: 0.57 THOUSANDS/ΜL (ref 0.6–4.47)
LYMPHOCYTES NFR BLD AUTO: 12 % (ref 14–44)
MAGNESIUM SERPL-MCNC: 2.2 MG/DL (ref 1.6–2.6)
MCH RBC QN AUTO: 26.9 PG (ref 26.8–34.3)
MCHC RBC AUTO-ENTMCNC: 28.2 G/DL (ref 31.4–37.4)
MCV RBC AUTO: 96 FL (ref 82–98)
MONOCYTES # BLD AUTO: 0.36 THOUSAND/ΜL (ref 0.17–1.22)
MONOCYTES NFR BLD AUTO: 7 % (ref 4–12)
MRSA NOSE QL CULT: NORMAL
NEUTROPHILS # BLD AUTO: 3.77 THOUSANDS/ΜL (ref 1.85–7.62)
NEUTS SEG NFR BLD AUTO: 78 % (ref 43–75)
NRBC BLD AUTO-RTO: 0 /100 WBCS
PHOSPHATE SERPL-MCNC: 2.6 MG/DL (ref 2.3–4.1)
PLATELET # BLD AUTO: 138 THOUSANDS/UL (ref 149–390)
PMV BLD AUTO: 10.1 FL (ref 8.9–12.7)
POTASSIUM SERPL-SCNC: 5.4 MMOL/L (ref 3.5–5.3)
RBC # BLD AUTO: 3.6 MILLION/UL (ref 3.81–5.12)
SODIUM SERPL-SCNC: 141 MMOL/L (ref 136–145)
WBC # BLD AUTO: 4.84 THOUSAND/UL (ref 4.31–10.16)

## 2021-08-29 PROCEDURE — 99222 1ST HOSP IP/OBS MODERATE 55: CPT | Performed by: INTERNAL MEDICINE

## 2021-08-29 PROCEDURE — 94640 AIRWAY INHALATION TREATMENT: CPT

## 2021-08-29 PROCEDURE — 97163 PT EVAL HIGH COMPLEX 45 MIN: CPT

## 2021-08-29 PROCEDURE — 83735 ASSAY OF MAGNESIUM: CPT | Performed by: NURSE PRACTITIONER

## 2021-08-29 PROCEDURE — 80048 BASIC METABOLIC PNL TOTAL CA: CPT | Performed by: NURSE PRACTITIONER

## 2021-08-29 PROCEDURE — 84100 ASSAY OF PHOSPHORUS: CPT | Performed by: NURSE PRACTITIONER

## 2021-08-29 PROCEDURE — 94760 N-INVAS EAR/PLS OXIMETRY 1: CPT

## 2021-08-29 PROCEDURE — 99232 SBSQ HOSP IP/OBS MODERATE 35: CPT | Performed by: INTERNAL MEDICINE

## 2021-08-29 PROCEDURE — 85025 COMPLETE CBC W/AUTO DIFF WBC: CPT | Performed by: NURSE PRACTITIONER

## 2021-08-29 RX ORDER — METOPROLOL TARTRATE 50 MG/1
50 TABLET, FILM COATED ORAL EVERY 12 HOURS SCHEDULED
Status: DISCONTINUED | OUTPATIENT
Start: 2021-08-29 | End: 2021-09-01 | Stop reason: HOSPADM

## 2021-08-29 RX ORDER — FLUTICASONE FUROATE AND VILANTEROL 100; 25 UG/1; UG/1
1 POWDER RESPIRATORY (INHALATION) DAILY
Status: DISCONTINUED | OUTPATIENT
Start: 2021-08-30 | End: 2021-09-01 | Stop reason: HOSPADM

## 2021-08-29 RX ADMIN — IPRATROPIUM BROMIDE AND ALBUTEROL SULFATE 3 ML: 2.5; .5 SOLUTION RESPIRATORY (INHALATION) at 01:23

## 2021-08-29 RX ADMIN — PANTOPRAZOLE SODIUM 40 MG: 40 TABLET, DELAYED RELEASE ORAL at 09:20

## 2021-08-29 RX ADMIN — Medication 3 MG: at 22:17

## 2021-08-29 RX ADMIN — PIPERACILLIN AND TAZOBACTAM 3.38 G: 36; 4.5 INJECTION, POWDER, FOR SOLUTION INTRAVENOUS at 17:53

## 2021-08-29 RX ADMIN — ATORVASTATIN CALCIUM 40 MG: 40 TABLET, FILM COATED ORAL at 17:54

## 2021-08-29 RX ADMIN — METOPROLOL TARTRATE 50 MG: 50 TABLET, FILM COATED ORAL at 20:21

## 2021-08-29 RX ADMIN — IPRATROPIUM BROMIDE AND ALBUTEROL SULFATE 3 ML: 2.5; .5 SOLUTION RESPIRATORY (INHALATION) at 20:29

## 2021-08-29 RX ADMIN — FLUOXETINE 20 MG: 20 CAPSULE ORAL at 09:19

## 2021-08-29 RX ADMIN — PIPERACILLIN AND TAZOBACTAM 3.38 G: 36; 4.5 INJECTION, POWDER, FOR SOLUTION INTRAVENOUS at 09:20

## 2021-08-29 RX ADMIN — APIXABAN 2.5 MG: 2.5 TABLET, FILM COATED ORAL at 17:54

## 2021-08-29 RX ADMIN — PIPERACILLIN AND TAZOBACTAM 3.38 G: 36; 4.5 INJECTION, POWDER, FOR SOLUTION INTRAVENOUS at 03:43

## 2021-08-29 RX ADMIN — ACETAMINOPHEN 650 MG: 325 TABLET, FILM COATED ORAL at 01:20

## 2021-08-29 RX ADMIN — APIXABAN 2.5 MG: 2.5 TABLET, FILM COATED ORAL at 09:19

## 2021-08-29 RX ADMIN — TRAMADOL HYDROCHLORIDE 50 MG: 50 TABLET, FILM COATED ORAL at 20:21

## 2021-08-29 RX ADMIN — METOPROLOL TARTRATE 50 MG: 50 TABLET, FILM COATED ORAL at 09:25

## 2021-08-29 NOTE — MALNUTRITION/BMI
This medical record reflects one or more clinical indicators suggestive of malnutrition  Malnutrition Findings:   Adult Malnutrition type: Chronic illness (Moderate malnutrition of chronic illness related to inadequate energy intake as evidenced by somewhat hollow orbits, somewhat protruding/prominant clavicles, some depression at the temples  Treated with Liberalized diet)  Adult Degree of Malnutrition: Malnutrition of moderate degree  Malnutrition Characteristics: Fat loss, Muscle loss    BMI Findings:  Adult BMI Classifications: Underweight < 18 5     Body mass index is 18 24 kg/m²  See Nutrition note dated 8/29/2021 for additional details  Completed nutrition assessment is viewable in the nutrition documentation

## 2021-08-29 NOTE — CONSULTS
CARDIOLOGY CONSULTATION  Alisson aVladez 78 y o  female MRN: [id number]  Unit/Bed#: [address] [place] [address] Encounter: [id number]      History of Present Illness   PCP: Park Inman MD  Date of Admission:  8/27/2021  Date of Consultation: 08/29/21  Physician Requesting Consult: Georges Malcolm MD    Reason for Consult / Principal Problem:  Atrial fibrillation    Assessment/Plan    Atrial fibrillation with rapid ventricular rate  In the setting of urinary tract infection with SIRS criteria-  Agree with metoprolol and digoxin therapy  Will need periodic checks of her digit level  She may need to be on every other day on discharge given her coexisting kidney does disease  Patient has a history of chronic atrial fibrillation  Recommend digoxin 125 mcg daily + metoprolol 50 mg twice a day  Continue Eliquis 2 5 mg twice a day     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-  With last echo with normal gradients    COPD on home 3 L of oxygen    Chronic diastolic heart failure    GERD on PPI therapy      HPI: Alisson Valadez is a 78y o  year old female who presents with    Symptoms of palpitations and shortness of breath  She reports having a couple days of cough  In the ED she was found to be in AFib with RVR with low blood pressures  She was also noted to have a urinary tract infection  She was started on Zosyn in fluid hydration  Her lactate was 3 2      Historical Information   Past Medical History:   Diagnosis Date    Arthritis     Atrial fibrillation ([address] )     CHF (congestive heart failure) (HCC)     COPD (chronic obstructive pulmonary disease) (HCC)     Coronary artery disease     aortic valve replacement    Heart murmur     Hypertension      Past Surgical History:   Procedure Laterality Date    AORTIC VALVE REPLACEMENT      APPENDECTOMY      CHOLECYSTECTOMY      JOINT REPLACEMENT      bilaterl hip and right knee    TONSILECTOMY AND ADNOIDECTOMY      TONSILLECTOMY      TUBAL LIGATION       Social History Substance and Sexual Activity   Alcohol Use Yes    Alcohol/week: 1 0 standard drinks    Types: 1 Glasses of wine per week    Comment: rare     Social History     Substance and Sexual Activity   Drug Use No     Social History     Tobacco Use   Smoking Status Former Smoker    Packs/day:     Years:     Pack years:     Types: Cigarettes    Quit date: 2000    Years since quittin 6   Smokeless Tobacco Never Used     Family History   Problem Relation Age of Onset    Heart defect Father        Meds/Allergies   Prior to Admission medications    Medication Sig Start Date End Date Taking?  Authorizing Provider   apixaban (ELIQUIS) 2 5 mg Take 1 tablet (2 5 mg total) by mouth 2 (two) times a day 20 Yes HILDA Haywood   aspirin (ECOTRIN LOW STRENGTH) 81 mg EC tablet Take 1 tablet (81 mg total) by mouth daily 21 Yes HILDA Haywood   benazepril (LOTENSIN) 20 mg tablet Take 10 mg by mouth daily    Yes Historical Provider, MD   FLUoxetine (PROzac) 20 mg capsule Take 20 mg by mouth daily   Yes Historical Provider, MD   furosemide (LASIX) 20 mg tablet Take 1 tablet (20 mg total) by mouth daily 20  Yes Ana Carey MD   melatonin 3 mg Take 1 tablet (3 mg total) by mouth daily at bedtime 19  Yes Renea Perez MD   metoprolol tartrate (LOPRESSOR) 50 mg tablet Take 1 tablet (50 mg total) by mouth every 12 (twelve) hours 21 Yes Ana Carey MD   Multiple Vitamins-Minerals (PreserVision AREDS) TABS Take by mouth   Yes Historical Provider, MD   omeprazole (PriLOSEC) 40 MG capsule Take 40 mg by mouth daily   Yes Historical Provider, MD   atorvastatin (LIPITOR) 40 mg tablet Take 1 tablet (40 mg total) by mouth daily  Patient not taking: Reported on 2021 3/9/21   Reji Morrell MD   tiotropium (SPIRIVA) 18 mcg inhalation capsule Place 1 capsule (18 mcg total) into inhaler and inhale daily  Patient not taking: Reported on 3/9/2021 12/13/20   Sofia Hsu MD   traMADol (ULTRAM) 50 mg tablet Take 50 mg by mouth 4 (four) times a day as needed 1/12/21   Historical Provider, MD     Current Facility-Administered Medications   Medication Dose Route Frequency Provider Last Rate Last Admin    acetaminophen (TYLENOL) tablet 650 mg  650 mg Oral Q6H PRN Evelene Ray, CRNP   650 mg at 08/29/21 0120    apixaban (ELIQUIS) tablet 2 5 mg  2 5 mg Oral BID Evelene Ray, CRNP   2 5 mg at 08/29/21 0919    atorvastatin (LIPITOR) tablet 40 mg  40 mg Oral Daily With Sameer Luke CRNP   40 mg at 08/28/21 1547    FLUoxetine (PROzac) capsule 20 mg  20 mg Oral Daily Evelene Ray, CRNP   20 mg at 08/29/21 0919    ipratropium-albuterol (DUO-NEB) 0 5-2 5 mg/3 mL inhalation solution 3 mL  3 mL Nebulization Q6H PRN Evelene Ray, CRNP   3 mL at 08/29/21 0123    melatonin tablet 3 mg  3 mg Oral HS Evelene Ray, CRNP   3 mg at 08/28/21 2116    metoprolol tartrate (LOPRESSOR) tablet 50 mg  50 mg Oral Q12H [address] Rohini Adame MD   50 mg at 08/29/21 0925    pantoprazole (PROTONIX) EC tablet 40 mg  40 mg Oral Daily Evelene Ray, CRNP   40 mg at 08/29/21 0920    piperacillin-tazobactam (ZOSYN) IVPB 3 375 g  3 375 g Intravenous Q6H Evelene Ray, CRNP 0 mL/hr at 08/28/21 1730 3 375 g at 08/29/21 0920    tiotropium (SPIRIVA) capsule for inhaler 18 mcg  18 mcg Inhalation Daily Evelene Ray, CRNP        traMADol Marzella Joaquin) tablet 50 mg  50 mg Oral Q8H PRN Evelene Ray, CRNP   50 mg at 08/28/21 2116     No Known Allergies    Review of systems  CONSTITUTIONAL:  As per hpi  HEENT:  Eyes:  No visual loss, blurred vision, double vision or yellow sclerae  Ears, Nose, Throat:  No hearing loss, sneezing, congestion, runny nose or sore throat  SKIN:  No rash or itching  CARDIOVASCULAR:  As per hpi  RESPIRATORY:  As per hpi  GASTROINTESTINAL:  No anorexia, nausea, vomiting or diarrhea  No abdominal pain or blood  GENITOURINARY:  Burning on urination   No flank pain   NEUROLOGICAL:  No headache, dizziness, syncope, paralysis, ataxia, numbness or tingling in the extremities  No change in bowel or bladder control  MUSCULOSKELETAL:  No muscle, back pain, joint pain or stiffness  HEMATOLOGIC:  No anemia, bleeding or bruising  LYMPHATICS:  No enlarged nodes  No history of splenectomy  PSYCHIATRIC:  No active suicidal or homicidal ideation  ENDOCRINOLOGIC:  No reports of sweating, cold or heat intolerance  No polyuria or polydipsia  ALLERGIES:  No history of asthma, hives, eczema or rhinitis  More than 10 systems reviewed and otherwise noncontributory  Objective   Vitals: Blood pressure 139/64, pulse 89, temperature (!) 97 °F (36 1 °C), temperature source Oral, resp  rate 18, height 5' 4" (1 626 m), weight 48 2 kg (106 lb 4 2 oz), SpO2 100 %, not currently breastfeeding  Blood pressure 139/64, pulse 89, temperature (!) 97 °F (36 1 °C), temperature source Oral, resp  rate 18, height 5' 4" (1 626 m), weight 48 2 kg (106 lb 4 2 oz), SpO2 100 %, not currently breastfeeding  Body mass index is 18 24 kg/m²  BP Readings from Last 3 Encounters:   08/29/21 139/64   07/26/21 161/71   03/09/21 113/85     Orthostatic Blood Pressures      Most Recent Value   Blood Pressure  139/64 filed at 08/29/2021 0809   Patient Position - Orthostatic VS  Lying filed at 08/29/2021 0809          Intake/Output Summary (Last 24 hours) at 8/29/2021 1453  Last data filed at 8/29/2021 0800  Gross per 24 hour   Intake 340 ml   Output 775 ml   Net -435 ml     Invasive Devices     Peripheral Intravenous Line            Peripheral IV 08/27/21 Left Antecubital 2 days    Peripheral IV 08/28/21 Distal;Dorsal (posterior); Right Forearm 1 day                Physical Exam  Constitutional:       General: She is not in acute distress  Appearance: She is well-developed  She is ill-appearing  She is not diaphoretic  HENT:      Head: Normocephalic and atraumatic        Right Ear: External ear normal  Left Ear: External ear normal       Nose: Nose normal       Mouth/Throat:      Pharynx: No oropharyngeal exudate  Eyes:      General: No scleral icterus  Right eye: No discharge  Left eye: No discharge  Conjunctiva/sclera: Conjunctivae normal       Pupils: Pupils are equal, round, and reactive to light  Neck:      Thyroid: No thyromegaly  Vascular: No JVD  Trachea: No tracheal deviation  Cardiovascular:      Rate and Rhythm: Normal rate  Rhythm irregular  Pulses: Intact distal pulses  Heart sounds: No murmur heard  No friction rub  Gallop present  Pulmonary:      Effort: Pulmonary effort is normal  No respiratory distress  Breath sounds: Normal breath sounds  No stridor  No wheezing or rales  Chest:      Chest wall: No tenderness  Abdominal:      General: Bowel sounds are normal  There is distension  Palpations: Abdomen is soft  There is no mass  Tenderness: There is no abdominal tenderness  There is no guarding or rebound  Genitourinary:     Comments: No CVA tenderness  Musculoskeletal:         General: Deformity present  No tenderness  Cervical back: Normal range of motion  Skin:     General: Skin is warm and dry  Findings: No erythema or rash  Neurological:      Mental Status: She is alert and oriented to person, place, and time  Motor: No abnormal muscle tone  Deep Tendon Reflexes: Reflexes normal    Psychiatric:         Behavior: Behavior normal          Thought Content: Thought content normal          Judgment: Judgment normal          Lab Results:  I Have Reviewed All Lab Data Below:  Results from last 7 days   Lab Units 08/29/21  0442   WBC Thousand/uL 4 84   HEMOGLOBIN g/dL 9 7*   HEMATOCRIT % 34 4*   PLATELETS Thousands/uL 138*   NEUTROS PCT % 78*   LYMPHS PCT % 12*   MONOS PCT % 7   EOS PCT % 2     Results from last 7 days   Lab Units 08/29/21  0442 08/27/21  1443   POTASSIUM mmol/L 5 4* 4 4   CHLORIDE mmol/L 108 102   CO2 mmol/L 27 27   BUN mg/dL 11 15   CREATININE mg/dL 1 03 1 15   CALCIUM mg/dL 8 6 9 2   ALK PHOS U/L  --  61   ALT U/L  --  20   AST U/L  --  19     Troponins:   Results from last 7 days   Lab Units 08/27/21  2333 08/27/21  2116 08/27/21  1756   TROPONIN I ng/mL 0 11* 0 14* 0 07*       CBC with diff:   Results from last 7 days   Lab Units 08/29/21 0442 08/28/21  0541 08/27/21  1443   WBC Thousand/uL 4 84 3 54* 9 25   HEMOGLOBIN g/dL 9 7* 7 1* 11 1*   HEMATOCRIT % 34 4* 24 5* 39 0   MCV fL 96 95 94   PLATELETS Thousands/uL 138* 105* 254   MCH pg 26 9 27 1 26 6*   MCHC g/dL 28 2* 28 6* 28 5*   RDW % 15 9* 15 7* 15 2*   MPV fL 10 1 9 7 10 3   NRBC AUTO /100 WBCs 0 0 0       CMP:   Results from last 7 days   Lab Units 08/29/21 0442 08/28/21  0541 08/27/21  1443   SODIUM mmol/L 141 147* 140   POTASSIUM mmol/L 5 4* 3 2* 4 4   CHLORIDE mmol/L 108 116* 102   CO2 mmol/L 27 23 27   ANION GAP mmol/L 6 8 11   BUN mg/dL 11 11 15   CREATININE mg/dL 1 03 0 74 1 15   CALCIUM mg/dL 8 6 6 2* 9 2   AST U/L  --   --  19   ALT U/L  --   --  20   ALK PHOS U/L  --   --  61   TOTAL PROTEIN g/dL  --   --  7 6   ALBUMIN g/dL  --   --  3 5   TOTAL BILIRUBIN mg/dL  --   --  0 21   EGFR ml/min/1 73sq m 50 75 44   GLUCOSE RANDOM mg/dL 104 84 149*     Magnesium:   Results from last 7 days   Lab Units 08/29/21 0442 08/28/21  0541 08/27/21  1615   MAGNESIUM mg/dL 2 2 1 1* 1 5*       NT-proBNP:   Recent Labs     08/27/21  1443   NTBNP 1,967*        Coags:   Results from last 7 days   Lab Units 08/27/21  1443   PTT seconds 37   INR  1 12       Troponins:   Results from last 7 days   Lab Units 08/27/21  2333 08/27/21  2116 08/27/21  1756   TROPONIN I ng/mL 0 11* 0 14* 0 07*       Lipid Profile:                              Lab Results   Component Value Date    CHOLESTEROL 166 12/30/2020    HDL 58 12/30/2020    LDLCALC 81 12/30/2020    TRIG 137 12/30/2020       TSH:    Results from last 7 days   Lab Units 08/27/21  1615   TSH 3RD FREDERICK uIU/mL 1 640       Hgb A1c:       Imaging: I have personally reviewed pertinent films in PACS    Cardiac testing:   Results for orders placed during the hospital encounter of 21    Echo complete with contrast if indicated    Narrative  [address]  [address] [facility]  [place]  [phone]    Transthoracic Echocardiogram  2D, M-mode, Doppler, and Color Doppler    Study date:  2021    Patient: Columba Mirza  MR number: [id number]  Account number: [de-identified]  : 1940  Age: 78 years  Gender: Female  Status: Inpatient  Location: Bedside  Height: 65 in  Weight: 107 8 lb  BP: 110/ 53 mmHg    Indications: Chest pain,dizziness      Diagnoses: I42 9 - Cardiomyopathy, unspecified    Sonographer:  HELDER Hanna  Primary Physician:  Nuvia Mccann MD  Group:  [facility]  Interpreting Physician:  Reina Salinas MD    SUMMARY    COMPARISONS:  Compared to previous study, patient now has severe pulmonary hypertension and peak PA pressure is increased from 30 to 70 mm Hg    LEFT VENTRICLE:  LV measurements were taken in apical 2 chamber views  Normal left ventricular chamber dimension  There is mild concentric left ventricular hypertrophy  Systolic function is hyperdynamic with an ejection fraction of 75%  No definite regional wall motion abnormality seen although it cannot be absolutely excluded on the basis of this study  Grade 1 diastolic dysfunction    RIGHT VENTRICLE:  Right ventricular free wall was not adequately visualized  Visually RV size appears borderline to mildly enlarged  Systolic function also appears borderline with no TAPSE measurement    LEFT ATRIUM:  Mild to moderately dilated left atrium    MITRAL VALVE:  There is mild to moderate thickening of mitral valve leaflets  There is moderate to severe mitral annular calcification  Leaflet separation is mildly decreased  There is mild to moderate mitral stenosis with mitral valve area of 2 1 cm2 and mean gradient of 6 72 mm Hg  There is mild to moderate mitral regurgitation    AORTIC VALVE:  Aortic valve was not well visualized  A bioprosthesis/TAVR is present  No evidence of significant aortic stenosis  Mean gradient is 5 and peak gradient is 9 mm Hg  No paravalvular leak or aortic valve regurgitation seen    TRICUSPID VALVE:  There was mild to moderate regurgitation  There is severe pulmonary hypertension with peak PA pressure of 70 mm Hg    PULMONIC VALVE:  There was mild regurgitation  IVC, HEPATIC VEINS:  IVC size and respirophasic variations appear preserved    COMPARISONS:  Compared to previous study, patient now has severe pulmonary hypertension and peak PA pressure is increased from 30 to 70 mm Hg    HISTORY: PRIOR HISTORY: chronic diastolic CHF,A Fib ,CVA,HTN,Gastroesophageal reflux disease,Prosthetic Aortic Valve,Lung Cancer,COPD,Chronic kidney disease  PROCEDURE: The procedure was performed at the bedside  This was a routine study  Technically difficult study resulting in off axis measurements The transthoracic approach was used  The study included complete 2D imaging, M-mode, complete  spectral Doppler, and color Doppler  The heart rate was 94 bpm, at the start of the study  Images were obtained from the parasternal, apical, subcostal, and suprasternal notch acoustic windows  Echocardiographic views were limited due to  chest wall deformity, poor patient compliance, poor acoustic window availability, decreased penetration, and lung interference  This was a technically difficult study      LEFT VENTRICLE: LV measurements were taken in apical 2 chamber views  Normal left ventricular chamber dimension  There is mild concentric left ventricular hypertrophy  Systolic function is hyperdynamic with an ejection fraction of 75%  No definite regional wall motion abnormality seen although it cannot be absolutely excluded on the basis of this study  Grade 1 diastolic dysfunction    RIGHT VENTRICLE: Right ventricular free wall was not adequately visualized  Visually RV size appears borderline to mildly enlarged  Systolic function also appears borderline with no TAPSE measurement    LEFT ATRIUM: Mild to moderately dilated left atrium    RIGHT ATRIUM: Size was normal     MITRAL VALVE: There is mild to moderate thickening of mitral valve leaflets  There is moderate to severe mitral annular calcification  Leaflet separation is mildly decreased  There is mild to moderate mitral stenosis with mitral valve area of 2 1 cm2 and mean gradient of 6 72 mm Hg  There is mild to moderate mitral regurgitation    AORTIC VALVE: Aortic valve was not well visualized  A bioprosthesis/TAVR is present  No evidence of significant aortic stenosis  Mean gradient is 5 and peak gradient is 9 mm Hg  No paravalvular leak or aortic valve regurgitation seen    TRICUSPID VALVE: DOPPLER: There was mild to moderate regurgitation  There is severe pulmonary hypertension with peak PA pressure of 70 mm Hg    PULMONIC VALVE: DOPPLER: There was mild regurgitation  PERICARDIUM: There was no pericardial effusion  The pericardium was normal in appearance  AORTA: The root exhibited normal size  SYSTEMIC VEINS: IVC: IVC size and respirophasic variations appear preserved    SYSTEM MEASUREMENT TABLES    2D  EF (Teich): 58 98 %  %FS: 30 59 %  Ao Diam: 2 82 cm  EDV(Teich): 56 52 ml  ESV(Teich): 23 19 ml  IVSd: 1 28 cm  LA Area: 23 84 cm2  LA Diam: 4 46 cm  LVEDV MOD A4C: 70 18 ml  LVEF MOD A4C: 67 91 %  LVESV MOD A4C: 22 52 ml  LVIDd: 3 66 cm  LVIDs: 2 54 cm  LVLd A4C: 7 05 cm  LVLs A4C: 5 64 cm  LVOT Diam: 1 98 cm  LVPWd: 1 34 cm  RA Area: 10 98 cm2  RVIDd: 2 68 cm  SV (Teich): 33 34 ml  SV MOD A4C: 47 66 ml    CW  AV Env  Ti: 220 74 ms  AV VTI: 23 26 cm  AV Vmax: 1 5 m/s  AV Vmean: 1 05 m/s  AV maxP 06 mmHg  AV meanP 05 mmHg  TR Vmax: 4 09 m/s  TR maxP 94 mmHg    PW  MV E/A Ratio: 0 84  E' Sept: 0 06 m/s  E/E' Sept: 22 95  LVOT Env  Ti: 281 64 ms  LVOT VTI: 25 72 cm  LVOT Vmax: 1 3 m/s  LVOT Vmean: 0 91 m/s  LVOT maxP 79 mmHg  LVOT meanPG: 3 81 mmHg  MV A Ermias: 1 6 m/s  MV Dec [place]: 6 99 m/s2  MV DecT: 193 88 ms  MV E Ermias: 1 35 m/s  MV PHT: 56 23 ms  MVA By PHT: 3 91 cm2    [address] Accredited Echocardiography Laboratory    Prepared and electronically signed by    Jose Luis Chavis MD  Signed 2021 12:31:58    No results found for this or any previous visit  No results found for this or any previous visit  No results found for this or any previous visit  EKG:   Atrial fibrillation    Counseling / Coordination of Care Time: 45 minutes  Greater than 50% of total time spent on patient counseling and coordination of care  Code Status: Level 3 - DNAR and DNI  Collaboration of Care: Were Recommendations Directly Discussed with Primary Treatment Team? - Yes     Reema White MD [facility]    "This note has been constructed using a voice recognition system  Therefore there may be syntax, spelling, and/or grammatical errors   Please call if you have any questions  "

## 2021-08-29 NOTE — PHYSICAL THERAPY NOTE
Attempted PT evaluation however pt declined stating that she's too tired  Will follow    Nehemias Childers PT  [id number]     08/29/21 0915   Note Type   Note type Evaluation   Cancel Reasons Other  (pt declined)

## 2021-08-29 NOTE — PHYSICAL THERAPY NOTE
PT EVALUATION       08/29/21 1320   Note Type   Note type Evaluation   Pain Assessment   Pain Assessment Tool Pain Assessment not indicated - pt denies pain   Home Living   Type of [address] Two level;Bed/bath upstairs  (commode down stairs, 3 EZRA)   Bathroom Equipment Commode   Home Equipment Walker;cane  ([id number] )   Prior Function   Level of [place] Independent with ADLs and functional mobility   Lives With Spouse   Receives Help From Family   General   Additional Pertinent History Pt admitted with afib with RVR  Pt reports severe SOB with minimal activity  Cognition   Overall Cognitive Status WFL   RLE Assessment   RLE Assessment   (ROM WFL, MMT 4-/5)   LLE Assessment   LLE Assessment   (ROM WFL, MMT 4-/5)   Bed Mobility   Supine to Sit 5  Supervision   Sit to Supine 5  Supervision   Transfers   Sit to Stand 5  Supervision   Stand to Sit 5  Supervision   Stand pivot 5  Supervision   Ambulation/Elevation   Gait Assistance 5  Supervision   Additional items   (holding bed rail)   Assistive Device   (none)   Distance few steps to head of bed   Balance   Static Sitting Good   Dynamic Sitting Fair +   Static Standing Fair +   Dynamic Standing Fair   Endurance Deficit   Endurance Deficit   (Sp02 dropped to 83% with activity on 3L02)   Assessment   Prognosis Good   Problem List Decreased strength;Decreased endurance; Impaired balance;Decreased mobility   Assessment Patient seen for Physical Therapy evaluation  Patient admitted with Atrial fibrillation with rapid ventricular response ([facility] [address] )  Comorbidities affecting patient's physical performance include: afib with RVR, CKD, lung cancer, TAVR, depression  Personal factors affecting patient at time of initial evaluation include: lives in 2 story house, ambulating with assistive device, stairs to enter home, inability to ambulate household distances and inability to perform ADLS   Prior to admission, patient was independent with functional mobility without assistive device and independent with ADLS  Please find objective findings from Physical Therapy assessment regarding body systems outlined above with impairments and limitations including weakness, decreased endurance, decreased activity tolerance, decreased functional mobility tolerance and SOB upon exertion  The Barthel Index was used as a functional outcome tool presenting with a score of 50 today indicating marked limitations of functional mobility and ADLS  Patient's clinical presentation is currently unstable/unpredictable as seen in patient's presentation of vital sign response, new onset of impairment of functional mobility, decreased endurance and new onset of weakness  Pt would benefit from continued Physical Therapy treatment to address deficits as defined above and maximize level of functional mobility  As demonstrated by objective findings, the assigned level of complexity for this evaluation is high  The patient's AM-PAC Basic Mobility Inpatient Short Form Raw Score is 17, Standardized Score is 39 67  A standardized score less than 42 9 suggests the patient may benefit from discharge to post-acute rehabilitation services, however pt declines STR so recommend home PT with assist from      Goals   Patient Goals "not get so short of breath whenever I move   STG Expiration Date 09/05/21   Short Term Goal #1 Independent transfers, independent ambulation with least restrictive device with minimal shortness of breath so patient can walkt to the bathroom, supervision up and down 3 steps the patient can enter and exit her home   LTG Expiration Date 09/12/21   Long Term Goal #1 Independent ambulation indoor level surfaces with least restrictive device with minimal shortness of breath, independent up and down 1 flight of steps the patient can get to her 2nd floor bedroom and bathroom, improve standing tolerance to at least 10 minutes so patient can prepare a snack in the kitchen   Plan Treatment/Interventions ADL retraining;Functional transfer training;LE strengthening/ROM; Elevations; Therapeutic exercise; Endurance training;Gait training;Equipment eval/education;Patient/family training   PT Frequency 5x/wk   Recommendation   PT Discharge Recommendation Home with home health rehabilitation   Equipment Recommended   (pt has a walker, cane, 02, commode)   AM-PAC Basic Mobility Inpatient   Turning in Bed Without Bedrails 4   Lying on Back to Sitting on Edge of Flat Bed 4   Moving Bed to Chair 2   Standing Up From Chair 4   Walk in Room 2   Climb 3-5 Stairs 1   Basic Mobility Inpatient Raw Score 17   Basic Mobility Standardized Score 39 67   Barthel Index   Feeding 10   Bathing 0   Grooming Score 0   Dressing Score 5   Bladder Score 10   Bowels Score 10   Toilet Use Score 5   Transfers (Bed/Chair) Score 10   Mobility (Level Surface) Score 0   Stairs Score 0   Barthel Index Score 48   Licensure   [place] License Number  Quick RAVINDER [id number]

## 2021-08-29 NOTE — PROGRESS NOTES
Trisha 43  Progress Note - Mitchell Lraios 1940, 78 y o  female MRN: [id number]  Unit/Bed#: [address] [place] [address] Encounter: [id number]  Primary Care Provider: Sudha Carrillo MD   Date and time admitted to hospital: 8/27/2021  2:25 PM    * Atrial fibrillation with rapid ventricular response [facility])  Assessment & Plan  History of paroxysmal AFib on Eliquis  Presented with AFib with RVR, hypotensive after initiation of Cardizem drip, subsequently admitted to step-down unit  Loaded with digoxin with improvement in heart rate  Likely precipitated by COPD plus/minus hypoxia, pulmonary hypertension? Compliance  · Continue metoprolol, will increase to 50 mg q 12 hours  · Cardiology evaluation  · Continue Eliquis      Moderate protein-calorie malnutrition (HCC)  Assessment & Plan  Malnutrition Findings:   Adult Malnutrition type: Chronic illness (Moderate malnutrition of chronic illness related to inadequate energy intake as evidenced by somewhat hollow orbits, somewhat protruding/prominant clavicles, some depression at the temples  Treated with Liberalized diet)  Adult Degree of Malnutrition: Malnutrition of moderate degree    BMI Findings:  Adult BMI Classifications: Underweight < 18 5     Body mass index is 18 24 kg/m²  Nutrition evaluation     Chronic respiratory failure with hypoxia 2/2 COPD  Assessment & Plan  On 3 L of supplemental oxygen    COPD (chronic obstructive pulmonary disease) (HCC)  Assessment & Plan  History of COPD (FEV1 47% of predicted) as per the chart review, on chronic supplemental oxygen  History of right lung CA status post radiation  Reports that she has not been using any inhaler or nebulizer treatment at home, reports chronic progressive shortness of breath limiting activity,?   Likely precipitant of AFib with RVR  No evidence of exacerbation at present  · Continue DuoNebs  · Initiate Breo  · Monitor respiratory status and oxygenation with activity    Chronic diastolic (congestive) heart failure (HCC)  Assessment & Plan  Wt Readings from Last 3 Encounters:   08/27/21 48 2 kg (106 lb 4 2 oz)   07/26/21 48 6 kg (107 lb 2 3 oz)   03/09/21 50 8 kg (112 lb)     Echocardiogram EF 75 %, grade 1 diastolic dysfunction, PA pressure 70 mmHg  No evidence of volume overload  · Continue metoprolol  · Holding furosemide  · Monitor intake output, daily weight  · Follow up Cardiology recommendation      Anemia  Assessment & Plan  Normocytic  Appears to be lower compared to last year  Patient denies any overt bleeding  · Iron studies, B12, folate  · Trend      CKD (chronic kidney disease) stage 3, GFR 30-59 ml/min [facility])  Assessment & Plan  Lab Results   Component Value Date    EGFR 50 08/29/2021    EGFR 75 08/28/2021    EGFR 44 08/27/2021    CREATININE 1 03 08/29/2021    CREATININE 0 74 08/28/2021    CREATININE 1 15 08/27/2021   Stable  Monitor    GERD (gastroesophageal reflux disease)  Assessment & Plan  On PPI    Hypertension  Assessment & Plan  Currently stable  Monitor metoprolol  Hold benazepril and furosemide    UTI (urinary tract infection)-resolved as of 8/29/2021  Assessment & Plan  Patient reported symptoms of dysuria on presentation  UA-4-10 WBC, moderate leukocytes, bacteriuria  Urine culture with mixed contaminant  Reports improvement in symptoms, patient is afebrile with normal white count  · Treated with Zosyn x3 days, will discontinue    Sepsis without shock -resolved as of 8/29/2021  Assessment & Plan  Versus SIRS as evidenced by tachycardia, tachypnea, hypotension lactic acidosis  Initially suspected to be UTI but likely secondary to AFib with RVR  Improved after rate control  Blood culture, urine culture negative  Procalcitonin negative  · Will discontinue antibiotics    Depression  Assessment & Plan  On fluoxetine        VTE Pharmacologic Prophylaxis: VTE Score: 5 High Risk (Score >/= 5) - Pharmacological DVT Prophylaxis Ordered: apixaban (Eliquis)   Sequential Compression Devices Ordered  Patient Centered Rounds: I performed bedside rounds with nursing staff today  Discussions with Specialists or Other Care Team Provider:  Yes    Education and Discussions with Family / Patient: Updated  (son) via phone  Time Spent for Care: 45 minutes  More than 50% of total time spent on counseling and coordination of care as described above  Current Length of Stay: 2 day(s)  Current Patient Status: Inpatient   Certification Statement: The patient will continue to require additional inpatient hospital stay due to AFib with RVR  Discharge Plan: Anticipate discharge in 24-48 hrs to discharge location to be determined pending rehab evaluations  Code Status: Level 3 - DNAR and DNI    Subjective:   Blood pressure heart rate is better  Patient reports limited activity with chronic shortness of breath and gradual weight loss  Appetite fair  Denies any chest pain or abdominal pain    Reports that she uses oxygen but does not use any inhalers or nebulizer    Objective:     Vitals:   Temp (24hrs), Av 2 °F (36 8 °C), Min:97 °F (36 1 °C), Max:98 8 °F (37 1 °C)    Temp:  [97 °F (36 1 °C)-98 8 °F (37 1 °C)] 97 °F (36 1 °C)  HR:  [] 89  Resp:  [18-36] 18  BP: (112-160)/(56-82) 139/64  SpO2:  [89 %-100 %] 100 %  Body mass index is 18 24 kg/m²  Input and Output Summary (last 24 hours): Intake/Output Summary (Last 24 hours) at 2021 1113  Last data filed at 2021 0800  Gross per 24 hour   Intake 584 33 ml   Output 1000 ml   Net -415 67 ml       Physical Exam:   Physical Exam  Vitals and nursing note reviewed  Constitutional:       General: She is not in acute distress  Appearance: She is well-developed  Comments: Cachectic, chronically ill, frail   HENT:      Head: Normocephalic and atraumatic  Cardiovascular:      Rate and Rhythm: Normal rate  Heart sounds: No murmur heard       Pulmonary:      Effort: Pulmonary effort is normal  No respiratory distress  Breath sounds: Normal breath sounds  Comments: Diminished bilaterally  Abdominal:      Palpations: Abdomen is soft  Tenderness: There is no abdominal tenderness  Musculoskeletal:      Cervical back: Neck supple  Right lower leg: No edema  Left lower leg: No edema  Skin:     General: Skin is warm and dry  Neurological:      Mental Status: She is alert  Mental status is at baseline  Additional Data:     Labs:  Results from last 7 days   Lab Units 08/29/21  0442   WBC Thousand/uL 4 84   HEMOGLOBIN g/dL 9 7*   HEMATOCRIT % 34 4*   PLATELETS Thousands/uL 138*   NEUTROS PCT % 78*   LYMPHS PCT % 12*   MONOS PCT % 7   EOS PCT % 2     Results from last 7 days   Lab Units 08/29/21  0442 08/27/21  1443   SODIUM mmol/L 141 140   POTASSIUM mmol/L 5 4* 4 4   CHLORIDE mmol/L 108 102   CO2 mmol/L 27 27   BUN mg/dL 11 15   CREATININE mg/dL 1 03 1 15   ANION GAP mmol/L 6 11   CALCIUM mg/dL 8 6 9 2   ALBUMIN g/dL  --  3 5   TOTAL BILIRUBIN mg/dL  --  0 21   ALK PHOS U/L  --  61   ALT U/L  --  20   AST U/L  --  19   GLUCOSE RANDOM mg/dL 104 149*     Results from last 7 days   Lab Units 08/27/21  1443   INR  1 12             Results from last 7 days   Lab Units 08/28/21  0541 08/27/21  2115 08/27/21  1756 08/27/21  1615   LACTIC ACID mmol/L  --  1 9 2 1* 3 2*   PROCALCITONIN ng/ml <0 05  --   --  <0 05       Lines/Drains:  Invasive Devices     Peripheral Intravenous Line            Peripheral IV 08/27/21 Left Antecubital 1 day    Peripheral IV 08/28/21 Distal;Dorsal (posterior); Right Forearm 1 day                           Imaging: Reviewed radiology reports from this admission including: chest xray    Recent Cultures (last 7 days):   Results from last 7 days   Lab Units 08/27/21  1615   BLOOD CULTURE  No Growth at 24 hrs  No Growth at 24 hrs         Last 24 Hours Medication List:   Current Facility-Administered Medications   Medication Dose Route Frequency Provider Last Rate    acetaminophen  650 mg Oral Q6H PRN Miley Muse, CRNP      apixaban  2 5 mg Oral BID Miley Muse, CRNP      atorvastatin  40 mg Oral Daily With González Saldaña, CRNP      FLUoxetine  20 mg Oral Daily Miley Muse, CRNP      ipratropium-albuterol  3 mL Nebulization Q6H PRN Miley Muse, CRNP      melatonin  3 mg Oral HS Miley Muse, CRNP      metoprolol tartrate  50 mg Oral Q12H [facility] Kami Truong MD      pantoprazole  40 mg Oral Daily Miley Muse, CRNP      piperacillin-tazobactam  3 375 g Intravenous Q6H Miley Muse, CRNP 0 g (08/28/21 1730)    tiotropium  18 mcg Inhalation Daily Miley Muse, CRNP      traMADol  50 mg Oral Q8H PRN Miley Muse, CRNP          Today, Patient Was Seen By: Kami Truong MD    **Please Note: This note may have been constructed using a voice recognition system  **

## 2021-08-30 ENCOUNTER — PATIENT OUTREACH (OUTPATIENT)
Dept: CASE MANAGEMENT | Facility: HOSPITAL | Age: 81
End: 2021-08-30

## 2021-08-30 ENCOUNTER — APPOINTMENT (INPATIENT)
Dept: RADIOLOGY | Facility: HOSPITAL | Age: 81
DRG: 872 | End: 2021-08-30
Payer: MEDICARE

## 2021-08-30 LAB
ANION GAP SERPL CALCULATED.3IONS-SCNC: 4 MMOL/L (ref 4–13)
BUN SERPL-MCNC: 9 MG/DL (ref 5–25)
CALCIUM SERPL-MCNC: 9.1 MG/DL (ref 8.3–10.1)
CHLORIDE SERPL-SCNC: 107 MMOL/L (ref 100–108)
CO2 SERPL-SCNC: 29 MMOL/L (ref 21–32)
CREAT SERPL-MCNC: 0.87 MG/DL (ref 0.6–1.3)
ERYTHROCYTE [DISTWIDTH] IN BLOOD BY AUTOMATED COUNT: 15.6 % (ref 11.6–15.1)
FERRITIN SERPL-MCNC: 21 NG/ML (ref 8–388)
FOLATE SERPL-MCNC: 19.8 NG/ML (ref 3.1–17.5)
GFR SERPL CREATININE-BSD FRML MDRD: 62 ML/MIN/1.73SQ M
GLUCOSE SERPL-MCNC: 99 MG/DL (ref 65–140)
HCT VFR BLD AUTO: 34.4 % (ref 34.8–46.1)
HGB BLD-MCNC: 9.8 G/DL (ref 11.5–15.4)
IRON SATN MFR SERPL: 14 %
IRON SERPL-MCNC: 41 UG/DL (ref 50–170)
MAGNESIUM SERPL-MCNC: 1.7 MG/DL (ref 1.6–2.6)
MCH RBC QN AUTO: 27.3 PG (ref 26.8–34.3)
MCHC RBC AUTO-ENTMCNC: 28.5 G/DL (ref 31.4–37.4)
MCV RBC AUTO: 96 FL (ref 82–98)
PLATELET # BLD AUTO: 158 THOUSANDS/UL (ref 149–390)
PMV BLD AUTO: 10.4 FL (ref 8.9–12.7)
POTASSIUM SERPL-SCNC: 5.1 MMOL/L (ref 3.5–5.3)
RBC # BLD AUTO: 3.59 MILLION/UL (ref 3.81–5.12)
SODIUM SERPL-SCNC: 140 MMOL/L (ref 136–145)
TIBC SERPL-MCNC: 303 UG/DL (ref 250–450)
VIT B12 SERPL-MCNC: 522 PG/ML (ref 100–900)
WBC # BLD AUTO: 5.74 THOUSAND/UL (ref 4.31–10.16)

## 2021-08-30 PROCEDURE — 71250 CT THORAX DX C-: CPT

## 2021-08-30 PROCEDURE — 82607 VITAMIN B-12: CPT | Performed by: INTERNAL MEDICINE

## 2021-08-30 PROCEDURE — 80048 BASIC METABOLIC PNL TOTAL CA: CPT | Performed by: INTERNAL MEDICINE

## 2021-08-30 PROCEDURE — 82728 ASSAY OF FERRITIN: CPT | Performed by: INTERNAL MEDICINE

## 2021-08-30 PROCEDURE — 99232 SBSQ HOSP IP/OBS MODERATE 35: CPT | Performed by: NURSE PRACTITIONER

## 2021-08-30 PROCEDURE — 82746 ASSAY OF FOLIC ACID SERUM: CPT | Performed by: INTERNAL MEDICINE

## 2021-08-30 PROCEDURE — 99223 1ST HOSP IP/OBS HIGH 75: CPT | Performed by: INTERNAL MEDICINE

## 2021-08-30 PROCEDURE — G1004 CDSM NDSC: HCPCS

## 2021-08-30 PROCEDURE — 83540 ASSAY OF IRON: CPT | Performed by: INTERNAL MEDICINE

## 2021-08-30 PROCEDURE — 99232 SBSQ HOSP IP/OBS MODERATE 35: CPT | Performed by: INTERNAL MEDICINE

## 2021-08-30 PROCEDURE — 97167 OT EVAL HIGH COMPLEX 60 MIN: CPT

## 2021-08-30 PROCEDURE — 85027 COMPLETE CBC AUTOMATED: CPT | Performed by: INTERNAL MEDICINE

## 2021-08-30 PROCEDURE — 83550 IRON BINDING TEST: CPT | Performed by: INTERNAL MEDICINE

## 2021-08-30 PROCEDURE — 83735 ASSAY OF MAGNESIUM: CPT | Performed by: INTERNAL MEDICINE

## 2021-08-30 RX ORDER — METOPROLOL TARTRATE 5 MG/5ML
2.5 INJECTION INTRAVENOUS EVERY 6 HOURS PRN
Status: DISCONTINUED | OUTPATIENT
Start: 2021-08-30 | End: 2021-09-01 | Stop reason: HOSPADM

## 2021-08-30 RX ORDER — FUROSEMIDE 10 MG/ML
20 INJECTION INTRAMUSCULAR; INTRAVENOUS ONCE
Status: COMPLETED | OUTPATIENT
Start: 2021-08-30 | End: 2021-08-30

## 2021-08-30 RX ORDER — DIGOXIN 125 MCG
125 TABLET ORAL DAILY
Status: DISCONTINUED | OUTPATIENT
Start: 2021-08-30 | End: 2021-09-01

## 2021-08-30 RX ORDER — LEVALBUTEROL 1.25 MG/.5ML
1.25 SOLUTION, CONCENTRATE RESPIRATORY (INHALATION) EVERY 8 HOURS PRN
Status: DISCONTINUED | OUTPATIENT
Start: 2021-08-30 | End: 2021-09-01 | Stop reason: HOSPADM

## 2021-08-30 RX ORDER — METOPROLOL TARTRATE 5 MG/5ML
5 INJECTION INTRAVENOUS ONCE
Status: COMPLETED | OUTPATIENT
Start: 2021-08-30 | End: 2021-08-30

## 2021-08-30 RX ADMIN — ATORVASTATIN CALCIUM 40 MG: 40 TABLET, FILM COATED ORAL at 16:47

## 2021-08-30 RX ADMIN — APIXABAN 2.5 MG: 2.5 TABLET, FILM COATED ORAL at 09:01

## 2021-08-30 RX ADMIN — PANTOPRAZOLE SODIUM 40 MG: 40 TABLET, DELAYED RELEASE ORAL at 09:01

## 2021-08-30 RX ADMIN — APIXABAN 2.5 MG: 2.5 TABLET, FILM COATED ORAL at 17:02

## 2021-08-30 RX ADMIN — METOPROLOL TARTRATE 5 MG: 5 INJECTION INTRAVENOUS at 22:47

## 2021-08-30 RX ADMIN — TRAMADOL HYDROCHLORIDE 50 MG: 50 TABLET, FILM COATED ORAL at 14:32

## 2021-08-30 RX ADMIN — FLUOXETINE 20 MG: 20 CAPSULE ORAL at 09:01

## 2021-08-30 RX ADMIN — METOPROLOL TARTRATE 2.5 MG: 5 INJECTION INTRAVENOUS at 19:01

## 2021-08-30 RX ADMIN — Medication 3 MG: at 21:37

## 2021-08-30 RX ADMIN — DIGOXIN 125 MCG: 125 TABLET ORAL at 12:28

## 2021-08-30 RX ADMIN — FLUTICASONE FUROATE AND VILANTEROL TRIFENATATE 1 PUFF: 100; 25 POWDER RESPIRATORY (INHALATION) at 09:01

## 2021-08-30 RX ADMIN — METOPROLOL TARTRATE 50 MG: 50 TABLET, FILM COATED ORAL at 09:01

## 2021-08-30 RX ADMIN — METOPROLOL TARTRATE 50 MG: 50 TABLET, FILM COATED ORAL at 20:24

## 2021-08-30 RX ADMIN — FUROSEMIDE 20 MG: 10 INJECTION, SOLUTION INTRAMUSCULAR; INTRAVENOUS at 16:47

## 2021-08-30 NOTE — ASSESSMENT & PLAN NOTE
Malnutrition Findings:   Adult Malnutrition type: Chronic illness (Moderate malnutrition of chronic illness related to inadequate energy intake as evidenced by somewhat hollow orbits, somewhat protruding/prominant clavicles, some depression at the temples  Treated with Liberalized diet)  Adult Degree of Malnutrition: Malnutrition of moderate degree    BMI Findings:  Adult BMI Classifications: Underweight < 18 5     Body mass index is 18 24 kg/m²     Nutrition evaluation

## 2021-08-30 NOTE — ASSESSMENT & PLAN NOTE
Lab Results   Component Value Date    EGFR 50 08/29/2021    EGFR 75 08/28/2021    EGFR 44 08/27/2021    CREATININE 1 03 08/29/2021    CREATININE 0 74 08/28/2021    CREATININE 1 15 08/27/2021   Stable  Monitor

## 2021-08-30 NOTE — ASSESSMENT & PLAN NOTE
History of paroxysmal AFib on Eliquis  Presented with AFib with RVR, hypotensive after initiation of Cardizem drip, subsequently admitted to step-down unit  Loaded with digoxin with improvement in heart rate  Likely precipitated by COPD plus/minus hypoxia, pulmonary hypertension, ?  Compliance  · Continue metoprolol at increased dose of 50 mg q12h  · Per cardiology, will start Digoxin 0 125 mcg daily   · Continue Eliquis   · Continue telemetry

## 2021-08-30 NOTE — ASSESSMENT & PLAN NOTE
History of COPD (FEV1 47% of predicted) as per the chart review, on chronic supplemental oxygen  History of right lung CA status post radiation  Reports that she has not been using any inhaler or nebulizer treatment at home, reports chronic progressive shortness of breath limiting activity,?   Likely precipitant of AFib with RVR  No evidence of exacerbation at present  · Continue DuoNebs  · Initiate Breo  · Monitor respiratory status and oxygenation with activity  · Consult pulmonary

## 2021-08-30 NOTE — PLAN OF CARE
Problem: Potential for Falls  Goal: Patient will remain free of falls  Description: INTERVENTIONS:  - Educate patient/family on patient safety including physical limitations  - Instruct patient to call for assistance with activity   - Consult OT/PT to assist with strengthening/mobility   - Keep Call bell within reach  - Keep bed low and locked with side rails adjusted as appropriate  - Keep care items and personal belongings within reach  - Initiate and maintain comfort rounds  - Make Fall Risk Sign visible to staff  - Offer Toileting every 2 Hours, in advance of need  - Initiate/Maintain bed alarm  - Obtain necessary fall risk management equipment: walker  - Apply yellow socks and bracelet for high fall risk patients  - Consider moving patient to room near nurses station  Outcome: Progressing     Problem: RESPIRATORY - ADULT  Goal: Achieves optimal ventilation and oxygenation  Description: INTERVENTIONS:  - Assess for changes in respiratory status  - Assess for changes in mentation and behavior  - Position to facilitate oxygenation and minimize respiratory effort  - Oxygen administered by appropriate delivery if ordered  - Initiate smoking cessation education as indicated  - Encourage broncho-pulmonary hygiene including cough, deep breathe, Incentive Spirometry  - Assess the need for suctioning and aspirate as needed  - Assess and instruct to report SOB or any respiratory difficulty  - Respiratory Therapy support as indicated  Outcome: Progressing     Problem: Nutrition/Hydration-ADULT  Goal: Nutrient/Hydration intake appropriate for improving, restoring or maintaining nutritional needs  Description: Monitor and assess patient's nutrition/hydration status for malnutrition  Collaborate with interdisciplinary team and initiate plan and interventions as ordered  Monitor patient's weight and dietary intake as ordered or per policy  Utilize nutrition screening tool and intervene as necessary   Determine patient's food preferences and provide high-protein, high-caloric foods as appropriate  INTERVENTIONS:  - Monitor oral intake, urinary output, labs, and treatment plans  - Assess nutrition and hydration status and recommend course of action  - Evaluate amount of meals eaten  - Assist patient with eating if necessary   - Allow adequate time for meals  - Recommend/ encourage appropriate diets, oral nutritional supplements, and vitamin/mineral supplements  - Order, calculate, and assess calorie counts as needed  - Recommend, monitor, and adjust tube feedings and TPN/PPN based on assessed needs  - Assess need for intravenous fluids  - Provide specific nutrition/hydration education as appropriate  - Include patient/family/caregiver in decisions related to nutrition  Outcome: Progressing     Problem: MOBILITY - ADULT  Goal: Maintain or return to baseline ADL function  Description: INTERVENTIONS:  -  Assess patient's ability to carry out ADLs; assess patient's baseline for ADL function and identify physical deficits which impact ability to perform ADLs (bathing, care of mouth/teeth, toileting, grooming, dressing, etc )  - Assess/evaluate cause of self-care deficits   - Assess range of motion  - Assess patient's mobility; develop plan if impaired  - Assess patient's need for assistive devices and provide as appropriate  - Encourage maximum independence but intervene and supervise when necessary  - Involve family in performance of ADLs  - Assess for home care needs following discharge   - Consider OT consult to assist with ADL evaluation and planning for discharge  - Provide patient education as appropriate  Outcome: Progressing  Goal: Maintains/Returns to pre admission functional level  Description: INTERVENTIONS:  - Perform BMAT or MOVE assessment daily    - Set and communicate daily mobility goal to care team and patient/family/caregiver     - Collaborate with rehabilitation services on mobility goals if consulted  - Perform Range of Motion 3 times a day  - Reposition patient every 2 hours    - Dangle patient 3 times a day  - Stand patient 3 times a day  - Ambulate patient 3 times a day  - Out of bed to chair 3 times a day   - Out of bed for meals 3 times a day  - Out of bed for toileting  - Record patient progress and toleration of activity level   Outcome: Progressing     Problem: Prexisting or High Potential for Compromised Skin Integrity  Goal: Skin integrity is maintained or improved  Description: INTERVENTIONS:  - Identify patients at risk for skin breakdown  - Assess and monitor skin integrity  - Assess and monitor nutrition and hydration status  - Monitor labs   - Assess for incontinence   - Turn and reposition patient  - Assist with mobility/ambulation  - Relieve pressure over bony prominences  - Avoid friction and shearing  - Provide appropriate hygiene as needed including keeping skin clean and dry  - Evaluate need for skin moisturizer/barrier cream  - Collaborate with interdisciplinary team   - Patient/family teaching  - Consider wound care consult   Outcome: Progressing     Problem: PAIN - ADULT  Goal: Verbalizes/displays adequate comfort level or baseline comfort level  Description: Interventions:  - Encourage patient to monitor pain and request assistance  - Assess pain using appropriate pain scale  - Administer analgesics based on type and severity of pain and evaluate response  - Implement non-pharmacological measures as appropriate and evaluate response  - Consider cultural and social influences on pain and pain management  - Notify physician/advanced practitioner if interventions unsuccessful or patient reports new pain  Outcome: Progressing     Problem: INFECTION - ADULT  Goal: Absence or prevention of progression during hospitalization  Description: INTERVENTIONS:  - Assess and monitor for signs and symptoms of infection  - Monitor lab/diagnostic results  - Monitor all insertion sites, i e  indwelling lines, tubes, and drains  - Monitor endotracheal if appropriate and nasal secretions for changes in amount and color  - [place] appropriate cooling/warming therapies per order  - Administer medications as ordered  - Instruct and encourage patient and family to use good hand hygiene technique  - Identify and instruct in appropriate isolation precautions for identified infection/condition  Outcome: Progressing     Problem: DISCHARGE PLANNING  Goal: Discharge to home or other facility with appropriate resources  Description: INTERVENTIONS:  - Identify barriers to discharge w/patient and caregiver  - Arrange for needed discharge resources and transportation as appropriate  - Identify discharge learning needs (meds, wound care, etc )  - Arrange for interpretive services to assist at discharge as needed  - Refer to Case Management Department for coordinating discharge planning if the patient needs post-hospital services based on physician/advanced practitioner order or complex needs related to functional status, cognitive ability, or social support system  Outcome: Progressing

## 2021-08-30 NOTE — PROGRESS NOTES
ADT notification received 8/27 for patient admission to [facility]   Patient presented with SOB and pain across her chest  ECG revealed tachycardia with Afib  Patient also reports recent history of dysuria and productive cough  BPCI form and care coordination note updated  This  CM will continue to monitor via chart review throughout hospitalization

## 2021-08-30 NOTE — PROGRESS NOTES
Progress Note - Cardiology   [facility]     Mitchell Larios 78 y o  female MRN: [id number]  : 1940  Unit/Bed#: [address] [address] Encounter: [id number]    Assessment and Plan:   1  Paroxysmal atrial fibrillation with rapid ventricular response:  Patient received IV digoxin load on admission, but oral dosing was not continued  - will start digoxin 0 125 mg daily while in hospital and then may need to transition to every other day at time of discharge due to chronic kidney disease    -  continue metoprolol 50 mg twice a day    -  continue Eliquis 2 5 mg daily    -  continue monitor telemetry    2  Acute exacerbation of COPD:  Patient currently on 2 L of oxygen chronically at home  History of right-sided lung cancer with previous radiation    -  patient states not currently using any inhalers at home    -  started on duo nebs and Breo by primary team    3  Chronic diastolic heart failure:  Appears to be euvolemic  Continue current meds    4  Aortic stenosis with history of TAVR:  Performed at [facility]   Echocardiogram performed 2021 with normal gradients    5  Severe pulmonary hypertension:  Pulmonary pressures by echocardiogram from 2021 noted to be estimated at 70 mm Hg    Subjective / Objective:       Vitals: Blood pressure 145/80, pulse 102, temperature 98 2 °F (36 8 °C), temperature source Tympanic, resp  rate 20, height 5' 4" (1 626 m), weight 48 2 kg (106 lb 4 2 oz), SpO2 95 %, not currently breastfeeding  Vitals:    21 1450 21   Weight: 47 7 kg (105 lb 2 6 oz) 48 2 kg (106 lb 4 2 oz)     Body mass index is 18 24 kg/m²    BP Readings from Last 3 Encounters:   21 145/80   21 161/71   21 113/85     Orthostatic Blood Pressures      Most Recent Value   Blood Pressure  145/80 filed at 2021 0750   Patient Position - Orthostatic VS  Lying filed at 2021 0750        I/O        07 -  07 07 - 08/30 0700 08/30 0701 - 08/31 0700    P  O   480     I V  (mL/kg) 483 9 (10)      IV Piggyback 300      Total Intake(mL/kg) 783 9 (16 3) 480 (10)     Urine (mL/kg/hr) 800 (0 7) 500 (0 4)     Total Output 800 500     Net -16 1 -20            Unmeasured Urine Occurrence 1 x 3 x         Invasive Devices     Peripheral Intravenous Line            Peripheral IV 08/27/21 Left Antecubital 2 days    Peripheral IV 08/28/21 Distal;Dorsal (posterior); Right Forearm 2 days                  Intake/Output Summary (Last 24 hours) at 8/30/2021 0923  Last data filed at 8/30/2021 0140  Gross per 24 hour   Intake 240 ml   Output 200 ml   Net 40 ml         Physical Exam:   Physical Exam  Vitals and nursing note reviewed  Constitutional:       Appearance: Normal appearance  She is underweight  HENT:      Head: Normocephalic  Right Ear: External ear normal       Left Ear: External ear normal       Nose: Nose normal    Eyes:      General: No scleral icterus  Right eye: No discharge  Left eye: No discharge  Cardiovascular:      Rate and Rhythm: Tachycardia present  Rhythm irregular  Pulses: Normal pulses  Pulmonary:      Effort: No accessory muscle usage or respiratory distress  Breath sounds: Examination of the right-lower field reveals decreased breath sounds  Examination of the left-lower field reveals decreased breath sounds  Decreased breath sounds and wheezing present  Comments: Coarse breath sounds in all fields  Abdominal:      General: Bowel sounds are normal       Palpations: Abdomen is soft  Comments: Patient notes anorexia and early satiety   Musculoskeletal:      Cervical back: Normal range of motion and neck supple  Right lower leg: No edema  Left lower leg: No edema  Skin:     General: Skin is warm and dry  Capillary Refill: Capillary refill takes less than 2 seconds  Neurological:      General: No focal deficit present        Mental Status: She is alert and oriented to person, place, and time  Mental status is at baseline                     Medications/ Allergies:     Current Facility-Administered Medications   Medication Dose Route Frequency Provider Last Rate    acetaminophen  650 mg Oral Q6H PRN Fatuma Carpen, CRNP      apixaban  2 5 mg Oral BID Fatuma Carpen, CRNP      atorvastatin  40 mg Oral Daily With Selma Rosario, CRNP      FLUoxetine  20 mg Oral Daily Fatuma Carpen, CRNP      fluticasone-vilanterol  1 puff Inhalation Daily Jacoby Louise MD      ipratropium-albuterol  3 mL Nebulization Q6H PRN Fatuma Carpen, CRNP      melatonin  3 mg Oral HS Fatuma Carpen, CRNP      metoprolol tartrate  50 mg Oral Q12H 1101 Evonne Steele MD      pantoprazole  40 mg Oral Daily Fatuma Carpen, CRNP      traMADol  50 mg Oral Q8H PRN Fatuma Carpen, CRNP       acetaminophen, 650 mg, Q6H PRN  ipratropium-albuterol, 3 mL, Q6H PRN  traMADol, 50 mg, Q8H PRN      No Known Allergies    VTE Pharmacologic Prophylaxis:   Sequential compression device (Venodyne)  and Eliquis    Labs:   Troponins:  Results from last 7 days   Lab Units 08/27/21  2333 08/27/21  2116 08/27/21  1756   TROPONIN I ng/mL 0 11* 0 14* 0 07*       CBC with diff:  Results from last 7 days   Lab Units 08/30/21  0511 08/29/21  0442 08/28/21  0541 08/27/21  1443   WBC Thousand/uL 5 74 4 84 3 54* 9 25   HEMOGLOBIN g/dL 9 8* 9 7* 7 1* 11 1*   HEMATOCRIT % 34 4* 34 4* 24 5* 39 0   MCV fL 96 96 95 94   PLATELETS Thousands/uL 158 138* 105* 254   MCH pg 27 3 26 9 27 1 26 6*   MCHC g/dL 28 5* 28 2* 28 6* 28 5*   RDW % 15 6* 15 9* 15 7* 15 2*   MPV fL 10 4 10 1 9 7 10 3   NRBC AUTO /100 WBCs  --  0 0 0       CMP:  Results from last 7 days   Lab Units 08/30/21  0511 08/29/21  0442 08/28/21  0541 08/27/21  1443   SODIUM mmol/L 140 141 147* 140   POTASSIUM mmol/L 5 1 5 4* 3 2* 4 4   CHLORIDE mmol/L 107 108 116* 102   CO2 mmol/L 29 27 23 27   ANION GAP mmol/L 4 6 8 11   BUN mg/dL 9 11 11 15   CREATININE mg/dL 0 87 1 03 0 74 1 15 CALCIUM mg/dL 9 1 8 6 6 2* 9 2   AST U/L  --   --   --  19   ALT U/L  --   --   --  20   ALK PHOS U/L  --   --   --  61   TOTAL PROTEIN g/dL  --   --   --  7 6   ALBUMIN g/dL  --   --   --  3 5   TOTAL BILIRUBIN mg/dL  --   --   --  0 21   EGFR ml/min/1 73sq m 62 50 75 44     Magnesium:  Results from last 7 days   Lab Units 21  0511 21  0442 21  0541 21  1615   MAGNESIUM mg/dL 1 7 2 2 1 1* 1 5*     Coags:  Results from last 7 days   Lab Units 21  1443   PTT seconds 37   INR  1 12     TSH:  Results from last 7 days   Lab Units 21  1615   TSH 3RD GENERATON uIU/mL 1 640     NT-proBNP:   Recent Labs     21  1443   NTBNP 1,967*        Imaging & Testing   I have personally reviewed pertinent reports  XR chest 1 view portable    Result Date: 2021  Narrative: CHEST INDICATION:   Chest pain  COMPARISON:  Chest radiograph from 2021 and chest CT from 2020  EXAM PERFORMED/VIEWS:  XR CHEST PORTABLE FINDINGS: Normal heart size  Median sternotomy  TAVR  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  No acute disease  No effusion or pneumothorax  Chronic elevation of the right hemidiaphragm  Severe bilateral glenohumeral degenerative disease  Impression: No acute cardiopulmonary disease  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 with chronic elevation of the right hemidiaphragm  Workstation performed: [id number]        EKG / Monitor: Personally reviewed      Rapid atrial fibrillation    Cardiac testing:   Results for orders placed during the hospital encounter of 21    Echo complete with contrast if indicated    Narrative  [address]  [address] [address]  [phone]    Transthoracic Echocardiogram  2D, M-mode, Doppler, and Color Doppler    Study date:  2021    Patient: Staci Osorio  MR number: [id number]  Account number: [de-identified]  : 1940  Age: 78 years  Gender: Female  Status: Inpatient  Location: Bedside  Height: 65 in  Weight: 107 8 lb  BP: 110/ 53 mmHg    Indications: Chest pain,dizziness  Diagnoses: I42 9 - Cardiomyopathy, unspecified    Sonographer:  HELDER Amezquita  Primary Physician:  Dustin Ramos MD  Group:  [facility]  Interpreting Physician:  Charisse Martin MD    SUMMARY    COMPARISONS:  Compared to previous study, patient now has severe pulmonary hypertension and peak PA pressure is increased from 30 to 70 mm Hg    LEFT VENTRICLE:  LV measurements were taken in apical 2 chamber views  Normal left ventricular chamber dimension  There is mild concentric left ventricular hypertrophy  Systolic function is hyperdynamic with an ejection fraction of 75%  No definite regional wall motion abnormality seen although it cannot be absolutely excluded on the basis of this study  Grade 1 diastolic dysfunction    RIGHT VENTRICLE:  Right ventricular free wall was not adequately visualized  Visually RV size appears borderline to mildly enlarged  Systolic function also appears borderline with no TAPSE measurement    LEFT ATRIUM:  Mild to moderately dilated left atrium    MITRAL VALVE:  There is mild to moderate thickening of mitral valve leaflets  There is moderate to severe mitral annular calcification  Leaflet separation is mildly decreased  There is mild to moderate mitral stenosis with mitral valve area of 2 1 cm2 and mean gradient of 6 72 mm Hg  There is mild to moderate mitral regurgitation    AORTIC VALVE:  Aortic valve was not well visualized  A bioprosthesis/TAVR is present  No evidence of significant aortic stenosis  Mean gradient is 5 and peak gradient is 9 mm Hg  No paravalvular leak or aortic valve regurgitation seen    TRICUSPID VALVE:  There was mild to moderate regurgitation  There is severe pulmonary hypertension with peak PA pressure of 70 mm Hg    PULMONIC VALVE:  There was mild regurgitation      IVC, HEPATIC VEINS:  IVC size and respirophasic variations appear preserved    COMPARISONS:  Compared to previous study, patient now has severe pulmonary hypertension and peak PA pressure is increased from 30 to 70 mm Hg    HISTORY: PRIOR HISTORY: chronic diastolic CHF,A Fib ,CVA,HTN,Gastroesophageal reflux disease,Prosthetic Aortic Valve,Lung Cancer,COPD,Chronic kidney disease  PROCEDURE: The procedure was performed at the bedside  This was a routine study  Technically difficult study resulting in off axis measurements The transthoracic approach was used  The study included complete 2D imaging, M-mode, complete  spectral Doppler, and color Doppler  The heart rate was 94 bpm, at the start of the study  Images were obtained from the parasternal, apical, subcostal, and suprasternal notch acoustic windows  Echocardiographic views were limited due to  chest wall deformity, poor patient compliance, poor acoustic window availability, decreased penetration, and lung interference  This was a technically difficult study      LEFT VENTRICLE: LV measurements were taken in apical 2 chamber views  Normal left ventricular chamber dimension  There is mild concentric left ventricular hypertrophy  Systolic function is hyperdynamic with an ejection fraction of 75%  No definite regional wall motion abnormality seen although it cannot be absolutely excluded on the basis of this study  Grade 1 diastolic dysfunction    RIGHT VENTRICLE: Right ventricular free wall was not adequately visualized  Visually RV size appears borderline to mildly enlarged  Systolic function also appears borderline with no TAPSE measurement    LEFT ATRIUM: Mild to moderately dilated left atrium    RIGHT ATRIUM: Size was normal     MITRAL VALVE: There is mild to moderate thickening of mitral valve leaflets  There is moderate to severe mitral annular calcification  Leaflet separation is mildly decreased  There is mild to moderate mitral stenosis with mitral valve area of 2 1 cm2 and mean gradient of 6 72 mm Hg  There is mild to moderate mitral regurgitation    AORTIC VALVE: Aortic valve was not well visualized  A bioprosthesis/TAVR is present  No evidence of significant aortic stenosis  Mean gradient is 5 and peak gradient is 9 mm Hg  No paravalvular leak or aortic valve regurgitation seen    TRICUSPID VALVE: DOPPLER: There was mild to moderate regurgitation  There is severe pulmonary hypertension with peak PA pressure of 70 mm Hg    PULMONIC VALVE: DOPPLER: There was mild regurgitation  PERICARDIUM: There was no pericardial effusion  The pericardium was normal in appearance  AORTA: The root exhibited normal size  SYSTEMIC VEINS: IVC: IVC size and respirophasic variations appear preserved    SYSTEM MEASUREMENT TABLES    2D  EF (Teich): 58 98 %  %FS: 30 59 %  Ao Diam: 2 82 cm  EDV(Teich): 56 52 ml  ESV(Teich): 23 19 ml  IVSd: 1 28 cm  LA Area: 23 84 cm2  LA Diam: 4 46 cm  LVEDV MOD A4C: 70 18 ml  LVEF MOD A4C: 67 91 %  LVESV MOD A4C: 22 52 ml  LVIDd: 3 66 cm  LVIDs: 2 54 cm  LVLd A4C: 7 05 cm  LVLs A4C: 5 64 cm  LVOT Diam: 1 98 cm  LVPWd: 1 34 cm  RA Area: 10 98 cm2  RVIDd: 2 68 cm  SV (Teich): 33 34 ml  SV MOD A4C: 47 66 ml    CW  AV Env  Ti: 220 74 ms  AV VTI: 23 26 cm  AV Vmax: 1 5 m/s  AV Vmean: 1 05 m/s  AV maxP 06 mmHg  AV meanP 05 mmHg  TR Vmax: 4 09 m/s  TR maxP 94 mmHg    PW  MV E/A Ratio: 0 84  E' Sept: 0 06 m/s  E/E' Sept: 22 95  LVOT Env  Ti: 281 64 ms  LVOT VTI: 25 72 cm  LVOT Vmax: 1 3 m/s  LVOT Vmean: 0 91 m/s  LVOT maxP 79 mmHg  LVOT meanPG: 3 81 mmHg  MV A Ermais: 1 6 m/s  MV Dec Clarke: 6 99 m/s2  MV DecT: 193 88 ms  MV E Ermias: 1 35 m/s  MV PHT: 56 23 ms  MVA By PHT: 3 91 cm2    [address] Accredited Echocardiography [facility]    Prepared and electronically signed by    Re Ross MD  Signed 2021 12:31:58        [facility], [place]  Cardiology      "This note has been constructed using a voice recognition system  Therefore there may be syntax, spelling, and/or grammatical errors  Please call if you have any questions  "

## 2021-08-30 NOTE — ASSESSMENT & PLAN NOTE
Wt Readings from Last 3 Encounters:   08/27/21 48 2 kg (106 lb 4 2 oz)   07/26/21 48 6 kg (107 lb 2 3 oz)   03/09/21 50 8 kg (112 lb)     Echocardiogram EF 75 %, grade 1 diastolic dysfunction, PA pressure 70 mmHg  No evidence of volume overload  · Continue metoprolol  · Holding furosemide  · Monitor intake output, daily weight

## 2021-08-30 NOTE — ASSESSMENT & PLAN NOTE
Wt Readings from Last 3 Encounters:   08/27/21 48 2 kg (106 lb 4 2 oz)   07/26/21 48 6 kg (107 lb 2 3 oz)   03/09/21 50 8 kg (112 lb)     Echocardiogram EF 75 %, grade 1 diastolic dysfunction, PA pressure 70 mmHg  No evidence of volume overload  · Continue metoprolol  · Holding furosemide  · Monitor intake output, daily weight  · Follow up Cardiology recommendation 16

## 2021-08-30 NOTE — ASSESSMENT & PLAN NOTE
Normocytic  Appears to be lower compared to last year  Patient denies any overt bleeding  · Iron studies, B12, folate  · Trend Solaraze Counseling:  I discussed with the patient the risks of Solaraze including but not limited to erythema, scaling, itching, weeping, crusting, and pain.

## 2021-08-30 NOTE — ASSESSMENT & PLAN NOTE
Malnutrition Findings:   Adult Malnutrition type: Chronic illness (Moderate malnutrition of chronic illness related to inadequate energy intake as evidenced by somewhat hollow orbits, somewhat protruding/prominant clavicles, some depression at the temples  Treated with Liberalized diet)  Adult Degree of Malnutrition: Malnutrition of moderate degree    BMI Findings:  Adult BMI Classifications: Underweight < 18 5     Body mass index is 18 24 kg/m²     Nutrition evaluation appreciated

## 2021-08-30 NOTE — ASSESSMENT & PLAN NOTE
History of paroxysmal AFib on Eliquis  Presented with AFib with RVR, hypotensive after initiation of Cardizem drip, subsequently admitted to step-down unit  Loaded with digoxin with improvement in heart rate  Likely precipitated by COPD plus/minus hypoxia, pulmonary hypertension?   Compliance  · Continue metoprolol, will increase to 50 mg q 12 hours  · Cardiology evaluation  · Continue Eliquis

## 2021-08-30 NOTE — CONSULTS
Consultation - Pulmonary Medicine  Alisson Valadez 78 y o  female MRN: [id number]  Unit/Bed#: [address] [place] [address] Encounter: [id number]  Code Status: Level 3 - DNAR and DNI    Assessment/Plan:      Chronic Dyspnea  -likely multifactorial and related to MVP, MR, CHF, COPD, PAH and Chronic Hypoxemia  -prn morphine for dyspnea  Severe Emphysematous COPD   -On home Spiriva  -on Breo in inpatient setting   -Duoneb PRN  Chronic Hypoxemic Respiratory Failure   -2 L NC at home  -currently remains on 2 L NC   Chronic Diastolic Congestive Heart Failure:  -gentle diuresis   -currently + 3 L   -20 IV lasix daily   Severe MVP / MR:  - see echo  PAH:  -multifactorial as listed above  -if persistent despite diuresis would consider RHC  -would defer any trials of PD inhibitors given above   Thank you for this consultation; we will be happy to follow with you     ______________________________________________________________________      History of Present Illness   Physician Requesting Consult: Georges Malcolm MD  Reason for Consult :  COPD/acute hypoxemic respiratory failure  HX and PE limited by:     HPI:  Alisson Valadez is a 78 y o  female  has a past medical history of Arthritis, Atrial fibrillation ([facility] [address] ), CHF (congestive heart failure) ([facility] [address] ), COPD (chronic obstructive pulmonary disease) ([facility] [address] ), Coronary artery disease, Heart murmur, and Hypertension  who presents with CC "I've been so short of breath lately "    23-year-old female past medical history of chronic paroxysmal atrial fibrillation, MDAD, HTN, hyperlipidemia, chronic pain, CKD, COPD, chronic hypoxemic respiratory failure on 3 L cannula, GERD presented the hospital 8/27 with shortness of breath and sepsis  No active chest pain    She was admitted to the ICU having presented in rapid atrial fibrillation with rapid ventricular response and tachycardic with heart rates in the 120-130 range and initiated on Cardizem infusion with subsequent hypotension necessitating ICU monitoring and treatment  She was also presumptive septic due to UTI and placed on IV Zosyn antibiotic therapy  She was admitted with a hemoglobin of 11 1 which subsequently down trended to 7 with volume expansion and overall is positive 3 3 L this hospitalization  Overall is doing well was transferred to the floor  Prior elevated lactic acid of 3 2 cleared  Last seen by Pulmonary in 2017 with prior FEV1/FVC ratio 47%, FEV1 0 93 L or 47% of predicted, FVC 1 99 L or 75% of predicted > improved FEV1 to 1 2 L 61% of predicted post bronchodilator also with improved FEV1 to FVC ratio 57%  She was given prescription for supplemental oxygen at that visit considering she was 92% resting on room air and decreased 88% with a few steps and improved on 3 L nasal cannula oxygen  Pulmonary consulted for COPD and acute hypoxemic respiratory failure  Additionally Echo has preserved EF 75% w/ G1DD w/ PAP 70 increased from prior 30 MMHG  Hx of porcine valve prior and MR/MVP to moderate degree      Smoking history:   Social History     Tobacco Use   Smoking Status Former Smoker    Packs/day: 1 00    Years: 25 00    Pack years: 25 00    Types: Cigarettes    Quit date: 2000    Years since quittin 6   Smokeless Tobacco Never Used     Occupational history:   Environmental History: no sig exposures   Travel history:no recent travel   Respiratory History: COPD / 2 L 02 dependence   PAP Therapy:NA   DME Company: deferred   Rx Insurance: deferred   PFT: NA prior   PSG: NA prior   Echo Reviewed:   [address]  [address] [facility]  [phone]     Transthoracic Echocardiogram  2D, M-mode, Doppler, and Color Doppler     Study date:  2021     Patient: Margi Ríos  MR number: [id number]  Account number: [de-identified]  : 1940  Age: 78 years  Gender: Female  Status: Inpatient  Location: Bedside  Height: 65 in  Weight: 107 8 lb  BP: 110/ 53 mmHg     Indications: Chest pain,dizziness      Diagnoses: I42 9 - Cardiomyopathy, unspecified     Sonographer:  HELDER Jolly  Primary Physician:  Yunier Rodriguez MD  Group:  [facility]  Interpreting Physician:  Waylon Disla MD     SUMMARY     COMPARISONS:  Compared to previous study, patient now has severe pulmonary hypertension and peak PA pressure is increased from 30 to 70 mm Hg     LEFT VENTRICLE:  LV measurements were taken in apical 2 chamber views  Normal left ventricular chamber dimension  There is mild concentric left ventricular hypertrophy  Systolic function is hyperdynamic with an ejection fraction of 75%  No definite regional wall motion abnormality seen although it cannot be absolutely excluded on the basis of this study  Grade 1 diastolic dysfunction     RIGHT VENTRICLE:  Right ventricular free wall was not adequately visualized  Visually RV size appears borderline to mildly enlarged  Systolic function also appears borderline with no TAPSE measurement     LEFT ATRIUM:  Mild to moderately dilated left atrium     MITRAL VALVE:  There is mild to moderate thickening of mitral valve leaflets  There is moderate to severe mitral annular calcification  Leaflet separation is mildly decreased  There is mild to moderate mitral stenosis with mitral valve area of 2 1 cm2 and mean gradient of 6 72 mm Hg  There is mild to moderate mitral regurgitation     AORTIC VALVE:  Aortic valve was not well visualized  A bioprosthesis/TAVR is present  No evidence of significant aortic stenosis  Mean gradient is 5 and peak gradient is 9 mm Hg  No paravalvular leak or aortic valve regurgitation seen     TRICUSPID VALVE:  There was mild to moderate regurgitation   There is severe pulmonary hypertension with peak PA pressure of 70 mm Hg     PULMONIC VALVE:  There was mild regurgitation      IVC, HEPATIC VEINS:  IVC size and respirophasic variations appear preserved     COMPARISONS:  Compared to previous study, patient now has severe pulmonary hypertension and peak PA pressure is increased from 30 to 70 mm Hg     HISTORY: PRIOR HISTORY: chronic diastolic CHF,A Fib ,CVA,HTN,Gastroesophageal reflux disease,Prosthetic Aortic Valve,Lung Cancer,COPD,Chronic kidney disease      PROCEDURE: The procedure was performed at the bedside  This was a routine study  Technically difficult study resulting in off axis measurements The transthoracic approach was used  The study included complete 2D imaging, M-mode, complete  spectral Doppler, and color Doppler  The heart rate was 94 bpm, at the start of the study  Images were obtained from the parasternal, apical, subcostal, and suprasternal notch acoustic windows  Echocardiographic views were limited due to  chest wall deformity, poor patient compliance, poor acoustic window availability, decreased penetration, and lung interference   This was a technically difficult study      LEFT VENTRICLE: LV measurements were taken in apical 2 chamber views  Normal left ventricular chamber dimension  There is mild concentric left ventricular hypertrophy  Systolic function is hyperdynamic with an ejection fraction of 75%  No definite regional wall motion abnormality seen although it cannot be absolutely excluded on the basis of this study  Grade 1 diastolic dysfunction     RIGHT VENTRICLE: Right ventricular free wall was not adequately visualized  Visually RV size appears borderline to mildly enlarged  Systolic function also appears borderline with no TAPSE measurement     LEFT ATRIUM: Mild to moderately dilated left atrium     RIGHT ATRIUM: Size was normal      MITRAL VALVE: There is mild to moderate thickening of mitral valve leaflets  There is moderate to severe mitral annular calcification  Leaflet separation is mildly decreased  There is mild to moderate mitral stenosis with mitral valve area of 2 1 cm2 and mean gradient of 6 72 mm Hg  There is mild to moderate mitral regurgitation     AORTIC VALVE: Aortic valve was not well visualized  A bioprosthesis/TAVR is present  No evidence of significant aortic stenosis  Mean gradient is 5 and peak gradient is 9 mm Hg  No paravalvular leak or aortic valve regurgitation seen     TRICUSPID VALVE: DOPPLER: There was mild to moderate regurgitation  There is severe pulmonary hypertension with peak PA pressure of 70 mm Hg     PULMONIC VALVE: DOPPLER: There was mild regurgitation      PERICARDIUM: There was no pericardial effusion  The pericardium was normal in appearance      AORTA: The root exhibited normal size      SYSTEMIC VEINS: IVC: IVC size and respirophasic variations appear preserved     SYSTEM MEASUREMENT TABLES  Labs Reviewed: yes   Imaging Reviewed:  CTA PE study 8/27              Consults Reviewed: yes   Collaborative Discussion: d/w IM team     Review of Systems   Constitutional: Negative for activity change, appetite change, chills, fatigue and fever  HENT: Negative for postnasal drip, rhinorrhea, sinus pressure and sinus pain  Eyes: Negative for visual disturbance  Respiratory: Negative for apnea, cough, chest tightness, shortness of breath, wheezing and stridor  Cardiovascular: Negative for chest pain and leg swelling  Gastrointestinal: Negative for diarrhea, nausea and vomiting  Endocrine: Negative for cold intolerance and heat intolerance  Musculoskeletal: Negative for arthralgias, back pain, joint swelling and myalgias  Skin: Negative for color change  Neurological: Negative for syncope and light-headedness  Hematological: Negative for adenopathy  Psychiatric/Behavioral: Negative for confusion and sleep disturbance         Past Medical/Surgical History  Past Medical History:   Diagnosis Date    Arthritis     Atrial fibrillation ([facility] )     [facility]     COPD [facility]chronic obstructive pulmonary disease) [facility]     Coronary artery disease     aortic valve replacement    Heart murmur     Hypertension      Past Surgical History:   Procedure Laterality Date    AORTIC VALVE REPLACEMENT      APPENDECTOMY      CHOLECYSTECTOMY      JOINT REPLACEMENT      bilaterl hip and right knee    TONSILECTOMY AND ADNOIDECTOMY      TONSILLECTOMY      TUBAL LIGATION         Social History  Social History     Substance and Sexual Activity   Alcohol Use Yes    Alcohol/week: 1 0 standard drinks    Types: 1 Glasses of wine per week    Comment: rare     Social History     Substance and Sexual Activity   Drug Use No     Social History     Tobacco Use   Smoking Status Former Smoker    Packs/day: 1 00    Years: 25 00    Pack years: 25 00    Types: Cigarettes    Quit date: 2000    Years since quittin 6   Smokeless Tobacco Never Used       Family History  Family History   Problem Relation Age of Onset    Heart defect Father        Allergies  No Known Allergies    Home Meds:   Medications Prior to Admission   Medication Sig Dispense Refill Last Dose    apixaban (ELIQUIS) 2 5 mg Take 1 tablet (2 5 mg total) by mouth 2 (two) times a day 60 tablet 0 2021 at 0800 time    aspirin (ECOTRIN LOW STRENGTH) 81 mg EC tablet Take 1 tablet (81 mg total) by mouth daily 30 tablet 0 2021 at 0800 time    benazepril (LOTENSIN) 20 mg tablet Take 10 mg by mouth daily    2021 at 0800 time    FLUoxetine (PROzac) 20 mg capsule Take 20 mg by mouth daily   2021 at 0800 time    furosemide (LASIX) 20 mg tablet Take 1 tablet (20 mg total) by mouth daily 30 tablet 0 2021 at 0800 time    melatonin 3 mg Take 1 tablet (3 mg total) by mouth daily at bedtime 30 tablet 0 2021 at Unknown time    metoprolol tartrate (LOPRESSOR) 50 mg tablet Take 1 tablet (50 mg total) by mouth every 12 (twelve) hours 60 tablet 0 2021 at 0800 time    Multiple Vitamins-Minerals (PreserVision AREDS) TABS Take by mouth   2021 at 0800 time    omeprazole (PriLOSEC) 40 MG capsule Take 40 mg by mouth daily 2021 at 0800    atorvastatin (LIPITOR) 40 mg tablet Take 1 tablet (40 mg total) by mouth daily (Patient not taking: Reported on 2021) 90 tablet 2 Not Taking at Unknown time    tiotropium (SPIRIVA) 18 mcg inhalation capsule Place 1 capsule (18 mcg total) into inhaler and inhale daily (Patient not taking: Reported on 3/9/2021) 30 each 0     traMADol (ULTRAM) 50 mg tablet Take 50 mg by mouth 4 (four) times a day as needed   2021 at 2100       Current Meds:   Scheduled Meds:  Current Facility-Administered Medications   Medication Dose Route Frequency Provider Last Rate    acetaminophen  650 mg Oral Q6H PRN HILDA Galvez      apixaban  2 5 mg Oral BID HILDA Galvez      atorvastatin  40 mg Oral Daily With [facility], HILDA      FLUoxetine  20 mg Oral Daily HILDA Galvez      fluticasone-vilanterol  1 puff Inhalation Daily Stacey Barnes MD      ipratropium-albuterol  3 mL Nebulization Q6H PRN HILDA Galvez      melatonin  3 mg Oral HS HILDA Galvez      metoprolol tartrate  50 mg Oral Q12H [address] Evonne Steele MD      pantoprazole  40 mg Oral Daily HILDA Galvez      traMADol  50 mg Oral Q8H PRN HILDA Galvez       PRN Meds:acetaminophen, 650 mg, Q6H PRN  ipratropium-albuterol, 3 mL, Q6H PRN  traMADol, 50 mg, Q8H PRN        ____________________________________________________________________    Objective   Vitals:   Vitals:    21 0300 21 0750   BP:  119/57 141/70 145/80   BP Location:  Right arm  Right arm   Pulse:  105 101 102   Resp:  20 19 20   Temp:  98 5 °F (36 9 °C) 98 8 °F (37 1 °C) 98 2 °F (36 8 °C)   TempSrc:  Tympanic  Tympanic   SpO2: 99% 100% 98% 95%   Weight:       Height:         Weight (last 2 days)     None        Temp (24hrs), Av 5 °F (36 9 °C), Min:98 2 °F (36 8 °C), Max:98 8 °F (37 1 °C)  Current: Temperature: 98 2 °F (36 8 °C)            IV Infusions:        Nutrition:        Diet Orders (From admission, onward)             Start     Ordered    08/29/21 1632  Diet Cardiovascular; Sodium 4 Gm (BRIAN); Potassium 2 GM  Diet effective now     Question Answer Comment   Diet Type Cardiovascular    Cardiac Sodium 4 Gm (BRIAN)    Other Restriction(s): Potassium 2 GM    RD to adjust diet per protocol? Yes        08/29/21 1631    08/28/21 1146  Room Service  Once     Question:  Type of Service  Answer:  Room Service - Appropriate with Assistance    08/28/21 1145                Ins/Outs:   I/O       08/28 0701 - 08/29 0700 08/29 0701 - 08/30 0700 08/30 0701 - 08/31 0700    P  O   480 110    I V  (mL/kg) 483 9 (10)  5 (0 1)    IV Piggyback 300      Total Intake(mL/kg) 783 9 (16 3) 480 (10) 115 (2 4)    Urine (mL/kg/hr) 800 (0 7) 500 (0 4)     Total Output 800 500     Net -16 1 -20 +115           Unmeasured Urine Occurrence 1 x 3 x           Lines/Drains:  Invasive Devices     Peripheral Intravenous Line            Peripheral IV 08/27/21 Left Antecubital 2 days    Peripheral IV 08/28/21 Distal;Dorsal (posterior); Right Forearm 2 days              ___________________________________________________________________    Physical Exam  Constitutional:       Appearance: She is well-developed  HENT:      Head: Normocephalic and atraumatic  Eyes:      Conjunctiva/sclera: Conjunctivae normal       Pupils: Pupils are equal, round, and reactive to light  Cardiovascular:      Rate and Rhythm: Normal rate and regular rhythm  Heart sounds: Normal heart sounds  Pulmonary:      Effort: Pulmonary effort is normal  No tachypnea, accessory muscle usage or respiratory distress  Breath sounds: Examination of the right-middle field reveals rales  Examination of the left-middle field reveals rales  Examination of the right-lower field reveals rales  Examination of the left-lower field reveals rales  Rales present  No wheezing or rhonchi  Comments: 2 L NC   Chest:      Chest wall: No tenderness     Abdominal: General: Bowel sounds are normal       Palpations: Abdomen is soft  Musculoskeletal:         General: Normal range of motion  Cervical back: Normal range of motion and neck supple  Right lower leg: No edema  Left lower leg: No edema  Skin:     General: Skin is warm and dry  Neurological:      Mental Status: She is alert and oriented to person, place, and time         ___________________________________________________________________    Invasive/non-invasive ventilation settings   Respiratory    Lab Data (Last 4 hours)    None         O2/Vent Data (Last 4 hours)    None                Laboratory and Diagnostics:  Results from last 7 days   Lab Units 08/30/21 0511 08/29/21 0442 08/28/21 0541 08/27/21  1443   WBC Thousand/uL 5 74 4 84 3 54* 9 25   HEMOGLOBIN g/dL 9 8* 9 7* 7 1* 11 1*   HEMATOCRIT % 34 4* 34 4* 24 5* 39 0   PLATELETS Thousands/uL 158 138* 105* 254   NEUTROS PCT %  --  78* 74 87*   MONOS PCT %  --  7 9 5     Results from last 7 days   Lab Units 08/30/21 0511 08/29/21 0442 08/28/21 0541 08/27/21  1443   SODIUM mmol/L 140 141 147* 140   POTASSIUM mmol/L 5 1 5 4* 3 2* 4 4   CHLORIDE mmol/L 107 108 116* 102   CO2 mmol/L 29 27 23 27   ANION GAP mmol/L 4 6 8 11   BUN mg/dL 9 11 11 15   CREATININE mg/dL 0 87 1 03 0 74 1 15   CALCIUM mg/dL 9 1 8 6 6 2* 9 2   GLUCOSE RANDOM mg/dL 99 104 84 149*   ALT U/L  --   --   --  20   AST U/L  --   --   --  19   ALK PHOS U/L  --   --   --  61   ALBUMIN g/dL  --   --   --  3 5   TOTAL BILIRUBIN mg/dL  --   --   --  0 21     Results from last 7 days   Lab Units 08/30/21 0511 08/29/21 0442 08/28/21 0541 08/27/21  1615   MAGNESIUM mg/dL 1 7 2 2 1 1* 1 5*   PHOSPHORUS mg/dL  --  2 6 2 1* 2 2*      Results from last 7 days   Lab Units 08/27/21  1443   INR  1 12   PTT seconds 37      Results from last 7 days   Lab Units 08/27/21  2333 08/27/21  2116 08/27/21  1756 08/27/21  1443   TROPONIN I ng/mL 0 11* 0 14* 0 07* 0 02     Results from last 7 days Lab Units 08/27/21 2115 08/27/21  1756 08/27/21  1615   LACTIC ACID mmol/L 1 9 2 1* 3 2*     ABG:    VBG:    Results from last 7 days   Lab Units 08/28/21  0541 08/27/21  1615   PROCALCITONIN ng/ml <0 05 <0 05       Micro  Results from last 7 days   Lab Units 08/27/21 2115 08/27/21  1933 08/27/21  1615   BLOOD CULTURE   --   --  No Growth at 48 hrs  No Growth at 48 hrs  URINE CULTURE   --  60,000-69,000 cfu/ml   --    MRSA CULTURE ONLY  No Methicillin Resistant Staphlyococcus aureus (MRSA) isolated  --   --        Imaging:   XR chest 1 view portable   Final Result by Deepti Ling MD (08/27 1705)      No acute cardiopulmonary disease  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

## 2021-08-30 NOTE — ASSESSMENT & PLAN NOTE
Versus SIRS as evidenced by tachycardia, tachypnea, hypotension lactic acidosis  Initially suspected to be UTI but likely secondary to AFib with RVR  Improved after rate control  Blood culture, urine culture negative  Procalcitonin negative  · Will discontinue antibiotics

## 2021-08-30 NOTE — OCCUPATIONAL THERAPY NOTE
OT EVALUATION       08/30/21 1105   Note Type   Note type Evaluation   Pain Assessment   Pain Assessment Tool Larios-Baker FACES   Larios-Baker FACES Pain Rating 4   Pain Location/Orientation Orientation: Right;Location: Shoulder  (due to arthritis)   Home Living   Type of [address] [place] [address] Two level;Bed/bath upstairs;Stairs to enter with rails  (3-4 EZRA)   Bathroom Shower/Tub Tub/shower unit   Bathroom Toilet Standard   Bathroom Equipment Shower chair;Hand-held shower;Commode   Bathroom Accessibility Accessible   Home Equipment Walker;Cane;Reacher  (long-handled brush)   Additional Comments Pt ambulating in home without AD, uses furniture for support  Prior Function   Level of [place] Independent with ADLs and functional mobility; Needs assistance with IADLs   Lives With Spouse   Receives Help From Family   ADL Assistance Independent   IADLs Needs assistance   Comments  does all cooking, cleaning  Family helps with shopping  Psychosocial   Psychosocial (WDL) X   Patient Behaviors/Mood Anxious; Sad   Ability to Express Feelings Able to express   Ability to Express Needs Able to express   Ability to Express Thoughts Able to express   Ability to Understand Others Understands   Subjective   Subjective "I just don't feel up to doing anything  Jerryl Rm Jerryl Rm I'm so weak "   ADL   Where Assessed Supine, bed   Eating Assistance [address] 3  Moderate Assistance   LB Bathing Assistance 3  Moderate Assistance   [address] 3  Moderate Assistance   LB Dressing Assistance 3  Moderate [address]  4  Minimal Assistance   Functional Deficit Setup;Steadying;Verbal cueing;Supervision/safety; Increased time to complete   Additional Comments for all ADLS due to SOB, decreased strength, decreased endurance and decreased balance at this time   Bed Mobility   Supine to Sit 5  Supervision   Sit to Supine 5  Supervision Transfers   Sit to Stand 5  Supervision   Stand to Sit 5  Supervision   Stand pivot 5  Supervision   Additional Comments Per PT notes; pt declined to get into chair this morning   Balance   Static Sitting Good   Dynamic Sitting Fair +   Static Standing Fair +   Dynamic Standing Fair   Activity Tolerance   Activity Tolerance Patient limited by fatigue  (Limited by SOB)   Nurse Made Aware Bess Jonas RN   RUE Assessment   RUE Assessment WFL  (shoulder 90 degrees @ baseline due to arthritis)   LUE Assessment   LUE Assessment WFL   Hand Function   Gross Motor Coordination Functional   Fine Motor Coordination Functional   Vision-Basic Assessment   Current Vision Wears glasses only for reading   Visual History Macular degeneration   Patient Visual Report Diplopia   Cognition   Overall Cognitive Status WFL   Arousal/Participation Alert   Attention Attends with cues to redirect   Orientation Level Oriented X4   Memory Within functional limits   Following Commands Follows multistep commands with increased time or repetition   Assessment   Limitation Decreased ADL status; Decreased UE ROM; Decreased UE strength;Decreased Safe judgement during ADL;Decreased endurance;Visual deficit; Decreased self-care trans;Decreased high-level ADLs;Mood limitation  (decreased balance and mobility)   Prognosis Good   Assessment Patient evaluated by Occupational Therapy  Patient admitted withrapid ventricular response ([facility] [address] )  The patients occupational profile, medical and therapy history includes a extensive additional review of physical, cognitive, or psychosocial history related to current functional performance  Comorbidities affecting functional mobility and ADLS include: afib with RVR, CKD, macular degeneration both eyes, CHF, HTN, TAVR, lung CA, CVA, COPD, numbness, CRF w hypoxia, both hip replacements, right TKA, severe OA especially both shoulders, and depression    Prior to admission, patient was independent with functional mobility without assistive device, independent with ADLS, requiring assist for IADLS, living with spouse in a 2 level home with 4 steps to enter and ambulating household distance  The evaluation identifies the following performance deficits: weakness, decreased ROM, impaired balance, decreased endurance, decreased coordination, increased fall risk, new onset of impairment of functional mobility, decreased ADLS, decreased IADLS, pain, decreased activity tolerance, SOB upon exertion, decreased strength and visual deficits, that result in activity limitations and/or participation restrictions  This evaluation requires clinical decision making of high complexity  The Barthel Index was used as a functional outcome tool presenting with a score of 50, indicating marked limitations of functional mobility and ADLS  The patient's raw score on the -PAC Daily Activity inpatient short form is 17, standardized score is 37 26, which is less than 39 4  Patients at this level are likely to benefit from discharge to post-acute rehabilitation services  However, please refer to the recommendation of the Occupational Therapist for safe discharge planning  Patient will benefit from skilled Occupational Therapy services to address above deficits and facilitate a safe return to prior level of function     Goals   Patient Goals "do more without difficulty breathing"   STG Time Frame   (1-7)   Short Term Goal #1 Patient will increase standing tolerance to 4 minutes during ADL task to decrease assistance level and decrease fall risk; Patient will increase functional mobility to and from bathroom with assistive device with supervision to increase performance with ADLS and to use a toilet; Patient will tolerate 10 minutes of UE ROM/strengthening to increase general activity tolerance and performance in ADLS/IADLS; Patient will improve functional activity tolerance to 10 minutes of sustained functional tasks to increase participation in basic self-care and decrease assistance level;  Patient will be able to to verbalize understanding and perform energy conservation/proper body mechanics during ADLS and functional mobility at least 75% of the time with minimal cueing to decrease signs of fatigue and increase stamina to return to prior level of function; Patient will increase dynamic sitting balance to good to improve the ability to sit at edge of bed or on a chair for ADLS;  Patient will increase dynamic standing balance to fair+ to improve postural stability and decrease fall risk during standing ADLS and transfers; Patient will demonstrate compensatory techniques for low vision with minimal verbal cues        LTG Time Frame   (8-14)   Long Term Goal #1 Patient will increase standing tolerance to 6 minutes during ADL task to decrease assistance level and decrease fall risk; Patient will increase functional mobility to and from bathroom with no assistive device independently to increase performance with ADLS and to use a toilet; Patient will tolerate 15 minutes of UE ROM/strengthening to increase general activity tolerance and performance in ADLS/IADLS; Patient will improve functional activity tolerance to 15 minutes of sustained functional tasks to increase participation in basic self-care and decrease assistance level;  Patient will be able to to verbalize understanding and perform energy conservation/proper body mechanics during ADLS and functional mobility at least 90% of the time with nocueing to decrease signs of fatigue and increase stamina to return to prior level of function; Patient will increase static/dynamic sitting balance to normal to improve the ability to sit at edge of bed or on a chair for ADLS;  Patient will increase static/dynamic standing balance to fair+ to improve postural stability and decrease fall risk during standing ADLS and transfers; Patient will demonstrate compensatory techniques for low vision with no cues needed      Functional Transfer Goals   Pt Will Perform All Functional Transfers Independently; With good judgment/safety   ADL Goals   Pt Will Perform All ADL's In chair; With stand by assist;With adaptive equipment; With setup; With cues  (LTG- Mod Indep)   Plan   Treatment Interventions ADL retraining;Visual perceptual retraining;Functional transfer training;UE strengthening/ROM; Endurance training;Patient/family training;Equipment evaluation/education; Compensatory technique education;Cardiac education; Energy conservation; Activityengagement   Goal Expiration Date 09/13/21   OT Frequency 3-5x/wk   Recommendation   OT Discharge Recommendation Home with home health rehabilitation   AM-PAC Daily Activity Inpatient   Lower Body Dressing 2   Bathing 2   Toileting 3   Upper Body Dressing 3   Grooming 3   Eating 4   Daily Activity Raw Score 17   Daily Activity Standardized Score (Calc for Raw Score >=11) 37 26   AM-PAC Applied Cognition Inpatient   Following a Speech/Presentation 4   Understanding Ordinary Conversation 4   Taking Medications 4   Remembering Where Things Are Placed or Put Away 4   Remembering List of 4-5 Errands 4   Taking Care of Complicated Tasks 4   Applied Cognition Raw Score 24   Applied Cognition Standardized Score 62 21   Barthel Index   Feeding 10   Bathing 0   Grooming Score 0   Dressing Score 5   Bladder Score 10   Bowels Score 10   Toilet Use Score 5   Transfers (Bed/Chair) Score 10   Mobility (Level Surface) Score 0   Stairs Score 0   Barthel Index Score 50   Licensure   [place] License Number  [address]   Ricky Valentin [address], OTR/L, [place] Lic# [id number]

## 2021-08-30 NOTE — ASSESSMENT & PLAN NOTE
Normocytic  Appears to be lower compared to last year  Patient denies any overt bleeding  · Iron low at 41, B12 and folate unremarkable   · Trend

## 2021-08-30 NOTE — ASSESSMENT & PLAN NOTE
Patient reported symptoms of dysuria on presentation  UA-4-10 WBC, moderate leukocytes, bacteriuria  Urine culture with mixed contaminant  Reports improvement in symptoms, patient is afebrile with normal white count  · Treated with Zosyn x3 days, will discontinue

## 2021-08-30 NOTE — ASSESSMENT & PLAN NOTE
Done previously at [facility]   ECHO: Gretchen Guevara presents  No evidence of significant aortic stenosis   No leak or regurgitation seen

## 2021-08-30 NOTE — ASSESSMENT & PLAN NOTE
History of COPD (FEV1 47% of predicted) as per the chart review, on chronic supplemental oxygen  History of right lung CA status post radiation  Reports that she has not been using any inhaler or nebulizer treatment at home, reports chronic progressive shortness of breath limiting activity,?   Likely precipitant of AFib with RVR  No evidence of exacerbation at present  · Continue DuoNebs  · Initiate Breo  · Monitor respiratory status and oxygenation with activity

## 2021-08-30 NOTE — PROGRESS NOTES
Trisha 43  Progress Note - Angel Carey 1940, 78 y o  female MRN: [id number]  Unit/Bed#: [address] [place] [address] Encounter: [id number]  Primary Care Provider: Myra Doran MD   Date and time admitted to hospital: 8/27/2021  2:25 PM    * Atrial fibrillation with rapid ventricular response [facility])  Assessment & Plan  History of paroxysmal AFib on Eliquis  Presented with AFib with RVR, hypotensive after initiation of Cardizem drip, subsequently admitted to step-down unit  Loaded with digoxin with improvement in heart rate  Likely precipitated by COPD plus/minus hypoxia, pulmonary hypertension, ? Compliance  · Continue metoprolol at increased dose of 50 mg q12h  · Per cardiology, will start Digoxin 0 125 mcg daily   · Continue Eliquis   · Continue telemetry     COPD (chronic obstructive pulmonary disease) ([facility] [address] )  Assessment & Plan  History of COPD (FEV1 47% of predicted) as per the chart review, on chronic supplemental oxygen  History of right lung CA status post radiation  Reports that she has not been using any inhaler or nebulizer treatment at home, reports chronic progressive shortness of breath limiting activity,?   Likely precipitant of AFib with RVR  No evidence of exacerbation at present  · Continue DuoNebs  · Initiate Breo  · Monitor respiratory status and oxygenation with activity  · Consult pulmonary     Chronic respiratory failure with hypoxia 2/2 COPD  Assessment & Plan  On 2-3 L of supplemental oxygen which is chronic and baseline     Chronic diastolic (congestive) heart failure (HCC)  Assessment & Plan  Wt Readings from Last 3 Encounters:   08/27/21 48 2 kg (106 lb 4 2 oz)   07/26/21 48 6 kg (107 lb 2 3 oz)   03/09/21 50 8 kg (112 lb)     Echocardiogram EF 75 %, grade 1 diastolic dysfunction, PA pressure 70 mmHg  No evidence of volume overload  · Continue metoprolol  · Holding furosemide  · Monitor intake output, daily weight    Anemia  Assessment & Plan  Normocytic  Appears to be lower compared to last year  Patient denies any overt bleeding  · Iron low at 41, B12 and folate unremarkable   · Trend    Moderate protein-calorie malnutrition (HCC)  Assessment & Plan  Malnutrition Findings:   Adult Malnutrition type: Chronic illness (Moderate malnutrition of chronic illness related to inadequate energy intake as evidenced by somewhat hollow orbits, somewhat protruding/prominant clavicles, some depression at the temples  Treated with Liberalized diet)  Adult Degree of Malnutrition: Malnutrition of moderate degree    BMI Findings:  Adult BMI Classifications: Underweight < 18 5     Body mass index is 18 24 kg/m²  Nutrition evaluation appreciated     CKD (chronic kidney disease) stage 3, GFR 30-59 ml/min [facility])  Assessment & Plan  Lab Results   Component Value Date    EGFR 62 08/30/2021    EGFR 50 08/29/2021    EGFR 75 08/28/2021    CREATININE 0 87 08/30/2021    CREATININE 1 03 08/29/2021    CREATININE 0 74 08/28/2021   Stable  Monitor    Elevated troponin  Assessment & Plan  Secondary to AFib with RVR    S/P TAVR (transcatheter aortic valve replacement)  Assessment & Plan  Done previously at [facility]   ECHO: Maribel Berger presents  No evidence of significant aortic stenosis   No leak or regurgitation seen     Depression  Assessment & Plan  On fluoxetine    GERD (gastroesophageal reflux disease)  Assessment & Plan  On PPI    Hypertension  Assessment & Plan  · Currently stable  · Monitor metoprolol  · Hold benazepril and furosemide    UTI (urinary tract infection)-resolved as of 8/29/2021  Assessment & Plan  Patient reported symptoms of dysuria on presentation  UA-4-10 WBC, moderate leukocytes, bacteriuria  Urine culture with mixed contaminant  Reports improvement in symptoms, patient is afebrile with normal white count  · Treated with Zosyn x3 days, will discontinue    Sepsis without shock -resolved as of 8/29/2021  Assessment & Plan  Versus SIRS as evidenced by tachycardia, tachypnea, hypotension lactic acidosis  Initially suspected to be UTI but likely secondary to AFib with RVR  Improved after rate control  Blood culture, urine culture negative  Procalcitonin negative  · Will discontinue antibiotics    VTE Pharmacologic Prophylaxis:   Pharmacologic: Apixaban (Eliquis)  Mechanical VTE Prophylaxis in Place: Yes    Patient Centered Rounds: I have performed bedside rounds with nursing staff today  Discussions with Specialists or Other Care Team Provider: nursing, CM, pulmonary     Education and Discussions with Family / Patient: I have answered all questions to the best of my ability  Time Spent for Care: 30 minutes  More than 50% of total time spent on counseling and coordination of care as described above  Current Length of Stay: 3 day(s)    Current Patient Status: Inpatient   Certification Statement: The patient will continue to require additional inpatient hospital stay due to COPD - pulmonary evaluation, uncontrolled AFib     Discharge Plan: Patient is not medically stable for discharge today, likely in 48 hours pending progress  Code Status: Level 3 - DNAR and DNI      Subjective:   Patient seen and examined at bedside this morning  Continues to feel palpitations  Denies HA, dizziness, CP, abdominal pain, N/V/D  Objective:     Vitals:   Temp (24hrs), Av 6 °F (37 °C), Min:98 2 °F (36 8 °C), Max:98 8 °F (37 1 °C)    Temp:  [98 2 °F (36 8 °C)-98 8 °F (37 1 °C)] 98 2 °F (36 8 °C)  HR:  [] 110  Resp:  [19-28] 20  BP: (119-145)/(57-89) 145/80  SpO2:  [95 %-100 %] 95 %  Body mass index is 18 24 kg/m²  Input and Output Summary (last 24 hours): Intake/Output Summary (Last 24 hours) at 2021 1529  Last data filed at 2021 0750  Gross per 24 hour   Intake 355 ml   Output 200 ml   Net 155 ml       Physical Exam:     Physical Exam  Vitals and nursing note reviewed  Constitutional:       General: She is not in acute distress  Appearance: She is well-developed   She is ill-appearing (elderly, frail female )  She is not toxic-appearing or diaphoretic  HENT:      Head: Normocephalic  Cardiovascular:      Rate and Rhythm: Tachycardia present  Rhythm irregular  Pulmonary:      Effort: Pulmonary effort is normal  No tachypnea or respiratory distress  Breath sounds: Examination of the right-lower field reveals decreased breath sounds  Examination of the left-lower field reveals decreased breath sounds  Decreased breath sounds present  No wheezing, rhonchi or rales  Abdominal:      General: Bowel sounds are normal  There is no distension  Palpations: Abdomen is soft  Tenderness: There is no abdominal tenderness  Musculoskeletal:         General: No tenderness  Normal range of motion  Cervical back: Normal range of motion  Right lower leg: No edema  Left lower leg: No edema  Skin:     General: Skin is warm and dry  Capillary Refill: Capillary refill takes less than 2 seconds  Neurological:      Mental Status: She is alert and oriented to person, place, and time  Mental status is at baseline  Psychiatric:         Mood and Affect: Mood normal          Behavior: Behavior normal          Thought Content:  Thought content normal          Judgment: Judgment normal          Additional Data:     Labs:    Results from last 7 days   Lab Units 08/30/21  0511 08/29/21  0442   WBC Thousand/uL 5 74 4 84   HEMOGLOBIN g/dL 9 8* 9 7*   HEMATOCRIT % 34 4* 34 4*   PLATELETS Thousands/uL 158 138*   NEUTROS PCT %  --  78*   LYMPHS PCT %  --  12*   MONOS PCT %  --  7   EOS PCT %  --  2     Results from last 7 days   Lab Units 08/30/21  0511 08/27/21  1443   POTASSIUM mmol/L 5 1 4 4   CHLORIDE mmol/L 107 102   CO2 mmol/L 29 27   BUN mg/dL 9 15   CREATININE mg/dL 0 87 1 15   CALCIUM mg/dL 9 1 9 2   ALK PHOS U/L  --  61   ALT U/L  --  20   AST U/L  --  19     Results from last 7 days   Lab Units 08/27/21  1443   INR  1 12       * I Have Reviewed All Lab Data Listed Above  * Additional Pertinent Lab Tests Reviewed: All Labs Within Last 24 Hours Reviewed    Imaging:    Imaging Reports Reviewed Today Include: CXR  Imaging Personally Reviewed by Myself Includes:  None     Recent Cultures (last 7 days):     Results from last 7 days   Lab Units 08/27/21  1933 08/27/21  1615   BLOOD CULTURE   --  No Growth at 48 hrs  No Growth at 48 hrs  URINE CULTURE  60,000-69,000 cfu/ml   --        Last 24 Hours Medication List:   Current Facility-Administered Medications   Medication Dose Route Frequency Provider Last Rate    acetaminophen  650 mg Oral Q6H PRN Kenedy HILDA Brown      apixaban  2 5 mg Oral BID Kenedy HILDA Brown      atorvastatin  40 mg Oral Daily With 4 [facility], HILDA      digoxin  125 mcg Oral Daily HILDA Hewitt      FLUoxetine  20 mg Oral Daily Kenedy HILDA Brown      fluticasone-vilanterol  1 puff Inhalation Daily Yoli Lane MD      ipratropium-albuterol  3 mL Nebulization Q6H PRN Kenedy HILDA Brown      melatonin  3 mg Oral HS Kenedy HILDA Brown      metoprolol tartrate  50 mg Oral Q12H Danyel Roblero MD      pantoprazole  40 mg Oral Daily Kenedy HILDA Brown      traMADol  50 mg Oral Q8H PRN KenedyHILDA Funes          Today, Patient Was Seen By: HILDA Ortiz    ** Please Note: Dictation voice to text software may have been used in the creation of this document   **

## 2021-08-30 NOTE — PLAN OF CARE
Problem: Potential for Falls  Goal: Patient will remain free of falls  Description: INTERVENTIONS:  - Educate patient/family on patient safety including physical limitations  - Instruct patient to call for assistance with activity   - Consult OT/PT to assist with strengthening/mobility   - Keep Call bell within reach  - Keep bed low and locked with side rails adjusted as appropriate  - Keep care items and personal belongings within reach  - Initiate and maintain comfort rounds  - Make Fall Risk Sign visible to staff  - Offer Toileting every x Hours, in advance of need  - Initiate/Maintain xalarm  - Obtain necessary fall risk management equipment: x  - Apply yellow socks and bracelet for high fall risk patients  - Consider moving patient to room near nurses station  Outcome: Progressing     Problem: RESPIRATORY - ADULT  Goal: Achieves optimal ventilation and oxygenation  Description: INTERVENTIONS:  - Assess for changes in respiratory status  - Assess for changes in mentation and behavior  - Position to facilitate oxygenation and minimize respiratory effort  - Oxygen administered by appropriate delivery if ordered  - Initiate smoking cessation education as indicated  - Encourage broncho-pulmonary hygiene including cough, deep breathe, Incentive Spirometry  - Assess the need for suctioning and aspirate as needed  - Assess and instruct to report SOB or any respiratory difficulty  - Respiratory Therapy support as indicated  Outcome: Progressing     Problem: Nutrition/Hydration-ADULT  Goal: Nutrient/Hydration intake appropriate for improving, restoring or maintaining nutritional needs  Description: Monitor and assess patient's nutrition/hydration status for malnutrition  Collaborate with interdisciplinary team and initiate plan and interventions as ordered  Monitor patient's weight and dietary intake as ordered or per policy  Utilize nutrition screening tool and intervene as necessary   Determine patient's food preferences and provide high-protein, high-caloric foods as appropriate  INTERVENTIONS:  - Monitor oral intake, urinary output, labs, and treatment plans  - Assess nutrition and hydration status and recommend course of action  - Evaluate amount of meals eaten  - Assist patient with eating if necessary   - Allow adequate time for meals  - Recommend/ encourage appropriate diets, oral nutritional supplements, and vitamin/mineral supplements  - Order, calculate, and assess calorie counts as needed  - Recommend, monitor, and adjust tube feedings and TPN/PPN based on assessed needs  - Assess need for intravenous fluids  - Provide specific nutrition/hydration education as appropriate  - Include patient/family/caregiver in decisions related to nutrition  Outcome: Progressing     Problem: MOBILITY - ADULT  Goal: Maintain or return to baseline ADL function  Description: INTERVENTIONS:  -  Assess patient's ability to carry out ADLs; assess patient's baseline for ADL function and identify physical deficits which impact ability to perform ADLs (bathing, care of mouth/teeth, toileting, grooming, dressing, etc )  - Assess/evaluate cause of self-care deficits   - Assess range of motion  - Assess patient's mobility; develop plan if impaired  - Assess patient's need for assistive devices and provide as appropriate  - Encourage maximum independence but intervene and supervise when necessary  - Involve family in performance of ADLs  - Assess for home care needs following discharge   - Consider OT consult to assist with ADL evaluation and planning for discharge  - Provide patient education as appropriate  Outcome: Progressing  Goal: Maintains/Returns to pre admission functional level  Description: INTERVENTIONS:  - Perform BMAT or MOVE assessment daily    - Set and communicate daily mobility goal to care team and patient/family/caregiver     - Collaborate with rehabilitation services on mobility goals if consulted  - Perform Range of Motion x times a day  - Reposition patient every x hours    - Dangle patient x times a day  - Stand patient x times a day  - Ambulate patient x times a day  - Out of bed to chair x times a day   - Out of bed for meals x times a day  - Out of bed for toileting  - Record patient progress and toleration of activity level   Outcome: Progressing     Problem: Prexisting or High Potential for Compromised Skin Integrity  Goal: Skin integrity is maintained or improved  Description: INTERVENTIONS:  - Identify patients at risk for skin breakdown  - Assess and monitor skin integrity  - Assess and monitor nutrition and hydration status  - Monitor labs   - Assess for incontinence   - Turn and reposition patient  - Assist with mobility/ambulation  - Relieve pressure over bony prominences  - Avoid friction and shearing  - Provide appropriate hygiene as needed including keeping skin clean and dry  - Evaluate need for skin moisturizer/barrier cream  - Collaborate with interdisciplinary team   - Patient/family teaching  - Consider wound care consult   Outcome: Progressing     Problem: PAIN - ADULT  Goal: Verbalizes/displays adequate comfort level or baseline comfort level  Description: Interventions:  - Encourage patient to monitor pain and request assistance  - Assess pain using appropriate pain scale  - Administer analgesics based on type and severity of pain and evaluate response  - Implement non-pharmacological measures as appropriate and evaluate response  - Consider cultural and social influences on pain and pain management  - Notify physician/advanced practitioner if interventions unsuccessful or patient reports new pain  Outcome: Progressing     Problem: INFECTION - ADULT  Goal: Absence or prevention of progression during hospitalization  Description: INTERVENTIONS:  - Assess and monitor for signs and symptoms of infection  - Monitor lab/diagnostic results  - Monitor all insertion sites, i e  indwelling lines, tubes, and drains  - Monitor endotracheal if appropriate and nasal secretions for changes in amount and color  - King appropriate cooling/warming therapies per order  - Administer medications as ordered  - Instruct and encourage patient and family to use good hand hygiene technique  - Identify and instruct in appropriate isolation precautions for identified infection/condition  Outcome: Progressing     Problem: DISCHARGE PLANNING  Goal: Discharge to home or other facility with appropriate resources  Description: INTERVENTIONS:  - Identify barriers to discharge w/patient and caregiver  - Arrange for needed discharge resources and transportation as appropriate  - Identify discharge learning needs (meds, wound care, etc )  - Arrange for interpretive services to assist at discharge as needed  - Refer to Case Management Department for coordinating discharge planning if the patient needs post-hospital services based on physician/advanced practitioner order or complex needs related to functional status, cognitive ability, or social support system  Outcome: Progressing

## 2021-08-30 NOTE — ASSESSMENT & PLAN NOTE
Lab Results   Component Value Date    EGFR 62 08/30/2021    EGFR 50 08/29/2021    EGFR 75 08/28/2021    CREATININE 0 87 08/30/2021    CREATININE 1 03 08/29/2021    CREATININE 0 74 08/28/2021   Stable  Monitor

## 2021-08-30 NOTE — OCCUPATIONAL THERAPY NOTE
OT EVALUATION       08/30/21 1105   Note Type   Note type Evaluation   Pain Assessment   Pain Assessment Tool Larios-Baker FACES   Larios-Baker FACES Pain Rating 4   Pain Location/Orientation Orientation: Right;Location: Shoulder  (due to arthritis)   Home Living   Type of [address] [place] [address] Two level;Bed/bath upstairs;Stairs to enter with rails  (3-4 EZ[employer])   Bathroom Shower/Tub Tub/shower unit   Bathroom Toilet Standard   Bathroom Equipment Shower chair;Hand-held shower;Commode   Bathroom Accessibility Accessible   Home Equipment Walker;Cane;Reacher  (long-handled brush)   Additional Comments Pt ambulating in home without AD, uses furniture for support  Prior Function   Level of [place] Independent with ADLs and functional mobility; Needs assistance with IADLs   Lives With Spouse   Receives Help From Family   ADL Assistance Independent   IADLs Needs assistance   Comments  does all cooking, cleaning  Family helps with shopping  Psychosocial   Psychosocial (WDL) X   Patient Behaviors/Mood Anxious; Sad   Ability to Express Feelings Able to express   Ability to Express Needs Able to express   Ability to Express Thoughts Able to express   Ability to Understand Others Understands   Subjective   Subjective "I just don't feel up to doing anything  Georgette Ormond Georgette Ormond I'm so weak "   ADL   Where Assessed Supine, bed   Eating Assistance [address] 3  Moderate Assistance   LB Bathing Assistance 3  Moderate Assistance   [address] 3  Moderate Assistance   LB Dressing Assistance 3  Moderate [address]  4  Minimal Assistance   Functional Deficit Setup;Steadying;Verbal cueing;Supervision/safety; Increased time to complete   Additional Comments for all ADLS due to SOB, decreased strength, decreased endurance and decreased balance at this time   Bed Mobility   Supine to Sit 5  Supervision   Sit to Supine 5  Supervision Transfers   Sit to Stand 5  Supervision   Stand to Sit 5  Supervision   Stand pivot 5  Supervision   Additional Comments Per PT notes; pt declined to get into chair this morning   Balance   Static Sitting Good   Dynamic Sitting Fair +   Static Standing Fair +   Dynamic Standing Fair   Activity Tolerance   Activity Tolerance Patient limited by fatigue  (Limited by SOB)   Nurse Made Aware Aftab Heard RN   RUE Assessment   RUE Assessment WFL  (shoulder 90 degrees @ baseline due to arthritis)   LUE Assessment   LUE Assessment WFL   Hand Function   Gross Motor Coordination Functional   Fine Motor Coordination Functional   Vision-Basic Assessment   Current Vision Wears glasses only for reading   Visual History Macular degeneration   Patient Visual Report Diplopia   Cognition   Overall Cognitive Status WFL   Arousal/Participation Alert   Attention Attends with cues to redirect   Orientation Level Oriented X4   Memory Within functional limits   Following Commands Follows multistep commands with increased time or repetition   Assessment   Limitation Decreased ADL status; Decreased UE ROM; Decreased UE strength;Decreased Safe judgement during ADL;Decreased endurance;Visual deficit; Decreased self-care trans;Decreased high-level ADLs;Mood limitation  (decreased balance and mobility)   Prognosis Good   Assessment Patient evaluated by Occupational Therapy  Patient admitted withrapid ventricular response ([facility] [address] )  The patients occupational profile, medical and therapy history includes a extensive additional review of physical, cognitive, or psychosocial history related to current functional performance  Comorbidities affecting functional mobility and ADLS include: afib with RVR, CKD, macular degeneration both eyes, CHF, HTN, TAVR, lung CA, CVA, COPD, numbness, CRF w hypoxia, both hip replacements, right TKA, severe OA especially both shoulders, and depression    Prior to admission, patient was independent with functional mobility without assistive device, independent with ADLS, requiring assist for IADLS, living with spouse in a 2 level home with 4 steps to enter and ambulating household distance  The evaluation identifies the following performance deficits: weakness, decreased ROM, impaired balance, decreased endurance, decreased coordination, increased fall risk, new onset of impairment of functional mobility, decreased ADLS, decreased IADLS, pain, decreased activity tolerance, SOB upon exertion, decreased strength and visual deficits, that result in activity limitations and/or participation restrictions  This evaluation requires clinical decision making of high complexity  The Barthel Index was used as a functional outcome tool presenting with a score of 50, indicating marked limitations of functional mobility and ADLS  The patient's raw score on the -PAC Daily Activity inpatient short form is 17, standardized score is 37 26, which is less than 39 4  Patients at this level are likely to benefit from discharge to post-acute rehabilitation services  However, please refer to the recommendation of the Occupational Therapist for safe discharge planning  Patient will benefit from skilled Occupational Therapy services to address above deficits and facilitate a safe return to prior level of function     Goals   Patient Goals "do more without difficulty breathing"   STG Time Frame   (1-7)   Short Term Goal #1 Patient will increase standing tolerance to 4 minutes during ADL task to decrease assistance level and decrease fall risk; Patient will increase functional mobility to and from bathroom with assistive device with supervision to increase performance with ADLS and to use a toilet; Patient will tolerate 10 minutes of UE ROM/strengthening to increase general activity tolerance and performance in ADLS/IADLS; Patient will improve functional activity tolerance to 10 minutes of sustained functional tasks to increase participation in basic self-care and decrease assistance level;  Patient will be able to to verbalize understanding and perform energy conservation/proper body mechanics during ADLS and functional mobility at least 75% of the time with minimal cueing to decrease signs of fatigue and increase stamina to return to prior level of function; Patient will increase dynamic sitting balance to good to improve the ability to sit at edge of bed or on a chair for ADLS;  Patient will increase dynamic standing balance to fair+ to improve postural stability and decrease fall risk during standing ADLS and transfers; Patient will demonstrate compensatory techniques for low vision with minimal verbal cues        LTG Time Frame   (8-14)   Long Term Goal #1 Patient will increase standing tolerance to 6 minutes during ADL task to decrease assistance level and decrease fall risk; Patient will increase functional mobility to and from bathroom with no assistive device independently to increase performance with ADLS and to use a toilet; Patient will tolerate 15 minutes of UE ROM/strengthening to increase general activity tolerance and performance in ADLS/IADLS; Patient will improve functional activity tolerance to 15 minutes of sustained functional tasks to increase participation in basic self-care and decrease assistance level;  Patient will be able to to verbalize understanding and perform energy conservation/proper body mechanics during ADLS and functional mobility at least 90% of the time with nocueing to decrease signs of fatigue and increase stamina to return to prior level of function; Patient will increase static/dynamic sitting balance to normal to improve the ability to sit at edge of bed or on a chair for ADLS;  Patient will increase static/dynamic standing balance to fair+ to improve postural stability and decrease fall risk during standing ADLS and transfers; Patient will demonstrate compensatory techniques for low vision with no cues needed      Functional Transfer Goals   Pt Will Perform All Functional Transfers Independently; With good judgment/safety   ADL Goals   Pt Will Perform All ADL's In chair; With stand by assist;With adaptive equipment; With setup; With cues  (LTG- Mod Indep)   Plan   Treatment Interventions ADL retraining;Visual perceptual retraining;Functional transfer training;UE strengthening/ROM; Endurance training;Patient/family training;Equipment evaluation/education; Compensatory technique education;Cardiac education; Energy conservation; Activityengagement   Goal Expiration Date 09/13/21   OT Frequency 3-5x/wk   Recommendation   OT Discharge Recommendation Home with home health rehabilitation   AM-PAC Daily Activity Inpatient   Lower Body Dressing 2   Bathing 2   Toileting 3   Upper Body Dressing 3   Grooming 3   Eating 4   Daily Activity Raw Score 17   Daily Activity Standardized Score (Calc for Raw Score >=11) 37 26   AM-PAC Applied Cognition Inpatient   Following a Speech/Presentation 4   Understanding Ordinary Conversation 4   Taking Medications 4   Remembering Where Things Are Placed or Put Away 4   Remembering List of 4-5 Errands 4   Taking Care of Complicated Tasks 4   Applied Cognition Raw Score 24   Applied Cognition Standardized Score 62 21   Barthel Index   Feeding 10   Bathing 0   Grooming Score 0   Dressing Score 5   Bladder Score 10   Bowels Score 10   Toilet Use Score 5   Transfers (Bed/Chair) Score 10   Mobility (Level Surface) Score 0   Stairs Score 0   Barthel Index Score 50   Licensure   [place] License Number  Veronica Novak Barbie Pietro 85, OTR/L, [place] Lic# [id number]

## 2021-08-31 LAB
ALBUMIN SERPL BCP-MCNC: 2.7 G/DL (ref 3.5–5)
ALP SERPL-CCNC: 56 U/L (ref 46–116)
ALT SERPL W P-5'-P-CCNC: 18 U/L (ref 12–78)
ANION GAP SERPL CALCULATED.3IONS-SCNC: 7 MMOL/L (ref 4–13)
AST SERPL W P-5'-P-CCNC: 14 U/L (ref 5–45)
BASOPHILS # BLD AUTO: 0.02 THOUSANDS/ΜL (ref 0–0.1)
BASOPHILS NFR BLD AUTO: 0 % (ref 0–1)
BILIRUB SERPL-MCNC: 0.32 MG/DL (ref 0.2–1)
BUN SERPL-MCNC: 12 MG/DL (ref 5–25)
CALCIUM ALBUM COR SERPL-MCNC: 9.9 MG/DL (ref 8.3–10.1)
CALCIUM SERPL-MCNC: 8.9 MG/DL (ref 8.3–10.1)
CHLORIDE SERPL-SCNC: 104 MMOL/L (ref 100–108)
CO2 SERPL-SCNC: 28 MMOL/L (ref 21–32)
CREAT SERPL-MCNC: 0.93 MG/DL (ref 0.6–1.3)
EOSINOPHIL # BLD AUTO: 0.12 THOUSAND/ΜL (ref 0–0.61)
EOSINOPHIL NFR BLD AUTO: 2 % (ref 0–6)
ERYTHROCYTE [DISTWIDTH] IN BLOOD BY AUTOMATED COUNT: 15.6 % (ref 11.6–15.1)
GFR SERPL CREATININE-BSD FRML MDRD: 57 ML/MIN/1.73SQ M
GLUCOSE SERPL-MCNC: 112 MG/DL (ref 65–140)
HCT VFR BLD AUTO: 34.7 % (ref 34.8–46.1)
HGB BLD-MCNC: 10 G/DL (ref 11.5–15.4)
IMM GRANULOCYTES # BLD AUTO: 0.01 THOUSAND/UL (ref 0–0.2)
IMM GRANULOCYTES NFR BLD AUTO: 0 % (ref 0–2)
LYMPHOCYTES # BLD AUTO: 0.71 THOUSANDS/ΜL (ref 0.6–4.47)
LYMPHOCYTES NFR BLD AUTO: 14 % (ref 14–44)
MAGNESIUM SERPL-MCNC: 1.5 MG/DL (ref 1.6–2.6)
MCH RBC QN AUTO: 27.2 PG (ref 26.8–34.3)
MCHC RBC AUTO-ENTMCNC: 28.8 G/DL (ref 31.4–37.4)
MCV RBC AUTO: 94 FL (ref 82–98)
MONOCYTES # BLD AUTO: 0.57 THOUSAND/ΜL (ref 0.17–1.22)
MONOCYTES NFR BLD AUTO: 11 % (ref 4–12)
NEUTROPHILS # BLD AUTO: 3.74 THOUSANDS/ΜL (ref 1.85–7.62)
NEUTS SEG NFR BLD AUTO: 73 % (ref 43–75)
NRBC BLD AUTO-RTO: 0 /100 WBCS
PHOSPHATE SERPL-MCNC: 4 MG/DL (ref 2.3–4.1)
PLATELET # BLD AUTO: 164 THOUSANDS/UL (ref 149–390)
PMV BLD AUTO: 10.5 FL (ref 8.9–12.7)
POTASSIUM SERPL-SCNC: 4.7 MMOL/L (ref 3.5–5.3)
PROT SERPL-MCNC: 6.3 G/DL (ref 6.4–8.2)
RBC # BLD AUTO: 3.68 MILLION/UL (ref 3.81–5.12)
SODIUM SERPL-SCNC: 139 MMOL/L (ref 136–145)
WBC # BLD AUTO: 5.17 THOUSAND/UL (ref 4.31–10.16)

## 2021-08-31 PROCEDURE — 84100 ASSAY OF PHOSPHORUS: CPT | Performed by: NURSE PRACTITIONER

## 2021-08-31 PROCEDURE — 94760 N-INVAS EAR/PLS OXIMETRY 1: CPT

## 2021-08-31 PROCEDURE — 85025 COMPLETE CBC W/AUTO DIFF WBC: CPT | Performed by: NURSE PRACTITIONER

## 2021-08-31 PROCEDURE — 83735 ASSAY OF MAGNESIUM: CPT | Performed by: NURSE PRACTITIONER

## 2021-08-31 PROCEDURE — 99232 SBSQ HOSP IP/OBS MODERATE 35: CPT | Performed by: INTERNAL MEDICINE

## 2021-08-31 PROCEDURE — 80053 COMPREHEN METABOLIC PANEL: CPT | Performed by: NURSE PRACTITIONER

## 2021-08-31 RX ORDER — DIGOXIN 0.25 MG/ML
125 INJECTION INTRAMUSCULAR; INTRAVENOUS ONCE
Status: DISCONTINUED | OUTPATIENT
Start: 2021-08-31 | End: 2021-09-01 | Stop reason: HOSPADM

## 2021-08-31 RX ORDER — MAGNESIUM SULFATE HEPTAHYDRATE 40 MG/ML
2 INJECTION, SOLUTION INTRAVENOUS ONCE
Status: COMPLETED | OUTPATIENT
Start: 2021-08-31 | End: 2021-08-31

## 2021-08-31 RX ORDER — LOSARTAN POTASSIUM 25 MG/1
25 TABLET ORAL DAILY
Status: DISCONTINUED | OUTPATIENT
Start: 2021-08-31 | End: 2021-09-01 | Stop reason: HOSPADM

## 2021-08-31 RX ORDER — FUROSEMIDE 10 MG/ML
40 INJECTION INTRAMUSCULAR; INTRAVENOUS
Status: DISCONTINUED | OUTPATIENT
Start: 2021-08-31 | End: 2021-09-01

## 2021-08-31 RX ORDER — TORSEMIDE 10 MG/1
10 TABLET ORAL DAILY
Status: DISCONTINUED | OUTPATIENT
Start: 2021-08-31 | End: 2021-09-01 | Stop reason: HOSPADM

## 2021-08-31 RX ORDER — DILTIAZEM HYDROCHLORIDE 5 MG/ML
10 INJECTION INTRAVENOUS ONCE
Status: COMPLETED | OUTPATIENT
Start: 2021-08-31 | End: 2021-08-31

## 2021-08-31 RX ADMIN — Medication 3 MG: at 21:59

## 2021-08-31 RX ADMIN — TORSEMIDE 10 MG: 10 TABLET ORAL at 13:32

## 2021-08-31 RX ADMIN — FLUOXETINE 20 MG: 20 CAPSULE ORAL at 08:41

## 2021-08-31 RX ADMIN — METOPROLOL TARTRATE 50 MG: 50 TABLET, FILM COATED ORAL at 08:41

## 2021-08-31 RX ADMIN — LOSARTAN POTASSIUM 25 MG: 25 TABLET, FILM COATED ORAL at 13:32

## 2021-08-31 RX ADMIN — APIXABAN 2.5 MG: 2.5 TABLET, FILM COATED ORAL at 08:41

## 2021-08-31 RX ADMIN — MAGNESIUM SULFATE HEPTAHYDRATE 2 G: 40 INJECTION, SOLUTION INTRAVENOUS at 04:33

## 2021-08-31 RX ADMIN — DIGOXIN 125 MCG: 125 TABLET ORAL at 08:41

## 2021-08-31 RX ADMIN — DILTIAZEM HYDROCHLORIDE 5 MG/HR: 5 INJECTION INTRAVENOUS at 05:50

## 2021-08-31 RX ADMIN — ATORVASTATIN CALCIUM 40 MG: 40 TABLET, FILM COATED ORAL at 17:09

## 2021-08-31 RX ADMIN — METOPROLOL TARTRATE 2.5 MG: 5 INJECTION INTRAVENOUS at 04:17

## 2021-08-31 RX ADMIN — ACETAMINOPHEN 650 MG: 325 TABLET, FILM COATED ORAL at 02:35

## 2021-08-31 RX ADMIN — PANTOPRAZOLE SODIUM 40 MG: 40 TABLET, DELAYED RELEASE ORAL at 08:41

## 2021-08-31 RX ADMIN — DILTIAZEM HYDROCHLORIDE 10 MG: 5 INJECTION INTRAVENOUS at 03:02

## 2021-08-31 RX ADMIN — FUROSEMIDE 40 MG: 10 INJECTION, SOLUTION INTRAMUSCULAR; INTRAVENOUS at 17:09

## 2021-08-31 RX ADMIN — FLUTICASONE FUROATE AND VILANTEROL TRIFENATATE 1 PUFF: 100; 25 POWDER RESPIRATORY (INHALATION) at 08:46

## 2021-08-31 RX ADMIN — METOPROLOL TARTRATE 50 MG: 50 TABLET, FILM COATED ORAL at 21:59

## 2021-08-31 RX ADMIN — TRAMADOL HYDROCHLORIDE 50 MG: 50 TABLET, FILM COATED ORAL at 21:59

## 2021-08-31 RX ADMIN — APIXABAN 2.5 MG: 2.5 TABLET, FILM COATED ORAL at 17:08

## 2021-08-31 NOTE — ASSESSMENT & PLAN NOTE
Wt Readings from Last 3 Encounters:   08/27/21 48 2 kg (106 lb 4 2 oz)   07/26/21 48 6 kg (107 lb 2 3 oz)   03/09/21 50 8 kg (112 lb)     Echocardiogram EF 75 %, grade 1 diastolic dysfunction, PA pressure 70 mmHg  No evidence of volume overload  · Continue metoprolol  · Patient was given a dose of IV Lasix yesterday and restarted back on torsemide 10 milligram p o   Daily today  · Monitor intake output, daily weight

## 2021-08-31 NOTE — ASSESSMENT & PLAN NOTE
Malnutrition Findings:   Adult Malnutrition type: Chronic illness (Moderate malnutrition of chronic illness related to inadequate energy intake as evidenced by somewhat hollow orbits, somewhat protruding/prominant clavicles, some depression at the temples  Treated with Liberalized diet)  Adult Degree of Malnutrition: Malnutrition of moderate degree    BMI Findings:  Adult BMI Classifications: Underweight < 18 5     Body mass index is 18 24 kg/m²     Nutrition evaluation appreciated   Patient lost significant weight recently   Continue diet nutritional supplementation as tolerated

## 2021-08-31 NOTE — QUICK NOTE
Patient seen multiple times overnight for afib with RVR  Patient is asymptomatic, resting in bed  Patient was medicated with multiple doses of IV lopressor, IV cardizem  She was given oral lopressor  She was loaded with digoxin yesterday  Plan was discussed with cardiology overnight  Ultimately, patient converted to NSR this morning

## 2021-08-31 NOTE — PROGRESS NOTES
Patient in uncontrolled afib with RVR during the night  HILDA Lambert notified throughout the night  2 doses of IV lopressor were given with no therapeutic effect  One dose of metoprolol PO given, also no change in HR  Dose of IV Cardizem given, HR stabilized for 1 hour and then converted back to uncontrolled Afib  Order for Cardizem drip was put in, patients BP 98/65, so drip was not started and order discontinued  Digoxin was ordered but patient ultimately converted back to normal sinus rhythm with a HR of 75

## 2021-08-31 NOTE — ASSESSMENT & PLAN NOTE
· Currently stable  · Monitor metoprolol  · Patient started on Cozaar 25 milligram p o   Daily and restarted on torsemide

## 2021-08-31 NOTE — ASSESSMENT & PLAN NOTE
Normocytic  Appears to be lower compared to last year  Patient denies any overt bleeding  · Iron low at 41, B12 and folate unremarkable   Results from last 7 days   Lab Units 08/31/21  0314 08/30/21  0511 08/29/21  0442 08/28/21  0541 08/27/21  1443   HEMOGLOBIN g/dL 10 0* 9 8* 9 7* 7 1* 11 1*   HEMATOCRIT % 34 7* 34 4* 34 4* 24 5* 39 0   MCV fL 94 96 96 95 94   ·

## 2021-08-31 NOTE — PROGRESS NOTES
Progress Note - Cardiology   [facility]     Mitchell Larios 78 y o  female MRN: [id number]  : 1940  Unit/Bed#: [address] [place] [address] Encounter: [id number]    Assessment and Plan:   1  Paroxysmal atrial fibrillation with rapid ventricular response:  Patient received IV digoxin load on admission, but oral dosing was not continued  - will start digoxin 0 125 mg daily while in hospital and then may need to transition to every other day at time of discharge due to chronic kidney disease    -  continue metoprolol 50 mg twice a day    -  continue Eliquis 2 5 mg daily    -  continue monitor telemetry     2  Acute exacerbation of COPD:  Patient currently on 2 L of oxygen chronically at home  History of right-sided lung cancer with previous radiation    -  patient states not currently using any inhalers at home    -  started on duo nebs and Breo by primary team    3  Hypertension:  Will start Cozaar 25 mg daily and monitor vital signs and renal function     4  Chronic diastolic heart failure:  Appears to be euvolemic  Continue current meds     5  Aortic stenosis with history of TAVR:  Performed at [facility]   Echocardiogram performed 2021 with normal gradients     6  Severe pulmonary hypertension:  Pulmonary pressures by echocardiogram from 2021 noted to be estimated at 70 mm Hg    Subjective / Objective:   Patient seen and examined  She converted back to sinus rhythm at approximately 6:30 a m  This morning  She does not note any change in her shortness of breath since converting back to sinus rhythm  Blood pressure is noted to be elevated  Will start low-dose Cozaar 25 mg and continue monitor     Vitals: Blood pressure 161/70, pulse 74, temperature (!) 97 4 °F (36 3 °C), resp  rate 18, height 5' 4" (1 626 m), weight 48 2 kg (106 lb 4 2 oz), SpO2 96 %, not currently breastfeeding    Vitals:    21 1450 21   Weight: 47 7 kg (105 lb 2 6 oz) 48 2 kg (106 lb 4 2 oz)     Body mass index is 18 24 kg/m²  BP Readings from Last 3 Encounters:   08/31/21 161/70   07/26/21 161/71   03/09/21 113/85     Orthostatic Blood Pressures      Most Recent Value   Blood Pressure  161/70 filed at 08/31/2021 0759   Patient Position - Orthostatic VS  Lying filed at 08/31/2021 0551        I/O       08/29 0701 - 08/30 0700 08/30 0701 - 08/31 0700 08/31 0701 - 09/01 0700    P  O  480 110     I V  (mL/kg)  5 (0 1)     IV Piggyback       Total Intake(mL/kg) 480 (10) 115 (2 4)     Urine (mL/kg/hr) 500 (0 4) 400 (0 3) 100 (0 9)    Total Output 500 400 100    Net -20 -285 -100           Unmeasured Urine Occurrence 3 x          Invasive Devices     Peripheral Intravenous Line            Peripheral IV 08/27/21 Left Antecubital 3 days    Peripheral IV 08/28/21 Distal;Dorsal (posterior); Right Forearm 3 days    Peripheral IV 08/31/21 Right Antecubital <1 day                  Intake/Output Summary (Last 24 hours) at 8/31/2021 6744  Last data filed at 8/31/2021 0701  Gross per 24 hour   Intake --   Output 500 ml   Net -500 ml         Physical Exam:   Physical Exam  Vitals and nursing note reviewed  Constitutional:       Appearance: Normal appearance  She is well-developed and underweight  HENT:      Head: Normocephalic  Right Ear: External ear normal       Left Ear: External ear normal       Nose: Nose normal    Eyes:      General: No scleral icterus  Right eye: No discharge  Left eye: No discharge  Pupils: Pupils are equal, round, and reactive to light  Neck:      Thyroid: No thyromegaly  Cardiovascular:      Rate and Rhythm: Normal rate and regular rhythm  Pulses: Normal pulses  Heart sounds: Normal heart sounds  No murmur heard  Pulmonary:      Effort: Pulmonary effort is normal  No accessory muscle usage or respiratory distress  Breath sounds: Examination of the right-lower field reveals decreased breath sounds   Examination of the left-lower field reveals decreased breath sounds  Decreased breath sounds present  Comments: Coarse breath sounds  Abdominal:      General: Bowel sounds are normal  There is no distension  Palpations: Abdomen is soft  Musculoskeletal:      Cervical back: Normal range of motion and neck supple  Right lower leg: No edema  Left lower leg: No edema  Skin:     General: Skin is warm and dry  Capillary Refill: Capillary refill takes less than 2 seconds  Neurological:      General: No focal deficit present  Mental Status: She is alert and oriented to person, place, and time  Mental status is at baseline     Psychiatric:         Mood and Affect: Mood normal                     Medications/ Allergies:     Current Facility-Administered Medications   Medication Dose Route Frequency Provider Last Rate    acetaminophen  650 mg Oral Q6H PRN HILDA Stacy      apixaban  2 5 mg Oral BID Angie Garza, HILDA      atorvastatin  40 mg Oral Daily With [address] [place] [address] HILDA Fry      digoxin  125 mcg Intravenous Once HILDA Zambrano      digoxin  125 mcg Oral Daily Barbara Begum, HILDA      FLUoxetine  20 mg Oral Daily HILDA Stacy      fluticasone-vilanterol  1 puff Inhalation Daily Nichole Joshi MD      ipratropium  0 5 mg Nebulization Q8H PRN HILDA Neville      levalbuterol  1 25 mg Nebulization Q8H PRN HILDA Neville      melatonin  3 mg Oral HS HILDA Stacy      metoprolol  2 5 mg Intravenous Q6H PRN HILDA Neville      metoprolol tartrate  50 mg Oral Q12H [facility] Nichole Joshi MD      morphine injection  2 mg Intravenous Q6H PRN Brent Zamora PA-C      pantoprazole  40 mg Oral Daily HILDA Stacy      traMADol  50 mg Oral Q8H PRN HILDA Stacy       acetaminophen, 650 mg, Q6H PRN  ipratropium, 0 5 mg, Q8H PRN  levalbuterol, 1 25 mg, Q8H PRN  metoprolol, 2 5 mg, Q6H PRN  morphine injection, 2 mg, Q6H PRN  traMADol, 50 mg, Q8H PRN      No Known Allergies    VTE Pharmacologic Prophylaxis:   Sequential compression device (Venodyne)     Labs:   Troponins:  Results from last 7 days   Lab Units 08/27/21  2333 08/27/21  2116 08/27/21  1756   TROPONIN I ng/mL 0 11* 0 14* 0 07*     CBC with diff:  Results from last 7 days   Lab Units 08/31/21 0314 08/30/21  0511 08/29/21  0442 08/28/21  0541 08/27/21  1443   WBC Thousand/uL 5 17 5 74 4 84 3 54* 9 25   HEMOGLOBIN g/dL 10 0* 9 8* 9 7* 7 1* 11 1*   HEMATOCRIT % 34 7* 34 4* 34 4* 24 5* 39 0   MCV fL 94 96 96 95 94   PLATELETS Thousands/uL 164 158 138* 105* 254   MCH pg 27 2 27 3 26 9 27 1 26 6*   MCHC g/dL 28 8* 28 5* 28 2* 28 6* 28 5*   RDW % 15 6* 15 6* 15 9* 15 7* 15 2*   MPV fL 10 5 10 4 10 1 9 7 10 3   NRBC AUTO /100 WBCs 0  --  0 0 0     CMP:  Results from last 7 days   Lab Units 08/31/21 0315 08/30/21  0511 08/29/21  0442 08/28/21  0541 08/27/21  1443   SODIUM mmol/L 139 140 141 147* 140   POTASSIUM mmol/L 4 7 5 1 5 4* 3 2* 4 4   CHLORIDE mmol/L 104 107 108 116* 102   CO2 mmol/L 28 29 27 23 27   ANION GAP mmol/L 7 4 6 8 11   BUN mg/dL 12 9 11 11 15   CREATININE mg/dL 0 93 0 87 1 03 0 74 1 15   CALCIUM mg/dL 8 9 9 1 8 6 6 2* 9 2   AST U/L 14  --   --   --  19   ALT U/L 18  --   --   --  20   ALK PHOS U/L 56  --   --   --  61   TOTAL PROTEIN g/dL 6 3*  --   --   --  7 6   ALBUMIN g/dL 2 7*  --   --   --  3 5   TOTAL BILIRUBIN mg/dL 0 32  --   --   --  0 21   EGFR ml/min/1 73sq m 57 62 50 75 44     Magnesium:  Results from last 7 days   Lab Units 08/31/21 0315 08/30/21  0511 08/29/21  0442 08/28/21  0541 08/27/21  1615   MAGNESIUM mg/dL 1 5* 1 7 2 2 1 1* 1 5*     Coags:  Results from last 7 days   Lab Units 08/27/21  1443   PTT seconds 37   INR  1 12     TSH:  Results from last 7 days   Lab Units 08/27/21  1615   TSH 3RD GENERATON uIU/mL 1 640       Imaging & Testing   I have personally reviewed pertinent reports      XR chest 1 view portable    Result Date: 8/27/2021  Narrative: CHEST INDICATION:   Chest pain  COMPARISON:  Chest radiograph from 2021 and chest CT from 2020  EXAM PERFORMED/VIEWS:  XR CHEST PORTABLE FINDINGS: Normal heart size  Median sternotomy  TAVR  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  No acute disease  No effusion or pneumothorax  Chronic elevation of the right hemidiaphragm  Severe bilateral glenohumeral degenerative disease  Impression: No acute cardiopulmonary disease  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 with chronic elevation of the right hemidiaphragm  Workstation performed: [id number]        EKG / Monitor: Personally reviewed  Patient was in atrial fibrillation atrial flutter overnight, at approximately 6:25 a m  She converted to sinus rhythm  Cardiac testing:   Results for orders placed during the hospital encounter of 21    Echo complete with contrast if indicated    Narrative  [phone]    Transthoracic Echocardiogram  2D, M-mode, Doppler, and Color Doppler    Study date:  2021    Patient: Soraya Paul  MR number: [id number]  Account number: [de-identified]  : 1940  Age: 78 years  Gender: Female  Status: Inpatient  Location: Bedside  Height: 65 in  Weight: 107 8 lb  BP: 110/ 53 mmHg    Indications: Chest pain,dizziness      Diagnoses: I42 9 - Cardiomyopathy, unspecified    Sonographer:  HELDER Villarreal  Primary Physician:  Patience Ferrer MD  Group:  [id number]  Interpreting Physician:  Haris Skaggs MD    SUMMARY    COMPARISONS:  Compared to previous study, patient now has severe pulmonary hypertension and peak PA pressure is increased from 30 to 70 mm Hg    LEFT VENTRICLE:  LV measurements were taken in apical 2 chamber views  Normal left ventricular chamber dimension  There is mild concentric left ventricular hypertrophy  Systolic function is hyperdynamic with an ejection fraction of 75%  No definite regional wall motion abnormality seen although it cannot be absolutely excluded on the basis of this study  Grade 1 diastolic dysfunction    RIGHT VENTRICLE:  Right ventricular free wall was not adequately visualized  Visually RV size appears borderline to mildly enlarged  Systolic function also appears borderline with no TAPSE measurement    LEFT ATRIUM:  Mild to moderately dilated left atrium    MITRAL VALVE:  There is mild to moderate thickening of mitral valve leaflets  There is moderate to severe mitral annular calcification  Leaflet separation is mildly decreased  There is mild to moderate mitral stenosis with mitral valve area of 2 1 cm2 and mean gradient of 6 72 mm Hg  There is mild to moderate mitral regurgitation    AORTIC VALVE:  Aortic valve was not well visualized  A bioprosthesis/TAVR is present  No evidence of significant aortic stenosis  Mean gradient is 5 and peak gradient is 9 mm Hg  No paravalvular leak or aortic valve regurgitation seen    TRICUSPID VALVE:  There was mild to moderate regurgitation  There is severe pulmonary hypertension with peak PA pressure of 70 mm Hg    PULMONIC VALVE:  There was mild regurgitation  IVC, HEPATIC VEINS:  IVC size and respirophasic variations appear preserved    COMPARISONS:  Compared to previous study, patient now has severe pulmonary hypertension and peak PA pressure is increased from 30 to 70 mm Hg    HISTORY: PRIOR HISTORY: chronic diastolic CHF,A Fib ,CVA,HTN,Gastroesophageal reflux disease,Prosthetic Aortic Valve,Lung Cancer,COPD,Chronic kidney disease  PROCEDURE: The procedure was performed at the bedside  This was a routine study  Technically difficult study resulting in off axis measurements The transthoracic approach was used  The study included complete 2D imaging, M-mode, complete  spectral Doppler, and color Doppler  The heart rate was 94 bpm, at the start of the study   Images were obtained from the parasternal, apical, subcostal, and suprasternal notch acoustic windows  Echocardiographic views were limited due to  chest wall deformity, poor patient compliance, poor acoustic window availability, decreased penetration, and lung interference  This was a technically difficult study  LEFT VENTRICLE: LV measurements were taken in apical 2 chamber views  Normal left ventricular chamber dimension  There is mild concentric left ventricular hypertrophy  Systolic function is hyperdynamic with an ejection fraction of 75%  No definite regional wall motion abnormality seen although it cannot be absolutely excluded on the basis of this study  Grade 1 diastolic dysfunction    RIGHT VENTRICLE: Right ventricular free wall was not adequately visualized  Visually RV size appears borderline to mildly enlarged  Systolic function also appears borderline with no TAPSE measurement    LEFT ATRIUM: Mild to moderately dilated left atrium    RIGHT ATRIUM: Size was normal     MITRAL VALVE: There is mild to moderate thickening of mitral valve leaflets  There is moderate to severe mitral annular calcification  Leaflet separation is mildly decreased  There is mild to moderate mitral stenosis with mitral valve area of 2 1 cm2 and mean gradient of 6 72 mm Hg  There is mild to moderate mitral regurgitation    AORTIC VALVE: Aortic valve was not well visualized  A bioprosthesis/TAVR is present  No evidence of significant aortic stenosis  Mean gradient is 5 and peak gradient is 9 mm Hg  No paravalvular leak or aortic valve regurgitation seen    TRICUSPID VALVE: DOPPLER: There was mild to moderate regurgitation  There is severe pulmonary hypertension with peak PA pressure of 70 mm Hg    PULMONIC VALVE: DOPPLER: There was mild regurgitation  PERICARDIUM: There was no pericardial effusion  The pericardium was normal in appearance  AORTA: The root exhibited normal size      SYSTEMIC VEINS: IVC: IVC size and respirophasic variations appear preserved    SYSTEM MEASUREMENT TABLES    2D  EF (Teich): 58 98 %  %FS: 30 59 %  Ao Diam: 2 82 cm  EDV(Teich): 56 52 ml  ESV(Teich): 23 19 ml  IVSd: 1 28 cm  LA Area: 23 84 cm2  LA Diam: 4 46 cm  LVEDV MOD A4C: 70 18 ml  LVEF MOD A4C: 67 91 %  LVESV MOD A4C: 22 52 ml  LVIDd: 3 66 cm  LVIDs: 2 54 cm  LVLd A4C: 7 05 cm  LVLs A4C: 5 64 cm  LVOT Diam: 1 98 cm  LVPWd: 1 34 cm  RA Area: 10 98 cm2  RVIDd: 2 68 cm  SV (Teich): 33 34 ml  SV MOD A4C: 47 66 ml    CW  AV Env  Ti: 220 74 ms  AV VTI: 23 26 cm  AV Vmax: 1 5 m/s  AV Vmean: 1 05 m/s  AV maxP 06 mmHg  AV meanP 05 mmHg  TR Vmax: 4 09 m/s  TR maxP 94 mmHg    PW  MV E/A Ratio: 0 84  E' Sept: 0 06 m/s  E/E' Sept: 22 95  LVOT Env  Ti: 281 64 ms  LVOT VTI: 25 72 cm  LVOT Vmax: 1 3 m/s  LVOT Vmean: 0 91 m/s  LVOT maxP 79 mmHg  LVOT meanPG: 3 81 mmHg  MV A Ermias: 1 6 m/s  MV Dec Lee: 6 99 m/s2  MV DecT: 193 88 ms  MV E Ermias: 1 35 m/s  MV PHT: 56 23 ms  MVA By PHT: 3 91 cm2    [address] Accredited Echocardiography [facility]    Prepared and electronically signed by    Reina Salinas MD  Signed 2021 12:31:58      Shruthi Smith, [facility]  Cardiology      "This note has been constructed using a voice recognition system  Therefore there may be syntax, spelling, and/or grammatical errors   Please call if you have any questions  "

## 2021-08-31 NOTE — ASSESSMENT & PLAN NOTE
Done previously at [facility]   ECHO: Sudha Cruz presents  No evidence of significant aortic stenosis   No leak or regurgitation seen

## 2021-08-31 NOTE — PROGRESS NOTES
[address] 43  Progress Note - Primitivo Demarco 1940, 78 y o  female MRN: [id number]  Unit/Bed#: [address] [place] [address] Encounter: [id number]  Primary Care Provider: Zoe Mancilla MD   Date and time admitted to hospital: 8/27/2021  2:25 PM    * Atrial fibrillation with rapid ventricular response [facility])  Assessment & Plan  History of paroxysmal AFib on Eliquis  Presented with AFib with RVR, hypotensive after initiation of Cardizem drip, subsequently admitted to step-down unit  Loaded with digoxin with improvement in heart rate  Patient continued to have irregular heartbeat which was uncontrolled overnight despite multiple doses of metoprolol  Patient became hypotensive with IV Cardizem  Likely precipitated by COPD plus/minus hypoxia, pulmonary hypertension, ? Compliance  · Continue metoprolol at increased dose of 50 mg q12h  · Patient was started on digoxin 0 125 micrograms p o  Daily while in the hospital but will transition to every other day upon discharge  · Continue Eliquis   · Continue telemetry     COPD (chronic obstructive pulmonary disease) ([facility] [address] )  Assessment & Plan  History of COPD (FEV1 47% of predicted) as per the chart review, on chronic supplemental oxygen  History of right lung CA status post radiation  Reports that she has not been using any inhaler or nebulizer treatment at home, reports chronic progressive shortness of breath limiting activity,?   Likely precipitant of AFib with RVR  No evidence of exacerbation at present  · Continue DuoNebs  · Patient started on Breo  · Continue oxygen supplementation and titrate down oxygen as tolerated  · Pulmonary consult appreciated    Elevated troponin  Assessment & Plan  Secondary to AFib with RVR  Likely non MI troponin elevation    Chronic diastolic (congestive) heart failure (HCC)  Assessment & Plan  Wt Readings from Last 3 Encounters:   08/27/21 48 2 kg (106 lb 4 2 oz)   07/26/21 48 6 kg (107 lb 2 3 oz)   03/09/21 50 8 kg (112 lb) Echocardiogram EF 75 %, grade 1 diastolic dysfunction, PA pressure 70 mmHg  No evidence of volume overload  · Continue metoprolol  · Patient was given a dose of IV Lasix yesterday and restarted back on torsemide 10 milligram p o  Daily today  · Monitor intake output, daily weight    Chronic respiratory failure with hypoxia 2/2 COPD  Assessment & Plan  On 2-3 L of supplemental oxygen which is chronic and baseline   CT of the chest showed moderate right and small left pleural effusions both of which have increased since prior CT with chronic parenchymal changes in the posterior base of the right lung suggesting post radiation fibrosis/chronic atelectasis with severe pulmonary emphysema with subpleural interstitial changes    Anemia  Assessment & Plan  Normocytic  Appears to be lower compared to last year  Patient denies any overt bleeding  · Iron low at 41, B12 and folate unremarkable   Results from last 7 days   Lab Units 08/31/21  0314 08/30/21  0511 08/29/21  0442 08/28/21  0541 08/27/21  1443   HEMOGLOBIN g/dL 10 0* 9 8* 9 7* 7 1* 11 1*   HEMATOCRIT % 34 7* 34 4* 34 4* 24 5* 39 0   MCV fL 94 96 96 95 94   ·     Moderate protein-calorie malnutrition (HCC)  Assessment & Plan  Malnutrition Findings:   Adult Malnutrition type: Chronic illness (Moderate malnutrition of chronic illness related to inadequate energy intake as evidenced by somewhat hollow orbits, somewhat protruding/prominant clavicles, some depression at the temples  Treated with Liberalized diet)  Adult Degree of Malnutrition: Malnutrition of moderate degree    BMI Findings:  Adult BMI Classifications: Underweight < 18 5     Body mass index is 18 24 kg/m²  Nutrition evaluation appreciated   Patient lost significant weight recently   Continue diet nutritional supplementation as tolerated    S/P TAVR (transcatheter aortic valve replacement)  Assessment & Plan  Done previously at [facility]   ECHO: Mainor Hackett presents   No evidence of significant aortic stenosis  No leak or regurgitation seen     Depression  Assessment & Plan  On fluoxetine    GERD (gastroesophageal reflux disease)  Assessment & Plan  On PPI    Hypertension  Assessment & Plan  · Currently stable  · Monitor metoprolol  · Patient started on Cozaar 25 milligram p o  Daily and restarted on torsemide          VTE Pharmacologic Prophylaxis: VTE Score: 5 High Risk (Score >/= 5) - Pharmacological DVT Prophylaxis Ordered: apixaban (Eliquis)  Sequential Compression Devices Ordered  Patient Centered Rounds: I performed bedside rounds with nursing staff today  Discussions with Specialists or Other Care Team Provider: Dr Amelia Bardales and Discussions with Family / Patient: yes    Time Spent for Care: 45 minutes  More than 50% of total time spent on counseling and coordination of care as described above  Current Length of Stay: 4 day(s)  Current Patient Status: Inpatient   Certification Statement: The patient will continue to require additional inpatient hospital stay due to AFib with RVR  Discharge Plan: Anticipate discharge tomorrow to home with home services  Code Status: Level 3 - DNAR and DNI    Subjective:   Patient complains of fatigue  Denies any chest pain, palpitations or shortness of breath  Objective:     Vitals:   Temp (24hrs), Av 8 °F (36 6 °C), Min:96 6 °F (35 9 °C), Max:98 9 °F (37 2 °C)    Temp:  [96 6 °F (35 9 °C)-98 9 °F (37 2 °C)] 97 4 °F (36 3 °C)  HR:  [] 69  Resp:  [18] 18  BP: ()/(62-93) 114/65  SpO2:  [96 %-100 %] 100 %  Body mass index is 18 24 kg/m²  Input and Output Summary (last 24 hours): Intake/Output Summary (Last 24 hours) at 2021 1446  Last data filed at 2021 0701  Gross per 24 hour   Intake --   Output 500 ml   Net -500 ml       Physical Exam:   Physical Exam  Constitutional:       Appearance: Normal appearance  HENT:      Head: Normocephalic and atraumatic        Nose: Nose normal       Mouth/Throat: Mouth: Mucous membranes are moist       Pharynx: Oropharynx is clear  Eyes:      Extraocular Movements: Extraocular movements intact  Pupils: Pupils are equal, round, and reactive to light  Cardiovascular:      Rate and Rhythm: Normal rate and regular rhythm  Pulmonary:      Effort: Pulmonary effort is normal       Breath sounds: Normal breath sounds  Abdominal:      General: Bowel sounds are normal  There is no distension  Palpations: Abdomen is soft  Tenderness: There is no abdominal tenderness  Musculoskeletal:         General: No swelling  Cervical back: Normal range of motion and neck supple  Skin:     General: Skin is warm and dry  Neurological:      General: No focal deficit present  Mental Status: She is alert  Additional Data:     Labs:  Results from last 7 days   Lab Units 08/31/21  0314   WBC Thousand/uL 5 17   HEMOGLOBIN g/dL 10 0*   HEMATOCRIT % 34 7*   PLATELETS Thousands/uL 164   NEUTROS PCT % 73   LYMPHS PCT % 14   MONOS PCT % 11   EOS PCT % 2     Results from last 7 days   Lab Units 08/31/21  0315   SODIUM mmol/L 139   POTASSIUM mmol/L 4 7   CHLORIDE mmol/L 104   CO2 mmol/L 28   BUN mg/dL 12   CREATININE mg/dL 0 93   ANION GAP mmol/L 7   CALCIUM mg/dL 8 9   ALBUMIN g/dL 2 7*   TOTAL BILIRUBIN mg/dL 0 32   ALK PHOS U/L 56   ALT U/L 18   AST U/L 14   GLUCOSE RANDOM mg/dL 112     Results from last 7 days   Lab Units 08/27/21  1443   INR  1 12             Results from last 7 days   Lab Units 08/28/21  0541 08/27/21  2115 08/27/21  1756 08/27/21  1615   LACTIC ACID mmol/L  --  1 9 2 1* 3 2*   PROCALCITONIN ng/ml <0 05  --   --  <0 05       Lines/Drains:  Invasive Devices     Peripheral Intravenous Line            Peripheral IV 08/27/21 Left Antecubital 4 days    Peripheral IV 08/28/21 Distal;Dorsal (posterior); Right Forearm 3 days    Peripheral IV 08/31/21 Right Antecubital <1 day                  Telemetry:  Telemetry Orders (From admission, onward) 48 Hour Telemetry Monitoring  Continuous x 48 hours     Question:  Reason for 48 Hour Telemetry  Answer:  Arrhythmias Requiring Medical Therapy (eg  SVT, Vtach/fib, Bradycardia, Uncontrolled A-fib)                 Telemetry Reviewed: Normal Sinus Rhythm  Indication for Continued Telemetry Use: Arrthymias requiring medical therapy           Imaging: Reviewed radiology reports from this admission including: chest CT scan    Recent Cultures (last 7 days):   Results from last 7 days   Lab Units 08/27/21  1933 08/27/21  1615   BLOOD CULTURE   --  No Growth at 72 hrs  No Growth at 72 hrs     URINE CULTURE  60,000-69,000 cfu/ml   --        Last 24 Hours Medication List:   Current Facility-Administered Medications   Medication Dose Route Frequency Provider Last Rate    acetaminophen  650 mg Oral Q6H PRN Fatuma Marlow, GERARDNP      apixaban  2 5 mg Oral BID Fatumashaun Marlow, CRNP      atorvastatin  40 mg Oral Daily With [address], CRNP      digoxin  125 mcg Intravenous Once Glorya Bruins, CRNP      digoxin  125 mcg Oral Daily Courtland Kaur, CRNP      FLUoxetine  20 mg Oral Daily Fatumajarek Marlow, CRNP      fluticasone-vilanterol  1 puff Inhalation Daily Jacoby Louise MD      ipratropium  0 5 mg Nebulization Q8H PRN Amanda Purpura, CRNP      levalbuterol  1 25 mg Nebulization Q8H PRN Amanda Purpura, CRNP      losartan  25 mg Oral Daily Courtland Kaur, CRNP      melatonin  3 mg Oral HS Fatumashaun Marlow, CRNP      metoprolol  2 5 mg Intravenous Q6H PRN Amanda Purpura, CRNP      metoprolol tartrate  50 mg Oral Q12H [address] Jacoby Louise MD      morphine injection  2 mg Intravenous Q6H PRN Kimberly Medrano PA-C      pantoprazole  40 mg Oral Daily Fatumashaun Marlow, GERARDNP      torsemide  10 mg Oral Daily Bianca Kaur, CRNP      traMADol  50 mg Oral Q8H PRN Fatuma Marlow, HILDA          Today, Patient Was Seen By: Yamilet Almaraz MD    **Please Note: This note may have been constructed using a voice recognition system  **

## 2021-08-31 NOTE — PLAN OF CARE
Problem: Potential for Falls  Goal: Patient will remain free of falls  Description: INTERVENTIONS:  - Educate patient/family on patient safety including physical limitations  - Instruct patient to call for assistance with activity   - Consult OT/PT to assist with strengthening/mobility   - Keep Call bell within reach  - Keep bed low and locked with side rails adjusted as appropriate  - Keep care items and personal belongings within reach  - Initiate and maintain comfort rounds  - Make Fall Risk Sign visible to staff  - Offer Toileting every 2 Hours, in advance of need  - Initiate/Maintain bed alarm  - Obtain necessary fall risk management equipment: fall risk   - Apply yellow socks and bracelet for high fall risk patients  - Consider moving patient to room near nurses station  Outcome: Progressing     Problem: RESPIRATORY - ADULT  Goal: Achieves optimal ventilation and oxygenation  Description: INTERVENTIONS:  - Assess for changes in respiratory status  - Assess for changes in mentation and behavior  - Position to facilitate oxygenation and minimize respiratory effort  - Oxygen administered by appropriate delivery if ordered  - Initiate smoking cessation education as indicated  - Encourage broncho-pulmonary hygiene including cough, deep breathe, Incentive Spirometry  - Assess the need for suctioning and aspirate as needed  - Assess and instruct to report SOB or any respiratory difficulty  - Respiratory Therapy support as indicated  Outcome: Progressing     Problem: Nutrition/Hydration-ADULT  Goal: Nutrient/Hydration intake appropriate for improving, restoring or maintaining nutritional needs  Description: Monitor and assess patient's nutrition/hydration status for malnutrition  Collaborate with interdisciplinary team and initiate plan and interventions as ordered  Monitor patient's weight and dietary intake as ordered or per policy  Utilize nutrition screening tool and intervene as necessary   Determine patient's food preferences and provide high-protein, high-caloric foods as appropriate  INTERVENTIONS:  - Monitor oral intake, urinary output, labs, and treatment plans  - Assess nutrition and hydration status and recommend course of action  - Evaluate amount of meals eaten  - Assist patient with eating if necessary   - Allow adequate time for meals  - Recommend/ encourage appropriate diets, oral nutritional supplements, and vitamin/mineral supplements  - Order, calculate, and assess calorie counts as needed  - Recommend, monitor, and adjust tube feedings and TPN/PPN based on assessed needs  - Assess need for intravenous fluids  - Provide specific nutrition/hydration education as appropriate  - Include patient/family/caregiver in decisions related to nutrition  Outcome: Progressing     Problem: MOBILITY - ADULT  Goal: Maintain or return to baseline ADL function  Description: INTERVENTIONS:  -  Assess patient's ability to carry out ADLs; assess patient's baseline for ADL function and identify physical deficits which impact ability to perform ADLs (bathing, care of mouth/teeth, toileting, grooming, dressing, etc )  - Assess/evaluate cause of self-care deficits   - Assess range of motion  - Assess patient's mobility; develop plan if impaired  - Assess patient's need for assistive devices and provide as appropriate  - Encourage maximum independence but intervene and supervise when necessary  - Involve family in performance of ADLs  - Assess for home care needs following discharge   - Consider OT consult to assist with ADL evaluation and planning for discharge  - Provide patient education as appropriate  Outcome: Progressing  Goal: Maintains/Returns to pre admission functional level  Description: INTERVENTIONS:  - Perform BMAT or MOVE assessment daily    - Set and communicate daily mobility goal to care team and patient/family/caregiver     - Collaborate with rehabilitation services on mobility goals if consulted  - Perform Range of Motion 2 times a day  - Reposition patient every 2 hours    - Dangle patient 2 times a day  - Stand patient 2 times a day  - Ambulate patient 2 times a day  - Out of bed to chair 2 times a day   - Out of bed for meals 2 times a day  - Out of bed for toileting  - Record patient progress and toleration of activity level   Outcome: Progressing     Problem: Prexisting or High Potential for Compromised Skin Integrity  Goal: Skin integrity is maintained or improved  Description: INTERVENTIONS:  - Identify patients at risk for skin breakdown  - Assess and monitor skin integrity  - Assess and monitor nutrition and hydration status  - Monitor labs   - Assess for incontinence   - Turn and reposition patient  - Assist with mobility/ambulation  - Relieve pressure over bony prominences  - Avoid friction and shearing  - Provide appropriate hygiene as needed including keeping skin clean and dry  - Evaluate need for skin moisturizer/barrier cream  - Collaborate with interdisciplinary team   - Patient/family teaching  - Consider wound care consult   Outcome: Progressing     Problem: PAIN - ADULT  Goal: Verbalizes/displays adequate comfort level or baseline comfort level  Description: Interventions:  - Encourage patient to monitor pain and request assistance  - Assess pain using appropriate pain scale  - Administer analgesics based on type and severity of pain and evaluate response  - Implement non-pharmacological measures as appropriate and evaluate response  - Consider cultural and social influences on pain and pain management  - Notify physician/advanced practitioner if interventions unsuccessful or patient reports new pain  Outcome: Progressing     Problem: INFECTION - ADULT  Goal: Absence or prevention of progression during hospitalization  Description: INTERVENTIONS:  - Assess and monitor for signs and symptoms of infection  - Monitor lab/diagnostic results  - Monitor all insertion sites, i e  indwelling lines, tubes, and drains  - Monitor endotracheal if appropriate and nasal secretions for changes in amount and color  - [place] appropriate cooling/warming therapies per order  - Administer medications as ordered  - Instruct and encourage patient and family to use good hand hygiene technique  - Identify and instruct in appropriate isolation precautions for identified infection/condition  Outcome: Progressing     Problem: DISCHARGE PLANNING  Goal: Discharge to home or other facility with appropriate resources  Description: INTERVENTIONS:  - Identify barriers to discharge w/patient and caregiver  - Arrange for needed discharge resources and transportation as appropriate  - Identify discharge learning needs (meds, wound care, etc )  - Arrange for interpretive services to assist at discharge as needed  - Refer to Case Management Department for coordinating discharge planning if the patient needs post-hospital services based on physician/advanced practitioner order or complex needs related to functional status, cognitive ability, or social support system  Outcome: Progressing

## 2021-08-31 NOTE — ASSESSMENT & PLAN NOTE
History of COPD (FEV1 47% of predicted) as per the chart review, on chronic supplemental oxygen  History of right lung CA status post radiation  Reports that she has not been using any inhaler or nebulizer treatment at home, reports chronic progressive shortness of breath limiting activity,?   Likely precipitant of AFib with RVR  No evidence of exacerbation at present  · Continue DuoNebs  · Patient started on Breo  · Continue oxygen supplementation and titrate down oxygen as tolerated  · Pulmonary consult appreciated

## 2021-08-31 NOTE — ASSESSMENT & PLAN NOTE
History of paroxysmal AFib on Eliquis  Presented with AFib with RVR, hypotensive after initiation of Cardizem drip, subsequently admitted to step-down unit  Loaded with digoxin with improvement in heart rate  Patient continued to have irregular heartbeat which was uncontrolled overnight despite multiple doses of metoprolol  Patient became hypotensive with IV Cardizem  Likely precipitated by COPD plus/minus hypoxia, pulmonary hypertension, ? Compliance  · Continue metoprolol at increased dose of 50 mg q12h  · Patient was started on digoxin 0 125 micrograms p o   Daily while in the hospital but will transition to every other day upon discharge  · Continue Eliquis   · Continue telemetry

## 2021-08-31 NOTE — ASSESSMENT & PLAN NOTE
On 2-3 L of supplemental oxygen which is chronic and baseline   CT of the chest showed moderate right and small left pleural effusions both of which have increased since prior CT with chronic parenchymal changes in the posterior base of the right lung suggesting post radiation fibrosis/chronic atelectasis with severe pulmonary emphysema with subpleural interstitial changes

## 2021-08-31 NOTE — PROGRESS NOTES
Progress Note - Pulmonary   Darrel Sheridan 78 y o  female MRN: [id number]  Unit/Bed#:  [facility] Encounter: [id number]  Code Status: Level 3 - DNAR and DNI    Brendan Alcala is a 78 y o  Past Medical History:   Diagnosis Date    Arthritis     Atrial fibrillation (HCC)     CHF (congestive heart failure) (HCC)     COPD (chronic obstructive pulmonary disease) (HCC)     Coronary artery disease     aortic valve replacement    Heart murmur     Hypertension        Assessment/Plan:       Chronic Dyspnea  -likely multifactorial and related to MVP, MR, CHF, COPD, PAH and Chronic Hypoxemia  -prn morphine for dyspnea  Severe Emphysematous COPD   -On home Spiriva  -on Breo in inpatient setting   -Duoneb PRN  Chronic Hypoxemic Respiratory Failure   -2 L NC at home  -currently remains on 2 L NC   Chronic Diastolic Congestive Heart Failure:  -increase diuresis   -currently + 2 8 L  > was 3 L yesterday   -Ordered 40 mg lasix IV BID   Severe MVP / MR:  - see echo  -likely etiology of PAH and worsening / transient CHF  PAH:  -multifactorial as listed above  -if persistent despite diuresis would consider RHC  -would defer any trials of PD inhibitors given above MR  History of Lung CA:  -treated w/ radiation same year as her heart valve replacement  -no CMT   -pt uncertain of dates or details of treatment     _________________________________________________    Subjective: Pt seen and examined at bedside  Still remains episodically / transiently dyspneic    Rales B/L         Smoking history:   Social History     Tobacco Use   Smoking Status Former Smoker    Packs/day: 1 00    Years: 25 00    Pack years: 25 00    Types: Cigarettes    Quit date: 2000    Years since quittin 6   Smokeless Tobacco Never Used     Smoking history:   Social History           Tobacco Use   Smoking Status Former Smoker    Packs/day:  00    Years: 25 00    Pack years: 25 00    Types: Cigarettes    Quit date: 2000    Years since quitting: 21 6   Smokeless Tobacco Never Used      Occupational history:   Environmental History: no sig exposures   Travel history:no recent travel   Respiratory History: COPD / 2 L 02 dependence   PAP Therapy:NA   DME Company: deferred   Rx Insurance: deferred   PFT: NA prior   PSG: NA prior   Echo Reviewed:   [address] [facility]  [address]  [phone]     Transthoracic Echocardiogram  2D, M-mode, Doppler, and Color Doppler     Study date:  2021     Patient: Alber Jackson  MR number: [id number]  Account number: [de-identified]  : 1940  Age: 78 years  Gender: Female  Status: Inpatient  Location: Bedside  Height: 65 in  Weight: 107 8 lb  BP: 110/ 53 mmHg     Indications: Chest pain,dizziness      Diagnoses: I42 9 - Cardiomyopathy, unspecified     Sonographer: HELDER Solano  Primary Physician: Sakshi Alexander MD  [facility]  Interpreting Physician: Sharla Montanez MD     SUMMARY     COMPARISONS:  Compared to previous study, patient now has severe pulmonary hypertension and peak PA pressure is increased from 30 to 70 mm Hg     LEFT VENTRICLE:  LV measurements were taken in apical 2 chamber views  Normal left ventricular chamber dimension  There is mild concentric left ventricular hypertrophy  Systolic function is hyperdynamic with an ejection fraction of 75%  No definite regional wall motion abnormality seen although it cannot be absolutely excluded on the basis of this study  Grade 1 diastolic dysfunction     RIGHT VENTRICLE:  Right ventricular free wall was not adequately visualized  Visually RV size appears borderline to mildly enlarged  Systolic function also appears borderline with no TAPSE measurement     LEFT ATRIUM:  Mild to moderately dilated left atrium     MITRAL VALVE:  There is mild to moderate thickening of mitral valve leaflets  There is moderate to severe mitral annular calcification  Leaflet separation is mildly decreased  There is mild to moderate mitral stenosis with mitral valve area of 2 1 cm2 and mean gradient of 6 72 mm Hg  There is mild to moderate mitral regurgitation     AORTIC VALVE:  Aortic valve was not well visualized  A bioprosthesis/TAVR is present  No evidence of significant aortic stenosis  Mean gradient is 5 and peak gradient is 9 mm Hg  No paravalvular leak or aortic valve regurgitation seen     TRICUSPID VALVE:  There was mild to moderate regurgitation  There is severe pulmonary hypertension with peak PA pressure of 70 mm Hg     PULMONIC VALVE:  There was mild regurgitation      IVC, HEPATIC VEINS:  IVC size and respirophasic variations appear preserved     COMPARISONS:  Compared to previous study, patient now has severe pulmonary hypertension and peak PA pressure is increased from 30 to 70 mm Hg     HISTORY: PRIOR HISTORY: chronic diastolic CHF,A Fib ,CVA,HTN,Gastroesophageal reflux disease,Prosthetic Aortic Valve,Lung Cancer,COPD,Chronic kidney disease      PROCEDURE: The procedure was performed at the bedside  This was a routine study  Technically difficult study resulting in off axis measurements The transthoracic approach was used  The study included complete 2D imaging, M-mode, complete  spectral Doppler, and color Doppler  The heart rate was 94 bpm, at the start of the study  Images were obtained from the parasternal, apical, subcostal, and suprasternal notch acoustic windows  Echocardiographic views were limited due to  chest wall deformity, poor patient compliance, poor acoustic window availability, decreased penetration, and lung interference   This was a technically difficult study      LEFT VENTRICLE: LV measurements were taken in apical 2 chamber views  Normal left ventricular chamber dimension  There is mild concentric left ventricular hypertrophy  Systolic function is hyperdynamic with an ejection fraction of 75%  No definite regional wall motion abnormality seen although it cannot be absolutely excluded on the basis of this study  Grade 1 diastolic dysfunction     RIGHT VENTRICLE: Right ventricular free wall was not adequately visualized  Visually RV size appears borderline to mildly enlarged  Systolic function also appears borderline with no TAPSE measurement     LEFT ATRIUM: Mild to moderately dilated left atrium     RIGHT ATRIUM: Size was normal      MITRAL VALVE: There is mild to moderate thickening of mitral valve leaflets  There is moderate to severe mitral annular calcification  Leaflet separation is mildly decreased  There is mild to moderate mitral stenosis with mitral valve area of 2 1 cm2 and mean gradient of 6 72 mm Hg  There is mild to moderate mitral regurgitation     AORTIC VALVE: Aortic valve was not well visualized  A bioprosthesis/TAVR is present  No evidence of significant aortic stenosis  Mean gradient is 5 and peak gradient is 9 mm Hg  No paravalvular leak or aortic valve regurgitation seen     TRICUSPID VALVE: DOPPLER: There was mild to moderate regurgitation  There is severe pulmonary hypertension with peak PA pressure of 70 mm Hg     PULMONIC VALVE: DOPPLER: There was mild regurgitation      PERICARDIUM: There was no pericardial effusion   The pericardium was normal in appearance      AORTA: The root exhibited normal size      SYSTEMIC VEINS: IVC: IVC size and respirophasic variations appear preserved     SYSTEM MEASUREMENT TABLES  Labs Reviewed: yes   Imaging Reviewed:  CTA PE study                 Consults Reviewed: yes   Collaborative Discussion: d/w IM team     Tele Events:     Vitals:   Vitals:    21 0716 21 0759 21 1333 21 1457   BP: 141/70 161/70 114/65 165/74   BP Location:       Pulse: 75 74 69 73   Resp:       Temp:  (!) 97 4 °F (36 3 °C)  (!) 97 4 °F (36 3 °C)   TempSrc:       SpO2: 99% 96% 100% 99%   Weight:       Height:         Weight (last 2 days)     None        Temp (24hrs), Av 8 °F (36 6 °C), Min:96 6 °F (35 9 °C), Max:98 9 °F (37 2 °C)  Current: Temperature: (!) 97 4 °F (36 3 °C)          IV Infusions:       Nutrition:        Diet Orders   (From admission, onward)             Start     Ordered    08/30/21 1756  Diet Regular; Regular House; Potassium 2 GM, Sodium 2 GM, Fluid Restriction 1500 ML  Diet effective now     Question Answer Comment   Diet Type Regular    Regular Regular House    Other Restriction(s): Potassium 2 GM    Other Restriction(s): Sodium 2 GM    Other Restriction(s): Fluid Restriction 1500 ML    RD to adjust diet per protocol? Yes        08/30/21 1756    08/28/21 1146  Room Service  Once     Question:  Type of Service  Answer:  Room Service - Appropriate with Assistance    08/28/21 1145                Ins/Outs:   I/O       08/29 0701 - 08/30 0700 08/30 0701 - 08/31 0700 08/31 0701 - 09/01 0700    P  O  480 110     I V  (mL/kg)  5 (0 1)     IV Piggyback       Total Intake(mL/kg) 480 (10) 115 (2 4)     Urine (mL/kg/hr) 500 (0 4) 400 (0 3) 100 (0 3)    Total Output 500 400 100    Net -20 -285 -100           Unmeasured Urine Occurrence 3 x            Lines/Drains:  Invasive Devices     Peripheral Intravenous Line            Peripheral IV 08/27/21 Left Antecubital 4 days    Peripheral IV 08/28/21 Distal;Dorsal (posterior); Right Forearm 3 days    Peripheral IV 08/31/21 Right Antecubital <1 day                 Active medications:  Scheduled Meds:  Current Facility-Administered Medications   Medication Dose Route Frequency Provider Last Rate    acetaminophen  650 mg Oral Q6H PRN HILDA Rodriguez      apixaban  2 5 mg Oral BID HILDA Rodriguez      atorvastatin  40 mg Oral Daily With Dinner HILDA Rodriguez      digoxin  125 mcg Intravenous Once HILDA Rhodes      digoxin  125 mcg Oral Daily HILDA Hernandez      FLUoxetine  20 mg Oral Daily HILDA Rodriguez      fluticasone-vilanterol  1 puff Inhalation Daily Douglas Garcia MD      ipratropium  0 5 mg Nebulization Q8H PRN Randy Morales Darol Scottie, CRNP      levalbuterol  1 25 mg Nebulization Q8H PRN Rupeshher Pollack, CRNP      losartan  25 mg Oral Daily Yazmin Ran, CRNP      melatonin  3 mg Oral HS Leo Nightingale, CRNP      metoprolol  2 5 mg Intravenous Q6H PRN Eather Pollack, CRNP      metoprolol tartrate  50 mg Oral Q12H [facility] Amanda Chi MD      morphine injection  2 mg Intravenous Q6H PRN Omer Anderson PA-C      pantoprazole  40 mg Oral Daily Joanette Nightingale, CRNP      torsemide  10 mg Oral Daily Yazmin Ran, CRNP      traMADol  50 mg Oral Q8H PRN Sobeidaanette Nightingale, CRNP       PRN Meds:acetaminophen, 650 mg, Q6H PRN  ipratropium, 0 5 mg, Q8H PRN  levalbuterol, 1 25 mg, Q8H PRN  metoprolol, 2 5 mg, Q6H PRN  morphine injection, 2 mg, Q6H PRN  traMADol, 50 mg, Q8H PRN      ____________________________________________________________________    Physical Exam  Constitutional:       Appearance: She is well-developed  HENT:      Head: Normocephalic and atraumatic  Eyes:      Conjunctiva/sclera: Conjunctivae normal       Pupils: Pupils are equal, round, and reactive to light  Cardiovascular:      Rate and Rhythm: Normal rate and regular rhythm  Heart sounds: Normal heart sounds  Pulmonary:      Effort: Pulmonary effort is normal  No tachypnea, accessory muscle usage or respiratory distress  Breath sounds: Examination of the right-upper field reveals rales  Examination of the left-upper field reveals rales  Examination of the right-middle field reveals rales  Examination of the left-middle field reveals rales  Examination of the right-lower field reveals rales  Examination of the left-lower field reveals rales  Rales present  No wheezing or rhonchi  Chest:      Chest wall: No tenderness  Comments: 2 L NC 94%   Abdominal:      General: Bowel sounds are normal       Palpations: Abdomen is soft  Musculoskeletal:         General: Normal range of motion  Cervical back: Normal range of motion and neck supple  Skin:     General: Skin is warm and dry  Neurological:      Mental Status: She is alert and oriented to person, place, and time         ____________________________________________________________________    Invasive/non-invasive ventilation settings   Respiratory    Lab Data (Last 4 hours)    None         O2/Vent Data (Last 4 hours)    None                Laboratory and Diagnostics:  Results from last 7 days   Lab Units 08/31/21 0314 08/30/21 0511 08/29/21 0442 08/28/21  0541 08/27/21  1443   WBC Thousand/uL 5 17 5 74 4 84 3 54* 9 25   HEMOGLOBIN g/dL 10 0* 9 8* 9 7* 7 1* 11 1*   HEMATOCRIT % 34 7* 34 4* 34 4* 24 5* 39 0   PLATELETS Thousands/uL 164 158 138* 105* 254   NEUTROS PCT % 73  --  78* 74 87*   MONOS PCT % 11  --  7 9 5     Results from last 7 days   Lab Units 08/31/21 0315 08/30/21 0511 08/29/21 0442 08/28/21  0541 08/27/21  1443   SODIUM mmol/L 139 140 141 147* 140   POTASSIUM mmol/L 4 7 5 1 5 4* 3 2* 4 4   CHLORIDE mmol/L 104 107 108 116* 102   CO2 mmol/L 28 29 27 23 27   ANION GAP mmol/L 7 4 6 8 11   BUN mg/dL 12 9 11 11 15   CREATININE mg/dL 0 93 0 87 1 03 0 74 1 15   CALCIUM mg/dL 8 9 9 1 8 6 6 2* 9 2   GLUCOSE RANDOM mg/dL 112 99 104 84 149*   ALT U/L 18  --   --   --  20   AST U/L 14  --   --   --  19   ALK PHOS U/L 56  --   --   --  61   ALBUMIN g/dL 2 7*  --   --   --  3 5   TOTAL BILIRUBIN mg/dL 0 32  --   --   --  0 21     Results from last 7 days   Lab Units 08/31/21 0315 08/30/21  0511 08/29/21 0442 08/28/21  0541 08/27/21  1615   MAGNESIUM mg/dL 1 5* 1 7 2 2 1 1* 1 5*   PHOSPHORUS mg/dL 4 0  --  2 6 2 1* 2 2*      Results from last 7 days   Lab Units 08/27/21  1443   INR  1 12   PTT seconds 37      Results from last 7 days   Lab Units 08/27/21  2333 08/27/21  2116 08/27/21  1756 08/27/21  1443   TROPONIN I ng/mL 0 11* 0 14* 0 07* 0 02     Results from last 7 days   Lab Units 08/27/21  2115 08/27/21  1756 08/27/21  1615   LACTIC ACID mmol/L 1 9 2 1* 3 2*     ABG:    VBG:    Results from last 7 days   Lab Units 08/28/21  0541 08/27/21  1615   PROCALCITONIN ng/ml <0 05 <0 05       Micro  Results from last 7 days   Lab Units 08/27/21  2115 08/27/21  1933 08/27/21  1615   BLOOD CULTURE   --   --  No Growth at 72 hrs  No Growth at 72 hrs  URINE CULTURE   --  60,000-69,000 cfu/ml   --    MRSA CULTURE ONLY  No Methicillin Resistant Staphlyococcus aureus (MRSA) isolated  --   --        Imaging:   CT chest wo contrast   Final Result by Luann Cline MD (08/31 1431)      1  Moderate right and small left effusions, both of which have increased in size since prior CT   2  Chronic parenchymal changes noted in the posterior base of the right lung may represent post radiation fibrosis /chronic atelectasis  3  Severe pulmonary emphysema with subpleural interstitial changes   4  Bilateral adrenal nodules, unchanged on the right, smaller on the left                  Workstation performed: [id number]         XR chest 1 view portable   Final Result by Marylee Pearson, MD (08/27 1705)      No acute cardiopulmonary disease  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

## 2021-09-01 VITALS
TEMPERATURE: 98 F | SYSTOLIC BLOOD PRESSURE: 146 MMHG | HEART RATE: 81 BPM | RESPIRATION RATE: 18 BRPM | DIASTOLIC BLOOD PRESSURE: 60 MMHG | HEIGHT: 64 IN | WEIGHT: 106.26 LBS | BODY MASS INDEX: 18.14 KG/M2 | OXYGEN SATURATION: 96 %

## 2021-09-01 LAB
BACTERIA BLD CULT: NORMAL
BACTERIA BLD CULT: NORMAL

## 2021-09-01 PROCEDURE — 94760 N-INVAS EAR/PLS OXIMETRY 1: CPT

## 2021-09-01 PROCEDURE — 99232 SBSQ HOSP IP/OBS MODERATE 35: CPT | Performed by: INTERNAL MEDICINE

## 2021-09-01 PROCEDURE — 97530 THERAPEUTIC ACTIVITIES: CPT

## 2021-09-01 PROCEDURE — 99239 HOSP IP/OBS DSCHRG MGMT >30: CPT | Performed by: INTERNAL MEDICINE

## 2021-09-01 RX ORDER — PHENAZOPYRIDINE HYDROCHLORIDE 100 MG/1
100 TABLET, FILM COATED ORAL
Status: DISCONTINUED | OUTPATIENT
Start: 2021-09-01 | End: 2021-09-01 | Stop reason: HOSPADM

## 2021-09-01 RX ORDER — FLUTICASONE FUROATE, UMECLIDINIUM BROMIDE AND VILANTEROL TRIFENATATE 100; 62.5; 25 UG/1; UG/1; UG/1
1 POWDER RESPIRATORY (INHALATION) DAILY
Qty: 60 BLISTER | Refills: 0 | Status: SHIPPED | OUTPATIENT
Start: 2021-09-01 | End: 2021-10-04 | Stop reason: SDUPTHER

## 2021-09-01 RX ORDER — MAGNESIUM SULFATE HEPTAHYDRATE 40 MG/ML
2 INJECTION, SOLUTION INTRAVENOUS ONCE
Status: COMPLETED | OUTPATIENT
Start: 2021-09-01 | End: 2021-09-01

## 2021-09-01 RX ORDER — DIGOXIN 125 MCG
125 TABLET ORAL EVERY OTHER DAY
Qty: 15 TABLET | Refills: 0 | Status: SHIPPED | OUTPATIENT
Start: 2021-09-03 | End: 2021-10-08 | Stop reason: SDUPTHER

## 2021-09-01 RX ORDER — DIGOXIN 125 MCG
125 TABLET ORAL EVERY OTHER DAY
Status: DISCONTINUED | OUTPATIENT
Start: 2021-09-03 | End: 2021-09-01 | Stop reason: HOSPADM

## 2021-09-01 RX ORDER — TORSEMIDE 10 MG/1
10 TABLET ORAL DAILY
Qty: 30 TABLET | Refills: 0 | Status: SHIPPED | OUTPATIENT
Start: 2021-09-02 | End: 2021-10-08 | Stop reason: SDUPTHER

## 2021-09-01 RX ORDER — FLUTICASONE FUROATE AND VILANTEROL 100; 25 UG/1; UG/1
1 POWDER RESPIRATORY (INHALATION) DAILY
Qty: 60 BLISTER | Refills: 0 | Status: SHIPPED | OUTPATIENT
Start: 2021-09-02 | End: 2021-10-04

## 2021-09-01 RX ADMIN — APIXABAN 2.5 MG: 2.5 TABLET, FILM COATED ORAL at 09:48

## 2021-09-01 RX ADMIN — METOPROLOL TARTRATE 50 MG: 50 TABLET, FILM COATED ORAL at 09:49

## 2021-09-01 RX ADMIN — FLUTICASONE FUROATE AND VILANTEROL TRIFENATATE 1 PUFF: 100; 25 POWDER RESPIRATORY (INHALATION) at 09:50

## 2021-09-01 RX ADMIN — PANTOPRAZOLE SODIUM 40 MG: 40 TABLET, DELAYED RELEASE ORAL at 09:49

## 2021-09-01 RX ADMIN — LOSARTAN POTASSIUM 25 MG: 25 TABLET, FILM COATED ORAL at 09:49

## 2021-09-01 RX ADMIN — DIGOXIN 125 MCG: 125 TABLET ORAL at 09:48

## 2021-09-01 RX ADMIN — FUROSEMIDE 40 MG: 10 INJECTION, SOLUTION INTRAMUSCULAR; INTRAVENOUS at 09:49

## 2021-09-01 RX ADMIN — MAGNESIUM SULFATE HEPTAHYDRATE 2 G: 40 INJECTION, SOLUTION INTRAVENOUS at 09:48

## 2021-09-01 RX ADMIN — FLUOXETINE 20 MG: 20 CAPSULE ORAL at 09:48

## 2021-09-01 RX ADMIN — TORSEMIDE 10 MG: 10 TABLET ORAL at 09:49

## 2021-09-01 NOTE — ASSESSMENT & PLAN NOTE
History of COPD (FEV1 47% of predicted) as per the chart review, on chronic supplemental oxygen  History of right lung CA status post radiation  Reports that she has not been using any inhaler or nebulizer treatment at home, reports chronic progressive shortness of breath limiting activity,?   Likely precipitant of AFib with RVR  No evidence of exacerbation at present  · Continue Breo and nebulizers  · Continue oxygen supplementation with outpatient follow-up with pulmonology

## 2021-09-01 NOTE — PLAN OF CARE
Problem: Potential for Falls  Goal: Patient will remain free of falls  Description: INTERVENTIONS:  - Educate patient/family on patient safety including physical limitations  - Instruct patient to call for assistance with activity   - Consult OT/PT to assist with strengthening/mobility   - Keep Call bell within reach  - Keep bed low and locked with side rails adjusted as appropriate  - Keep care items and personal belongings within reach  - Initiate and maintain comfort rounds  - Make Fall Risk Sign visible to staff  - Offer Toileting every 3 Hours, in advance of need  - Initiate/Maintain bed alarm  - Obtain necessary fall risk management equipment: fall risk  - Apply yellow socks and bracelet for high fall risk patients  - Consider moving patient to room near nurses station  Outcome: Progressing     Problem: RESPIRATORY - ADULT  Goal: Achieves optimal ventilation and oxygenation  Description: INTERVENTIONS:  - Assess for changes in respiratory status  - Assess for changes in mentation and behavior  - Position to facilitate oxygenation and minimize respiratory effort  - Oxygen administered by appropriate delivery if ordered  - Initiate smoking cessation education as indicated  - Encourage broncho-pulmonary hygiene including cough, deep breathe, Incentive Spirometry  - Assess the need for suctioning and aspirate as needed  - Assess and instruct to report SOB or any respiratory difficulty  - Respiratory Therapy support as indicated  Outcome: Progressing     Problem: Nutrition/Hydration-ADULT  Goal: Nutrient/Hydration intake appropriate for improving, restoring or maintaining nutritional needs  Description: Monitor and assess patient's nutrition/hydration status for malnutrition  Collaborate with interdisciplinary team and initiate plan and interventions as ordered  Monitor patient's weight and dietary intake as ordered or per policy  Utilize nutrition screening tool and intervene as necessary   Determine patient's food preferences and provide high-protein, high-caloric foods as appropriate  INTERVENTIONS:  - Monitor oral intake, urinary output, labs, and treatment plans  - Assess nutrition and hydration status and recommend course of action  - Evaluate amount of meals eaten  - Assist patient with eating if necessary   - Allow adequate time for meals  - Recommend/ encourage appropriate diets, oral nutritional supplements, and vitamin/mineral supplements  - Order, calculate, and assess calorie counts as needed  - Recommend, monitor, and adjust tube feedings and TPN/PPN based on assessed needs  - Assess need for intravenous fluids  - Provide specific nutrition/hydration education as appropriate  - Include patient/family/caregiver in decisions related to nutrition  Outcome: Progressing     Problem: MOBILITY - ADULT  Goal: Maintain or return to baseline ADL function  Description: INTERVENTIONS:  -  Assess patient's ability to carry out ADLs; assess patient's baseline for ADL function and identify physical deficits which impact ability to perform ADLs (bathing, care of mouth/teeth, toileting, grooming, dressing, etc )  - Assess/evaluate cause of self-care deficits   - Assess range of motion  - Assess patient's mobility; develop plan if impaired  - Assess patient's need for assistive devices and provide as appropriate  - Encourage maximum independence but intervene and supervise when necessary  - Involve family in performance of ADLs  - Assess for home care needs following discharge   - Consider OT consult to assist with ADL evaluation and planning for discharge  - Provide patient education as appropriate  Outcome: Progressing  Goal: Maintains/Returns to pre admission functional level  Description: INTERVENTIONS:  - Perform BMAT or MOVE assessment daily    - Set and communicate daily mobility goal to care team and patient/family/caregiver     - Collaborate with rehabilitation services on mobility goals if consulted  - Perform Range of Motion 3 times a day  - Reposition patient every 3 hours    - Dangle patient 3 times a day  - Stand patient 3 times a day  - Ambulate patient 3 times a day  - Out of bed to chair 3 times a day   - Out of bed for meals 3 times a day  - Out of bed for toileting  - Record patient progress and toleration of activity level   Outcome: Progressing     Problem: Prexisting or High Potential for Compromised Skin Integrity  Goal: Skin integrity is maintained or improved  Description: INTERVENTIONS:  - Identify patients at risk for skin breakdown  - Assess and monitor skin integrity  - Assess and monitor nutrition and hydration status  - Monitor labs   - Assess for incontinence   - Turn and reposition patient  - Assist with mobility/ambulation  - Relieve pressure over bony prominences  - Avoid friction and shearing  - Provide appropriate hygiene as needed including keeping skin clean and dry  - Evaluate need for skin moisturizer/barrier cream  - Collaborate with interdisciplinary team   - Patient/family teaching  - Consider wound care consult   Outcome: Progressing     Problem: INFECTION - ADULT  Goal: Absence or prevention of progression during hospitalization  Description: INTERVENTIONS:  - Assess and monitor for signs and symptoms of infection  - Monitor lab/diagnostic results  - Monitor all insertion sites, i e  indwelling lines, tubes, and drains  - Monitor endotracheal if appropriate and nasal secretions for changes in amount and color  - Streeter appropriate cooling/warming therapies per order  - Administer medications as ordered  - Instruct and encourage patient and family to use good hand hygiene technique  - Identify and instruct in appropriate isolation precautions for identified infection/condition  Outcome: Progressing     Problem: PAIN - ADULT  Goal: Verbalizes/displays adequate comfort level or baseline comfort level  Description: Interventions:  - Encourage patient to monitor pain and request assistance  - Assess pain using appropriate pain scale  - Administer analgesics based on type and severity of pain and evaluate response  - Implement non-pharmacological measures as appropriate and evaluate response  - Consider cultural and social influences on pain and pain management  - Notify physician/advanced practitioner if interventions unsuccessful or patient reports new pain  Outcome: Progressing     Problem: DISCHARGE PLANNING  Goal: Discharge to home or other facility with appropriate resources  Description: INTERVENTIONS:  - Identify barriers to discharge w/patient and caregiver  - Arrange for needed discharge resources and transportation as appropriate  - Identify discharge learning needs (meds, wound care, etc )  - Arrange for interpretive services to assist at discharge as needed  - Refer to Case Management Department for coordinating discharge planning if the patient needs post-hospital services based on physician/advanced practitioner order or complex needs related to functional status, cognitive ability, or social support system  Outcome: Progressing

## 2021-09-01 NOTE — ASSESSMENT & PLAN NOTE
Malnutrition Findings:   Adult Malnutrition type: Chronic illness (Moderate malnutrition of chronic illness related to inadequate energy intake as evidenced by somewhat hollow orbits, somewhat protruding/prominant clavicles, some depression at the temples  Treated with Liberalized diet)  Adult Degree of Malnutrition: Malnutrition of moderate degree    BMI Findings:  Adult BMI Classifications: Underweight < 18 5     Body mass index is 18 24 kg/m²     Nutrition evaluation appreciated   Patient lost significant weight recently   Continue diet and nutritional supplementation as tolerated

## 2021-09-01 NOTE — ASSESSMENT & PLAN NOTE
Normocytic  Appears to be lower compared to last year  Patient denies any overt bleeding  · Iron low at 41, B12 and folate unremarkable   Results from last 7 days   Lab Units 08/31/21  0314 08/30/21  0511 08/29/21  0442 08/28/21  0541 08/27/21  1443   HEMOGLOBIN g/dL 10 0* 9 8* 9 7* 7 1* 11 1*   HEMATOCRIT % 34 7* 34 4* 34 4* 24 5* 39 0   MCV fL 94 96 96 95 94

## 2021-09-01 NOTE — PHYSICAL THERAPY NOTE
PT TREATMENT     09/01/21 0845   Note Type   Note Type Treatment   Pain Assessment   Pain Assessment Tool Pain Assessment not indicated - pt denies pain   Restrictions/Precautions   Other Precautions O2;Fall Risk   General   Chart Reviewed Yes   Subjective   Subjective "doing better, I might go home today"   Bed Mobility   Supine to Sit 5  Supervision   Sit to Supine 5  Supervision   Transfers   Sit to Stand 5  Supervision   Stand to Sit 5  Supervision   Stand pivot 5  Supervision   Ambulation/Elevation   Gait pattern   (unsteady)   Gait Assistance 4  Minimal assist   Assistive Device   (handheld 3L02)   Distance 20 feet   Balance   Static Sitting Fair +   Dynamic Sitting Fair +   Static Standing Fair   Dynamic Standing Fair -   Endurance Deficit   Endurance Deficit   (Sp02 stable with activity on 3L02)   Activity Tolerance   Activity Tolerance Patient limited by fatigue  (limited by SOB)   Assessment   Prognosis Good   Problem List Decreased endurance; Impaired balance;Decreased mobility; Decreased strength   Assessment Pt demonstrates improving endurance overall as pt was able to walk for the first time today  Pt does need min assist for balance and was only able to walk 20 feet due to SOB that resolved after a few minutes rest   Pt declined additional activity but agreed to try again later today  Will revisit pt if time allows  Educated pt to use her walker and have assist whenever walking at home if pt leaves today  The patient's AM-PAC Basic Mobility Inpatient Short Form Raw Score is 17, Standardized Score is 39 67  A standardized score less than 42 9 suggests the patient may benefit from discharge to post-acute rehabilitation services, however pt declines and wishes to go home with home PT and assist of her   Plan   Treatment/Interventions ADL retraining;Functional transfer training;LE strengthening/ROM; Therapeutic exercise; Endurance training;Gait training;Bed mobility; Equipment eval/education;Patient/family training;Elevations   Progress Progressing toward goals   PT Frequency 5x/wk   Recommendation   PT Discharge Recommendation Home with home health rehabilitation   Equipment Recommended   (pt vianey pena walker, cane, 02)   AM-PAC Basic Mobility Inpatient   Turning in Bed Without Bedrails 4   Lying on Back to Sitting on Edge of Flat Bed 4   Moving Bed to Chair 3   Standing Up From Chair 3   Walk in Room 3   Climb 3-5 Stairs 2   Basic Mobility Inpatient Raw Score 19   Basic Mobility Standardized Score 97 53   Licensure   [place] License Number  Yoselyn Jeong PT  [id number]

## 2021-09-01 NOTE — CASE MANAGEMENT
Discharge ordered  Pt will be returning home with  and home care services through [facility] [address]  [facility] [address] has accepted case and is aware of discharge today  SOC is planned within 24-48 hours  AVS faxed to agency through [address] [place] [address] Rd  No other discharge needs expressed by pt prior to discharge

## 2021-09-01 NOTE — PROGRESS NOTES
Progress Note - Pulmonary   Ferd Kin 78 y o  female MRN: [id number]  Unit/Bed#: [facility] [place] Encounter: [id number]  Code Status: Level 3 - DNAR and DNI    Karen Simms is a 78 y o  Past Medical History:   Diagnosis Date    Arthritis     Atrial fibrillation (HCC)     CHF (congestive heart failure) (HCC)     COPD (chronic obstructive pulmonary disease) (HCC)     Coronary artery disease     aortic valve replacement    Heart murmur     Hypertension        Assessment/Plan:       Chronic Dyspnea  -likely multifactorial and related to MVP, MR, CHF, COPD, PAH and Chronic Hypoxemia  -prn morphine for dyspnea  -would recommend palliative care referral in OP setting   Severe Emphysematous COPD   -On home Spiriva  -on Breo in inpatient setting   -Duoneb PRN  -Transition to [facility] in OP setting  -needs CPFT in OP setting > ordered and messaged Timmy Murray for scheduling   Chronic Hypoxemic Respiratory Failure   -2 L NC at home  -currently remains 100% on 2 L NC  -titrate for 02 sat 05%  Chronic Diastolic Congestive Heart Failure:  -currently +1 4 improved from  2 8 L    -d/c'd lasix 40 mg BID > pt has been placed on Torsemide 10 mg / day   Severe MVP / MR:  - see echo  -likely etiology of PAH and worsening / transient CHF  PAH:  -multifactorial as listed above  -if persistent despite diuresis would consider RHC  -would defer any trials of PD inhibitors given above MR  History of Lung CA:  -treated w/ radiation same year as her heart valve replacement  -no CMT   -pt uncertain of dates or details of treatment     Patient is stable from a Pulmonary standpoint  Pulmonary service will sign off of this patient's case  Please call with questions, concerns, or need for reevaluation  Pulm follow up as scheduled on D/C Navigator       _________________________________________________    Subjective: Pt seen and examined at bedside  Feels improved / overall not significantly better     Indicated that she does not want heroic interventions and would not want valvular correction (invasive surgery)  Did discuss palliative care with the patient yesterday      Smoking history:   Social History     Tobacco Use   Smoking Status Former Smoker    Packs/day: 1 00    Years: 25 00    Pack years: 25 00    Types: Cigarettes    Quit date: 2000    Years since quittin 6   Smokeless Tobacco Never Used     Smoking history:   Social History           Tobacco Use   Smoking Status Former Smoker    Packs/day: 1 00    Years: 25 00    Pack years: 25 00    Types: Cigarettes    Quit date: 2000    Years since quittin 6   Smokeless Tobacco Never Used      Occupational history:   Environmental History: no sig exposures   Travel history:no recent travel   Respiratory History: COPD / 2 L 02 dependence   PAP Therapy:NA   DME Company: deferred   Rx Insurance: deferred   PFT: NA prior   PSG: NA prior   Echo Reviewed:   [address] [facility]  [phone]     Transthoracic Echocardiogram  2D, M-mode, Doppler, and Color Doppler     Study date:  2021     Patient: Kaci Mar  MR number: [id number]  Account number: [de-identified]  : 1940  Age: 78 years  Gender: Female  Status: Inpatient  Location: Bedside  Height: 65 in  Weight: 107 8 lb  BP: 110/ 53 mmHg     Indications: Chest pain,dizziness      Diagnoses: I42 9 - Cardiomyopathy, unspecified     Sonographer: HELDER Pineda  Primary Physician: Mike Fry MD  Group: [id number]  Interpreting Physician: Aman Blanco MD     SUMMARY     COMPARISONS:  Compared to previous study, patient now has severe pulmonary hypertension and peak PA pressure is increased from 30 to 70 mm Hg     LEFT VENTRICLE:  LV measurements were taken in apical 2 chamber views  Normal left ventricular chamber dimension  There is mild concentric left ventricular hypertrophy  Systolic function is hyperdynamic with an ejection fraction of 75%  No definite regional wall motion abnormality seen although it cannot be absolutely excluded on the basis of this study  Grade 1 diastolic dysfunction     RIGHT VENTRICLE:  Right ventricular free wall was not adequately visualized  Visually RV size appears borderline to mildly enlarged  Systolic function also appears borderline with no TAPSE measurement     LEFT ATRIUM:  Mild to moderately dilated left atrium     MITRAL VALVE:  There is mild to moderate thickening of mitral valve leaflets  There is moderate to severe mitral annular calcification  Leaflet separation is mildly decreased  There is mild to moderate mitral stenosis with mitral valve area of 2 1 cm2 and mean gradient of 6 72 mm Hg  There is mild to moderate mitral regurgitation     AORTIC VALVE:  Aortic valve was not well visualized  A bioprosthesis/TAVR is present  No evidence of significant aortic stenosis  Mean gradient is 5 and peak gradient is 9 mm Hg  No paravalvular leak or aortic valve regurgitation seen     TRICUSPID VALVE:  There was mild to moderate regurgitation  There is severe pulmonary hypertension with peak PA pressure of 70 mm Hg     PULMONIC VALVE:  There was mild regurgitation      IVC, HEPATIC VEINS:  IVC size and respirophasic variations appear preserved     COMPARISONS:  Compared to previous study, patient now has severe pulmonary hypertension and peak PA pressure is increased from 30 to 70 mm Hg     HISTORY: PRIOR HISTORY: chronic diastolic CHF,A Fib ,CVA,HTN,Gastroesophageal reflux disease,Prosthetic Aortic Valve,Lung Cancer,COPD,Chronic kidney disease      PROCEDURE: The procedure was performed at the bedside  This was a routine study  Technically difficult study resulting in off axis measurements The transthoracic approach was used  The study included complete 2D imaging, M-mode, complete  spectral Doppler, and color Doppler  The heart rate was 94 bpm, at the start of the study   Images were obtained from the parasternal, apical, subcostal, and suprasternal notch acoustic windows  Echocardiographic views were limited due to  chest wall deformity, poor patient compliance, poor acoustic window availability, decreased penetration, and lung interference  This was a technically difficult study      LEFT VENTRICLE: LV measurements were taken in apical 2 chamber views  Normal left ventricular chamber dimension  There is mild concentric left ventricular hypertrophy  Systolic function is hyperdynamic with an ejection fraction of 75%  No definite regional wall motion abnormality seen although it cannot be absolutely excluded on the basis of this study  Grade 1 diastolic dysfunction     RIGHT VENTRICLE: Right ventricular free wall was not adequately visualized  Visually RV size appears borderline to mildly enlarged  Systolic function also appears borderline with no TAPSE measurement     LEFT ATRIUM: Mild to moderately dilated left atrium     RIGHT ATRIUM: Size was normal      MITRAL VALVE: There is mild to moderate thickening of mitral valve leaflets  There is moderate to severe mitral annular calcification  Leaflet separation is mildly decreased  There is mild to moderate mitral stenosis with mitral valve area of 2 1 cm2 and mean gradient of 6 72 mm Hg  There is mild to moderate mitral regurgitation     AORTIC VALVE: Aortic valve was not well visualized  A bioprosthesis/TAVR is present  No evidence of significant aortic stenosis  Mean gradient is 5 and peak gradient is 9 mm Hg  No paravalvular leak or aortic valve regurgitation seen     TRICUSPID VALVE: DOPPLER: There was mild to moderate regurgitation  There is severe pulmonary hypertension with peak PA pressure of 70 mm Hg     PULMONIC VALVE: DOPPLER: There was mild regurgitation      PERICARDIUM: There was no pericardial effusion   The pericardium was normal in appearance      AORTA: The root exhibited normal size      SYSTEMIC VEINS: IVC: IVC size and respirophasic variations appear preserved     SYSTEM MEASUREMENT TABLES  Labs Reviewed: yes   Imaging Reviewed:  CTA PE study                 Consults Reviewed: yes   Collaborative Discussion: d/w IM team     Tele Events:     Vitals:   Vitals:    21 2028 21 2256 21 0752 21 0812   BP:  147/63 146/60    BP Location:  Left arm     Pulse: 90 72 77 81   Resp: 20 16  18   Temp:  (!) 97 3 °F (36 3 °C) 98 °F (36 7 °C)    TempSrc:  Oral     SpO2: 100% 100% 100% 96%   Weight:       Height:         Weight (last 2 days)     None        Temp (24hrs), Av 8 °F (36 6 °C), Min:97 3 °F (36 3 °C), Max:98 4 °F (36 9 °C)  Current: Temperature: 98 °F (36 7 °C)          IV Infusions:       Nutrition:        Diet Orders   (From admission, onward)             Start     Ordered    21 175  Diet Regular; Regular House; Potassium 2 GM, Sodium 2 GM, Fluid Restriction 1500 ML  Diet effective now     Question Answer Comment   Diet Type Regular    Regular Regular House    Other Restriction(s): Potassium 2 GM    Other Restriction(s): Sodium 2 GM    Other Restriction(s): Fluid Restriction 1500 ML    RD to adjust diet per protocol? Yes        21 1756    21 1146  Room Service  Once     Question:  Type of Service  Answer:  Room Service - Appropriate with Assistance    21 1145                Ins/Outs:   I/O        07 -  0700  07 -  0700  07 -  0700    P  O  480 110     I V  (mL/kg)  5 (0 1)     IV Piggyback       Total Intake(mL/kg) 480 (10) 115 (2 4)     Urine (mL/kg/hr) 500 (0 4) 400 (0 3) 100 (0 3)    Total Output 500 400 100    Net -20 -285 -100           Unmeasured Urine Occurrence 3 x            Lines/Drains:  Invasive Devices     Peripheral Intravenous Line            Peripheral IV 21 Distal;Dorsal (posterior); Right Forearm 4 days    Peripheral IV 21 Right Antecubital 1 day          Drain            External Urinary Catheter <1 day                 Active medications:  Scheduled Meds:  Current Facility-Administered Medications   Medication Dose Route Frequency Provider Last Rate    acetaminophen  650 mg Oral Q6H PRN Doris Judith, CRNP      apixaban  2 5 mg Oral BID Doris Judith, CRNP      atorvastatin  40 mg Oral Daily With [facility], CRNP      digoxin  125 mcg Intravenous Once Aldo Dc, CRNP      digoxin  125 mcg Oral Daily Jason Coates, CRNP      FLUoxetine  20 mg Oral Daily Doris Judith, CRNP      fluticasone-vilanterol  1 puff Inhalation Daily Omar Torres MD      furosemide  40 mg Intravenous BID (diuretic) Meredith Morris PA-C      ipratropium  0 5 mg Nebulization Q8H PRN Katia Red, CRNP      levalbuterol  1 25 mg Nebulization Q8H PRN Katia Red, CRNP      losartan  25 mg Oral Daily Jason Coates, GERARDNP      magnesium sulfate  2 g Intravenous Once Opal Carney MD      melatonin  3 mg Oral HS Doris Judith, CRNP      metoprolol  2 5 mg Intravenous Q6H PRN Katia Red, CRNP      metoprolol tartrate  50 mg Oral Q12H [address] Omar Torres MD      morphine injection  2 mg Intravenous Q6H PRN Meredith Morris PA-C      pantoprazole  40 mg Oral Daily Doris Judith, CRNP      phenazopyridine  100 mg Oral TID With Meals Opal aCrney MD      torsemide  10 mg Oral Daily Jason Coates, HILDA      traMADol  50 mg Oral Q8H PRN Doris Judith, CRNP       PRN Meds:acetaminophen, 650 mg, Q6H PRN  ipratropium, 0 5 mg, Q8H PRN  levalbuterol, 1 25 mg, Q8H PRN  metoprolol, 2 5 mg, Q6H PRN  morphine injection, 2 mg, Q6H PRN  traMADol, 50 mg, Q8H PRN      ____________________________________________________________________    Physical Exam  Constitutional:       Appearance: She is well-developed  Comments: Elderly / frail    HENT:      Head: Normocephalic and atraumatic  Eyes:      Conjunctiva/sclera: Conjunctivae normal       Pupils: Pupils are equal, round, and reactive to light     Cardiovascular:      Rate and Rhythm: Normal rate and regular rhythm  Heart sounds: Normal heart sounds  Pulmonary:      Effort: Pulmonary effort is normal  No respiratory distress  Breath sounds: Normal breath sounds  No wheezing or rales  Comments: 100% 2 L   Chest:      Chest wall: No tenderness  Abdominal:      General: Bowel sounds are normal       Palpations: Abdomen is soft  Musculoskeletal:         General: Normal range of motion  Cervical back: Normal range of motion and neck supple  Skin:     General: Skin is warm and dry  Neurological:      Mental Status: She is alert and oriented to person, place, and time         ____________________________________________________________________    Invasive/non-invasive ventilation settings   Respiratory    Lab Data (Last 4 hours)    None         O2/Vent Data (Last 4 hours)    None                Laboratory and Diagnostics:  Results from last 7 days   Lab Units 08/31/21  0314 08/30/21  0511 08/29/21  0442 08/28/21  0541 08/27/21  1443   WBC Thousand/uL 5 17 5 74 4 84 3 54* 9 25   HEMOGLOBIN g/dL 10 0* 9 8* 9 7* 7 1* 11 1*   HEMATOCRIT % 34 7* 34 4* 34 4* 24 5* 39 0   PLATELETS Thousands/uL 164 158 138* 105* 254   NEUTROS PCT % 73  --  78* 74 87*   MONOS PCT % 11  --  7 9 5     Results from last 7 days   Lab Units 08/31/21  0315 08/30/21  0511 08/29/21  0442 08/28/21  0541 08/27/21  1443   SODIUM mmol/L 139 140 141 147* 140   POTASSIUM mmol/L 4 7 5 1 5 4* 3 2* 4 4   CHLORIDE mmol/L 104 107 108 116* 102   CO2 mmol/L 28 29 27 23 27   ANION GAP mmol/L 7 4 6 8 11   BUN mg/dL 12 9 11 11 15   CREATININE mg/dL 0 93 0 87 1 03 0 74 1 15   CALCIUM mg/dL 8 9 9 1 8 6 6 2* 9 2   GLUCOSE RANDOM mg/dL 112 99 104 84 149*   ALT U/L 18  --   --   --  20   AST U/L 14  --   --   --  19   ALK PHOS U/L 56  --   --   --  61   ALBUMIN g/dL 2 7*  --   --   --  3 5   TOTAL BILIRUBIN mg/dL 0 32  --   --   --  0 21     Results from last 7 days   Lab Units 08/31/21  0315 08/30/21  5361 08/29/21  0442 08/28/21  0541 08/27/21  1615   MAGNESIUM mg/dL 1 5* 1 7 2 2 1 1* 1 5*   PHOSPHORUS mg/dL 4 0  --  2 6 2 1* 2 2*      Results from last 7 days   Lab Units 08/27/21  1443   INR  1 12   PTT seconds 37      Results from last 7 days   Lab Units 08/27/21  2333 08/27/21  2116 08/27/21  1756 08/27/21  1443   TROPONIN I ng/mL 0 11* 0 14* 0 07* 0 02     Results from last 7 days   Lab Units 08/27/21  2115 08/27/21  1756 08/27/21  1615   LACTIC ACID mmol/L 1 9 2 1* 3 2*     ABG:    VBG:    Results from last 7 days   Lab Units 08/28/21  0541 08/27/21  1615   PROCALCITONIN ng/ml <0 05 <0 05       Micro  Results from last 7 days   Lab Units 08/27/21  2115 08/27/21  1933 08/27/21  1615   BLOOD CULTURE   --   --  No Growth After 4 Days  No Growth After 4 Days  URINE CULTURE   --  60,000-69,000 cfu/ml   --    MRSA CULTURE ONLY  No Methicillin Resistant Staphlyococcus aureus (MRSA) isolated  --   --        Imaging:   CT chest wo contrast   Final Result by Claire Grant MD (08/31 1431)      1  Moderate right and small left effusions, both of which have increased in size since prior CT   2  Chronic parenchymal changes noted in the posterior base of the right lung may represent post radiation fibrosis /chronic atelectasis  3  Severe pulmonary emphysema with subpleural interstitial changes   4  Bilateral adrenal nodules, unchanged on the right, smaller on the left                  Workstation performed: [id number]         XR chest 1 view portable   Final Result by Livia Aaron MD (08/27 1705)      No acute cardiopulmonary disease  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

## 2021-09-01 NOTE — ASSESSMENT & PLAN NOTE
Done previously at [facility]   ECHO: Elias Ya presents  No evidence of significant aortic stenosis   No leak or regurgitation seen

## 2021-09-01 NOTE — ASSESSMENT & PLAN NOTE
Wt Readings from Last 3 Encounters:   08/27/21 48 2 kg (106 lb 4 2 oz)   07/26/21 48 6 kg (107 lb 2 3 oz)   03/09/21 50 8 kg (112 lb)     Echocardiogram EF 75 %, grade 1 diastolic dysfunction, PA pressure 70 mmHg  No evidence of volume overload  · Continue metoprolol  · Patient was given a dose of IV Lasix on August 30, 2021 and restarted back on torsemide 10 milligram p o   Daily on 08/31  · Monitor intake output, daily weight

## 2021-09-01 NOTE — DISCHARGE SUMMARY
[address] 43  Discharge- Danielle Hooks 1940, 78 y o  female MRN: [id number]  Unit/Bed#: [address] [place] [address] Encounter: [id number]  Primary Care Provider: Atul Rajan MD   Date and time admitted to hospital: 8/27/2021  2:25 PM    * Atrial fibrillation with rapid ventricular response [facility])  Assessment & Plan  History of paroxysmal AFib on Eliquis  Presented with AFib with RVR, hypotensive after initiation of Cardizem drip, subsequently admitted to step-down unit  Loaded with digoxin with improvement in heart rate  Patient continued to have irregular heartbeat which was uncontrolled overnight despite multiple doses of metoprolol  Patient became hypotensive with IV Cardizem  Likely precipitated by COPD plus/minus hypoxia, pulmonary hypertension, ? Compliance  · Will continue metoprolol 50 milligram p o  B i d   · Patient was on digoxin 125 micrograms p o  Daily which will be decreased to 125 milligram every other day upon discharge  · Continue Eliquis for anticoagulation      COPD (chronic obstructive pulmonary disease) ([facility] [address] )  Assessment & Plan  History of COPD (FEV1 47% of predicted) as per the chart review, on chronic supplemental oxygen  History of right lung CA status post radiation  Reports that she has not been using any inhaler or nebulizer treatment at home, reports chronic progressive shortness of breath limiting activity,?   Likely precipitant of AFib with RVR  No evidence of exacerbation at present  · Continue Breo and nebulizers  · Continue oxygen supplementation with outpatient follow-up with pulmonology    Elevated troponin  Assessment & Plan  Secondary to AFib with RVR  Likely non MI troponin elevation    Chronic diastolic (congestive) heart failure (HCC)  Assessment & Plan  Wt Readings from Last 3 Encounters:   08/27/21 48 2 kg (106 lb 4 2 oz)   07/26/21 48 6 kg (107 lb 2 3 oz)   03/09/21 50 8 kg (112 lb)     Echocardiogram EF 75 %, grade 1 diastolic dysfunction, PA pressure 70 mmHg  No evidence of volume overload  · Continue metoprolol  · Patient was given a dose of IV Lasix on August 30, 2021 and restarted back on torsemide 10 milligram p o  Daily on 08/31  · Monitor intake output, daily weight    Chronic respiratory failure with hypoxia 2/2 COPD  Assessment & Plan  On 2-3 L of supplemental oxygen which is chronic and baseline   CT of the chest showed moderate right and small left pleural effusions both of which have increased since prior CT with chronic parenchymal changes in the posterior base of the right lung suggesting post radiation fibrosis/chronic atelectasis with severe pulmonary emphysema with subpleural interstitial changes    Anemia  Assessment & Plan  Normocytic  Appears to be lower compared to last year  Patient denies any overt bleeding  · Iron low at 41, B12 and folate unremarkable   Results from last 7 days   Lab Units 08/31/21  0314 08/30/21  0511 08/29/21  0442 08/28/21  0541 08/27/21  1443   HEMOGLOBIN g/dL 10 0* 9 8* 9 7* 7 1* 11 1*   HEMATOCRIT % 34 7* 34 4* 34 4* 24 5* 39 0   MCV fL 94 96 96 95 94       Moderate protein-calorie malnutrition (HCC)  Assessment & Plan  Malnutrition Findings:   Adult Malnutrition type: Chronic illness (Moderate malnutrition of chronic illness related to inadequate energy intake as evidenced by somewhat hollow orbits, somewhat protruding/prominant clavicles, some depression at the temples  Treated with Liberalized diet)  Adult Degree of Malnutrition: Malnutrition of moderate degree    BMI Findings:  Adult BMI Classifications: Underweight < 18 5     Body mass index is 18 24 kg/m²     Nutrition evaluation appreciated   Patient lost significant weight recently   Continue diet and nutritional supplementation as tolerated    CKD (chronic kidney disease) stage 3, GFR 30-59 ml/min [facility])  Assessment & Plan  Lab Results   Component Value Date    EGFR 57 08/31/2021    EGFR 62 08/30/2021    EGFR 50 08/29/2021    CREATININE 0 93 08/31/2021 CREATININE 0 87 08/30/2021    CREATININE 1 03 08/29/2021   Stable  Monitor    S/P TAVR (transcatheter aortic valve replacement)  Assessment & Plan  Done previously at [facility]   ECHO: Eliana Alves presents  No evidence of significant aortic stenosis  No leak or regurgitation seen     Depression  Assessment & Plan  On fluoxetine    GERD (gastroesophageal reflux disease)  Assessment & Plan  On PPI    Hypertension  Assessment & Plan  · Currently stable  · Monitor metoprolol  · Patient started on Cozaar 25 milligram p o  Daily and restarted on torsemide  · Patient to resume lisinopril and torsemide upon discharge      Discharging Physician / Practitioner: Tamiko Manzanares MD  PCP: Yobany Murillo MD  Admission Date:   Admission Orders (From admission, onward)     Ordered        08/27/21 1632  Inpatient Admission  Once                   Discharge Date: 09/01/21    Medical Problems     Resolved Problems  Date Reviewed: 9/1/2021        Resolved    Sepsis without shock  8/29/2021     Resolved by  Juan Francisco Quinonez MD    UTI (urinary tract infection) 8/29/2021     Resolved by  Juan Francisco Quinonez MD                Consultations During Hospital Stay:  · Cardiology and pulmonology     Outpatient Tests Requested:  · Follow-up with PCP, Cardiology and pulmonology    Complications:  None    Reason for Admission:  Chest pain and shortness of breath    Hospital Course:     Swapna Salmeron is a 78 y o  female patient with past medical history of atrial fibrillation, COPD, chronic respiratory failure, GERD, right lung cancer status post radiation, CVA who originally presented to the hospital on 8/27/2021 due to AFib with RVR along with dysuria and sepsis/UTI  Patient was loaded with digoxin and was started initially on Cardizem drip which was later weaned  Patient received IV Zosyn for possible UTI    Patient continued to improve but had another event of AFib with RVR which later converted with IV Lopressor and repeat infusion of digoxin  Patient received IV Lasix during the hospital stay  Patient completed course of IV Zosyn  Patient continued remained stable without any further episodes of atrial fibrillation  Patient to be discharged home with outpatient follow-up with pulmonology and Cardiology        Please see above list of diagnoses and related plan for additional information  Condition at Discharge: fair     Discharge Day Visit / Exam:     Subjective:  Patient continues to complain of of frequency with urination  Denies any chest pain, palpitations or shortness of breath  Vitals: Blood Pressure: 146/60 (09/01/21 0752)  Pulse: 81 (09/01/21 0812)  Temperature: 98 °F (36 7 °C) (09/01/21 0752)  Temp Source: Oral (08/31/21 2256)  Respirations: 18 (09/01/21 0812)  Height: 5' 4" (162 6 cm) (08/27/21 2030)  Weight - Scale: 48 2 kg (106 lb 4 2 oz) (08/27/21 2030)  SpO2: 96 % (09/01/21 5996)  Exam:   Physical Exam  Constitutional:       Appearance: Normal appearance  HENT:      Head: Normocephalic and atraumatic  Nose: Nose normal       Mouth/Throat:      Mouth: Mucous membranes are moist       Pharynx: Oropharynx is clear  Eyes:      Extraocular Movements: Extraocular movements intact  Pupils: Pupils are equal, round, and reactive to light  Cardiovascular:      Rate and Rhythm: Normal rate and regular rhythm  Pulmonary:      Effort: Pulmonary effort is normal       Breath sounds: Normal breath sounds  Abdominal:      General: Bowel sounds are normal  There is no distension  Palpations: Abdomen is soft  Tenderness: There is no abdominal tenderness  Musculoskeletal:         General: No swelling  Cervical back: Normal range of motion and neck supple  Skin:     General: Skin is warm and dry  Neurological:      General: No focal deficit present  Mental Status: She is alert             Discharge instructions/Information to patient and family:   See after visit summary for information provided to patient and family  Provisions for Follow-Up Care:  See after visit summary for information related to follow-up care and any pertinent home health orders  Disposition:     Home with [facility]NA Services (Reminder: Complete face to face encounter)      Planned Readmission: No     Discharge Statement:  I spent 35 minutes discharging the patient  This time was spent on the day of discharge  I had direct contact with the patient on the day of discharge  Greater than 50% of the total time was spent examining patient, answering all patient questions, arranging and discussing plan of care with patient as well as directly providing post-discharge instructions  Additional time then spent on discharge activities  Discharge Medications:  See after visit summary for reconciled discharge medications provided to patient and family        ** Please Note: This note has been constructed using a voice recognition system **

## 2021-09-01 NOTE — ASSESSMENT & PLAN NOTE
Lab Results   Component Value Date    EGFR 57 08/31/2021    EGFR 62 08/30/2021    EGFR 50 08/29/2021    CREATININE 0 93 08/31/2021    CREATININE 0 87 08/30/2021    CREATININE 1 03 08/29/2021   Stable  Monitor

## 2021-09-01 NOTE — NURSING NOTE
Patient discharged home   came to pickup patient  Referral made for home health PT/OT  IV access taken out  All belongings sent with patient  Questions and concerns answered at this time

## 2021-09-01 NOTE — PROGRESS NOTES
Progress Note - Cardiology   [facility]     Miles Bellamy 78 y o  female MRN: [id number]  : 1940  Unit/Bed#: 2 [place] [address] Encounter: [id number]    Assessment and Plan:   1  Paroxysmal atrial fibrillation with rapid ventricular response:  Patient received IV digoxin load on admission, but oral dosing was not continued     -  transition patient to digoxin 0 125 mg every other day    -  continue metoprolol 50 mg twice a day    -  continue Eliquis 2 5 mg daily    -  continue monitor telemetry     2  Acute exacerbation of COPD:  Patient currently on 2 L of oxygen chronically at home   History of right-sided lung cancer with previous radiation    -  patient states not currently using any inhalers at home    -  started on duo nebs and Breo by primary team     3  Hypertension:  Will start Cozaar 25 mg daily and monitor vital signs and renal function    -  blood pressure slowly improving     4  Chronic diastolic heart failure:  Appears to be euvolemic   Continue current meds     5  Aortic stenosis with history of TAVR:  Performed at [facility] [address] performed 2021 with normal gradients     6  Severe pulmonary hypertension:  Pulmonary pressures by echocardiogram from 2021 noted to be estimated at 70 mm Hg    Subjective / Objective:   Patient seen and examined  No complaints offered at this time  She feels that her breathing has improved  Stressed with patient importance with follow-up with Cardiology and pulmonology in the outpatient setting    Vitals: Blood pressure 146/60, pulse 81, temperature 98 °F (36 7 °C), resp  rate 18, height 5' 4" (1 626 m), weight 48 2 kg (106 lb 4 2 oz), SpO2 96 %, not currently breastfeeding  Vitals:    21 1450 21   Weight: 47 7 kg (105 lb 2 6 oz) 48 2 kg (106 lb 4 2 oz)     Body mass index is 18 24 kg/m²    BP Readings from Last 3 Encounters:   21 146/60   21 161/71   21 113/85 Orthostatic Blood Pressures      Most Recent Value   Blood Pressure  146/60 filed at 09/01/2021 0752   Patient Position - Orthostatic VS  Lying filed at 08/31/2021 2256        I/O       08/30 0701 - 08/31 0700 08/31 0701 - 09/01 0700 09/01 0701 - 09/02 0700    P  O  110      I V  (mL/kg) 5 (0 1)      Total Intake(mL/kg) 115 (2 4)      Urine (mL/kg/hr) 400 (0 3) 1500 (1 3)     Total Output 400 1500     Net -285 -1500            Unmeasured Urine Occurrence  2 x         Invasive Devices     Peripheral Intravenous Line            Peripheral IV 08/28/21 Distal;Dorsal (posterior); Right Forearm 4 days    Peripheral IV 08/31/21 Right Antecubital 1 day          Drain            External Urinary Catheter <1 day                  Intake/Output Summary (Last 24 hours) at 9/1/2021 1027  Last data filed at 8/31/2021 1950  Gross per 24 hour   Intake --   Output 1400 ml   Net -1400 ml         Physical Exam:   Physical Exam  Vitals and nursing note reviewed  Constitutional:       Appearance: Normal appearance  She is well-developed and underweight  HENT:      Head: Normocephalic  Right Ear: External ear normal       Left Ear: External ear normal       Nose: Nose normal    Eyes:      General: No scleral icterus  Right eye: No discharge  Left eye: No discharge  Neck:      Thyroid: No thyromegaly  Cardiovascular:      Rate and Rhythm: Normal rate and regular rhythm  Pulses: Normal pulses  Heart sounds: Normal heart sounds  No murmur heard  Pulmonary:      Effort: Pulmonary effort is normal  No respiratory distress  Breath sounds: Examination of the right-lower field reveals decreased breath sounds  Examination of the left-lower field reveals decreased breath sounds  Decreased breath sounds present  No wheezing  Abdominal:      General: Bowel sounds are normal       Palpations: Abdomen is soft  Musculoskeletal:      Cervical back: Normal range of motion and neck supple     Skin: General: Skin is warm and dry  Neurological:      Mental Status: She is alert                     Medications/ Allergies:     Current Facility-Administered Medications   Medication Dose Route Frequency Provider Last Rate    acetaminophen  650 mg Oral Q6H PRN HILDA Cornell      apixaban  2 5 mg Oral BID HILDA Cornell      atorvastatin  40 mg Oral Daily With [facility] DrHILDA      digoxin  125 mcg Intravenous Once HILDA Panchal      [START ON 9/3/2021] digoxin  125 mcg Oral Every Other Day HILDA Estrada      FLUoxetine  20 mg Oral Daily HILDA Cornell      fluticasone-vilanterol  1 puff Inhalation Daily Lelia Sams MD      ipratropium  0 5 mg Nebulization Q8H PRN Feliciano Hima, HILDA      levalbuterol  1 25 mg Nebulization Q8H PRN Feliciano Hima, HILDA      losartan  25 mg Oral Daily HILDA Estrada      magnesium sulfate  2 g Intravenous Once Arabella Hilton MD      melatonin  3 mg Oral HS HILDA Cornell      metoprolol  2 5 mg Intravenous Q6H PRN HILDA Butcher      metoprolol tartrate  50 mg Oral Q12H [facility] Lelia Sams MD      morphine injection  2 mg Intravenous Q6H PRN Treasure Leung PA-C      pantoprazole  40 mg Oral Daily HILDA Cornell      phenazopyridine  100 mg Oral TID With Meals Arabella Hilton MD      torsemide  10 mg Oral Daily HILDA Estrada      traMADol  50 mg Oral Q8H PRN HILDA Cornell       acetaminophen, 650 mg, Q6H PRN  ipratropium, 0 5 mg, Q8H PRN  levalbuterol, 1 25 mg, Q8H PRN  metoprolol, 2 5 mg, Q6H PRN  morphine injection, 2 mg, Q6H PRN  traMADol, 50 mg, Q8H PRN      No Known Allergies    VTE Pharmacologic Prophylaxis:   Sequential compression device (Venodyne)     Labs:   Troponins:  Results from last 7 days   Lab Units 08/27/21  2333 08/27/21  2116 08/27/21  1756   TROPONIN I ng/mL 0 11* 0 14* 0 07*     CBC with diff:  Results from last 7 days   Lab Units 08/31/21  0314 08/30/21  0511 08/29/21 0442 08/28/21  0541 08/27/21  1443   WBC Thousand/uL 5 17 5 74 4 84 3 54* 9 25   HEMOGLOBIN g/dL 10 0* 9 8* 9 7* 7 1* 11 1*   HEMATOCRIT % 34 7* 34 4* 34 4* 24 5* 39 0   MCV fL 94 96 96 95 94   PLATELETS Thousands/uL 164 158 138* 105* 254   MCH pg 27 2 27 3 26 9 27 1 26 6*   MCHC g/dL 28 8* 28 5* 28 2* 28 6* 28 5*   RDW % 15 6* 15 6* 15 9* 15 7* 15 2*   MPV fL 10 5 10 4 10 1 9 7 10 3   NRBC AUTO /100 WBCs 0  --  0 0 0     CMP:  Results from last 7 days   Lab Units 08/31/21 0315 08/30/21 0511 08/29/21 0442 08/28/21  0541 08/27/21  1443   SODIUM mmol/L 139 140 141 147* 140   POTASSIUM mmol/L 4 7 5 1 5 4* 3 2* 4 4   CHLORIDE mmol/L 104 107 108 116* 102   CO2 mmol/L 28 29 27 23 27   ANION GAP mmol/L 7 4 6 8 11   BUN mg/dL 12 9 11 11 15   CREATININE mg/dL 0 93 0 87 1 03 0 74 1 15   CALCIUM mg/dL 8 9 9 1 8 6 6 2* 9 2   AST U/L 14  --   --   --  19   ALT U/L 18  --   --   --  20   ALK PHOS U/L 56  --   --   --  61   TOTAL PROTEIN g/dL 6 3*  --   --   --  7 6   ALBUMIN g/dL 2 7*  --   --   --  3 5   TOTAL BILIRUBIN mg/dL 0 32  --   --   --  0 21   EGFR ml/min/1 73sq m 57 62 50 75 44     Magnesium:  Results from last 7 days   Lab Units 08/31/21 0315 08/30/21 0511 08/29/21 0442 08/28/21  0541 08/27/21  1615   MAGNESIUM mg/dL 1 5* 1 7 2 2 1 1* 1 5*     Coags:  Results from last 7 days   Lab Units 08/27/21  1443   PTT seconds 37   INR  1 12     TSH:  Results from last 7 days   Lab Units 08/27/21  1615   TSH 3RD GENERATON uIU/mL 1 640       Imaging & Testing   I have personally reviewed pertinent reports  XR chest 1 view portable    Result Date: 8/27/2021  Narrative: CHEST INDICATION:   Chest pain  COMPARISON:  Chest radiograph from 7/23/2021 and chest CT from 12/19/2020  EXAM PERFORMED/VIEWS:  XR CHEST PORTABLE FINDINGS: Normal heart size  Median sternotomy  TAVR  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  No acute disease  No effusion or pneumothorax    Chronic elevation of the right hemidiaphragm  Severe bilateral glenohumeral degenerative disease  Impression: No acute cardiopulmonary disease  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 with chronic elevation of the right hemidiaphragm  Workstation performed: [id number]     CT chest wo contrast    Result Date: 8/31/2021  Narrative: CT CHEST WITHOUT IV CONTRAST INDICATION:   Shortness of breath abnormal chest xray  COMPARISON:  There is a previous chest CT for comparison dated 12/9/2020, and chest x-ray dated 8/27/2021 TECHNIQUE: CT examination of the chest was performed without intravenous contrast   Axial, sagittal, and coronal 2D reformatted images were created from the source data and submitted for interpretation  Radiation dose length product (DLP) for this visit:  158 mGy-cm   This examination, like all CT scans performed in the [facility], was performed utilizing techniques to minimize radiation dose exposure, including the use of iterative reconstruction and automated exposure control  FINDINGS: LUNGS:  There is severe pulmonary emphysema  Compressive atelectasis present in the posterior lung fields most pronounced at the right base posteriorly, with air bronchograms present with appearance similar to prior study of 12/9/2020  Scattered prominent interstitial markings also again noted  Overall volume loss within the right hemithorax with cardiac and mediastinal shifting toward the right as on prior study PLEURA:  Small left-sided pleural effusion present, larger than prior CT  Moderate right-sided pleural effusion present, also increased in size HEART/GREAT VESSELS:  No pericardial effusion MEDIASTINUM AND DORIAN:  Unremarkable  CHEST WALL AND LOWER NECK:   Status post median sternotomy VISUALIZED STRUCTURES IN THE UPPER ABDOMEN:  Visualized aspect of the right kidney is atrophic    Bilateral adrenal nodules are seen, similar on the right, diminished on the left from approximately 3 0 x 1 9 cm to approximately 2 4 x 1 3 cm OSSEOUS STRUCTURES:  No acute fracture or destructive osseous lesion  Impression: 1  Moderate right and small left effusions, both of which have increased in size since prior CT 2  Chronic parenchymal changes noted in the posterior base of the right lung may represent post radiation fibrosis /chronic atelectasis  3  Severe pulmonary emphysema with subpleural interstitial changes 4  Bilateral adrenal nodules, unchanged on the right, smaller on the left Workstation performed: [id number]        EKG / Monitor: Personally reviewed  Sinus rhythm    Cardiac testing:   Results for orders placed during the hospital encounter of 21    Echo complete with contrast if indicated    Narrative  [address]  [address] [facility], [facility] [address]  [phone]    Transthoracic Echocardiogram  2D, M-mode, Doppler, and Color Doppler    Study date:  2021    Patient: Rose Cruz  MR number: [id number]  Account number: [de-identified]  : 1940  Age: 78 years  Gender: Female  Status: Inpatient  Location: Bedside  Height: 65 in  Weight: 107 8 lb  BP: 110/ 53 mmHg    Indications: Chest pain,dizziness      Diagnoses: I42 9 - Cardiomyopathy, unspecified    Sonographer:  HELDER Cochran  Primary Physician:  Inocencia Galindo MD  Group:  [facility]  Interpreting Physician:  Roni Chou MD    SUMMARY    COMPARISONS:  Compared to previous study, patient now has severe pulmonary hypertension and peak PA pressure is increased from 30 to 70 mm Hg    LEFT VENTRICLE:  LV measurements were taken in apical 2 chamber views  Normal left ventricular chamber dimension  There is mild concentric left ventricular hypertrophy  Systolic function is hyperdynamic with an ejection fraction of 75%  No definite regional wall motion abnormality seen although it cannot be absolutely excluded on the basis of this study  Grade 1 diastolic dysfunction    RIGHT VENTRICLE:  Right ventricular free wall was not adequately visualized  Visually RV size appears borderline to mildly enlarged  Systolic function also appears borderline with no TAPSE measurement    LEFT ATRIUM:  Mild to moderately dilated left atrium    MITRAL VALVE:  There is mild to moderate thickening of mitral valve leaflets  There is moderate to severe mitral annular calcification  Leaflet separation is mildly decreased  There is mild to moderate mitral stenosis with mitral valve area of 2 1 cm2 and mean gradient of 6 72 mm Hg  There is mild to moderate mitral regurgitation    AORTIC VALVE:  Aortic valve was not well visualized  A bioprosthesis/TAVR is present  No evidence of significant aortic stenosis  Mean gradient is 5 and peak gradient is 9 mm Hg  No paravalvular leak or aortic valve regurgitation seen    TRICUSPID VALVE:  There was mild to moderate regurgitation  There is severe pulmonary hypertension with peak PA pressure of 70 mm Hg    PULMONIC VALVE:  There was mild regurgitation  IVC, HEPATIC VEINS:  IVC size and respirophasic variations appear preserved    COMPARISONS:  Compared to previous study, patient now has severe pulmonary hypertension and peak PA pressure is increased from 30 to 70 mm Hg    HISTORY: PRIOR HISTORY: chronic diastolic CHF,A Fib ,CVA,HTN,Gastroesophageal reflux disease,Prosthetic Aortic Valve,Lung Cancer,COPD,Chronic kidney disease  PROCEDURE: The procedure was performed at the bedside  This was a routine study  Technically difficult study resulting in off axis measurements The transthoracic approach was used  The study included complete 2D imaging, M-mode, complete  spectral Doppler, and color Doppler  The heart rate was 94 bpm, at the start of the study  Images were obtained from the parasternal, apical, subcostal, and suprasternal notch acoustic windows   Echocardiographic views were limited due to  chest wall deformity, poor patient compliance, poor acoustic window availability, decreased penetration, and lung interference  This was a technically difficult study  LEFT VENTRICLE: LV measurements were taken in apical 2 chamber views  Normal left ventricular chamber dimension  There is mild concentric left ventricular hypertrophy  Systolic function is hyperdynamic with an ejection fraction of 75%  No definite regional wall motion abnormality seen although it cannot be absolutely excluded on the basis of this study  Grade 1 diastolic dysfunction    RIGHT VENTRICLE: Right ventricular free wall was not adequately visualized  Visually RV size appears borderline to mildly enlarged  Systolic function also appears borderline with no TAPSE measurement    LEFT ATRIUM: Mild to moderately dilated left atrium    RIGHT ATRIUM: Size was normal     MITRAL VALVE: There is mild to moderate thickening of mitral valve leaflets  There is moderate to severe mitral annular calcification  Leaflet separation is mildly decreased  There is mild to moderate mitral stenosis with mitral valve area of 2 1 cm2 and mean gradient of 6 72 mm Hg  There is mild to moderate mitral regurgitation    AORTIC VALVE: Aortic valve was not well visualized  A bioprosthesis/TAVR is present  No evidence of significant aortic stenosis  Mean gradient is 5 and peak gradient is 9 mm Hg  No paravalvular leak or aortic valve regurgitation seen    TRICUSPID VALVE: DOPPLER: There was mild to moderate regurgitation  There is severe pulmonary hypertension with peak PA pressure of 70 mm Hg    PULMONIC VALVE: DOPPLER: There was mild regurgitation  PERICARDIUM: There was no pericardial effusion  The pericardium was normal in appearance  AORTA: The root exhibited normal size      SYSTEMIC VEINS: IVC: IVC size and respirophasic variations appear preserved    SYSTEM MEASUREMENT TABLES    2D  EF (Teich): 58 98 %  %FS: 30 59 %  Ao Diam: 2 82 cm  EDV(Teich): 56 52 ml  ESV(Teich): 23 19 ml  IVSd: 1 28 cm  LA Area: 23 84 cm2  LA Diam: 4 46 cm  LVEDV MOD A4C: 70 18 ml  LVEF MOD A4C: 67 91 %  LVESV MOD A4C: 22 52 ml  LVIDd: 3 66 cm  LVIDs: 2 54 cm  LVLd A4C: 7 05 cm  LVLs A4C: 5 64 cm  LVOT Diam: 1 98 cm  LVPWd: 1 34 cm  RA Area: 10 98 cm2  RVIDd: 2 68 cm  SV (Teich): 33 34 ml  SV MOD A4C: 47 66 ml    CW  AV Env  Ti: 220 74 ms  AV VTI: 23 26 cm  AV Vmax: 1 5 m/s  AV Vmean: 1 05 m/s  AV maxP 06 mmHg  AV meanP 05 mmHg  TR Vmax: 4 09 m/s  TR maxP 94 mmHg    PW  MV E/A Ratio: 0 84  E' Sept: 0 06 m/s  E/E' Sept: 22 95  LVOT Env  Ti: 281 64 ms  LVOT VTI: 25 72 cm  LVOT Vmax: 1 3 m/s  LVOT Vmean: 0 91 m/s  LVOT maxP 79 mmHg  LVOT meanPG: 3 81 mmHg  MV A Ermias: 1 6 m/s  MV Dec Burleson: 6 99 m/s2  MV DecT: 193 88 ms  MV E Ermias: 1 35 m/s  MV PHT: 56 23 ms  MVA By PHT: 3 91 cm2    [address] Accredited Ech[facility]ography [facility]    Prepared and electronically signed by    America Turner MD  Signed 2021 12:31:58      Latanya Lovett        "This note has been constructed using a voice recognition system  Therefore there may be syntax, spelling, and/or grammatical errors   Please call if you have any questions  "

## 2021-09-01 NOTE — ASSESSMENT & PLAN NOTE
· Currently stable  · Monitor metoprolol  · Patient started on Cozaar 25 milligram p o   Daily and restarted on torsemide  · Patient to resume lisinopril and torsemide upon discharge

## 2021-09-01 NOTE — ASSESSMENT & PLAN NOTE
History of paroxysmal AFib on Eliquis  Presented with AFib with RVR, hypotensive after initiation of Cardizem drip, subsequently admitted to step-down unit  Loaded with digoxin with improvement in heart rate  Patient continued to have irregular heartbeat which was uncontrolled overnight despite multiple doses of metoprolol  Patient became hypotensive with IV Cardizem  Likely precipitated by COPD plus/minus hypoxia, pulmonary hypertension, ? Compliance  · Will continue metoprolol 50 milligram p o  B i d   · Patient was on digoxin 125 micrograms p o   Daily which will be decreased to 125 milligram every other day upon discharge  · Continue Eliquis for anticoagulation

## 2021-09-01 NOTE — CASE MANAGEMENT
LOS - 5 days    CPR2  Bundle-CHF    SW met with pt to assess needs and discuss plans  Discussed goals of making sure pt's needs are met upon discharge and that Freedom of Choice is offered  Pt lives with her  in two story house  Per pt she was independent with ADLs and mobility  Pt has a cane, walker and commode (downstairs)  Pt has oxygen and nebulizer from [facility]  Per pt she had home care services long ago  Pt has also been to acute rehab at [facility] a few times as well  No MH or D&A issues  Pt's PCP is Dr Eduard Russell MD   Per pt she has prescription coverage and has no difficulty getting her medication as prescribed  Preferred pharmacy is [facility]    Pt does not have POA/advanced directives  Per pt her son is her healthcare representative  Discussed dishcarge plans and recommendations with pt  Home care/therapy is being recommended  In the past pt has declined services  Offered pt home care services and reviewed list of area agencies to consider  Pt is open to home care and home therapy at this time  Pt expressed no specific preference of agency  Referral made to [facility] of [address]  Waiting for response  Discharge is anticipated today  Pt aware  IMM reviewed with patient  Patient agrees with discharge determination  Patient signed IMM and copy given  Copy also placed in scan bin for chart  Per pt her  and friend will be picking her up  Per pt she and her  do not drive anymore  SW will continue to follow to monitor progress and assist with transition home when ready

## 2021-09-02 ENCOUNTER — PATIENT OUTREACH (OUTPATIENT)
Dept: CASE MANAGEMENT | Facility: HOSPITAL | Age: 81
End: 2021-09-02

## 2021-09-02 NOTE — PROGRESS NOTES
ADT notification received yesterday for patient discharge from hospital, LOS = 5 days  Patient discharged donald with [facility]  Unsuccessful attempt at reaching patient  Left name, call back number and message requesting return call of this CM  Unsuccessful attempt at reaching [facility]  Left name, call back number and message requesting return call confirming patient [facility]  This CM will continue to monitor electronically via chart review, outreach and [facility] 
none

## 2021-09-03 ENCOUNTER — PATIENT OUTREACH (OUTPATIENT)
Dept: CASE MANAGEMENT | Facility: HOSPITAL | Age: 81
End: 2021-09-03

## 2021-09-03 NOTE — PROGRESS NOTES
Patient  left  for CM this afternoon requesting return call to discuss a couple of medications patient prescribed but does not have period CM returned call two  as requested in which Pj informed CM that patient does not have melatonin or aspirin EC 81 milligrams  CM informed patient  that both medications are OTC   melatonin is taken to help sleep at bedtime and aspirin EC is an enteric coated aspirin  has been questioned if baby aspirin is the same and if patient could take  CM informed  that if the milligram strength is 81 milligrams it would be comparable to the ecotrin recommended on the AVS    Pj verbalized understanding and expressed gratitude for [facility] assistance

## 2021-09-03 NOTE — PROGRESS NOTES
Email received yesterday from [facility] stating msg's have been left for patient with no return call to schedule first visit  CM spoke briefly with patient this morning who states she is feeling very tired and druggie    BAILEE explained that [facility] was trying to reach her to set up her first visit and she replied she doesn't really think she needs a visiting nurse  States her  has been watching her going up and down the steps and gives her her medications  CM attempted to discuss medications with her and she replied she doesn't know as her  gives her the medications and would like for this CM to come into home to review medications  She states her  can't speak right now as he is on his phone talking with someone else  CM explained that is what the visiting nurse will do and encouraged her to contact [facility]  She requested return call from [facility] be placed to her  Coco Ayesha       Email sent to [facility] with request for skilled nurse to contact  Coco Hodge at [phone]

## 2021-09-09 ENCOUNTER — PATIENT OUTREACH (OUTPATIENT)
Dept: CASE MANAGEMENT | Facility: HOSPITAL | Age: 81
End: 2021-09-09

## 2021-09-09 NOTE — PROGRESS NOTES
Chart review completed  Outside records through [facility] requested and refreshed  Telephonic assessment completed during this encounter  Giovanni Coulter admits to lack of energy and weight loss stating nothing appeals to her  CM encouraged her to increase oral intake but to be mindful of sodium content  Also educated on muscle / fat weight gain vs fluid restricted weight gain  CM emphasized to take all medications as prescribed, follow a heart healthy diet and to call doctor for 1) weight gain of 3 pounds in day or 5 pounds in 1 week, 2) edema to BLE or abdomen and 3) SOB that does not resolve with rest  Patient verbalized same  Giovanni Coulter reports visiting nurse came yesterday  Patient encouraged to outreach to CM as needed   This CM will continue to monitor electronically via chart review, outreach and [facility]

## 2021-09-13 DIAGNOSIS — J44.9 CHRONIC OBSTRUCTIVE PULMONARY DISEASE, UNSPECIFIED COPD TYPE (HCC): Primary | ICD-10-CM

## 2021-09-15 ENCOUNTER — TELEPHONE (OUTPATIENT)
Dept: CARDIOLOGY CLINIC | Facility: CLINIC | Age: 81
End: 2021-09-15

## 2021-09-15 NOTE — TELEPHONE ENCOUNTER
c/o lightheadedness /58 HR 67 today and last week 108/66 HR 74 as confirmed by PT: Robinson Oneill [phone]   Please advise

## 2021-09-15 NOTE — TELEPHONE ENCOUNTER
Spoke with patient and her   Medication instructions provided  Both patient and spouse expressed a verbal understanding  As per patient, her Daughter-Wendi is to call back and schedule a follow up appt

## 2021-10-04 ENCOUNTER — OFFICE VISIT (OUTPATIENT)
Dept: PULMONOLOGY | Facility: MEDICAL CENTER | Age: 81
End: 2021-10-04
Payer: MEDICARE

## 2021-10-04 VITALS
OXYGEN SATURATION: 96 % | HEART RATE: 61 BPM | SYSTOLIC BLOOD PRESSURE: 130 MMHG | WEIGHT: 105.4 LBS | DIASTOLIC BLOOD PRESSURE: 68 MMHG | HEIGHT: 64 IN | TEMPERATURE: 96.3 F | RESPIRATION RATE: 18 BRPM | BODY MASS INDEX: 17.99 KG/M2

## 2021-10-04 DIAGNOSIS — J44.9 CHRONIC OBSTRUCTIVE PULMONARY DISEASE, UNSPECIFIED COPD TYPE (HCC): ICD-10-CM

## 2021-10-04 DIAGNOSIS — J96.11 CHRONIC RESPIRATORY FAILURE WITH HYPOXIA (HCC): Primary | Chronic | ICD-10-CM

## 2021-10-04 PROCEDURE — 99214 OFFICE O/P EST MOD 30 MIN: CPT | Performed by: PHYSICIAN ASSISTANT

## 2021-10-04 RX ORDER — FLUTICASONE FUROATE, UMECLIDINIUM BROMIDE AND VILANTEROL TRIFENATATE 100; 62.5; 25 UG/1; UG/1; UG/1
1 POWDER RESPIRATORY (INHALATION) DAILY
Qty: 60 BLISTER | Refills: 11 | Status: SHIPPED | OUTPATIENT
Start: 2021-10-04 | End: 2022-03-09 | Stop reason: SDUPTHER

## 2021-10-08 ENCOUNTER — CONSULT (OUTPATIENT)
Dept: CARDIOLOGY CLINIC | Facility: CLINIC | Age: 81
End: 2021-10-08
Payer: MEDICARE

## 2021-10-08 VITALS
HEIGHT: 64 IN | HEART RATE: 83 BPM | OXYGEN SATURATION: 95 % | WEIGHT: 107 LBS | BODY MASS INDEX: 18.27 KG/M2 | SYSTOLIC BLOOD PRESSURE: 122 MMHG | TEMPERATURE: 98.6 F | DIASTOLIC BLOOD PRESSURE: 60 MMHG

## 2021-10-08 DIAGNOSIS — I48.91 ATRIAL FIBRILLATION (HCC): ICD-10-CM

## 2021-10-08 DIAGNOSIS — I48.91 ATRIAL FIBRILLATION WITH RAPID VENTRICULAR RESPONSE (HCC): Primary | ICD-10-CM

## 2021-10-08 DIAGNOSIS — I63.9 CVA (CEREBRAL VASCULAR ACCIDENT) (HCC): ICD-10-CM

## 2021-10-08 DIAGNOSIS — I10 PRIMARY HYPERTENSION: ICD-10-CM

## 2021-10-08 DIAGNOSIS — I70.8 AORTO-ILIAC ATHEROSCLEROSIS (HCC): ICD-10-CM

## 2021-10-08 DIAGNOSIS — J90 PLEURAL EFFUSION, BILATERAL: ICD-10-CM

## 2021-10-08 DIAGNOSIS — I70.0 AORTO-ILIAC ATHEROSCLEROSIS (HCC): ICD-10-CM

## 2021-10-08 PROCEDURE — 99214 OFFICE O/P EST MOD 30 MIN: CPT | Performed by: INTERNAL MEDICINE

## 2021-10-08 PROCEDURE — 93000 ELECTROCARDIOGRAM COMPLETE: CPT | Performed by: INTERNAL MEDICINE

## 2021-10-08 RX ORDER — TORSEMIDE 10 MG/1
10 TABLET ORAL DAILY
Qty: 30 TABLET | Refills: 5 | Status: SHIPPED | OUTPATIENT
Start: 2021-10-08 | End: 2022-02-24 | Stop reason: SDUPTHER

## 2021-10-08 RX ORDER — DIGOXIN 125 MCG
125 TABLET ORAL EVERY OTHER DAY
Qty: 15 TABLET | Refills: 5 | Status: SHIPPED | OUTPATIENT
Start: 2021-10-08 | End: 2022-07-15

## 2021-10-14 ENCOUNTER — PATIENT OUTREACH (OUTPATIENT)
Dept: CASE MANAGEMENT | Facility: HOSPITAL | Age: 81
End: 2021-10-14

## 2021-10-25 ENCOUNTER — PATIENT OUTREACH (OUTPATIENT)
Dept: CASE MANAGEMENT | Facility: HOSPITAL | Age: 81
End: 2021-10-25

## 2021-10-29 ENCOUNTER — TELEPHONE (OUTPATIENT)
Dept: NEUROLOGY | Facility: CLINIC | Age: 81
End: 2021-10-29

## 2021-11-01 ENCOUNTER — OFFICE VISIT (OUTPATIENT)
Dept: NEUROLOGY | Facility: CLINIC | Age: 81
End: 2021-11-01
Payer: MEDICARE

## 2021-11-01 VITALS
SYSTOLIC BLOOD PRESSURE: 117 MMHG | HEART RATE: 74 BPM | DIASTOLIC BLOOD PRESSURE: 61 MMHG | WEIGHT: 106 LBS | TEMPERATURE: 96.4 F | HEIGHT: 64 IN | BODY MASS INDEX: 18.1 KG/M2

## 2021-11-01 DIAGNOSIS — Z86.73 CHRONIC ISCHEMIC VERTEBROBASILAR ARTERY CEREBELLAR STROKE: ICD-10-CM

## 2021-11-01 DIAGNOSIS — G44.1 VASCULAR HEADACHE: ICD-10-CM

## 2021-11-01 DIAGNOSIS — I65.22 LEFT CAROTID STENOSIS: Primary | ICD-10-CM

## 2021-11-01 PROCEDURE — 99214 OFFICE O/P EST MOD 30 MIN: CPT | Performed by: PSYCHIATRY & NEUROLOGY

## 2021-11-01 PROCEDURE — 1124F ACP DISCUSS-NO DSCNMKR DOCD: CPT | Performed by: PSYCHIATRY & NEUROLOGY

## 2021-11-01 RX ORDER — TOPIRAMATE 50 MG/1
TABLET, FILM COATED ORAL
Qty: 60 TABLET | Refills: 5 | Status: SHIPPED | OUTPATIENT
Start: 2021-11-01 | End: 2022-03-07 | Stop reason: ALTCHOICE

## 2022-02-07 ENCOUNTER — OFFICE VISIT (OUTPATIENT)
Dept: CARDIOLOGY CLINIC | Facility: CLINIC | Age: 82
End: 2022-02-07
Payer: MEDICARE

## 2022-02-07 VITALS
DIASTOLIC BLOOD PRESSURE: 60 MMHG | WEIGHT: 103 LBS | HEIGHT: 64 IN | OXYGEN SATURATION: 87 % | TEMPERATURE: 98.6 F | SYSTOLIC BLOOD PRESSURE: 110 MMHG | BODY MASS INDEX: 17.58 KG/M2 | HEART RATE: 74 BPM

## 2022-02-07 DIAGNOSIS — R06.02 SHORTNESS OF BREATH: ICD-10-CM

## 2022-02-07 DIAGNOSIS — I10 PRIMARY HYPERTENSION: ICD-10-CM

## 2022-02-07 DIAGNOSIS — I70.0 AORTO-ILIAC ATHEROSCLEROSIS (HCC): ICD-10-CM

## 2022-02-07 DIAGNOSIS — I69.30 CHRONIC ARTERIAL ISCHEMIC STROKE, MULTIFOCAL, MULTIPLE VASCULAR TERRITORIES: ICD-10-CM

## 2022-02-07 DIAGNOSIS — I70.8 AORTO-ILIAC ATHEROSCLEROSIS (HCC): ICD-10-CM

## 2022-02-07 DIAGNOSIS — I48.0 PAROXYSMAL ATRIAL FIBRILLATION (HCC): Primary | ICD-10-CM

## 2022-02-07 PROCEDURE — 99214 OFFICE O/P EST MOD 30 MIN: CPT | Performed by: INTERNAL MEDICINE

## 2022-02-07 RX ORDER — ATORVASTATIN CALCIUM 40 MG/1
40 TABLET, FILM COATED ORAL DAILY
Qty: 90 TABLET | Refills: 2 | Status: SHIPPED | OUTPATIENT
Start: 2022-02-07

## 2022-02-07 NOTE — PROGRESS NOTES
Cardiology   Chun Lazcano DO, Sarahy Elder MD, Andrew Mccauley MD, Lubna Irwin MD, [facility] [place]  -------------------------------------------------------------------  [facility]  [address], [facility],[address], [address]  [place], [address] [place] [address], [address] [place] [address]  [phone]    Cardiology Follow Up  Wilian Chowdary  1940  [id number]          Assessment/Plan:    1  Paroxysmal atrial fibrillation (HCC)    2  Chronic arterial ischemic stroke, multifocal, multiple vascular territories    3  Shortness of breath    4  Primary hypertension    5  Aorto-iliac atherosclerosis ([address] )      - Patient remains in sinus rhythm  Will continue metoprolol and digoxin  She is tolerating anticoagulation  Will continue Eliquis 2 5 milligrams b i d  She does have a history of CVA  -   Follow-up with Pulmonary  Will obtain Chest Xray and PFT given worsening of dyspnea since last evaluation     -   Continue torsemide 10 milligrams daily  -   Will follow-up in 3 months  Interval History:     Wilian Chowdary is 78 y o  female here for followup of CHF  She feels shortness of breath with minimal exertion  She denies any chest pain, LE edema, orthopnea or PND  No palpitations  Has not seen pulmonologist recently  Reports good adherence with medications  Previously,  she was admitted to [facility] from  October 27, 2021 to September 1, 2021 with respiratory failure  She was found to have atrial fibrillation with rapid ventricular response along with pneumonia and COPD exacerbation  In addition, she was treated for diastolic congestive heart failure  Patient was started on metoprolol and digoxin  She converted to sinus rhythm  Echocardiogram showed ejection fraction of 75 percent  She had mild valve disease  Bioprosthetic aortic valve was normal     Patient has a history of thalamic CVA along with transcatheter aortic valve replacement  Procedure was done at [facility] several years ago  The following portions of the patient's history were reviewed and updated as appropriate: allergies, current medications, past family history, past medical history, past social history, past surgical history, and problem list        Current Outpatient Medications:     atorvastatin (LIPITOR) 40 mg tablet, Take 1 tablet (40 mg total) by mouth daily, Disp: 90 tablet, Rfl: 2    benazepril (LOTENSIN) 20 mg tablet, Take 10 mg by mouth daily , Disp: , Rfl:     Chlorpheniramine-Acetaminophen (CORICIDIN HBP COLD/FLU PO), Take by mouth, Disp: , Rfl:     FLUoxetine (PROzac) 20 mg capsule, Take 20 mg by mouth daily, Disp: , Rfl:     magnesium oxide (MAG-OX) 400 mg, Take 1 tablet (400 mg total) by mouth daily, Disp: 30 tablet, Rfl: 4    melatonin 3 mg, Take 1 tablet (3 mg total) by mouth daily at bedtime, Disp: 30 tablet, Rfl: 0    metoprolol tartrate (LOPRESSOR) 25 mg tablet, Take 1 tablet (25 mg total) by mouth every 12 (twelve) hours, Disp: 60 tablet, Rfl: 5    Multiple Vitamins-Minerals (PreserVision AREDS) TABS, Take by mouth, Disp: , Rfl:     omeprazole (PriLOSEC) 40 MG capsule, Take 40 mg by mouth daily, Disp: , Rfl:     topiramate (Topamax) 50 MG tablet, Start with 1 tab at bedtime for 2 weeks and then take 2 tabs at bedtime  , Disp: 60 tablet, Rfl: 5    torsemide (DEMADEX) 10 mg tablet, Take 1 tablet (10 mg total) by mouth daily, Disp: 30 tablet, Rfl: 5    traMADol (ULTRAM) 50 mg tablet, Take 50 mg by mouth 4 (four) times a day as needed, Disp: , Rfl:     apixaban (ELIQUIS) 2 5 mg, Take 1 tablet (2 5 mg total) by mouth 2 (two) times a day, Disp: 60 tablet, Rfl: 5    aspirin (ECOTRIN LOW STRENGTH) 81 mg EC tablet, Take 1 tablet (81 mg total) by mouth daily, Disp: 30 tablet, Rfl: 0    digoxin (LANOXIN) 0 125 mg tablet, Take 1 tablet (125 mcg total) by mouth every other day, Disp: 15 tablet, Rfl: 5   fluticasone-umeclidinium-vilanterol (Trelegy Ellipta) 100-62 5-25 MCG/INH inhaler, Inhale 1 puff daily Rinse mouth after use , Disp: 60 blister, Rfl: 11        Review of Systems:  Review of Systems   Constitutional: Positive for fatigue and unexpected weight change  Respiratory: Positive for shortness of breath  Cardiovascular: Negative for chest pain, palpitations and leg swelling  All other systems reviewed and are negative  Physical Exam:  Vitals:  Vitals:    02/07/22 1555   BP: 110/60   BP Location: Left arm   Patient Position: Sitting   Cuff Size: Standard   Pulse: 74   Temp: 98 6 °F (37 °C)   SpO2: (!) 87%   Weight: 46 7 kg (103 lb)   Height: 5' 4" (1 626 m)     Physical Exam   Constitutional: She appears healthy  No distress  Eyes: Pupils are equal, round, and reactive to light  Conjunctivae are normal    Neck: No JVD present  Cardiovascular: Normal rate, regular rhythm and normal heart sounds  Exam reveals no gallop and no friction rub  No murmur heard  Pulmonary/Chest: Effort normal  She has no rales  She has diffuse wheezes  tachypnea   Musculoskeletal:         General: No tenderness, deformity or edema  Cervical back: Normal range of motion and neck supple  Neurological: She is alert and oriented to person, place, and time  Skin: Skin is warm and dry  Cardiographics:  EKG: Personally reviewed   Normal sinus rhythm with rate 75 beats per min  Last known EF:  70 percent    This note was completed in part utilizing [employer] Fluency Direct Software  Grammatical errors, random word insertions, spelling mistakes, and incomplete sentences can be an occasional consequence of this system secondary to software limitations, ambient noise, and hardware issues  If you have any questions or concerns about the content, text, or information contained within the body of this dictation, please contact the provider for clarification

## 2022-02-08 PROCEDURE — 93000 ELECTROCARDIOGRAM COMPLETE: CPT | Performed by: INTERNAL MEDICINE

## 2022-02-14 ENCOUNTER — HOSPITAL ENCOUNTER (OUTPATIENT)
Dept: RADIOLOGY | Facility: HOSPITAL | Age: 82
Discharge: HOME | End: 2022-02-14
Attending: INTERNAL MEDICINE
Payer: MEDICARE

## 2022-02-14 ENCOUNTER — HOSPITAL ENCOUNTER (OUTPATIENT)
Dept: PULMONOLOGY | Facility: HOSPITAL | Age: 82
Discharge: HOME | End: 2022-02-14
Attending: INTERNAL MEDICINE
Payer: MEDICARE

## 2022-02-14 DIAGNOSIS — R06.02 SHORTNESS OF BREATH: ICD-10-CM

## 2022-02-14 PROCEDURE — 94060 EVALUATION OF WHEEZING: CPT | Performed by: INTERNAL MEDICINE

## 2022-02-14 PROCEDURE — 94726 PLETHYSMOGRAPHY LUNG VOLUMES: CPT

## 2022-02-14 PROCEDURE — 94060 EVALUATION OF WHEEZING: CPT

## 2022-02-14 PROCEDURE — 94760 N-INVAS EAR/PLS OXIMETRY 1: CPT

## 2022-02-14 PROCEDURE — 94726 PLETHYSMOGRAPHY LUNG VOLUMES: CPT | Performed by: INTERNAL MEDICINE

## 2022-02-14 PROCEDURE — 94729 DIFFUSING CAPACITY: CPT

## 2022-02-14 PROCEDURE — 71046 X-RAY EXAM CHEST 2 VIEWS: CPT

## 2022-02-14 PROCEDURE — 94729 DIFFUSING CAPACITY: CPT | Performed by: INTERNAL MEDICINE

## 2022-02-14 RX ORDER — ALBUTEROL SULFATE 2.5 MG/3ML
2.5 SOLUTION RESPIRATORY (INHALATION) ONCE
Status: COMPLETED | OUTPATIENT
Start: 2022-02-14 | End: 2022-02-14

## 2022-02-14 RX ADMIN — ALBUTEROL SULFATE 2.5 MG: 2.5 SOLUTION RESPIRATORY (INHALATION) at 09:02

## 2022-02-18 ENCOUNTER — TELEPHONE (OUTPATIENT)
Dept: CARDIOLOGY CLINIC | Facility: CLINIC | Age: 82
End: 2022-02-18

## 2022-02-18 DIAGNOSIS — J90 PLEURAL EFFUSION, BILATERAL: ICD-10-CM

## 2022-02-18 NOTE — TELEPHONE ENCOUNTER
----- Message from Edgardo Ribeiro DO sent at 2/17/2022  4:02 PM EST -----  Can you please let the patient know tI looked at her Xray and PFT  She has lung disease but also fluid in her lungs  I want her to increase her torsemide to 20 mg daily  Please let me know if she wants me to send her in more

## 2022-02-24 RX ORDER — TORSEMIDE 20 MG/1
20 TABLET ORAL DAILY
Qty: 30 TABLET | Refills: 5 | Status: SHIPPED | OUTPATIENT
Start: 2022-02-24

## 2022-02-24 NOTE — ADDENDUM NOTE
Addended by: Henrik Littlejohn on: 2/24/2022 02:42 PM     Modules accepted: Orders reports tobacco use, denies alcohol and illicit drug use

## 2022-03-01 NOTE — TELEPHONE ENCOUNTER
I spent about ten minutes attempting to confirm that patient's  had received the message from us prior  He reports that he has terrible memory, relayed results again  Patient's  aware  And no further questions indicated  I did advise daughter in law to attain a release with her name on it due to the confusion surrounding both Christiana Todd and Pj

## 2022-03-01 NOTE — TELEPHONE ENCOUNTER
Daughter called to go over the results of the PFT   Please call the daughter at cell# [phone]  Or call her at her work # [phone] her name is kia hernandez

## 2022-03-04 ENCOUNTER — TELEPHONE (OUTPATIENT)
Dept: NEUROLOGY | Facility: CLINIC | Age: 82
End: 2022-03-04

## 2022-03-07 ENCOUNTER — OFFICE VISIT (OUTPATIENT)
Dept: NEUROLOGY | Facility: CLINIC | Age: 82
End: 2022-03-07
Payer: MEDICARE

## 2022-03-07 ENCOUNTER — TELEPHONE (OUTPATIENT)
Dept: PULMONOLOGY | Facility: CLINIC | Age: 82
End: 2022-03-07

## 2022-03-07 VITALS
SYSTOLIC BLOOD PRESSURE: 118 MMHG | HEIGHT: 64 IN | DIASTOLIC BLOOD PRESSURE: 72 MMHG | BODY MASS INDEX: 16.39 KG/M2 | HEART RATE: 76 BPM | WEIGHT: 96 LBS

## 2022-03-07 DIAGNOSIS — I48.91 ATRIAL FIBRILLATION (HCC): ICD-10-CM

## 2022-03-07 DIAGNOSIS — Z86.73 CHRONIC ARTERIAL ISCHEMIC STROKE, VERTEBROBASILAR, CEREBELLAR: ICD-10-CM

## 2022-03-07 DIAGNOSIS — J44.9 COPD (CHRONIC OBSTRUCTIVE PULMONARY DISEASE) (HCC): ICD-10-CM

## 2022-03-07 DIAGNOSIS — I65.22 LEFT CAROTID STENOSIS: Primary | ICD-10-CM

## 2022-03-07 PROCEDURE — 99214 OFFICE O/P EST MOD 30 MIN: CPT | Performed by: PSYCHIATRY & NEUROLOGY

## 2022-03-07 NOTE — TELEPHONE ENCOUNTER
The wife of the son is going to call back and schedule the Virtual Visit since she will be the one assisting her in the call   Thank you

## 2022-03-07 NOTE — PROGRESS NOTES
Return NeuroOutpatient Note        Flakito Webb  [id number]  78 y o   1940       CC: stroke       History obtained from:  Patient     HPI/Subjective:    Flakito Webb is an 77 yo F with PMH of stroke, A fib, COPD, left carotid disease presents as f/u  Per my previous history, patient had presented on 12/30/20 with left facial numbness and left arm weakness  Patient was found to have left cerebellar stroke  She had evidence of chronic b/l cerebellar strokes as well  Patient is able to ambulate independently  She's on eliquis and aspirin for stroke prevention  Her CTA had revealed moderate atherosclerotic disease of aortic arch and proximal great vessels  There was a high grade stenosis of left CCA  Patient would not want surgery unless absolutely necessary  Her strokes were from A fib, not atherosclerotic disease  She's on eliquis and aspirin for stroke prevention       Her LDL was 61  She was on lipitor but stopped taking it when she ran out of supply       Previously she had reported daily headaches  We had placed her on topamax but she's not taking it anymore  She says that now that she's on diuretics, her headaches are better  She was previously tried on verapamil but patient doesn't remember whether she actually took it      She's on continuous 2-3 5L NCO2         Past Medical History:   Diagnosis Date    Arthritis     Atrial fibrillation (HCC)     CHF (congestive heart failure) (HCC)     COPD (chronic obstructive pulmonary disease) (HCC)     Coronary artery disease     aortic valve replacement    Heart murmur     Hypertension      Social History     Socioeconomic History    Marital status: /Civil Union     Spouse name: Eleanor Newton Number of children: 4    Years of education: Not on file    Highest education level: 12th grade   Occupational History    Not on file   Tobacco Use    Smoking status: Former Smoker     Packs/day: 1 00     Years: 25 00     Pack years: 25 00     Types: Cigarettes     Start date: 65     Quit date: 2000     Years since quittin 1    Smokeless tobacco: Never Used   Vaping Use    Vaping Use: Never used   Substance and Sexual Activity    Alcohol use: Not Currently     Alcohol/week: 1 0 standard drink     Types: 1 Glasses of wine per week     Comment: rare    Drug use: No    Sexual activity: Not Currently   Other Topics Concern    Not on file   Social History Narrative    Not on file     Social Determinants of Health     Financial Resource Strain: Low Risk     Difficulty of Paying Living Expenses: Not hard at all   Food Insecurity: No Food Insecurity    Worried About Running Out of Food in the Last Year: Never true    Dina of Food in the Last Year: Never true   Transportation Needs: No Transportation Needs    Lack of Transportation (Medical): No    Lack of Transportation (Non-Medical): No   Physical Activity: Inactive    Days of Exercise per Week: 0 days    Minutes of Exercise per Session: 0 min   Stress: No Stress Concern Present    Feeling of Stress : Only a little   Social Connections:  Moderately Isolated    Frequency of Communication with Friends and Family: Once a week    Frequency of Social Gatherings with Friends and Family: Once a week    Attends Catholic Services: 1 to 4 times per year    Active Member of [employer] or Organizations: No    Attends Club or Organization Meetings: Never    Marital Status:    Intimate Partner Violence: Not At Risk    Fear of Current or Ex-Partner: No    Emotionally Abused: No    Physically Abused: No    Sexually Abused: No   Housing Stability: Low Risk     Unable to Pay for Housing in the Last Year: No    Number of [place] in the Last Year: 1    Unstable Housing in the Last Year: No     Family History   Problem Relation Age of Onset    Heart defect Father      No Known Allergies  Current Outpatient Medications on File Prior to Visit   Medication Sig Dispense Refill    apixaban (ELIQUIS) 2 5 mg Take 1 tablet (2 5 mg total) by mouth 2 (two) times a day 60 tablet 5    aspirin (ECOTRIN LOW STRENGTH) 81 mg EC tablet Take 1 tablet (81 mg total) by mouth daily 30 tablet 0    atorvastatin (LIPITOR) 40 mg tablet Take 1 tablet (40 mg total) by mouth daily 90 tablet 2    benazepril (LOTENSIN) 20 mg tablet Take 10 mg by mouth daily       Chlorpheniramine-Acetaminophen (CORICIDIN HBP COLD/FLU PO) Take by mouth      digoxin (LANOXIN) 0 125 mg tablet Take 1 tablet (125 mcg total) by mouth every other day 15 tablet 5    FLUoxetine (PROzac) 20 mg capsule Take 20 mg by mouth daily      fluticasone-umeclidinium-vilanterol (Trelegy Ellipta) 100-62 5-25 MCG/INH inhaler Inhale 1 puff daily Rinse mouth after use  60 blister 11    magnesium oxide (MAG-OX) 400 mg Take 1 tablet (400 mg total) by mouth daily 30 tablet 4    melatonin 3 mg Take 1 tablet (3 mg total) by mouth daily at bedtime 30 tablet 0    metoprolol tartrate (LOPRESSOR) 25 mg tablet Take 1 tablet (25 mg total) by mouth every 12 (twelve) hours 60 tablet 5    Multiple Vitamins-Minerals (PreserVision AREDS) TABS Take by mouth      omeprazole (PriLOSEC) 40 MG capsule Take 40 mg by mouth daily      torsemide (DEMADEX) 20 mg tablet Take 1 tablet (20 mg total) by mouth daily 30 tablet 5    traMADol (ULTRAM) 50 mg tablet Take 50 mg by mouth 4 (four) times a day as needed      [DISCONTINUED] topiramate (Topamax) 50 MG tablet Start with 1 tab at bedtime for 2 weeks and then take 2 tabs at bedtime  60 tablet 5     No current facility-administered medications on file prior to visit  Review of Systems   Refer to positive review of systems in HPI     Review of Systems    Constitutional- No fever  Eyes- No visual change  ENT- Hearing normal  CV- No chest pain  Resp- No Shortness of breath  GI- No diarrhea  - Bladder normal  MS- No Arthritis   Skin- No rash  Psych- No depression  Endo- No DM  Heme- No nodes    Vitals:    03/07/22 1048   BP: 118/72   BP Location: Left arm   Patient Position: Sitting   Cuff Size: Standard   Pulse: 76   Weight: 43 5 kg (96 lb)   Height: 5' 4" (1 626 m)       PHYSICAL EXAM:  Appearance: No Acute Distress  Ophthalmoscopic: Disc Flat, Normal fundus  Mental status:  Orientation: Awake, Alert, and Orientedx3  Memory: Registation 3/3 Recall 3/3  Attention: normal  Knowledge: good  Language: No aphasia  Speech: No dysarthria  Cranial Nerves:  2 No Visual Defect on Confrontation, Pupils round, equal, reactive to light  3,4,6 Extraocular Movements Intact, no nystagmus  5 Facial Sensation Intact  7 No facial asymmetry  8 Intact hearing  9,10 Palate symmetric, normal gag  11 Good shoulder shrug  12 Tongue Midline  Gait: Stable, stooped  Coordination: No ataxia with finger to nose testing, and heel to shin  Sensory: Intact, Symmetric to pinprick, light touch, vibration, and joint position  Muscle Tone: Normal              Muscle exam:  4+/5 all over        Reflexes   RJ BJ TJ KJ AJ Plantars Galindo's   Right 2+ 2+ 2+ 2+ 2+ Downgoing Not present   Left 2+ 2+ 2+ 2+ 2+ Downgoing Not present     Personal review of  Labs:                  Diagnoses and all orders for this visit:      1  Left carotid stenosis  VAS carotid complete study   2  Chronic arterial ischemic stroke, vertebrobasilar, cerebellar  VAS carotid complete study   3  Atrial fibrillation ([address] )     4  COPD (chronic obstructive pulmonary disease) [facility])  Ambulatory Referral to Pulmonology       Patient has remained stable from stroke standpoint  Her main limitation at present is COPD and limitation with ADLs due to this  She is to remain on eliquis and aspirin for stroke prevention  Patient wants to minimize all of doctors' visits  We can keep her as prn  If carotid doppler results are concerning then will refer her to vascular surgeon                   Total time of encounter:  30 min  More than 50% of the time was used in counseling and/or coordination of care  Extent of counseling and/or coordination of care        Galina Metz MD  [facility]  [address]  [address]  [phone]

## 2022-03-07 NOTE — TELEPHONE ENCOUNTER
Araceli Bran is calling in for Quita Guerrero, she is having difficulty with her fluid build up in her Lungs  He said she feels like crap, very tried, short of breath and a runny nose  He is asking for her to be seen sooner than later, that she is having a difficult time  That was all the information provided   Please advise

## 2022-03-09 ENCOUNTER — TELEPHONE (OUTPATIENT)
Dept: PULMONOLOGY | Facility: MEDICAL CENTER | Age: 82
End: 2022-03-09

## 2022-03-09 NOTE — TELEPHONE ENCOUNTER
Attempted to call patient and unable to reach via telephone  Left voicemail for the patient to return phone call to the office to perform virtual visit  Did also inform of need to reschedule if outside of scheduling showtime jorge period

## 2022-03-09 NOTE — TELEPHONE ENCOUNTER
Son called back, offered an appt britton/ Anisa Payer today @ 4pm virtual  Son will call back if he can keep that appt, aware there is nothing till next week

## 2022-03-10 ENCOUNTER — TELEPHONE (OUTPATIENT)
Dept: PULMONOLOGY | Facility: MEDICAL CENTER | Age: 82
End: 2022-03-10

## 2022-03-10 ENCOUNTER — APPOINTMENT (OUTPATIENT)
Dept: LAB | Facility: HOSPITAL | Age: 82
End: 2022-03-10
Payer: MEDICARE

## 2022-03-10 ENCOUNTER — HOSPITAL ENCOUNTER (OUTPATIENT)
Dept: RADIOLOGY | Facility: HOSPITAL | Age: 82
Discharge: HOME | End: 2022-03-10
Payer: MEDICARE

## 2022-03-10 DIAGNOSIS — I50.9 CONGESTIVE HEART FAILURE, UNSPECIFIED HF CHRONICITY, UNSPECIFIED HEART FAILURE TYPE (HCC): ICD-10-CM

## 2022-03-10 DIAGNOSIS — J90 PLEURAL EFFUSION: ICD-10-CM

## 2022-03-10 DIAGNOSIS — R06.02 SHORTNESS OF BREATH: ICD-10-CM

## 2022-03-10 LAB
ANION GAP SERPL CALCULATED.3IONS-SCNC: 8 MMOL/L (ref 4–13)
BUN SERPL-MCNC: 17 MG/DL (ref 5–25)
CALCIUM SERPL-MCNC: 8.8 MG/DL (ref 8.3–10.1)
CHLORIDE SERPL-SCNC: 102 MMOL/L (ref 100–108)
CO2 SERPL-SCNC: 31 MMOL/L (ref 21–32)
CREAT SERPL-MCNC: 0.77 MG/DL (ref 0.6–1.3)
GFR SERPL CREATININE-BSD FRML MDRD: 71 ML/MIN/1.73SQ M
GLUCOSE SERPL-MCNC: 109 MG/DL (ref 65–140)
NT-PROBNP SERPL-MCNC: 2114 PG/ML
POTASSIUM SERPL-SCNC: 3.9 MMOL/L (ref 3.5–5.3)
SODIUM SERPL-SCNC: 141 MMOL/L (ref 136–145)

## 2022-03-10 PROCEDURE — 36415 COLL VENOUS BLD VENIPUNCTURE: CPT

## 2022-03-10 PROCEDURE — 83880 ASSAY OF NATRIURETIC PEPTIDE: CPT

## 2022-03-10 PROCEDURE — 71046 X-RAY EXAM CHEST 2 VIEWS: CPT

## 2022-03-10 PROCEDURE — 80048 BASIC METABOLIC PNL TOTAL CA: CPT

## 2022-03-10 NOTE — TELEPHONE ENCOUNTER
[facility] called with significant finding on chest xray done today

## 2022-03-14 NOTE — TELEPHONE ENCOUNTER
Patient's son calling asking for results of CXR  He stated they saw her pcp today and they told her she has fluid that needs to be taken out and he is very upset nobody has called from our office   Please advise [phone]

## 2022-03-15 ENCOUNTER — TELEPHONE (OUTPATIENT)
Dept: PULMONOLOGY | Facility: MEDICAL CENTER | Age: 82
End: 2022-03-15

## 2022-03-15 DIAGNOSIS — J90 PLEURAL EFFUSION: Primary | ICD-10-CM

## 2022-03-15 NOTE — PROGRESS NOTES
Called and discussed results of recent labs and CXR  No fevers / cough  /dyspnea  Is improving w/ dyspnea  CXR on 3/10 w/ increased R sided effusion and R fissure effusion  BNP > 2000  Improved symptoms since having increased diuretics to torsemide 30 mg / day  Stable CR  Discussed options of IR thoracentesis v  Repeat CXR  Patient and  opted for repeat CXR  Ordered and will perform today v  Tomorrow and call office once performed

## 2022-03-15 NOTE — TELEPHONE ENCOUNTER
I had discussed w/ patient and  in regard to drainage  Since her office visit she is feeling less short of breath and much better since increasing her lasix suggesting that her shortness of breath was much more likely due to pulmonary edema rather than her pleural effusion  We did discuss risks v  Benefits of IR thoracentesis  Main benefit being diagnostic considering she is no longer short of breath from the intrapulmonary fluid and not intrapleural fluid  We did discuss risks of IR procedure mainly pertinent to bleeding and risk for pneumothorax  She and  initially decided not to pursue thoracentesis in consideration of improvement in dyspnea  They did, however, call back and state that their son would like to make the decisions and requesting that I contact him  In the interim I did discuss that the effusion is likely due to CHF but would not be able to determine without pleural fluid sample  They will further decide on procedure

## 2022-03-15 NOTE — TELEPHONE ENCOUNTER
Attempted to call son of patient, at patient's request, and unable to reach via telephone  Left voicemail for the patient to return phone call to the office to discuss test results and further diagnostic procedures

## 2022-03-18 ENCOUNTER — HOSPITAL ENCOUNTER (OUTPATIENT)
Dept: NON INVASIVE DIAGNOSTICS | Facility: HOSPITAL | Age: 82
Discharge: HOME | End: 2022-03-18
Admitting: INTERNAL MEDICINE
Payer: MEDICARE

## 2022-03-18 VITALS
SYSTOLIC BLOOD PRESSURE: 155 MMHG | DIASTOLIC BLOOD PRESSURE: 93 MMHG | OXYGEN SATURATION: 97 % | RESPIRATION RATE: 20 BRPM | HEART RATE: 78 BPM

## 2022-03-18 DIAGNOSIS — J90 PLEURAL EFFUSION: ICD-10-CM

## 2022-03-18 LAB
APPEARANCE FLD: CLEAR
COLOR FLD: NORMAL
GLUCOSE FLD-MCNC: 140 MG/DL
HISTIOCYTES NFR FLD: 12 %
LDH FLD L TO P-CCNC: 125 U/L
LYMPHOCYTES NFR BLD AUTO: 69 %
MONO+MESO NFR FLD MANUAL: 2 %
MONOCYTES NFR BLD AUTO: 7 %
NEUTS BAND NFR FLD MANUAL: 2 %
NEUTS SEG NFR BLD AUTO: 8 %
PH BODY FLUID: 7.6
PROT FLD-MCNC: 3.4 G/DL
SITE: NORMAL
TOTAL CELLS COUNTED SPEC: 100
WBC # FLD MANUAL: 452 /UL

## 2022-03-18 PROCEDURE — 82945 GLUCOSE OTHER FLUID: CPT

## 2022-03-18 PROCEDURE — 32555 ASPIRATE PLEURA W/ IMAGING: CPT

## 2022-03-18 PROCEDURE — 88112 CYTOPATH CELL ENHANCE TECH: CPT | Performed by: PATHOLOGY

## 2022-03-18 PROCEDURE — 88305 TISSUE EXAM BY PATHOLOGIST: CPT | Performed by: PATHOLOGY

## 2022-03-18 PROCEDURE — 87205 SMEAR GRAM STAIN: CPT

## 2022-03-18 PROCEDURE — NC001 PR NO CHARGE: Performed by: INTERNAL MEDICINE

## 2022-03-18 PROCEDURE — 32555 ASPIRATE PLEURA W/ IMAGING: CPT | Performed by: INTERNAL MEDICINE

## 2022-03-18 PROCEDURE — 83986 ASSAY PH BODY FLUID NOS: CPT

## 2022-03-18 PROCEDURE — 84157 ASSAY OF PROTEIN OTHER: CPT

## 2022-03-18 PROCEDURE — 83615 LACTATE (LD) (LDH) ENZYME: CPT

## 2022-03-18 PROCEDURE — 89051 BODY FLUID CELL COUNT: CPT | Performed by: PHYSICIAN ASSISTANT

## 2022-03-18 PROCEDURE — 87070 CULTURE OTHR SPECIMN AEROBIC: CPT

## 2022-03-18 RX ORDER — LIDOCAINE WITH 8.4% SOD BICARB 0.9%(10ML)
SYRINGE (ML) INJECTION CODE/TRAUMA/SEDATION MEDICATION
Status: COMPLETED | OUTPATIENT
Start: 2022-03-18 | End: 2022-03-18

## 2022-03-18 RX ADMIN — Medication 10 ML: at 11:06

## 2022-03-18 NOTE — SEDATION DOCUMENTATION
Pt tolerated thoracentesis  1100ml yellow hazy fluids removed  Labs collected and sent   Vitals stable No

## 2022-03-18 NOTE — DISCHARGE INSTRUCTIONS
Thoracentesis   WHAT YOU NEED TO KNOW:   A thoracentesis is a procedure to remove extra fluid or air from between your lungs and your inner chest wall  Air or fluid buildup may make it hard for you to breathe  A thoracentesis allows your lungs to expand fully so you can breathe more easily  DISCHARGE INSTRUCTIONS:     Small amount of shoulder pain and bloody sputum is normal after a Thoracentesis  Rest:  Rest when you feel it is needed  Slowly start to do more each day  Return to your daily activities as directed  Resume your normal diet  Small sips of flat soda will help mild nausea  Do not smoke: If you smoke, it is never too late to quit  Ask for information about how to stop smoking if you need help  Contact Interventional Radiology at [phone] [place] PATIENTS: Contact Interventional Radiology at [phone]) Gali Steel PATIENTS: Contact Interventional Radiology at [phone]) if:   · You have a fever  · Your puncture site is red, warm, swollen, or draining pus  · You have questions or concerns about your procedure, medicine, or care  Seek care immediately or call 911 if:   · Severe chest pain with inspiration and shortness of breath    · Large amounts of blood in your sputum    Follow up with your healthcare provider as directed

## 2022-03-18 NOTE — BRIEF OP NOTE (RAD/CATH)
INTERVENTIONAL RADIOLOGY PROCEDURE NOTE    Date: 3/18/2022    Procedure: IR THORACENTESIS    Preoperative diagnosis:   1  Pleural effusion         Postoperative diagnosis: Same  Surgeon: Arlen Rodas MD     Assistant: None  No qualified resident was available  Blood loss: None    Specimens:  Pleural fluid sent for further evaluation    Findings: Right-sided thoracentesis performed  Complications: None immediate      Anesthesia: local

## 2022-03-21 ENCOUNTER — TELEPHONE (OUTPATIENT)
Dept: PULMONOLOGY | Facility: MEDICAL CENTER | Age: 82
End: 2022-03-21

## 2022-03-21 LAB
BACTERIA SPEC BFLD CULT: NO GROWTH
GRAM STN SPEC: NORMAL
GRAM STN SPEC: NORMAL

## 2022-04-21 DIAGNOSIS — I48.91 ATRIAL FIBRILLATION (HCC): ICD-10-CM

## 2022-07-11 ENCOUNTER — APPOINTMENT (INPATIENT)
Dept: NON INVASIVE DIAGNOSTICS | Facility: HOSPITAL | Age: 82
DRG: 186 | End: 2022-07-11
Attending: EMERGENCY MEDICINE
Payer: MEDICARE

## 2022-07-11 ENCOUNTER — APPOINTMENT (EMERGENCY)
Dept: RADIOLOGY | Facility: HOSPITAL | Age: 82
DRG: 186 | End: 2022-07-11
Payer: MEDICARE

## 2022-07-11 ENCOUNTER — HOSPITAL ENCOUNTER (INPATIENT)
Facility: HOSPITAL | Age: 82
LOS: 4 days | Discharge: NURSING, REHAB OR LONG-TERM CARE | DRG: 186 | End: 2022-07-15
Attending: EMERGENCY MEDICINE | Admitting: FAMILY MEDICINE
Payer: MEDICARE

## 2022-07-11 DIAGNOSIS — D64.9 ANEMIA, UNSPECIFIED TYPE: ICD-10-CM

## 2022-07-11 DIAGNOSIS — R52 PAIN: ICD-10-CM

## 2022-07-11 DIAGNOSIS — R06.00 DYSPNEA: ICD-10-CM

## 2022-07-11 DIAGNOSIS — R06.02 SHORTNESS OF BREATH: ICD-10-CM

## 2022-07-11 DIAGNOSIS — J96.21 ACUTE ON CHRONIC RESPIRATORY FAILURE WITH HYPOXIA (HCC): ICD-10-CM

## 2022-07-11 DIAGNOSIS — K59.00 CONSTIPATION: ICD-10-CM

## 2022-07-11 DIAGNOSIS — I48.91 RAPID ATRIAL FIBRILLATION (HCC): ICD-10-CM

## 2022-07-11 DIAGNOSIS — J90 PLEURAL EFFUSION: ICD-10-CM

## 2022-07-11 DIAGNOSIS — I27.20 PULMONARY HTN (HCC): ICD-10-CM

## 2022-07-11 DIAGNOSIS — I50.9 CHF (CONGESTIVE HEART FAILURE) (HCC): Primary | ICD-10-CM

## 2022-07-11 DIAGNOSIS — J44.9 CHRONIC OBSTRUCTIVE PULMONARY DISEASE, UNSPECIFIED COPD TYPE (HCC): ICD-10-CM

## 2022-07-11 PROBLEM — J96.20 ACUTE ON CHRONIC RESPIRATORY FAILURE (HCC): Status: ACTIVE | Noted: 2022-07-11

## 2022-07-11 PROBLEM — Z71.89 DNR (DO NOT RESUSCITATE) DISCUSSION: Status: ACTIVE | Noted: 2022-07-11

## 2022-07-11 PROBLEM — Z86.73 HISTORY OF CVA (CEREBROVASCULAR ACCIDENT): Status: ACTIVE | Noted: 2022-07-11

## 2022-07-11 PROBLEM — E78.5 HYPERLIPIDEMIA: Status: ACTIVE | Noted: 2022-07-11

## 2022-07-11 LAB
2HR DELTA HS TROPONIN: -2 NG/L
4HR DELTA HS TROPONIN: 14 NG/L
ALBUMIN SERPL BCP-MCNC: 2.9 G/DL (ref 3.5–5)
ALP SERPL-CCNC: 93 U/L (ref 46–116)
ALT SERPL W P-5'-P-CCNC: 21 U/L (ref 12–78)
ANION GAP SERPL CALCULATED.3IONS-SCNC: 12 MMOL/L (ref 4–13)
APTT PPP: 40 SECONDS (ref 23–37)
AST SERPL W P-5'-P-CCNC: 28 U/L (ref 5–45)
BACTERIA UR QL AUTO: ABNORMAL /HPF
BASOPHILS # BLD AUTO: 0.03 THOUSANDS/ΜL (ref 0–0.1)
BASOPHILS NFR BLD AUTO: 0 % (ref 0–1)
BILIRUB SERPL-MCNC: 0.42 MG/DL (ref 0.2–1)
BILIRUB UR QL STRIP: NEGATIVE
BUN SERPL-MCNC: 32 MG/DL (ref 5–25)
CALCIUM ALBUM COR SERPL-MCNC: 9.8 MG/DL (ref 8.3–10.1)
CALCIUM SERPL-MCNC: 8.9 MG/DL (ref 8.3–10.1)
CARDIAC TROPONIN I PNL SERPL HS: 58 NG/L
CARDIAC TROPONIN I PNL SERPL HS: 60 NG/L
CARDIAC TROPONIN I PNL SERPL HS: 74 NG/L
CHLORIDE SERPL-SCNC: 105 MMOL/L (ref 100–108)
CLARITY UR: ABNORMAL
CO2 SERPL-SCNC: 24 MMOL/L (ref 21–32)
COLOR UR: ABNORMAL
CREAT SERPL-MCNC: 1.39 MG/DL (ref 0.6–1.3)
EOSINOPHIL # BLD AUTO: 0.01 THOUSAND/ΜL (ref 0–0.61)
EOSINOPHIL NFR BLD AUTO: 0 % (ref 0–6)
ERYTHROCYTE [DISTWIDTH] IN BLOOD BY AUTOMATED COUNT: 18.2 % (ref 11.6–15.1)
FLUAV RNA RESP QL NAA+PROBE: NEGATIVE
FLUBV RNA RESP QL NAA+PROBE: NEGATIVE
GFR SERPL CREATININE-BSD FRML MDRD: 34 ML/MIN/1.73SQ M
GLUCOSE SERPL-MCNC: 127 MG/DL (ref 65–140)
GLUCOSE UR STRIP-MCNC: NEGATIVE MG/DL
HCT VFR BLD AUTO: 34.3 % (ref 34.8–46.1)
HGB BLD-MCNC: 9.4 G/DL (ref 11.5–15.4)
HGB UR QL STRIP.AUTO: ABNORMAL
HYALINE CASTS #/AREA URNS LPF: ABNORMAL /LPF
IMM GRANULOCYTES # BLD AUTO: 0.03 THOUSAND/UL (ref 0–0.2)
IMM GRANULOCYTES NFR BLD AUTO: 0 % (ref 0–2)
INR PPP: 1.61 (ref 0.84–1.19)
KETONES UR STRIP-MCNC: NEGATIVE MG/DL
LEUKOCYTE ESTERASE UR QL STRIP: ABNORMAL
LYMPHOCYTES # BLD AUTO: 0.48 THOUSANDS/ΜL (ref 0.6–4.47)
LYMPHOCYTES NFR BLD AUTO: 6 % (ref 14–44)
MCH RBC QN AUTO: 22.7 PG (ref 26.8–34.3)
MCHC RBC AUTO-ENTMCNC: 27.4 G/DL (ref 31.4–37.4)
MCV RBC AUTO: 83 FL (ref 82–98)
MONOCYTES # BLD AUTO: 0.54 THOUSAND/ΜL (ref 0.17–1.22)
MONOCYTES NFR BLD AUTO: 7 % (ref 4–12)
NEUTROPHILS # BLD AUTO: 6.85 THOUSANDS/ΜL (ref 1.85–7.62)
NEUTS SEG NFR BLD AUTO: 87 % (ref 43–75)
NITRITE UR QL STRIP: NEGATIVE
NON-SQ EPI CELLS URNS QL MICRO: ABNORMAL /HPF
NRBC BLD AUTO-RTO: 1 /100 WBCS
NT-PROBNP SERPL-MCNC: ABNORMAL PG/ML
PH UR STRIP.AUTO: 6.5 [PH]
PLATELET # BLD AUTO: 319 THOUSANDS/UL (ref 149–390)
PMV BLD AUTO: 10.6 FL (ref 8.9–12.7)
POTASSIUM SERPL-SCNC: 5 MMOL/L (ref 3.5–5.3)
PROT SERPL-MCNC: 6.9 G/DL (ref 6.4–8.2)
PROT UR STRIP-MCNC: NEGATIVE MG/DL
PROTHROMBIN TIME: 18.7 SECONDS (ref 11.6–14.5)
RBC # BLD AUTO: 4.14 MILLION/UL (ref 3.81–5.12)
RBC #/AREA URNS AUTO: ABNORMAL /HPF
RSV RNA RESP QL NAA+PROBE: NEGATIVE
SARS-COV-2 RNA RESP QL NAA+PROBE: NEGATIVE
SODIUM SERPL-SCNC: 141 MMOL/L (ref 136–145)
SP GR UR STRIP.AUTO: 1.01 (ref 1–1.03)
TSH SERPL DL<=0.05 MIU/L-ACNC: 5.48 UIU/ML (ref 0.45–4.5)
UROBILINOGEN UR QL STRIP.AUTO: 0.2 E.U./DL
WBC # BLD AUTO: 7.94 THOUSAND/UL (ref 4.31–10.16)
WBC #/AREA URNS AUTO: ABNORMAL /HPF

## 2022-07-11 PROCEDURE — 80053 COMPREHEN METABOLIC PANEL: CPT | Performed by: EMERGENCY MEDICINE

## 2022-07-11 PROCEDURE — 36556 INSERT NON-TUNNEL CV CATH: CPT | Performed by: EMERGENCY MEDICINE

## 2022-07-11 PROCEDURE — 99285 EMERGENCY DEPT VISIT HI MDM: CPT

## 2022-07-11 PROCEDURE — 99223 1ST HOSP IP/OBS HIGH 75: CPT | Performed by: FAMILY MEDICINE

## 2022-07-11 PROCEDURE — 85730 THROMBOPLASTIN TIME PARTIAL: CPT | Performed by: EMERGENCY MEDICINE

## 2022-07-11 PROCEDURE — 0W993ZZ DRAINAGE OF RIGHT PLEURAL CAVITY, PERCUTANEOUS APPROACH: ICD-10-PCS | Performed by: INTERNAL MEDICINE

## 2022-07-11 PROCEDURE — 84439 ASSAY OF FREE THYROXINE: CPT | Performed by: FAMILY MEDICINE

## 2022-07-11 PROCEDURE — 32555 ASPIRATE PLEURA W/ IMAGING: CPT | Performed by: RADIOLOGY

## 2022-07-11 PROCEDURE — 87086 URINE CULTURE/COLONY COUNT: CPT | Performed by: FAMILY MEDICINE

## 2022-07-11 PROCEDURE — 06HY33Z INSERTION OF INFUSION DEVICE INTO LOWER VEIN, PERCUTANEOUS APPROACH: ICD-10-PCS | Performed by: INTERNAL MEDICINE

## 2022-07-11 PROCEDURE — C9113 INJ PANTOPRAZOLE SODIUM, VIA: HCPCS | Performed by: FAMILY MEDICINE

## 2022-07-11 PROCEDURE — 0241U HB NFCT DS VIR RESP RNA 4 TRGT: CPT | Performed by: EMERGENCY MEDICINE

## 2022-07-11 PROCEDURE — 81001 URINALYSIS AUTO W/SCOPE: CPT | Performed by: FAMILY MEDICINE

## 2022-07-11 PROCEDURE — 36415 COLL VENOUS BLD VENIPUNCTURE: CPT | Performed by: EMERGENCY MEDICINE

## 2022-07-11 PROCEDURE — 71045 X-RAY EXAM CHEST 1 VIEW: CPT

## 2022-07-11 PROCEDURE — 85025 COMPLETE CBC W/AUTO DIFF WBC: CPT | Performed by: EMERGENCY MEDICINE

## 2022-07-11 PROCEDURE — 94760 N-INVAS EAR/PLS OXIMETRY 1: CPT

## 2022-07-11 PROCEDURE — 83880 ASSAY OF NATRIURETIC PEPTIDE: CPT | Performed by: EMERGENCY MEDICINE

## 2022-07-11 PROCEDURE — 84443 ASSAY THYROID STIM HORMONE: CPT | Performed by: EMERGENCY MEDICINE

## 2022-07-11 PROCEDURE — 84484 ASSAY OF TROPONIN QUANT: CPT | Performed by: EMERGENCY MEDICINE

## 2022-07-11 PROCEDURE — 85610 PROTHROMBIN TIME: CPT | Performed by: EMERGENCY MEDICINE

## 2022-07-11 PROCEDURE — 99291 CRITICAL CARE FIRST HOUR: CPT | Performed by: EMERGENCY MEDICINE

## 2022-07-11 PROCEDURE — 93005 ELECTROCARDIOGRAM TRACING: CPT

## 2022-07-11 PROCEDURE — 32555 ASPIRATE PLEURA W/ IMAGING: CPT

## 2022-07-11 RX ORDER — FUROSEMIDE 10 MG/ML
40 INJECTION INTRAMUSCULAR; INTRAVENOUS DAILY
Status: DISCONTINUED | OUTPATIENT
Start: 2022-07-12 | End: 2022-07-13

## 2022-07-11 RX ORDER — PANTOPRAZOLE SODIUM 40 MG/10ML
40 INJECTION, POWDER, LYOPHILIZED, FOR SOLUTION INTRAVENOUS ONCE
Status: COMPLETED | OUTPATIENT
Start: 2022-07-11 | End: 2022-07-11

## 2022-07-11 RX ORDER — METHYLPREDNISOLONE SODIUM SUCCINATE 40 MG/ML
40 INJECTION, POWDER, LYOPHILIZED, FOR SOLUTION INTRAMUSCULAR; INTRAVENOUS DAILY
Status: DISCONTINUED | OUTPATIENT
Start: 2022-07-12 | End: 2022-07-13

## 2022-07-11 RX ORDER — METHYLPREDNISOLONE SODIUM SUCCINATE 125 MG/2ML
125 INJECTION, POWDER, LYOPHILIZED, FOR SOLUTION INTRAMUSCULAR; INTRAVENOUS ONCE
Status: COMPLETED | OUTPATIENT
Start: 2022-07-11 | End: 2022-07-11

## 2022-07-11 RX ORDER — DOCUSATE SODIUM 100 MG/1
100 CAPSULE, LIQUID FILLED ORAL 2 TIMES DAILY PRN
Status: DISCONTINUED | OUTPATIENT
Start: 2022-07-11 | End: 2022-07-15 | Stop reason: HOSPADM

## 2022-07-11 RX ORDER — LIDOCAINE WITH 8.4% SOD BICARB 0.9%(10ML)
SYRINGE (ML) INJECTION CODE/TRAUMA/SEDATION MEDICATION
Status: COMPLETED | OUTPATIENT
Start: 2022-07-11 | End: 2022-07-11

## 2022-07-11 RX ORDER — ATORVASTATIN CALCIUM 40 MG/1
40 TABLET, FILM COATED ORAL
Status: DISCONTINUED | OUTPATIENT
Start: 2022-07-11 | End: 2022-07-15 | Stop reason: HOSPADM

## 2022-07-11 RX ORDER — PANTOPRAZOLE SODIUM 40 MG/1
40 TABLET, DELAYED RELEASE ORAL
Status: DISCONTINUED | OUTPATIENT
Start: 2022-07-12 | End: 2022-07-15 | Stop reason: HOSPADM

## 2022-07-11 RX ORDER — LANOLIN ALCOHOL/MO/W.PET/CERES
3 CREAM (GRAM) TOPICAL
Status: DISCONTINUED | OUTPATIENT
Start: 2022-07-11 | End: 2022-07-15 | Stop reason: HOSPADM

## 2022-07-11 RX ORDER — MAGNESIUM HYDROXIDE/ALUMINUM HYDROXICE/SIMETHICONE 120; 1200; 1200 MG/30ML; MG/30ML; MG/30ML
30 SUSPENSION ORAL ONCE
Status: COMPLETED | OUTPATIENT
Start: 2022-07-11 | End: 2022-07-11

## 2022-07-11 RX ORDER — POLYETHYLENE GLYCOL 3350 17 G/17G
17 POWDER, FOR SOLUTION ORAL DAILY
Status: DISCONTINUED | OUTPATIENT
Start: 2022-07-12 | End: 2022-07-15 | Stop reason: HOSPADM

## 2022-07-11 RX ORDER — ACETAMINOPHEN 325 MG/1
650 TABLET ORAL EVERY 6 HOURS PRN
Status: DISCONTINUED | OUTPATIENT
Start: 2022-07-11 | End: 2022-07-15 | Stop reason: HOSPADM

## 2022-07-11 RX ORDER — FLUOXETINE HYDROCHLORIDE 20 MG/1
20 CAPSULE ORAL DAILY
Status: DISCONTINUED | OUTPATIENT
Start: 2022-07-12 | End: 2022-07-15 | Stop reason: HOSPADM

## 2022-07-11 RX ORDER — IPRATROPIUM BROMIDE AND ALBUTEROL SULFATE 2.5; .5 MG/3ML; MG/3ML
3 SOLUTION RESPIRATORY (INHALATION) ONCE
Status: COMPLETED | OUTPATIENT
Start: 2022-07-11 | End: 2022-07-11

## 2022-07-11 RX ORDER — ASPIRIN 81 MG/1
81 TABLET ORAL DAILY
Status: DISCONTINUED | OUTPATIENT
Start: 2022-07-12 | End: 2022-07-15 | Stop reason: HOSPADM

## 2022-07-11 RX ORDER — DILTIAZEM HYDROCHLORIDE 5 MG/ML
10 INJECTION INTRAVENOUS ONCE
Status: COMPLETED | OUTPATIENT
Start: 2022-07-11 | End: 2022-07-11

## 2022-07-11 RX ORDER — FUROSEMIDE 10 MG/ML
40 INJECTION INTRAMUSCULAR; INTRAVENOUS ONCE
Status: COMPLETED | OUTPATIENT
Start: 2022-07-11 | End: 2022-07-11

## 2022-07-11 RX ORDER — ALBUTEROL SULFATE 2.5 MG/3ML
2.5 SOLUTION RESPIRATORY (INHALATION) EVERY 6 HOURS PRN
Status: DISCONTINUED | OUTPATIENT
Start: 2022-07-11 | End: 2022-07-15 | Stop reason: HOSPADM

## 2022-07-11 RX ADMIN — ALUMINA, MAGNESIA, AND SIMETHICONE ORAL SUSPENSION REGULAR STRENGTH 30 ML: 1200; 1200; 120 SUSPENSION ORAL at 18:40

## 2022-07-11 RX ADMIN — IPRATROPIUM BROMIDE AND ALBUTEROL SULFATE 3 ML: 2.5; .5 SOLUTION RESPIRATORY (INHALATION) at 14:17

## 2022-07-11 RX ADMIN — IPRATROPIUM BROMIDE AND ALBUTEROL SULFATE 3 ML: 2.5; .5 SOLUTION RESPIRATORY (INHALATION) at 14:05

## 2022-07-11 RX ADMIN — DILTIAZEM HYDROCHLORIDE 10 MG: 5 INJECTION, SOLUTION INTRAVENOUS at 18:40

## 2022-07-11 RX ADMIN — FUROSEMIDE 40 MG: 10 INJECTION, SOLUTION INTRAMUSCULAR; INTRAVENOUS at 16:23

## 2022-07-11 RX ADMIN — Medication 10 ML: at 16:53

## 2022-07-11 RX ADMIN — ATORVASTATIN CALCIUM 40 MG: 40 TABLET, FILM COATED ORAL at 18:35

## 2022-07-11 RX ADMIN — PANTOPRAZOLE SODIUM 40 MG: 40 INJECTION, POWDER, FOR SOLUTION INTRAVENOUS at 18:45

## 2022-07-11 RX ADMIN — METHYLPREDNISOLONE SODIUM SUCCINATE 125 MG: 125 INJECTION, POWDER, FOR SOLUTION INTRAMUSCULAR; INTRAVENOUS at 16:22

## 2022-07-11 RX ADMIN — DILTIAZEM HYDROCHLORIDE 5 MG/HR: 5 INJECTION INTRAVENOUS at 19:07

## 2022-07-11 RX ADMIN — APIXABAN 2.5 MG: 2.5 TABLET, FILM COATED ORAL at 18:34

## 2022-07-11 RX ADMIN — Medication 3 MG: at 21:25

## 2022-07-11 NOTE — ED NOTES
As per Arturo Reyes from IR patient will be brought transported to 4N from IR  Ramiro Richards receiving RN from N notified and report offered  Patient remains on ED monitor belongings brought to Patient        Joellen Ba RN  07/11/22 3597

## 2022-07-11 NOTE — H&P
[address]  Progress Note - Jeyson Garcia 1940, 78 y o  female MRN: [id number]  Unit/Bed#: [address] [address] Encounter: [id number]  Primary Care Provider: Andrés Christian MD   Date and time admitted to hospital: 7/11/2022 12:27 PM    * Acute on chronic respiratory failure [facility])  Assessment & Plan  Patient seen and examined in the emergency department and will be admitted to the hospital for further evaluation and management  Patient presenting with increasing shortness of breath  Patient has a history of CHF and COPD on 4 L oxygen chronically via nasal cannula  Patient was found to have evidence of moderate volume pleural effusion of the right lung in addition to pulmonary vascular congestion  Patient underwent evaluation with Interventional Radiology and underwent thoracentesis  Patient also received a dose of IV Lasix therapy  Will continue patient on IV Lasix 40 mg once daily  Monitor intake/output and daily weights  2D echocardiogram has been ordered for further evaluation  Cardiology consultation has been placed  Patient also with suspected exacerbation of her underlying COPD  Patient did receive a dose of IV Solu-Medrol in the emergency department  Will continue with 40 mg once daily at this time in addition to nebulizer therapy  DNR (do not resuscitate) discussion  Assessment & Plan  Patient states that she does not want cardiopulmonary resuscitation or intubation under any circumstances  History of CVA (cerebrovascular accident)  Assessment & Plan  Will continue to monitor  Hyperlipidemia  Assessment & Plan  Continue home medications  Acute congestive heart failure (HCC)  Assessment & Plan  Wt Readings from Last 3 Encounters:   07/11/22 48 3 kg (106 lb 7 7 oz)   03/07/22 43 5 kg (96 lb)   02/07/22 46 7 kg (103 lb)     Patient with evidence of acute on chronic diastolic congestive heart failure    Patient did receive 40 mg of IV Lasix in the emergency department  Will continue with 40 mg of IV Lasix daily at this time  Monitor intake/output and daily weights  2D echocardiogram has been ordered  Cardiology consultation has been placed  Will also monitor troponin levels  Increased thyroid stimulating hormone (TSH) level  Assessment & Plan  Patient with a TSH level of 5 479  Patient is not on thyroid replacement at home  Will check a free T4  Acute kidney injury [facility])  Assessment & Plan  Patient with evidence of acute kidney injury with a creatinine level of 1 39 likely secondary to volume overload  Previous creatinine level 4 months ago was noted to be 0 77  Repeat blood work in the morning  Rapid atrial fibrillation [facility])  Assessment & Plan  Patient noted on telemetry to have evidence of rapid atrial fibrillation with a rate between 110 and 140  Will give patient 10 mg IV push of Cardizem and start patient on a Cardizem drip at 5 mg/hour  Patient is also on Eliquis at home which will be continued  Pleural effusion  Assessment & Plan  Patient with evidence of a moderate right-sided pleural effusion  Patient did undergo evaluation with Interventional Radiology and did have 1550 mL of fluid taken off  Will continue to monitor  Hypertension  Assessment & Plan  Continue home medications  VTE Pharmacologic Prophylaxis: VTE Score: 6 Eliquis  Code Status: Level 3 - DNAR and DNI    Chief Complaint: Shortness of breath  History of Present Illness:  Monse Aguirre is a 78 y o  female with a history of chronic hypoxic respiratory failure on 4 L of oxygen at baseline who presents to the emergency department with complaints of progressively worsening shortness of breath on exertion  In the emergency department patient was noted to have a moderate volume right-sided pleural effusion as well as pulmonary vascular congestion    Patient was given IV Lasix therapy and underwent evaluation with Interventional Radiology at which time she underwent thoracentesis  Patient is also noted to be in rapid atrial fibrillation  Patient currently denies any abdominal pain, lightheadedness, dizziness, palpitations, fevers, chills, change in urinary habits, change in bowel habits, recent travel or any known sick contacts  Review of Systems:  14 point review of systems obtained and reviewed and is negative except as outlined above in the HPI  Past Medical and Surgical History:   Past Medical History:   Diagnosis Date    Arthritis     [facility]     [facility]     COPD [facility]chronic obstructive [facility] disease[facility]     Coronary artery disease     aortic valve replacement    Heart murmur     Hypertension      Past Surgical History:   Procedure Laterality Date    AORTIC VALVE REPLACEMENT      APPENDECTOMY      CHOLECYSTECTOMY      CT GUIDED PERC DRAINAGE CATHETER PLACEMENT  10/23/2014    IR THORACENTESIS  3/18/2022    JOINT REPLACEMENT      bilaterl hip and right knee    TONSILECTOMY AND ADNOIDECTOMY      TONSILLECTOMY      TUBAL LIGATION       Meds/Allergies:  Prior to Admission medications    Medication Sig Start Date End Date Taking?  Authorizing Provider   albuterol (Ventolin HFA) 90 mcg/act inhaler Inhale 2 puffs every 6 (six) hours as needed for wheezing 3/9/22  Yes Dione Morales PA-C   apixaban (ELIQUIS) 2 5 mg Take 1 tablet (2 5 mg total) by mouth 2 (two) times a day 10/8/21 3/7/22  Leda Cortes DO   aspirin (ECOTRIN LOW STRENGTH) 81 mg EC tablet Take 1 tablet (81 mg total) by mouth daily 1/1/21 3/7/22  Ronita Severs, CRNP   atorvastatin (LIPITOR) 40 mg tablet Take 1 tablet (40 mg total) by mouth daily 2/7/22   Leda Cortes DO   benazepril (LOTENSIN) 20 mg tablet Take 10 mg by mouth daily     Historical Provider, MD   Chlorpheniramine-Acetaminophen (CORICIDIN HBP COLD/FLU PO) Take by mouth    Historical Provider, MD   digoxin (LANOXIN) 0 125 mg tablet Take 1 tablet (125 mcg total) by mouth every other day 10/8/21 3/7/22  Leda Cortes DO   FLUoxetine (PROzac) 20 mg capsule Take 20 mg by mouth daily    Historical Provider, MD   fluticasone-umeclidinium-vilanterol (Trelegy Ellipta) 100-62 5-25 MCG/INH inhaler Inhale 1 puff daily Rinse mouth after use   3/9/22 4/8/22  Lillian Gardner PA-C   magnesium oxide (MAG-OX) 400 mg Take 1 tablet (400 mg total) by mouth daily 21   Kavon Barron MD   melatonin 3 mg Take 1 tablet (3 mg total) by mouth daily at bedtime 19   Edyta Jo MD   metoprolol tartrate (LOPRESSOR) 25 mg tablet Take 1 tablet (25 mg total) by mouth every 12 (twelve) hours 22   Leda Cortes DO   Multiple Vitamins-Minerals (PreserVision AREDS) TABS Take by mouth    Historical Provider, MD   omeprazole (PriLOSEC) 40 MG capsule Take 40 mg by mouth daily    Historical Provider, MD   torsemide (DEMADEX) 20 mg tablet Take 1 tablet (20 mg total) by mouth daily 22   Mattie Mason MD   traMADol (ULTRAM) 50 mg tablet Take 50 mg by mouth 4 (four) times a day as needed 21   Historical Provider, MD     Allergies: No Known Allergies    Social History:  Marital Status: /Civil Union     Social History     Substance and Sexual Activity   Alcohol Use Not Currently    Alcohol/week: 1 0 standard drink    Types: 1 Glasses of wine per week    Comment: rare     Social History     Tobacco Use   Smoking Status Former Smoker    Packs/day: 1     Years: 25 00    Pack years: 25 00    Types: Cigarettes    Start date: 63    Quit date: 2000    Years since quittin 5   Smokeless Tobacco Never Used     Social History     Substance and Sexual Activity   Drug Use No     Family History:  Family History   Problem Relation Age of Onset    Heart defect Father      Physical Exam:     Vitals:   Blood Pressure: 116/78 (22 1755)  Pulse: 85 (22 1755)  Temperature: 97 9 °F (36 6 °C) (22 1731)  Temp Source: Tympanic (22 1630)  Respirations: 18 (07/11/22 1731)  Weight - Scale: 48 3 kg (106 lb 7 7 oz) (07/11/22 1236)  SpO2: 97 % (07/11/22 1756)    Physical Exam  HENT:      Head: Normocephalic  Mouth/Throat:      Mouth: Mucous membranes are moist    Eyes:      Extraocular Movements: Extraocular movements intact  Cardiovascular:      Rate and Rhythm: Rhythm irregular  Pulmonary:      Effort: Pulmonary effort is normal       Breath sounds: Wheezing present  Comments: Diminished in the bases  Abdominal:      Palpations: Abdomen is soft  Tenderness: There is no abdominal tenderness  Skin:     General: Skin is warm  Neurological:      Mental Status: She is alert and oriented to person, place, and time  Psychiatric:         Mood and Affect: Mood normal          Behavior: Behavior normal        Additional Data:     Lab Results:  Results from last 7 days   Lab Units 07/11/22  1323   WBC Thousand/uL 7 94   HEMOGLOBIN g/dL 9 4*   HEMATOCRIT % 34 3*   PLATELETS Thousands/uL 319   NEUTROS PCT % 87*   LYMPHS PCT % 6*   MONOS PCT % 7   EOS PCT % 0     Results from last 7 days   Lab Units 07/11/22  1323   SODIUM mmol/L 141   POTASSIUM mmol/L 5 0   CHLORIDE mmol/L 105   CO2 mmol/L 24   BUN mg/dL 32*   CREATININE mg/dL 1 39*   ANION GAP mmol/L 12   CALCIUM mg/dL 8 9   ALBUMIN g/dL 2 9*   TOTAL BILIRUBIN mg/dL 0 42   ALK PHOS U/L 93   ALT U/L 21   AST U/L 28   GLUCOSE RANDOM mg/dL 127     Results from last 7 days   Lab Units 07/11/22  1323   INR  1 61*     Imaging: Reviewed  XR chest 1 view portable   Final Result by Kasandra Marcum MD (07/11 1419)      Mild pulmonary venous congestion  Moderate right effusion with right base opacity, at least in part due to atelectasis  Pneumonia cannot be excluded in the appropriate clinical setting                    Workstation performed: [id number]         IR IN-Patient Thoracentesis    (Results Pending)     ** Please Note: This note has been constructed using a voice recognition system   **

## 2022-07-11 NOTE — ASSESSMENT & PLAN NOTE
Patient with a TSH level of 5 479  Patient is not on thyroid replacement at home  Will check a free T4

## 2022-07-11 NOTE — SEDATION DOCUMENTATION
Pt tolerated right thoracentesis  1550ml yellow cloudy fluids removed  Vitals stable  Pt being transferred back to ED

## 2022-07-11 NOTE — BRIEF OP NOTE (RAD/CATH)
INTERVENTIONAL RADIOLOGY PROCEDURE NOTE    Date: 7/11/2022    Procedure: Procedure name not found  Preoperative diagnosis:   1  CHF (congestive heart failure) ([facility] )    2  Dyspnea    3  Pleural effusion         Postoperative diagnosis: Same  Surgeon: Jacqueline Watts MD     Assistant: None  No qualified resident was available  Blood loss: Minimal    Specimens: None     Findings: Right pleural effusion, thoracentesis performed  Complications: None immediate      Anesthesia: local

## 2022-07-11 NOTE — ASSESSMENT & PLAN NOTE
Patient with evidence of a moderate right-sided pleural effusion  Patient did undergo evaluation with Interventional Radiology and did have 1550 mL of fluid taken off  Will continue to monitor

## 2022-07-11 NOTE — DISCHARGE INSTRUCTIONS
Thoracentesis   WHAT YOU NEED TO KNOW:   A thoracentesis is a procedure to remove extra fluid or air from between your lungs and your inner chest wall  Air or fluid buildup may make it hard for you to breathe  A thoracentesis allows your lungs to expand fully so you can breathe more easily  DISCHARGE INSTRUCTIONS:     Small amount of shoulder pain and bloody sputum is normal after a Thoracentesis  Rest:  Rest when you feel it is needed  Slowly start to do more each day  Return to your daily activities as directed  Resume your normal diet  Small sips of flat soda will help mild nausea  Do not smoke: If you smoke, it is never too late to quit  Ask for information about how to stop smoking if you need help  Contact Interventional Radiology at [phone] [place] PATIENTS: Contact Interventional Radiology at [phone]) Alix Bauer PATIENTS: Contact Interventional Radiology at [phone]) if:   You have a fever  Your puncture site is red, warm, swollen, or draining pus  You have questions or concerns about your procedure, medicine, or care  Seek care immediately or call 911 if:   Severe chest pain with inspiration and shortness of breath    Large amounts of blood in your sputum    Follow up with your healthcare provider as directed

## 2022-07-11 NOTE — ASSESSMENT & PLAN NOTE
Patient with evidence of acute kidney injury with a creatinine level of 1 39 likely secondary to volume overload  Previous creatinine level 4 months ago was noted to be 0 77  Repeat blood work in the morning

## 2022-07-11 NOTE — ED PROVIDER NOTES
History  Chief Complaint   Patient presents with    Shortness of Breath     States she is having problems breathing   states he increased her oxygen to 4 LNC  Facial pallor, mottled legs, tachypneic  Son states had to have her lung tapped in past       78-year-old female presents with shortness of breath lately worsening on exertion she is on chronic 4 L nasal cannula oxygen she has a history of COPD and CHF  No increasing leg swelling or weight gain noted  History of pleural effusions that were tapped and she had thoracentesis a few months ago  No fevers chills cough congestion or any other symptoms  History provided by:  Patient   used: No        Prior to Admission Medications   Prescriptions Last Dose Informant Patient Reported? Taking? FLUoxetine (PROzac) 20 mg capsule 7/11/2022 at 0900 Spouse/Significant Other Yes Yes   Sig: Take 20 mg by mouth daily   albuterol (Ventolin HFA) 90 mcg/act inhaler 7/11/2022 at 1000  No Yes   Sig: Inhale 2 puffs every 6 (six) hours as needed for wheezing   apixaban (ELIQUIS) 2 5 mg 7/11/2022 at 0900  No Yes   Sig: Take 1 tablet (2 5 mg total) by mouth 2 (two) times a day   Patient taking differently: Take 2 5 mg by mouth 2 (two) times a day Takes at 9 AM and 6 PM   aspirin (ECOTRIN LOW STRENGTH) 81 mg EC tablet 7/11/2022 at 0900 Self No Yes   Sig: Take 1 tablet (81 mg total) by mouth daily   Patient taking differently: Take 81 mg by mouth daily   atorvastatin (LIPITOR) 40 mg tablet 7/10/2022 at 1800  No Yes   Sig: Take 1 tablet (40 mg total) by mouth daily   digoxin (LANOXIN) 0 125 mg tablet 7/11/2022 at 0900  No Yes   Sig: Take 1 tablet (125 mcg total) by mouth every other day   Patient taking differently: Take 125 mcg by mouth every other day Takes on the odd days-took today 7/11   fluticasone-umeclidinium-vilanterol (Trelegy Ellipta) 100-62 5-25 MCG/INH inhaler 7/11/2022 at 0900  No Yes   Sig: Inhale 1 puff daily Rinse mouth after use  Patient taking differently: Inhale 1 puff daily Rinse mouth after use    magnesium oxide (MAG-OX) 400 mg 2022 at 0900  No Yes   Sig: Take 1 tablet (400 mg total) by mouth daily   metoprolol tartrate (LOPRESSOR) 25 mg tablet 2022 at 0900  No Yes   Sig: Take 1 tablet (25 mg total) by mouth every 12 (twelve) hours   omeprazole (PriLOSEC) 40 MG capsule 2022 at 0900 Spouse/Significant Other Yes Yes   Sig: Take 20 mg by mouth daily   torsemide (DEMADEX) 20 mg tablet 2022 at 0900  No Yes   Sig: Take 1 tablet (20 mg total) by mouth daily   traMADol (ULTRAM) 50 mg tablet Past Week at Unknown time Spouse/Significant Other Yes Yes   Sig: Take 50 mg by mouth 4 (four) times a day as needed      Facility-Administered Medications: None       Past Medical History:   Diagnosis Date    Arthritis     Atrial fibrillation (HCC)     [facility]     COPD [facility]chronic obstructive pulmonary disease) [facility] [address]     Coronary artery disease     aortic valve replacement    Heart murmur     Hypertension        Past Surgical History:   Procedure Laterality Date    AORTIC VALVE REPLACEMENT      APPENDECTOMY      CHOLECYSTECTOMY      CT GUIDED PERC DRAINAGE CATHETER PLACEMENT  10/23/2014    IR THORACENTESIS  3/18/2022    JOINT REPLACEMENT      bilaterl hip and right knee    TONSILECTOMY AND ADNOIDECTOMY      TONSILLECTOMY      TUBAL LIGATION         Family History   Problem Relation Age of Onset    Heart defect Father      I have reviewed and agree with the history as documented      E-Cigarette/Vaping    E-Cigarette Use Never User      E-Cigarette/Vaping Substances    Nicotine No     THC No     CBD No     Flavoring No      Social History     Tobacco Use    Smoking status: Former Smoker     Packs/day: 1 00     Years: 25 00     Pack years: 25 00     Types: Cigarettes     Start date: 63     Quit date: 2000     Years since quittin 5    Smokeless tobacco: Never Used   Vaping Use  Vaping Use: Never used   Substance Use Topics    Alcohol use: Not Currently     Alcohol/week: 1 0 standard drink     Types: 1 Glasses of wine per week     Comment: rare    Drug use: No       Review of Systems   Constitutional: Negative  HENT: Negative  Eyes: Negative  Respiratory: Positive for shortness of breath  Cardiovascular: Negative  Gastrointestinal: Negative  Endocrine: Negative  Genitourinary: Negative  Musculoskeletal: Negative  Skin: Negative  Allergic/Immunologic: Negative  Neurological: Negative  Hematological: Negative  Psychiatric/Behavioral: Negative  All other systems reviewed and are negative  Physical Exam  Physical Exam  Constitutional:       Appearance: Normal appearance  HENT:      Head: Normocephalic and atraumatic  Nose: Nose normal       Mouth/Throat:      Mouth: Mucous membranes are moist    Eyes:      Extraocular Movements: Extraocular movements intact  Pupils: Pupils are equal, round, and reactive to light  Cardiovascular:      Rate and Rhythm: Normal rate and regular rhythm  Pulmonary:      Effort: Pulmonary effort is normal       Breath sounds: Rhonchi present  Abdominal:      General: Abdomen is flat  Bowel sounds are normal       Palpations: Abdomen is soft  Musculoskeletal:         General: Normal range of motion  Cervical back: Normal range of motion and neck supple  Skin:     General: Skin is warm  Capillary Refill: Capillary refill takes less than 2 seconds  Neurological:      General: No focal deficit present  Mental Status: She is alert and oriented to person, place, and time  Mental status is at baseline  Psychiatric:         Mood and Affect: Mood normal          Thought Content:  Thought content normal          Vital Signs  ED Triage Vitals   Temperature Pulse Respirations Blood Pressure SpO2   07/11/22 1236 07/11/22 1236 07/11/22 1236 07/11/22 1236 07/11/22 1236   (!) 94 1 °F (34 5 °C) 73 (!) 32 139/64 91 %      Temp Source Heart Rate Source Patient Position - Orthostatic VS BP Location FiO2 (%)   07/11/22 1236 07/11/22 1236 07/11/22 1236 07/11/22 1236 --   Tympanic Monitor Sitting Left arm       Pain Score       07/11/22 1504       10 - Worst Possible Pain           Vitals:    07/11/22 1705 07/11/22 1731 07/11/22 1755 07/11/22 1900   BP: 165/94 100/55 116/78 111/82   Pulse:  (!) 117 85 72   Patient Position - Orthostatic VS:             Visual Acuity      ED Medications  Medications   albuterol inhalation solution 2 5 mg (has no administration in time range)   aspirin (ECOTRIN LOW STRENGTH) EC tablet 81 mg (has no administration in time range)   apixaban (ELIQUIS) tablet 2 5 mg (2 5 mg Oral Given 7/11/22 1834)   atorvastatin (LIPITOR) tablet 40 mg (40 mg Oral Given 7/11/22 1835)   FLUoxetine (PROzac) capsule 20 mg (has no administration in time range)   fluticasone-vilanterol (BREO ELLIPTA) 100-25 mcg/inh inhaler 1 puff (has no administration in time range)   magnesium oxide (MAG-OX) tablet 400 mg (has no administration in time range)   melatonin tablet 3 mg (has no administration in time range)   multivitamin-minerals (CENTRUM) tablet 1 tablet (has no administration in time range)   pantoprazole (PROTONIX) EC tablet 40 mg (has no administration in time range)   furosemide (LASIX) injection 40 mg (has no administration in time range)   acetaminophen (TYLENOL) tablet 650 mg (has no administration in time range)   docusate sodium (COLACE) capsule 100 mg (has no administration in time range)   polyethylene glycol (MIRALAX) packet 17 g (has no administration in time range)   methylPREDNISolone sodium succinate (Solu-MEDROL) injection 40 mg (has no administration in time range)   diltiazem (CARDIZEM) 125 mg in sodium chloride 0 9 % 125 mL infusion (5 mg/hr Intravenous New Bag 7/11/22 1907)   methylPREDNISolone sodium succinate (Solu-MEDROL) injection 125 mg (125 mg Intravenous Given 7/11/22 1622) ipratropium-albuterol (DUO-NEB) 0 5-2 5 mg/3 mL inhalation solution 3 mL (3 mL Nebulization Given 7/11/22 1417)   ipratropium-albuterol (DUO-NEB) 0 5-2 5 mg/3 mL inhalation solution 3 mL (3 mL Nebulization Given 7/11/22 1405)   furosemide (LASIX) injection 40 mg (40 mg Intravenous Given 7/11/22 1623)   lidocaine 1% buffered (10 mL Infiltration Given 7/11/22 1653)   pantoprazole (PROTONIX) injection 40 mg (40 mg Intravenous Given 7/11/22 1845)   aluminum-magnesium hydroxide-simethicone (MYLANTA) oral suspension 30 mL (30 mL Oral Given 7/11/22 1840)   diltiazem (CARDIZEM) injection 10 mg (10 mg Intravenous Given 7/11/22 1840)       Diagnostic Studies  Results Reviewed     Procedure Component Value Units Date/Time    HS Troponin I 4hr [[id number]]  (Abnormal) Collected: 07/11/22 1730    Lab Status: Final result Specimen: Blood from Central Venous Line Updated: 07/11/22 1806     hs TnI 4hr 74 ng/L      Delta 4hr hsTnI 14 ng/L     UA w Reflex to Microscopic w Reflex to Culture [[id number]]  (Abnormal) Collected: 07/11/22 1744    Lab Status: Final result Specimen: Urine, Clean Catch Updated: 07/11/22 1800     Color, UA Light Yellow     Clarity, UA Slightly Cloudy     Specific [place], UA 1 010     pH, UA 6 5     Leukocytes, UA Large     Nitrite, UA Negative     Protein, UA Negative mg/dl      Glucose, UA Negative mg/dl      Ketones, UA Negative mg/dl      Urobilinogen, UA 0 2 E U /dl      Bilirubin, UA Negative     Occult Blood, UA Moderate    T4, free [[id number]] Collected: 07/11/22 1323    Lab Status:  In process Specimen: Blood from Arm, Left Updated: 07/11/22 1755    HS Troponin I 2hr [[id number]]  (Abnormal) Collected: 07/11/22 1533    Lab Status: Final result Specimen: Blood from Arm, Left Updated: 07/11/22 1605     hs TnI 2hr 58 ng/L      Delta 2hr hsTnI -2 ng/L     FLU/RSV/COVID - if FLU/RSV clinically relevant [[id number]]  (Normal) Collected: 07/11/22 6085    Lab Status: Final result Specimen: Nares from Nose Updated: 07/11/22 1449     SARS-CoV-2 Negative     INFLUENZA A PCR Negative     INFLUENZA B PCR Negative     RSV PCR Negative    Narrative:      FOR PEDIATRIC PATIENTS - copy/paste COVID Guidelines URL to browser: [email or web address]  [email or web address]    SARS-CoV-2 assay is a Nucleic Acid Amplification assay intended for the  qualitative detection of nucleic acid from SARS-CoV-2 in nasopharyngeal  swabs  Results are for the presumptive identification of SARS-CoV-2 RNA  Positive results are indicative of infection with SARS-CoV-2, the virus  causing COVID-19, but do not rule out bacterial infection or co-infection  with other viruses  Laboratories within the [place] and its  territories are required to report all positive results to the appropriate  public health authorities  Negative results do not preclude SARS-CoV-2  infection and should not be used as the sole basis for treatment or other  patient management decisions  Negative results must be combined with  clinical observations, patient history, and epidemiological information  This test has not been FDA cleared or approved  This test has been authorized by FDA under an Emergency Use Authorization  (EUA)  This test is only authorized for the duration of time the  declaration that circumstances exist justifying the authorization of the  emergency use of an in vitro diagnostic tests for detection of SARS-CoV-2  virus and/or diagnosis of COVID-19 infection under section 564(b)(1) of  the Act, 21 U  S C  665DRB-6(C)(1), unless the authorization is terminated  or revoked sooner  The test has been validated but independent review by FDA  and CLIA is pending  Test performed using [employer] GeneXpert: This RT-PCR assay targets N2,  a region unique to SARS-CoV-2  A conserved region in the E-gene was chosen  for pan-Sarbecovirus detection which includes SARS-CoV-2      HS Troponin 0hr (reflex protocol) [[id number]] (Abnormal) Collected: 07/11/22 1323    Lab Status: Final result Specimen: Blood from Arm, Left Updated: 07/11/22 1354     hs TnI 0hr 60 ng/L     TSH [[id number]]  (Abnormal) Collected: 07/11/22 1323    Lab Status: Final result Specimen: Blood from Arm, Left Updated: 07/11/22 1354     TSH 3RD GENERATON 5 479 uIU/mL     Narrative:      Patients undergoing fluorescein dye angiography may retain small amounts of fluorescein in the body for 48-72 hours post procedure  Samples containing fluorescein can produce falsely depressed TSH values  If the patient had this procedure,a specimen should be resubmitted post fluorescein clearance        NT-BNP PRO [[id number]]  (Abnormal) Collected: 07/11/22 1323    Lab Status: Final result Specimen: Blood from Arm, Left Updated: 07/11/22 1354     NT-proBNP 15,829 pg/mL     Comprehensive metabolic panel [[id number]]  (Abnormal) Collected: 07/11/22 1323    Lab Status: Final result Specimen: Blood from Arm, Left Updated: 07/11/22 1347     Sodium 141 mmol/L      Potassium 5 0 mmol/L      Chloride 105 mmol/L      CO2 24 mmol/L      ANION GAP 12 mmol/L      BUN 32 mg/dL      Creatinine 1 39 mg/dL      Glucose 127 mg/dL      Calcium 8 9 mg/dL      Corrected Calcium 9 8 mg/dL      AST 28 U/L      ALT 21 U/L      Alkaline Phosphatase 93 U/L      Total Protein 6 9 g/dL      Albumin 2 9 g/dL      Total Bilirubin 0 42 mg/dL      eGFR 34 ml/min/1 73sq m     Narrative:      Solo guidelines for Chronic Kidney Disease (CKD):     Stage 1 with normal or high GFR (GFR > 90 mL/min/1 73 square meters)    Stage 2 Mild CKD (GFR = 60-89 mL/min/1 73 square meters)    Stage 3A Moderate CKD (GFR = 45-59 mL/min/1 73 square meters)    Stage 3B Moderate CKD (GFR = 30-44 mL/min/1 73 square meters)    Stage 4 Severe CKD (GFR = 15-29 mL/min/1 73 square meters)    Stage 5 End Stage CKD (GFR <15 mL/min/1 73 square meters)  Note: GFR calculation is accurate only with a steady state creatinine    Protime-INR [[id number]]  (Abnormal) Collected: 07/11/22 1323    Lab Status: Final result Specimen: Blood from Arm, Left Updated: 07/11/22 1341     Protime 18 7 seconds      INR 1 61    APTT [[id number]]  (Abnormal) Collected: 07/11/22 1323    Lab Status: Final result Specimen: Blood from Arm, Left Updated: 07/11/22 1341     PTT 40 seconds     CBC and differential [[id number]]  (Abnormal) Collected: 07/11/22 1323    Lab Status: Final result Specimen: Blood from Arm, Left Updated: 07/11/22 1330     WBC 7 94 Thousand/uL      RBC 4 14 Million/uL      Hemoglobin 9 4 g/dL      Hematocrit 34 3 %      MCV 83 fL      MCH 22 7 pg      MCHC 27 4 g/dL      RDW 18 2 %      MPV 10 6 fL      Platelets 145 Thousands/uL      nRBC 1 /100 WBCs      Neutrophils Relative 87 %      Immat GRANS % 0 %      Lymphocytes Relative 6 %      Monocytes Relative 7 %      Eosinophils Relative 0 %      Basophils Relative 0 %      Neutrophils Absolute 6 85 Thousands/µL      Immature Grans Absolute 0 03 Thousand/uL      Lymphocytes Absolute 0 48 Thousands/µL      Monocytes Absolute 0 54 Thousand/µL      Eosinophils Absolute 0 01 Thousand/µL      Basophils Absolute 0 03 Thousands/µL                  XR chest 1 view portable   Final Result by Lon Kim MD (07/11 1419)      Mild pulmonary venous congestion  Moderate right effusion with right base opacity, at least in part due to atelectasis  Pneumonia cannot be excluded in the appropriate clinical setting                    Workstation performed: [id number]         IR IN-Patient Thoracentesis    (Results Pending)              Procedures  CriticalCare Time  Performed by: Sharda Rivero DO  Authorized by: Sharda Rivero DO     Critical care provider statement:     Critical care time (minutes):  35    Critical care was necessary to treat or prevent imminent or life-threatening deterioration of the following conditions:  Respiratory failure and metabolic crisis    Critical care was time spent personally by me on the following activities:  Blood draw for specimens, obtaining history from patient or surrogate, development of treatment plan with patient or surrogate, discussions with consultants, evaluation of patient's response to treatment, examination of patient, interpretation of cardiac output measurements, ordering and performing treatments and interventions, ordering and review of laboratory studies, ordering and review of radiographic studies, re-evaluation of patient's condition and review of old charts    I assumed direction of critical care for this patient from another provider in my specialty: no    ECG 12 Lead Documentation Only  Performed by: Mirta Skaggs DO  Authorized by: Mirta Skaggs DO     ECG reviewed by me, the ED Provider: yes    Patient location:  ED  Previous ECG:     Previous ECG:  Unavailable    Comparison to cardiac monitor: Yes    Interpretation:     Interpretation: non-specific    Rate:     ECG rate assessment: normal    Rhythm:     Rhythm: sinus rhythm    Ectopy:     Ectopy: none    QRS:     QRS axis:  Normal  Conduction:     Conduction: normal    ST segments:     ST segments:  Non-specific  T waves:     T waves: non-specific    Central Line    Date/Time: 7/11/2022 8:52 PM  Performed by: Mirta Skaggs DO  Authorized by: Mirta Skaggs DO     Patient location:  ED  Other Assisting Provider: Yes (comment)    Consent:     Consent obtained:  Verbal    Consent given by:  Patient    Alternatives discussed:  No treatment  Universal protocol:     Patient identity confirmed:  Verbally with patient  Pre-procedure details:     Hand hygiene: Hand hygiene performed prior to insertion      Skin preparation:  2% chlorhexidine  Indications:     Central line indications: no peripheral vascular access    Anesthesia (see MAR for exact dosages):      Anesthesia method:  Local infiltration    Local anesthetic:  Lidocaine 1% WITH epi  Procedure details:     Location:  Right femoral Vessel type: vein      Laterality:  Right    Approach: percutaneous technique used      Patient position:  Trendelenburg    Catheter type:  Triple lumen    Catheter size:  7 Fr    Landmarks identified: yes      Ultrasound guidance: yes      Sterile ultrasound techniques: Sterile gel and sterile probe covers were used      Number of attempts:  1    Successful placement: yes    Post-procedure details:     Post-procedure:  Dressing applied and line sutured    Assessment:  Blood return through all ports    Patient tolerance of procedure: Tolerated well, no immediate complications    Observer:  Yes               ED Course                                             MDM  Number of Diagnoses or Management Options     Amount and/or Complexity of Data Reviewed  Clinical lab tests: ordered and reviewed  Tests in the radiology section of CPT®: ordered and reviewed  Tests in the medicine section of CPT®: ordered and reviewed    Patient Progress  Patient progress: stable      Disposition  Final diagnoses:   CHF (congestive heart failure) (HCC)   Dyspnea   Pleural effusion     Time reflects when diagnosis was documented in both MDM as applicable and the Disposition within this note     Time User Action Codes Description Comment    7/11/2022  3:57 PM AnepParesh bonilla Add [I50 9] CHF (congestive heart failure) ([facility] [address] )     7/11/2022  3:57 PM AnepuArti Add [R06 00] Dyspnea     7/11/2022  3:57 PM AnepuParesh Add [J90] Pleural effusion       ED Disposition     ED Disposition   Admit    Condition   Stable    Date/Time   Mon Jul 11, 2022  3:57 PM    Comment               Follow-up Information    None         Current Discharge Medication List      CONTINUE these medications which have NOT CHANGED    Details   albuterol (Ventolin HFA) 90 mcg/act inhaler Inhale 2 puffs every 6 (six) hours as needed for wheezing  Qty: 18 g, Refills: 11    Comments: Substitution to a formulary equivalent within the same pharmaceutical class is authorized  Associated Diagnoses: Congestive heart failure, unspecified HF chronicity, unspecified heart failure type ([place] [address] ); Pleural effusion; Shortness of breath      apixaban (ELIQUIS) 2 5 mg Take 1 tablet (2 5 mg total) by mouth 2 (two) times a day  Qty: 60 tablet, Refills: 5    Associated Diagnoses: CVA (cerebral vascular accident) ([place] [address] )      aspirin (ECOTRIN LOW STRENGTH) 81 mg EC tablet Take 1 tablet (81 mg total) by mouth daily  Qty: 30 tablet, Refills: 0    Associated Diagnoses: CVA (cerebral vascular accident) ([place] [address] )      atorvastatin (LIPITOR) 40 mg tablet Take 1 tablet (40 mg total) by mouth daily  Qty: 90 tablet, Refills: 2    Associated Diagnoses: Chronic arterial ischemic stroke, multifocal, multiple vascular territories      digoxin (LANOXIN) 0 125 mg tablet Take 1 tablet (125 mcg total) by mouth every other day  Qty: 15 tablet, Refills: 5    Associated Diagnoses: Atrial fibrillation with rapid ventricular response (HCC)      FLUoxetine (PROzac) 20 mg capsule Take 20 mg by mouth daily      fluticasone-umeclidinium-vilanterol (Trelegy Ellipta) 100-62 5-25 MCG/INH inhaler Inhale 1 puff daily Rinse mouth after use    Qty: 60 blister, Refills: 11    Comments: Patient requests home delivery  Associated Diagnoses: Chronic obstructive pulmonary disease, unspecified COPD type (HCC)      magnesium oxide (MAG-OX) 400 mg Take 1 tablet (400 mg total) by mouth daily  Qty: 30 tablet, Refills: 4    Associated Diagnoses: Vascular headache      metoprolol tartrate (LOPRESSOR) 25 mg tablet Take 1 tablet (25 mg total) by mouth every 12 (twelve) hours  Qty: 60 tablet, Refills: 5    Associated Diagnoses: Atrial fibrillation (HCC)      omeprazole (PriLOSEC) 40 MG capsule Take 20 mg by mouth daily      torsemide (DEMADEX) 20 mg tablet Take 1 tablet (20 mg total) by mouth daily  Qty: 30 tablet, Refills: 5    Associated Diagnoses: Pleural effusion, bilateral      traMADol (ULTRAM) 50 mg tablet Take 50 mg by mouth 4 (four) times a day as needed         STOP taking these medications       melatonin 3 mg Comments:   Reason for Stopping:         Multiple Vitamins-Minerals (PreserVision AREDS) TABS Comments:   Reason for Stopping:               No discharge procedures on file      PDMP Review     None          ED Provider  Electronically Signed by           Phuc Acosta DO  07/11/22 4592

## 2022-07-11 NOTE — ASSESSMENT & PLAN NOTE
Patient noted on telemetry to have evidence of rapid atrial fibrillation with a rate between 110 and 140  Will give patient 10 mg IV push of Cardizem and start patient on a Cardizem drip at 5 mg/hour  Patient is also on Eliquis at home which will be continued

## 2022-07-11 NOTE — ASSESSMENT & PLAN NOTE
Patient states that she does not want cardiopulmonary resuscitation or intubation under any circumstances

## 2022-07-11 NOTE — PLAN OF CARE
Problem: RESPIRATORY - ADULT  Goal: Achieves optimal ventilation and oxygenation  Description: INTERVENTIONS:  - Assess for changes in respiratory status  - Assess for changes in mentation and behavior  - Position to facilitate oxygenation and minimize respiratory effort  - Oxygen administered by appropriate delivery if ordered  - Initiate smoking cessation education as indicated  - Encourage broncho-pulmonary hygiene including cough, deep breathe, Incentive Spirometry  - Assess the need for suctioning and aspirate as needed  - Assess and instruct to report SOB or any respiratory difficulty  - Respiratory Therapy support as indicated  Outcome: Progressing     Problem: Nutrition/Hydration-ADULT  Goal: Nutrient/Hydration intake appropriate for improving, restoring or maintaining nutritional needs  Description: Monitor and assess patient's nutrition/hydration status for malnutrition  Collaborate with interdisciplinary team and initiate plan and interventions as ordered  Monitor patient's weight and dietary intake as ordered or per policy  Utilize nutrition screening tool and intervene as necessary  Determine patient's food preferences and provide high-protein, high-caloric foods as appropriate       INTERVENTIONS:  - Monitor oral intake, urinary output, labs, and treatment plans  - Assess nutrition and hydration status and recommend course of action  - Evaluate amount of meals eaten  - Assist patient with eating if necessary   - Allow adequate time for meals  - Recommend/ encourage appropriate diets, oral nutritional supplements, and vitamin/mineral supplements  - Assess need for intravenous fluids  - Provide specific nutrition/hydration education as appropriate  - Include patient/family/caregiver in decisions related to nutrition  Outcome: Progressing

## 2022-07-11 NOTE — ED NOTES
Multiple failed attempts for obtaining an IV  Dr Hopkins  notified        Christian Anne RN  07/11/22 [id number]

## 2022-07-11 NOTE — ASSESSMENT & PLAN NOTE
Wt Readings from Last 3 Encounters:   07/11/22 48 3 kg (106 lb 7 7 oz)   03/07/22 43 5 kg (96 lb)   02/07/22 46 7 kg (103 lb)     Patient with evidence of acute on chronic diastolic congestive heart failure  Patient did receive 40 mg of IV Lasix in the emergency department  Will continue with 40 mg of IV Lasix daily at this time  Monitor intake/output and daily weights  2D echocardiogram has been ordered  Cardiology consultation has been placed  Will also monitor troponin levels

## 2022-07-11 NOTE — ED NOTES
Multiple failed attempts at obtaining and IV by Judy Cheek and by [address] by resident  Dr Katrina Paget notified        Fatemeh Sapp RN  07/11/22 [id number]

## 2022-07-11 NOTE — ASSESSMENT & PLAN NOTE
Patient seen and examined in the emergency department and will be admitted to the hospital for further evaluation and management  Patient presenting with increasing shortness of breath  Patient has a history of CHF and COPD on 4 L oxygen chronically via nasal cannula  Patient was found to have evidence of moderate volume pleural effusion of the right lung in addition to pulmonary vascular congestion  Patient underwent evaluation with Interventional Radiology and underwent thoracentesis  Patient also received a dose of IV Lasix therapy  Will continue patient on IV Lasix 40 mg once daily  Monitor intake/output and daily weights  2D echocardiogram has been ordered for further evaluation  Cardiology consultation has been placed  Patient also with suspected exacerbation of her underlying COPD  Patient did receive a dose of IV Solu-Medrol in the emergency department  Will continue with 40 mg once daily at this time in addition to nebulizer therapy

## 2022-07-12 ENCOUNTER — APPOINTMENT (INPATIENT)
Dept: NON INVASIVE DIAGNOSTICS | Facility: HOSPITAL | Age: 82
DRG: 186 | End: 2022-07-12
Payer: MEDICARE

## 2022-07-12 PROBLEM — J96.21 ACUTE ON CHRONIC RESPIRATORY FAILURE WITH HYPOXIA (HCC): Status: ACTIVE | Noted: 2022-07-11

## 2022-07-12 PROBLEM — J44.1 CHRONIC OBSTRUCTIVE PULMONARY DISEASE WITH ACUTE EXACERBATION (HCC): Status: ACTIVE | Noted: 2020-12-09

## 2022-07-12 PROBLEM — I50.33 ACUTE ON CHRONIC DIASTOLIC CONGESTIVE HEART FAILURE (HCC): Status: ACTIVE | Noted: 2022-07-11

## 2022-07-12 LAB
ALBUMIN SERPL BCP-MCNC: 2.7 G/DL (ref 3.5–5)
ALP SERPL-CCNC: 84 U/L (ref 46–116)
ALT SERPL W P-5'-P-CCNC: 16 U/L (ref 12–78)
ANION GAP SERPL CALCULATED.3IONS-SCNC: 8 MMOL/L (ref 4–13)
AORTIC ROOT: 2.9 CM
AORTIC VALVE MEAN VELOCITY: 8.7 M/S
APICAL FOUR CHAMBER EJECTION FRACTION: 55 %
AST SERPL W P-5'-P-CCNC: 15 U/L (ref 5–45)
AV AREA BY CONTINUOUS VTI: 2.8 CM2
AV AREA PEAK VELOCITY: 2.3 CM2
AV LVOT MEAN GRADIENT: 2 MMHG
AV LVOT PEAK GRADIENT: 3 MMHG
AV MEAN GRADIENT: 3 MMHG
AV PEAK GRADIENT: 6 MMHG
AV VALVE AREA: 2.81 CM2
AV VELOCITY RATIO: 0.74
BACTERIA UR CULT: NORMAL
BASOPHILS # BLD AUTO: 0 THOUSANDS/ΜL (ref 0–0.1)
BASOPHILS NFR BLD AUTO: 0 % (ref 0–1)
BILIRUB SERPL-MCNC: 0.37 MG/DL (ref 0.2–1)
BUN SERPL-MCNC: 36 MG/DL (ref 5–25)
CALCIUM ALBUM COR SERPL-MCNC: 9.9 MG/DL (ref 8.3–10.1)
CALCIUM SERPL-MCNC: 8.9 MG/DL (ref 8.3–10.1)
CHLORIDE SERPL-SCNC: 104 MMOL/L (ref 100–108)
CO2 SERPL-SCNC: 27 MMOL/L (ref 21–32)
CREAT SERPL-MCNC: 1.16 MG/DL (ref 0.6–1.3)
DIGOXIN SERPL-MCNC: 0.7 NG/ML (ref 0.8–2)
DOP CALC AO PEAK VEL: 1.21 M/S
DOP CALC AO VTI: 18.86 CM
DOP CALC LVOT AREA: 3.14 CM2
DOP CALC LVOT DIAMETER: 2 CM
DOP CALC LVOT PEAK VEL VTI: 16.9 CM
DOP CALC LVOT PEAK VEL: 0.89 M/S
DOP CALC LVOT STROKE INDEX: 34.5 ML/M2
DOP CALC LVOT STROKE VOLUME: 53.07 CM3
DOP CALC MV VTI: 25.1 CM
EOSINOPHIL # BLD AUTO: 0 THOUSAND/ΜL (ref 0–0.61)
EOSINOPHIL NFR BLD AUTO: 0 % (ref 0–6)
ERYTHROCYTE [DISTWIDTH] IN BLOOD BY AUTOMATED COUNT: 18.1 % (ref 11.6–15.1)
FRACTIONAL SHORTENING: 28 % (ref 28–44)
GFR SERPL CREATININE-BSD FRML MDRD: 43 ML/MIN/1.73SQ M
GLUCOSE SERPL-MCNC: 135 MG/DL (ref 65–140)
HCT VFR BLD AUTO: 30.6 % (ref 34.8–46.1)
HGB BLD-MCNC: 8.6 G/DL (ref 11.5–15.4)
IMM GRANULOCYTES # BLD AUTO: 0.02 THOUSAND/UL (ref 0–0.2)
IMM GRANULOCYTES NFR BLD AUTO: 1 % (ref 0–2)
INTERVENTRICULAR SEPTUM IN DIASTOLE (PARASTERNAL SHORT AXIS VIEW): 1.3 CM
INTERVENTRICULAR SEPTUM: 1.3 CM (ref 0.6–1.1)
LAAS-AP2: 20.8 CM2
LAAS-AP4: 22.5 CM2
LEFT ATRIUM SIZE: 4 CM
LEFT INTERNAL DIMENSION IN SYSTOLE: 2.1 CM (ref 2.1–4)
LEFT VENTRICULAR INTERNAL DIMENSION IN DIASTOLE: 2.9 CM (ref 3.5–6)
LEFT VENTRICULAR POSTERIOR WALL IN END DIASTOLE: 1.3 CM
LEFT VENTRICULAR STROKE VOLUME: 17 ML
LVSV (TEICH): 17 ML
LYMPHOCYTES # BLD AUTO: 0.32 THOUSANDS/ΜL (ref 0.6–4.47)
LYMPHOCYTES NFR BLD AUTO: 8 % (ref 14–44)
MAGNESIUM SERPL-MCNC: 1.9 MG/DL (ref 1.6–2.6)
MCH RBC QN AUTO: 22.6 PG (ref 26.8–34.3)
MCHC RBC AUTO-ENTMCNC: 28.1 G/DL (ref 31.4–37.4)
MCV RBC AUTO: 80 FL (ref 82–98)
MONOCYTES # BLD AUTO: 0.22 THOUSAND/ΜL (ref 0.17–1.22)
MONOCYTES NFR BLD AUTO: 6 % (ref 4–12)
MV E'TISSUE VEL-SEP: 6 CM/S
MV MEAN GRADIENT: 6 MMHG
MV PEAK GRADIENT: 12 MMHG
MV STENOSIS PRESSURE HALF TIME: 47 MS
MV VALVE AREA BY CONTINUITY EQUATION: 2.11 CM2
MV VALVE AREA P 1/2 METHOD: 4.68 CM2
NEUTROPHILS # BLD AUTO: 3.41 THOUSANDS/ΜL (ref 1.85–7.62)
NEUTS SEG NFR BLD AUTO: 85 % (ref 43–75)
NRBC BLD AUTO-RTO: 0 /100 WBCS
NT-PROBNP SERPL-MCNC: ABNORMAL PG/ML
PLATELET # BLD AUTO: 276 THOUSANDS/UL (ref 149–390)
PMV BLD AUTO: 10.7 FL (ref 8.9–12.7)
POTASSIUM SERPL-SCNC: 4 MMOL/L (ref 3.5–5.3)
PROT SERPL-MCNC: 6.3 G/DL (ref 6.4–8.2)
RBC # BLD AUTO: 3.81 MILLION/UL (ref 3.81–5.12)
RIGHT ATRIUM AREA SYSTOLE A4C: 16.2 CM2
RIGHT VENTRICLE ID DIMENSION: 3 CM
SL CV LEFT ATRIUM LENGTH A2C: 5.8 CM
SL CV PED ECHO LEFT VENTRICLE DIASTOLIC VOLUME (MOD BIPLANE) 2D: 31 ML
SL CV PED ECHO LEFT VENTRICLE SYSTOLIC VOLUME (MOD BIPLANE) 2D: 14 ML
SODIUM SERPL-SCNC: 139 MMOL/L (ref 136–145)
T4 FREE SERPL-MCNC: 1.26 NG/DL (ref 0.76–1.46)
TR MAX PG: 72 MMHG
TR PEAK VELOCITY: 4.2 M/S
TRICUSPID VALVE PEAK REGURGITATION VELOCITY: 4.24 M/S
WBC # BLD AUTO: 3.97 THOUSAND/UL (ref 4.31–10.16)

## 2022-07-12 PROCEDURE — 83735 ASSAY OF MAGNESIUM: CPT | Performed by: FAMILY MEDICINE

## 2022-07-12 PROCEDURE — 93306 TTE W/DOPPLER COMPLETE: CPT

## 2022-07-12 PROCEDURE — 80053 COMPREHEN METABOLIC PANEL: CPT | Performed by: FAMILY MEDICINE

## 2022-07-12 PROCEDURE — 99232 SBSQ HOSP IP/OBS MODERATE 35: CPT | Performed by: INTERNAL MEDICINE

## 2022-07-12 PROCEDURE — 83880 ASSAY OF NATRIURETIC PEPTIDE: CPT | Performed by: FAMILY MEDICINE

## 2022-07-12 PROCEDURE — 93306 TTE W/DOPPLER COMPLETE: CPT | Performed by: INTERNAL MEDICINE

## 2022-07-12 PROCEDURE — 85025 COMPLETE CBC W/AUTO DIFF WBC: CPT | Performed by: FAMILY MEDICINE

## 2022-07-12 PROCEDURE — 99222 1ST HOSP IP/OBS MODERATE 55: CPT | Performed by: INTERNAL MEDICINE

## 2022-07-12 PROCEDURE — 99223 1ST HOSP IP/OBS HIGH 75: CPT | Performed by: INTERNAL MEDICINE

## 2022-07-12 PROCEDURE — 80162 ASSAY OF DIGOXIN TOTAL: CPT | Performed by: NURSE PRACTITIONER

## 2022-07-12 RX ORDER — SENNOSIDES 8.6 MG
2 TABLET ORAL
Status: DISCONTINUED | OUTPATIENT
Start: 2022-07-12 | End: 2022-07-15 | Stop reason: HOSPADM

## 2022-07-12 RX ORDER — DIGOXIN 125 MCG
125 TABLET ORAL EVERY OTHER DAY
Status: DISCONTINUED | OUTPATIENT
Start: 2022-07-12 | End: 2022-07-15

## 2022-07-12 RX ORDER — TRAMADOL HYDROCHLORIDE 50 MG/1
50 TABLET ORAL EVERY 12 HOURS PRN
Status: DISCONTINUED | OUTPATIENT
Start: 2022-07-12 | End: 2022-07-15 | Stop reason: HOSPADM

## 2022-07-12 RX ADMIN — Medication 1 TABLET: at 10:09

## 2022-07-12 RX ADMIN — POLYETHYLENE GLYCOL 3350 17 G: 17 POWDER, FOR SOLUTION ORAL at 10:41

## 2022-07-12 RX ADMIN — FUROSEMIDE 40 MG: 10 INJECTION, SOLUTION INTRAMUSCULAR; INTRAVENOUS at 10:08

## 2022-07-12 RX ADMIN — APIXABAN 2.5 MG: 2.5 TABLET, FILM COATED ORAL at 10:08

## 2022-07-12 RX ADMIN — ASPIRIN 81 MG: 81 TABLET, COATED ORAL at 10:09

## 2022-07-12 RX ADMIN — APIXABAN 2.5 MG: 2.5 TABLET, FILM COATED ORAL at 17:13

## 2022-07-12 RX ADMIN — FLUTICASONE FUROATE AND VILANTEROL TRIFENATATE 1 PUFF: 100; 25 POWDER RESPIRATORY (INHALATION) at 10:41

## 2022-07-12 RX ADMIN — METHYLPREDNISOLONE SODIUM SUCCINATE 40 MG: 40 INJECTION, POWDER, FOR SOLUTION INTRAMUSCULAR; INTRAVENOUS at 10:07

## 2022-07-12 RX ADMIN — METOPROLOL TARTRATE 25 MG: 25 TABLET, FILM COATED ORAL at 20:24

## 2022-07-12 RX ADMIN — METOPROLOL TARTRATE 25 MG: 25 TABLET, FILM COATED ORAL at 10:08

## 2022-07-12 RX ADMIN — MAGNESIUM OXIDE TAB 400 MG (241.3 MG ELEMENTAL MG) 400 MG: 400 (241.3 MG) TAB at 10:08

## 2022-07-12 RX ADMIN — ATORVASTATIN CALCIUM 40 MG: 40 TABLET, FILM COATED ORAL at 17:12

## 2022-07-12 RX ADMIN — PANTOPRAZOLE SODIUM 40 MG: 40 TABLET, DELAYED RELEASE ORAL at 05:25

## 2022-07-12 RX ADMIN — FLUOXETINE 20 MG: 20 CAPSULE ORAL at 10:08

## 2022-07-12 RX ADMIN — Medication 3 MG: at 21:58

## 2022-07-12 RX ADMIN — DIGOXIN 125 MCG: 125 TABLET ORAL at 10:09

## 2022-07-12 RX ADMIN — DILTIAZEM HYDROCHLORIDE 7.5 MG/HR: 5 INJECTION INTRAVENOUS at 16:15

## 2022-07-12 RX ADMIN — SENNOSIDES 17.2 MG: 8.6 TABLET, FILM COATED ORAL at 21:58

## 2022-07-12 RX ADMIN — TRAMADOL HYDROCHLORIDE 50 MG: 50 TABLET, COATED ORAL at 20:23

## 2022-07-12 NOTE — ASSESSMENT & PLAN NOTE
Patient with a TSH level of 5 479  Patient is not on thyroid replacement at home      Free T4 normal

## 2022-07-12 NOTE — RESPIRATORY THERAPY NOTE
COPD education completed and booklet given to PT  PT not able to read due to macular degeneration   PT stated she will have  read to her  We did discuss COPD severity zones, energy conservation, pursed lip breathing and inhaler use

## 2022-07-12 NOTE — CONSULTS
Consultation - Pulmonary Medicine   Brittny Sanabria 78 y o  female MRN: [id number]  Unit/Bed#: [address] [place] [address] Encounter: [id number]      Assessment:  Shortness of breath which appears to be related to her atrial fibrillation rapid ventricular rate and she did have some moderate size right pleural effusion  She is receiving treatment for acute chronic diastolic heart failure  Echo and directly shows evidence of severe pulmonary hypertension which may be due to her valvular heart disease, diastolic dysfunction and COPD  Recurrent pleural effusion  Last thoracentesis was done March 18th and cytology was negative for any malignancy  It appeared to be  Transudative  No pleural fluid studies ordered on thoracentesis done yesterday on 7/11  Cause of recurrent pleural effusion could be acute on chronic diastolic heart failure  History of trans apical aortic valve replacement done at [facility]  Moderate to severe COPD with FEV1 of 1 1 L or 59% of predicted  Possible exacerbation    Plan:   Patient has only needed 2 thoracentesis procedures thus far spaced out over just 3 months  At this point I do not think she needs at tunneled intrapleural catheter  She states she does not think she would want 1 of these even if advise in the future unless her circumstances change  Continue methylprednisone 40 mg IV for now  She is on oxygen 4 liters/minute O2 saturations are good  She normally uses oxygen anywhere from 2 to 3 5 L of oxygen at home  History of right-sided lung cancer several years ago which may been a right hilar-superior segment right lower lobe  She had radiation therapy  Will order follow-up chest x-ray since she has had thoracentesis done to see if any rapid reaccumulation right pleural fluid also to see if there are any changes the right hilar region-in superior segment of right lower lobe  She had some minor changes here did appear to be chronic on CT scan in 2020    If patient does have thoracentesis again would do chemistries on it as well as cytology  Since she does have atrial fibrillation rapid ventricular will hold off on any nebulizer treatments for now    History of Present Illness   Physician Requesting Consult: Kvng Mcclendon MD  Reason for Consult / Principal Problem:  Shortness of breath, pleural effusion  Hx and PE limited by: none      HPI: Domenico Evans is a 78y o  year old female who presented to the emergency room yesterday complaining worsening shortness of breath over last several days  Does use oxygen anywhere from 2 and half to 3 5 L minute at home but could not having any cough, fever  or chills  Portable chest x-ray done yesterday showed moderate size right pleural effusion  More from mild pulmonary vascular congestion  Right ultrasound-guided thoracentesis was done yesterday by interventional radiologist some slight improvement in her breathing  A total 1 5 L of yellow fluid was removed  No pleural fluid studies were ordered  She did have prior thoracentesis March 18 of this year at that time pleural LDH was 125 with total protein level of 3 4 which appears to be transudative  Cytology from the pleural fluid was negative  When she had thoracentesis done March 18th of this year a total 1 1 L of pleural fluid was room    Patient also presented with atrial fibrillation with rapid ventricular rate  She is present on IVs Cardizem drip at 5 milligram/hour to help control RA  She has history of acute on chronic diastolic failure, severe pulmonary hypertension and did have a transapical aortic valve replacement  Echocardiogram done today showed LV systolic function be hyperdynamic with ejection fraction of 75%     Her bioprosthetic aortic valve replacement appeared to be function well  There was mild-to-moderate mitral valve stenosis and mild-to-moderate mitral regurgitation  Estimated right ventricular systolic pressure was 77 mm Hg    This pressure reading similar to echo done in July of 2021  She used to smoke anywhere from 1/2-1 pack of cigarettes per day and smoked for about 25-30 years quitting around age 52  Last pulmonary function test done 02/14/2022 showed evidence of moderate to severe airflow obstruction  Her forced vital capacity was 1 68 L or 69% predicted, FEV1 1 1 L or 59% predicted obstructive ratio was 65%  Residual volume was 73% of predicted and total lung capacity was mildly decreased at 68% predicted  Shows TLC is mildly decreased there is some mild restrictive impairment  Also her diffusion capacity at that time was moderate to severely decreased at 41% predicted  No significant change after bronchodilator    She does use albuterol inhaler at home  Also is on Trelegy 100 micron 1 puff daily  She is on anticoagulation with Eliquis 2 5 mg twice a day  She does take tramadol 50 mg when needed for her chronic pain  She had a transapical aortic valve replacement done in [facility] which she has had stroke in 2016    Does have a history of lung cancer I suspect that was in the superior segment of right lower lobe was prior CT scan had showed some which she consolidation is portion consisted possible scarring from treatment  She had radiation therapy but did not have any chemotherapy  Last CT of her chest done in December 2020 showed some fullness in right hilum with no change from before  Suspect this is where her lung cancer may have been    Hemoglobin today is 8 6  White count is 3 97 and platelet count 398  Serum creatinine is 1 16 and brain nitrate peptide level was 19,319    She is being treated with IV furosemide 40 mg daily and also is on Solu-Medrol 40 mg IV once a day for possible COPD exacerbation      Review of Systems   Constitutional: Negative for chills, fever and unexpected weight change  HENT: Negative for congestion, rhinorrhea and sore throat      Eyes: Negative for discharge and redness  Respiratory: Positive for shortness of breath  Cardiovascular: Negative for chest pain, palpitations and leg swelling  Gastrointestinal: Negative for abdominal distention, abdominal pain and nausea  Endocrine: Negative for polydipsia and polyphagia  Genitourinary: Negative for dysuria  Musculoskeletal: Positive for arthralgias  Negative for joint swelling and myalgias  Has chronic pain for which he takes tramadol at home   Skin: Negative for rash  Neurological: Negative for light-headedness  Psychiatric/Behavioral: Negative for decreased concentration         Historical Information   Past Medical History:   Diagnosis Date    Arthritis     Atrial fibrillation (HCC)     CHF (congestive heart failure) (HCC)     COPD (chronic obstructive pulmonary disease) (HCC)     Coronary artery disease     aortic valve replacement    Heart murmur     Hypertension      Past Surgical History:   Procedure Laterality Date    AORTIC VALVE REPLACEMENT      APPENDECTOMY      CHOLECYSTECTOMY      CT GUIDED PERC DRAINAGE CATHETER PLACEMENT  10/23/2014    IR THORACENTESIS  3/18/2022    IR THORACENTESIS  2022    JOINT REPLACEMENT      bilaterl hip and right knee    TONSILECTOMY AND ADNOIDECTOMY      TONSILLECTOMY      TUBAL LIGATION       Social History   Social History     Substance and Sexual Activity   Alcohol Use Not Currently    Alcohol/week: 1 0 standard drink    Types: 1 Glasses of wine per week    Comment: rare     Social History     Substance and Sexual Activity   Drug Use No     Social History     Tobacco Use   Smoking Status Former Smoker    Packs/day: 1 00    Years: 25 00    Pack years: 25 00    Types: Cigarettes    Start date: 63    Quit date: 2000    Years since quittin 5   Smokeless Tobacco Never Used         Family History:   Family History   Problem Relation Age of Onset    Heart defect Father        Meds/Allergies     Current Facility-Administered Medications:     acetaminophen (TYLENOL) tablet 650 mg, 650 mg, Oral, Q6H PRN, Arlester Boast, MD    albuterol inhalation solution 2 5 mg, 2 5 mg, Nebulization, Q6H PRN, Arlester Boast, MD    apixaban [facility]) tablet 2 5 mg, 2 5 mg, Oral, BID, Arlester Boast, MD, 2 5 mg at 07/12/22 1713    aspirin (ECOTRIN LOW STRENGTH) EC tablet 81 mg, 81 mg, Oral, Daily, Arlester Boast, MD, 81 mg at 07/12/22 1009    atorvastatin (LIPITOR) tablet 40 mg, 40 mg, Oral, Daily With Loria Gilford, MD, 40 mg at 07/12/22 1712    digoxin (LANOXIN) tablet 125 mcg, 125 mcg, Oral, Every Other Day, Valentino Church, CRNP, 125 mcg at 07/12/22 1009    diltiazem (CARDIZEM) 125 mg in sodium chloride 0 9 % 125 mL infusion, 7 5 mg/hr, Intravenous, Continuous, Valentino Church, CRNP, Last Rate: 7 5 mL/hr at 07/12/22 1615, 7 5 mg/hr at 07/12/22 1615    docusate sodium (COLACE) capsule 100 mg, 100 mg, Oral, BID PRN, Arlester Boast, MD    FLUoxetine (PROzac) capsule 20 mg, 20 mg, Oral, Daily, Arlester Boast, MD, 20 mg at 07/12/22 1008    fluticasone-vilanterol (BREO ELLIPTA) 100-25 mcg/inh inhaler 1 puff, 1 puff, Inhalation, Daily, Arlester Boast, MD, 1 puff at 07/12/22 1041    furosemide (LASIX) injection 40 mg, 40 mg, Intravenous, Daily, Arlester Boast, MD, 40 mg at 07/12/22 1008    magnesium oxide (MAG-OX) tablet 400 mg, 400 mg, Oral, Daily, Arlester Boast, MD, 400 mg at 07/12/22 1008    melatonin tablet 3 mg, 3 mg, Oral, HS, Arlester Boast, MD, 3 mg at 07/11/22 2125    methylPREDNISolone sodium succinate (Solu-MEDROL) injection 40 mg, 40 mg, Intravenous, Daily, Arlester Boast, MD, 40 mg at 07/12/22 1007    metoprolol tartrate (LOPRESSOR) tablet 25 mg, 25 mg, Oral, Q12H [address], Valentino Church, CRNP, 25 mg at 07/12/22 1008    multivitamin-minerals (CENTRUM) tablet 1 tablet, 1 tablet, Oral, Daily, Arlester Boast, MD, 1 tablet at 07/12/22 1009   pantoprazole (PROTONIX) EC tablet 40 mg, 40 mg, Oral, Early Morning, Ivory North MD, 40 mg at 07/12/22 0525    polyethylene glycol (MIRALAX) packet 17 g, 17 g, Oral, Daily, Ivory North MD, 17 g at 07/12/22 1041    senna (SENOKOT) tablet 17 2 mg, 2 tablet, Oral, HS, Selvin Moore MD    traMADol (ULTRAM) tablet 50 mg, 50 mg, Oral, Q12H PRN, Selvin Moore MD    Prior to Admission medications    Medication Sig Start Date End Date Taking? Authorizing Provider   albuterol (Ventolin HFA) 90 mcg/act inhaler Inhale 2 puffs every 6 (six) hours as needed for wheezing 3/9/22  Yes Haresh Johnson PA-C   apixaban (ELIQUIS) 2 5 mg Take 1 tablet (2 5 mg total) by mouth 2 (two) times a day  Patient taking differently: Take 2 5 mg by mouth 2 (two) times a day Takes at 9 AM and 6 PM 10/8/21 7/11/22 Yes Leda Cortes DO   aspirin (ECOTRIN LOW STRENGTH) 81 mg EC tablet Take 1 tablet (81 mg total) by mouth daily  Patient taking differently: Take 81 mg by mouth daily 1/1/21 7/11/22 Yes HILDA Rodriguez   atorvastatin (LIPITOR) 40 mg tablet Take 1 tablet (40 mg total) by mouth daily 2/7/22  Yes Leda Cortes DO   digoxin (LANOXIN) 0 125 mg tablet Take 1 tablet (125 mcg total) by mouth every other day  Patient taking differently: Take 125 mcg by mouth every other day Takes on the odd days-took today 7/11 10/8/21 7/11/22 Yes Leda Cortes DO   FLUoxetine (PROzac) 20 mg capsule Take 20 mg by mouth daily   Yes Historical Provider, MD   fluticasone-umeclidinium-vilanterol (Trelegy Ellipta) 100-62 5-25 MCG/INH inhaler Inhale 1 puff daily Rinse mouth after use  Patient taking differently: Inhale 1 puff daily Rinse mouth after use   3/9/22 4/8/22 Yes Haresh Johnson PA-C   magnesium oxide (MAG-OX) 400 mg Take 1 tablet (400 mg total) by mouth daily 11/1/21  Yes Carri Mata MD   metoprolol tartrate (LOPRESSOR) 25 mg tablet Take 1 tablet (25 mg total) by mouth every 12 (twelve) hours 4/21/22 Yes Leda Cortes DO   omeprazole (PriLOSEC) 40 MG capsule Take 20 mg by mouth daily   Yes Historical Provider, MD   torsemide (DEMADEX) 20 mg tablet Take 1 tablet (20 mg total) by mouth daily 2/24/22  Yes Kwame Nguyen MD   traMADol (ULTRAM) 50 mg tablet Take 50 mg by mouth 4 (four) times a day as needed 1/12/21  Yes Historical Provider, MD       No Known Allergies    Objective   Vitals: Blood pressure 108/71, pulse (!) 107, temperature 98 °F (36 7 °C), resp  rate 15, height 5' 3" (1 6 m), weight 48 1 kg (106 lb 0 7 oz), SpO2 98 %, not currently breastfeeding  ,Body mass index is 18 78 kg/m²  Intake/Output Summary (Last 24 hours) at 7/12/2022 1724  Last data filed at 7/12/2022 1701  Gross per 24 hour   Intake 300 ml   Output 780 ml   Net -480 ml     Invasive Devices  Report    Central Venous Catheter Line  Duration           CVC Central Lines 07/11/22 Triple Right Femoral 1 day                Physical Exam  Vitals reviewed  Constitutional:       General: She is not in acute distress  Appearance: She is well-developed  Comments: Underweight  4 liters/minute nasal cannula oxygen O2 saturation 99%   HENT:      Head: Normocephalic  Nose: Nose normal       Mouth/Throat:      Mouth: Mucous membranes are moist       Pharynx: Oropharynx is clear  No oropharyngeal exudate  Eyes:      Conjunctiva/sclera: Conjunctivae normal       Pupils: Pupils are equal, round, and reactive to light  Neck:      Vascular: No JVD  Trachea: No tracheal deviation  Cardiovascular:      Rate and Rhythm: Normal rate and regular rhythm  Heart sounds: Normal heart sounds  Pulmonary:      Effort: Pulmonary effort is normal       Comments: Lung sounds are clear bilaterally  No wheezes, crackles or rhonchi  Abdominal:      General: There is no distension  Palpations: Abdomen is soft  Tenderness: There is no abdominal tenderness  There is no guarding  Musculoskeletal:      Cervical back: Neck supple  Comments: No edema   Lymphadenopathy:      Cervical: No cervical adenopathy  Skin:     General: Skin is warm and dry  Findings: No rash  Neurological:      General: No focal deficit present  Mental Status: She is alert and oriented to person, place, and time  Psychiatric:         Behavior: Behavior normal          Thought Content: Thought content normal          Lab Results: ABG: No results found for: PHART, WTQ2VUF, PO2ART, KOS4SPH, B1VLCDFP, BEART, SOURCE, BNP: No results found for: BNP, CBC:   Lab Results   Component Value Date    WBC 3 97 (L) 07/12/2022    HGB 8 6 (L) 07/12/2022    HCT 30 6 (L) 07/12/2022    MCV 80 (L) 07/12/2022     07/12/2022    MCH 22 6 (L) 07/12/2022    MCHC 28 1 (L) 07/12/2022    RDW 18 1 (H) 07/12/2022    MPV 10 7 07/12/2022    NRBC 0 07/12/2022   , CMP:   Lab Results   Component Value Date     12/17/2015    K 4 0 07/12/2022    K 3 7 12/17/2015     07/12/2022     12/17/2015    CO2 27 07/12/2022    CO2 24 12/17/2015    ANIONGAP 14 3 12/17/2015    BUN 36 (H) 07/12/2022    BUN 8 (L) 12/17/2015    CREATININE 1 16 07/12/2022    CREATININE 0 6 12/17/2015    GLUCOSE 105 12/17/2015    CALCIUM 8 9 07/12/2022    CALCIUM 8 6 12/17/2015    AST 15 07/12/2022    AST 19 12/17/2015    ALT 16 07/12/2022    ALT 79 (H) 12/17/2015    ALKPHOS 84 07/12/2022    ALKPHOS 339 (H) 12/17/2015    PROT 5 5 (L) 12/17/2015    BILITOT 0 6 12/17/2015    EGFR 43 07/12/2022   , PT/INR:   Lab Results   Component Value Date    INR 1 61 (H) 07/11/2022    INR 1 00 12/16/2015   , Troponin: No results found for: TROPONIN    Imaging Studies: I have personally reviewed pertinent reports  and I have personally reviewed pertinent films in PACS    EKG, Pathology, and Other Studies: I have personally reviewed pertinent reports  VTE Prophylaxis: eliquis    Code Status: Level 3 - DNAR and DNI    Counseling/Coordination of Care: Total floor / unit time spent today 30 minutes   Greater than 50% of total time was spent with the patient and / or family counseling and / or coordination of care   A description of the counseling / coordination of care: I reviewed chart, CT of chest, discussed diagnosis and treatment with patient

## 2022-07-12 NOTE — ASSESSMENT & PLAN NOTE
Patient presenting with increasing shortness of breath  history of CHF and COPD on 4 L oxygen chronically via nasal cannula  Noted to have moderate volume pleural effusion of the right lung in addition to pulmonary vascular congestion      Appears to be improving status post thoracentesis and diuresis, now close to baseline  · Continue diuresis  · Monitor intake output  · Continue supplemental oxygen, taper as tolerated  · Monitor respiratory status

## 2022-07-12 NOTE — ASSESSMENT & PLAN NOTE
Noted with AFib with RVR  Seen by Cardiology, input appreciated  Heart rate is overall better/improving on oral Cardizem  Patient declines cardioversion at this point  · Status post Cardizem drip,  · Continue Cardizem, changed to Cardizem  mg q 12 hours  ·  Continue metoprolol, digoxin  · On Eliquis  · Cardiology follow-up after discharge  · Continue current dose of Cardizem, metoprolol and digoxin    Follow-up BMP and digoxin 7/20

## 2022-07-12 NOTE — PLAN OF CARE
Problem: RESPIRATORY - ADULT  Goal: Achieves optimal ventilation and oxygenation  Description: INTERVENTIONS:  - Assess for changes in respiratory status  - Assess for changes in mentation and behavior  - Position to facilitate oxygenation and minimize respiratory effort  - Oxygen administered by appropriate delivery if ordered  - Initiate smoking cessation education as indicated  - Encourage broncho-pulmonary hygiene including cough, deep breathe, Incentive Spirometry  - Assess the need for suctioning and aspirate as needed  - Assess and instruct to report SOB or any respiratory difficulty  - Respiratory Therapy support as indicated  Outcome: Progressing     Problem: Nutrition/Hydration-ADULT  Goal: Nutrient/Hydration intake appropriate for improving, restoring or maintaining nutritional needs  Description: Monitor and assess patient's nutrition/hydration status for malnutrition  Collaborate with interdisciplinary team and initiate plan and interventions as ordered  Monitor patient's weight and dietary intake as ordered or per policy  Utilize nutrition screening tool and intervene as necessary  Determine patient's food preferences and provide high-protein, high-caloric foods as appropriate       INTERVENTIONS:  - Monitor oral intake, urinary output, labs, and treatment plans  - Assess nutrition and hydration status and recommend course of action  - Evaluate amount of meals eaten  - Assist patient with eating if necessary   - Allow adequate time for meals  - Recommend/ encourage appropriate diets, oral nutritional supplements, and vitamin/mineral supplements  - Assess need for intravenous fluids  - Provide specific nutrition/hydration education as appropriate  - Include patient/family/caregiver in decisions related to nutrition  Outcome: Progressing     Problem: MOBILITY - ADULT  Goal: Maintain or return to baseline ADL function  Description: INTERVENTIONS:  -  Assess patient's ability to carry out ADLs; assess patient's baseline for ADL function and identify physical deficits which impact ability to perform ADLs (bathing, care of mouth/teeth, toileting, grooming, dressing, etc )  - Assess/evaluate cause of self-care deficits   - Assess range of motion  - Assess patient's mobility; develop plan if impaired  - Assess patient's need for assistive devices and provide as appropriate  - Encourage maximum independence but intervene and supervise when necessary  - Involve family in performance of ADLs  - Assess for home care needs following discharge   - Consider OT consult to assist with ADL evaluation and planning for discharge  - Provide patient education as appropriate  Outcome: Progressing     Problem: Potential for Falls  Goal: Patient will remain free of falls  Description: INTERVENTIONS:  - Educate patient/family on patient safety including physical limitations  - Instruct patient to call for assistance with activity   - Consult OT/PT to assist with strengthening/mobility   - Keep Call bell within reach  - Keep bed low and locked with side rails adjusted as appropriate  - Keep care items and personal belongings within reach  - Initiate and maintain comfort rounds  - Make Fall Risk Sign visible to staff  - Offer Toileting every 2 Hours, in advance of need  - Initiate/Maintain bed larm  - Apply yellow socks and bracelet for high fall risk patients  - Consider moving patient to room near nurses station  Outcome: Progressing     Problem: PAIN - ADULT  Goal: Verbalizes/displays adequate comfort level or baseline comfort level  Description: Interventions:  - Encourage patient to monitor pain and request assistance  - Assess pain using appropriate pain scale  - Administer analgesics based on type and severity of pain and evaluate response  - Implement non-pharmacological measures as appropriate and evaluate response  - Consider cultural and social influences on pain and pain management  - Notify physician/advanced practitioner if interventions unsuccessful or patient reports new pain  Outcome: Progressing     Problem: INFECTION - ADULT  Goal: Absence or prevention of progression during hospitalization  Description: INTERVENTIONS:  - Assess and monitor for signs and symptoms of infection  - Monitor lab/diagnostic results  - Monitor all insertion sites, i e  indwelling lines, tubes, and drains  - [place] appropriate cooling/warming therapies per order  - Administer medications as ordered  - Instruct and encourage patient and family to use good hand hygiene technique  - Identify and instruct in appropriate isolation precautions for identified infection/condition  Outcome: Progressing

## 2022-07-12 NOTE — ASSESSMENT & PLAN NOTE
Patient with evidence of acute kidney injury with a creatinine level of 1 39 likely secondary to volume overload      Previous creatinine level 4 months ago was noted to be 0 77    · Monitor

## 2022-07-12 NOTE — PROGRESS NOTES
[address] Internal Medicine Progress Note  Patient: Libra Luke 78 y o  female   MRN: [id number]  PCP: Corie Monique MD  Unit/Bed#: [address] [place] [address] Encounter: [id number]  Date Of Visit: 07/12/22    Problem List:    Principal Problem:    Acute on chronic respiratory failure with hypoxia ([facility] )  Active Problems:    Pleural effusion    Rapid atrial fibrillation (HCC)    Acute on chronic diastolic congestive heart failure (HCC)    S/P TAVR (transcatheter aortic valve replacement)    Chronic obstructive pulmonary disease with acute exacerbation (HCC)    Acute kidney injury ([facility] )    Increased thyroid stimulating hormone (TSH) level    History of CVA (cerebrovascular accident)    DNR (do not resuscitate) discussion    Hypertension    Hyperlipidemia    BMI less than 19,adult      Assessment & Plan:    Acute on chronic diastolic congestive heart failure ([facility] )  Assessment & Plan  Wt Readings from Last 3 Encounters:   07/11/22 48 3 kg (106 lb 7 7 oz)   03/07/22 43 5 kg (96 lb)   02/07/22 46 7 kg (103 lb)     Noted to have pleural effusion and a vascular congestion  2D echo with EF 75%, history of TAVR, RVSP 77 mmHg  · Continue IV Lasix 40 mg q 12 hours  · Monitor intake output, daily weight  · Follow up Cardiology recommendation        Rapid atrial fibrillation [facility])  Assessment & Plan  Noted with AFib with RVR  Seen by Cardiology, input appreciated  · On Cardizem drip  ·  Continue metoprolol, digoxin  · Telemetry  · On Eliquis  · May require cardioversion    Pleural effusion  Assessment & Plan  Presented with evidence of a moderate right-sided pleural effusion  Status post IR guided thoracentesis, 7/11-1 5 L of yellow fluid was removed  No pleural fluid studies were sent  Likely secondary to CHF  Previous pleural fluid studies consistent with transudative  Cytology negative    · Continue diuretics as per Cardiology  · Follow up repeat chest x-ray    * Acute on chronic respiratory failure with hypoxia [facility])  Assessment & Plan  Patient presenting with increasing shortness of breath  history of CHF and COPD on 4 L oxygen chronically via nasal cannula  Noted to have moderate volume pleural effusion of the right lung in addition to pulmonary vascular congestion  Appears to be improving status post thoracentesis and diuresis  · Continue diuresis  · Monitor intake output  · Continue supplemental oxygen  · Monitor respiratory status    DNR (do not resuscitate) discussion  Assessment & Plan  Patient states that she does not want cardiopulmonary resuscitation or intubation under any circumstances  History of CVA (cerebrovascular accident)  Assessment & Plan  Will continue to monitor  Increased thyroid stimulating hormone (TSH) level  Assessment & Plan  Patient with a TSH level of 5 479  Patient is not on thyroid replacement at home  Follow-up a free T4  Acute kidney injury [facility])  Assessment & Plan  Patient with evidence of acute kidney injury with a creatinine level of 1 39 likely secondary to volume overload  Previous creatinine level 4 months ago was noted to be 0 77    · Monitor    Chronic obstructive pulmonary disease with acute exacerbation (HCC)  Assessment & Plan  History of moderate to severe COPD, FEV1 59% of predicted  · Continue bronchodilators, IV steroid 40 mg daily  · Follow-up pulmonary recommendation    Hyperlipidemia  Assessment & Plan  Continue home medications  Hypertension  Assessment & Plan  Continue home medications  BMI less than 19,adult  Assessment & Plan  With BMI of 18 78          VTE Pharmacologic Prophylaxis: VTE Score: 6 Moderate Risk (Score 3-4) - Pharmacological DVT Prophylaxis Ordered: apixaban (Eliquis)  Patient Centered Rounds: I performed bedside rounds with nursing staff today  Discussions with Specialists or Other Care Team Provider:  Cardiology, Pulmonary    Education and Discussions with Family / Patient: Updated  (son) via phone      Time Spent for Care: 45 minutes  More than 50% of total time spent on counseling and coordination of care as described above  Current Length of Stay: 1 day(s)  Current Patient Status: Inpatient   Certification Statement: The patient will continue to require additional inpatient hospital stay due to Respiratory failure  Discharge Plan: Anticipate discharge in >72 hrs to discharge location to be determined pending rehab evaluations  Code Status: Level 3 - DNAR and DNI    Subjective:   Reports some improvement in breathing  Denies any chest pain  Denies any fever or cough  Requesting breathing treatment      Objective:     Vitals:   Temp (24hrs), Av 6 °F (36 4 °C), Min:94 1 °F (34 5 °C), Max:98 6 °F (37 °C)    Temp:  [94 1 °F (34 5 °C)-98 6 °F (37 °C)] 97 4 °F (36 3 °C)  HR:  [] 114  Resp:  [15-40] 15  BP: (100-166)/(55-94) 138/86  SpO2:  [89 %-100 %] 100 %  Body mass index is 18 78 kg/m²  Input and Output Summary (last 24 hours): Intake/Output Summary (Last 24 hours) at 2022 1149  Last data filed at 2022 0900  Gross per 24 hour   Intake 300 ml   Output 1980 ml   Net -1680 ml       Physical Exam:   Physical Exam  Constitutional:       General: She is not in acute distress  Comments: Elderly, frail, cachectic   HENT:      Head: Normocephalic and atraumatic  Cardiovascular:      Rate and Rhythm: Normal rate  Pulmonary:      Effort: Pulmonary effort is normal  No respiratory distress  Breath sounds: Normal breath sounds  No wheezing or rales  Comments: Diminished bilaterally  Abdominal:      General: Bowel sounds are normal  There is no distension  Palpations: Abdomen is soft  Tenderness: There is no abdominal tenderness  There is no guarding or rebound  Musculoskeletal:      Right lower leg: No edema  Left lower leg: No edema  Skin:     General: Skin is warm and dry  Findings: No rash  Neurological:      Mental Status: She is alert           Additional Data: Labs:  Results from last 7 days   Lab Units 07/12/22  0524   WBC Thousand/uL 3 97*   HEMOGLOBIN g/dL 8 6*   HEMATOCRIT % 30 6*   PLATELETS Thousands/uL 276   NEUTROS PCT % 85*   LYMPHS PCT % 8*   MONOS PCT % 6   EOS PCT % 0     Results from last 7 days   Lab Units 07/12/22  0524   SODIUM mmol/L 139   POTASSIUM mmol/L 4 0   CHLORIDE mmol/L 104   CO2 mmol/L 27   BUN mg/dL 36*   CREATININE mg/dL 1 16   ANION GAP mmol/L 8   CALCIUM mg/dL 8 9   ALBUMIN g/dL 2 7*   TOTAL BILIRUBIN mg/dL 0 37   ALK PHOS U/L 84   ALT U/L 16   AST U/L 15   GLUCOSE RANDOM mg/dL 135     Results from last 7 days   Lab Units 07/11/22  1323   INR  1 61*                   Lines/Drains:  Invasive Devices  Report    Central Venous Catheter Line  Duration           CVC Central Lines 07/11/22 Triple Right Femoral <1 day                Central Line:  Goal for removal: No longer needed  Will place order to discontinue           Telemetry:  Telemetry Orders (From admission, onward)             48 Hour Telemetry Monitoring  (ED Bridging Orders Panel)  Continuous x 48 hours        References:    Telemetry Guidelines   Question:  Reason for 48 Hour Telemetry  Answer:  Acute Decompensated CHF (continuous diuretic infusion or total diuretic dose > 200 mg daily, associated electrolyte derangement, ionotropic drip, history of ventricular arrhythmia, or new EF <35%)                 Telemetry Reviewed: AFib with RVR  Indication for Continued Telemetry Use: Arrthymias requiring medical therapy             Imaging: Reviewed radiology reports from this admission including: chest xray    Recent Cultures (last 7 days):         Last 24 Hours Medication List:   Current Facility-Administered Medications   Medication Dose Route Frequency Provider Last Rate    acetaminophen  650 mg Oral Q6H PRN Celestino Olmstead MD      albuterol  2 5 mg Nebulization Q6H PRN Celestino Olmstead MD      apixaban  2 5 mg Oral BID Celestino Olmstead MD      aspirin  81 mg Oral Daily Osbaldo Martin MD      atorvastatin  40 mg Oral Daily With Sindi Sanders MD      digoxin  125 mcg Oral Every Other Day [address]      diltiazem  5 mg/hr Intravenous Continuous Osbaldo Martin MD 5 mg/hr (07/12/22 0706)    docusate sodium  100 mg Oral BID PRN Osbaldo Martin MD      FLUoxetine  20 mg Oral Daily Osbaldo Martin MD      fluticasone-vilanterol  1 puff Inhalation Daily Osbaldo Martin MD      furosemide  40 mg Intravenous Daily Osbaldo Martin MD      magnesium oxide  400 mg Oral Daily Osbaldo Martin MD      melatonin  3 mg Oral HS Osbaldo Martin MD      methylPREDNISolone sodium succinate  40 mg Intravenous Daily Osbaldo Martin MD      metoprolol tartrate  25 mg Oral Q12H [address]      multivitamin-minerals  1 tablet Oral Daily Osbaldo Martin MD      pantoprazole  40 mg Oral Early Morning Osbaldo Martin MD      polyethylene glycol  17 g Oral Daily Osbaldo Martin MD          Today, Patient Was Seen By: Alysia Lovett MD    ** Please Note: "This note has been constructed using a voice recognition system  Therefore there may be syntax, spelling, and/or grammatical errors   Please call if you have any questions  "**

## 2022-07-12 NOTE — ASSESSMENT & PLAN NOTE
Presented with evidence of a moderate right-sided pleural effusion  Status post IR guided thoracentesis, 7/11-1 5 L of yellow fluid was removed  No pleural fluid studies were sent  Likely secondary to CHF  Previous pleural fluid studies consistent with transudative  Cytology negative  · Continue diuretics as per Cardiology  · Repeat chest x-ray with small right and trace left effusion  · Pulmonary input appreciated, no indication of tunneled intrapleural catheter at present    Will require outpatient monitoring  · Follow-up chest x-ray as outpatient

## 2022-07-12 NOTE — ASSESSMENT & PLAN NOTE
Wt Readings from Last 3 Encounters:   07/15/22 48 1 kg (106 lb)   03/07/22 43 5 kg (96 lb)   02/07/22 46 7 kg (103 lb)     Noted to have pleural effusion and a vascular congestion  2D echo with EF 75%, history of TAVR, RVSP 77 mmHg  Improving  · Status post IV Lasix 40 mg q 12 hours, transitioning to torsemide 20 mg daily  · Monitor intake output, daily weight  · Follow up Cardiology recommendation

## 2022-07-12 NOTE — CASE MANAGEMENT
Case Management Assessment & Discharge Planning Note    Patient name Jeyson Garcia  Location [address] [place] [address]-* MRN [id number]  : 1940 Date 2022       Current Admission Date: 2022  Current Admission Diagnosis:Acute on chronic respiratory failure [facility])   Patient Active Problem List    Diagnosis Date Noted    Acute on chronic respiratory failure ([facility] ) 2022    Acute congestive heart failure ([place] [address] ) 2022    Hyperlipidemia 2022    History of CVA (cerebrovascular accident) 2022    DNR (do not resuscitate) discussion 2022    Shortness of breath     Anemia 2021    Moderate protein-calorie malnutrition ([place] [address] ) 2021    Acute kidney injury ([place] [address] ) 2021    Increased thyroid stimulating hormone (TSH) level 2021    Rapid atrial fibrillation ([place] [address] ) 2020    CKD (chronic kidney disease) stage 3, GFR 30-59 ml/min ([facility]) 2020    S/P TAVR (transcatheter aortic valve replacement) 2020    History of lung cancer 2020    Chronic diastolic (congestive) heart failure ([place] [address] ) 2020    Elevated troponin 2020    COPD (chronic obstructive pulmonary disease) ([place] [address] ) 2020    Chronic respiratory failure with hypoxia 2/2 COPD 2020    Pleural effusion 2020    Depression 2017    CVA (cerebral vascular accident) ([place] [address] ) 10/09/2016    Hypertension 10/09/2016    GERD (gastroesophageal reflux disease) 10/09/2016    Aorto-iliac atherosclerosis ([place] [address] ) 2014      LOS (days): 1  Geometric Mean LOS (GMLOS) (days): 4 30  Days to GMLOS:3 4     OBJECTIVE:    Risk of Unplanned Readmission Score: 18 99         Current admission status: Inpatient       Preferred Pharmacy:   [address] ([address] [facility] ([address] [place] [address])  [address] [facility] [address] ([address] [place] [address])  [place] [address]  Phone: [phone] Fax: [phone]    Primary Care Provider: Andrés Christian MD    Primary Insurance: MEDICARE  Secondary Insurance: AARP    ASSESSMENT:  Active Health Care Proxies    There are no active Health Care Proxies on file  Patient Information  Admitted from[de-identified] Home  Mental Status: Alert  Assessment information provided by[de-identified] Patient  Primary Caregiver: Spouse  Caregiver's Name[de-identified] Polly Sprague Relationship to Patient[de-identified] Family Member  Caregiver's Telephone Number[de-identified] [phone]  Support Systems: Self, Spouse/significant other, Son  [place] of Residence: [address] [place] [address] do you live in?: [address] [place] [address] Road entry access options   Select all that apply : Stairs  Number of steps to enter home : 4  Do the steps have railings?: Yes  Type of Current Residence: 2 story home  Upon entering residence, is there a bedroom on the main floor (no further steps)?: No  A bedroom is located on the following floor levels of residence (select all that apply):: 2nd Floor  Upon entering residence, is there a bathroom on the main floor (no further steps)?: No  Indicate which floors of current residence have a bathroom (select all the apply):: 2nd Floor  Number of steps to 2nd floor from main floor: One Flight  In the last 12 months, was there a time when you were not able to pay the mortgage or rent on time?: No  In the last 12 months, was there a time when you did not have a steady place to sleep or slept in a shelter (including now)?: No  Living Arrangements: Lives w/ Spouse/significant other  Is patient a ?: No    Activities of Daily Living Prior to Admission  Functional Status: Assistance  Completes ADLs independently?: No  Level of ADL dependence: Assistance  Ambulates independently?: No  Level of ambulatory dependence: Assistance  Does patient use assisted devices?: Yes  Assisted Devices (DME) used: [employer], Nebulizer, Bedside Commode, Home Oxygen concentrator, Portable Oxygen concentrator  DME Company Name (respiratory supplies): [employer]  Does patient currently own DME?: Yes  What DME does the patient currently own?: Bedside Commode, Nebulizer, Straight Cane, Walker, Home Oxygen concentrator, Portable Oxygen concentrator  Does patient have a history of Outpatient Therapy (PT/OT)?: No  Does the patient have a history of Short-Term Rehab?: Yes (Has been to [facility] a few times)  Does patient have a history of HHC?: Yes ([facility])  Does patient currently have [address]?: No         Patient Information Continued  Income Source: Pension/detention  Does patient have prescription coverage?: Yes  Within the past 12 months, you worried that your food would run out before you got the money to buy more : Never true  Within the past 12 months, the food you bought just didn't last and you didn't have money to get more : Never true  Does patient receive dialysis treatments?: No  Does patient have a history of substance abuse?: No  Does patient have a history of Mental Health Diagnosis?: No         Means of Transportation  [facility] Family transport  In the past 12 months, has lack of transportation kept you from medical appointments or from getting medications?: No  In the past 12 months, has lack of transportation kept you from meetings, work, or from getting things needed for daily living?: No  Was application for public transport provided?: N/A        DISCHARGE DETAILS:    Discharge planning discussed with[de-identified] patient  Freedom of Choice: Yes  Comments - Freedom of Choice: FOC discussed with patient  She is aware if any recommendations are made for discharge needs she has a choice in her decision  CM will discuss further at that time if recommendations are made  CM contacted family/caregiver?: No- see comments (Pt declined   Pt will let spouse know about case management )  Were Treatment Team discharge recommendations reviewed with patient/caregiver?: Yes  Did patient/caregiver verbalize understanding of patient care needs?: Yes  Were patient/caregiver advised of the risks associated with not following Treatment Team discharge recommendations?: Yes         [address]         Is the patient interested in [facility] at discharge?: No    DME Referral Provided  Referral made for DME?: No    Other Referral/Resources/Interventions Provided:  Referral Comments: No referrals have been made at this time  If recommendations are made for discharge planning CM will follow-up  Met with pt who provided information for initial assessment  Pt lives with her spouse in a 2 story home  3-4STE and a flight of steps to upstairs  Pt is able to manage steps with assistance and has a commode on the 1st floor  Prior to admission, pt ambulated with a walker and requires assistance with ADLs in which spouse assists with  On chronic O2 at 2L  Pt's son assists with transport needs  CM will continue to follow for discharge planning needs

## 2022-07-12 NOTE — CONSULTS
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  3  Acute on chronic diastolic heart failure:  Mild  4  Rapid atrial fibrillation  5  Recurrent right-sided pleural effusion  6  Severe pulmonary hypertension   7  History of transapical aortic valve replacement        Plan:  Patient has been admitted to the hospital service   1  Will continue IV Cardizem drip at 5 mg an hour and titrate as needed    2  We will reintroduce Lopressor 25 mg b i d  And digoxin 0 125 mg every other day as she was taking at home    3  Continue Eliquis 2 5 mg b i d  She was taking at home    4  Consult Pulmonary regarding severe pulmonary hypertension to query whether there is any medications to help her symptoms  Also in regards to possible PleurX catheter versus pleurodesis    5  Continue supplemental oxygen    6  Consider cardioversion if she does not convert independently    7  Patient's Demadex was held and she is currently on Lasix 40 mg IV daily, will continue and monitor labs, INR and daily weights  8  Check digoxin level      History of Present Illness   Physician Requesting Consult: Chester Goodpasture, MD  Reason for Consult / Principal Problem:  Right pleural effusion, severe pulmonary hypertension, atrial fibrillation        HPI: Rachel Edwards is a 78y o  year old female who presented to the emergency room yesterday with complaints of shortness of breath and increasing oxygen requirements at home  Patient normally uses between 2 5-3 L of oxygen  She noted needing to increase her oxygen and still not feeling any improvement  She noted progressive worsening shortness of breath with both exertion and at rest   Chest x-ray noted a moderate-sized right pleural effusion with mild vascular congestion    She was taken to Interventional Radiology and 1 55 L of fluid was removed from the right side  She noted improvement in her respirations after thoracentesis  Patient also received 40 mg of IV Lasix, but unfortunately I&O was not recorded  Patient also was said to be back in rapid atrial fibrillation  She does have a history of paroxysmal AFib and has been on metoprolol and digoxin with Eliquis  Patient was started on a Cardizem drip in the emergency room  Patient has a history for diastolic heart failure, coronary artery disease, COPD for which she is on chronic oxygen, she was a former smoker and had radiation treatment for lung cancer,  CVA noted in 2016  Patient had a transapical aortic valve replacement performed at [facility], but she does not remember the year  At that time, she states, she had a cardiac catheterization but did not require any coronary intervention  Inpatient consult to Cardiology  Consult performed by: HILDA Mcdonough  Consult ordered by: Rolan Ferro MD          Review of Systems   Constitutional: Positive for activity change, appetite change and fatigue  HENT: Negative  Negative for congestion, ear discharge, mouth sores, sinus pain and sore throat  Eyes: Positive for visual disturbance (History of macular degeneration)  Negative for photophobia  Respiratory: Positive for cough, shortness of breath and wheezing  Cardiovascular: Negative  Negative for chest pain and leg swelling  Gastrointestinal: Negative  Negative for abdominal distention, constipation, diarrhea, nausea and vomiting  Endocrine: Negative for polydipsia, polyphagia and polyuria  Genitourinary: Negative  Negative for difficulty urinating  Musculoskeletal: Positive for arthralgias  Skin: Negative  Neurological: Positive for weakness  Negative for dizziness, syncope and light-headedness  Hematological: Negative  Psychiatric/Behavioral: Negative          Historical Information   Past Medical History:   Diagnosis Date    Arthritis     Atrial fibrillation (HCC)     CHF (congestive heart failure) (HCC)     COPD (chronic obstructive pulmonary disease) (HCC)     Coronary artery disease     aortic valve replacement    Heart murmur     Hypertension      Past Surgical History:   Procedure Laterality Date    AORTIC VALVE REPLACEMENT      APPENDECTOMY      CHOLECYSTECTOMY      CT GUIDED PERC DRAINAGE CATHETER PLACEMENT  10/23/2014    IR THORACENTESIS  3/18/2022    JOINT REPLACEMENT      bilaterl hip and right knee    TONSILECTOMY AND ADNOIDECTOMY      TONSILLECTOMY      TUBAL LIGATION       Social History     Substance and Sexual Activity   Alcohol Use Not Currently    Alcohol/week: 1 0 standard drink    Types: 1 Glasses of wine per week    Comment: rare     Social History     Substance and Sexual Activity   Drug Use No     E-Cigarette/Vaping    E-Cigarette Use Never User      E-Cigarette/Vaping Substances    Nicotine No     THC No     CBD No     Flavoring No      Social History     Tobacco Use   Smoking Status Former Smoker    Packs/day: 1     Years: 25 00    Pack years: 25 00    Types: Cigarettes    Start date: 63    Quit date: 2000    Years since quittin 5   Smokeless Tobacco Never Used     Family History:   Family History   Problem Relation Age of Onset    Heart defect Father        Meds/Allergies   all current active meds have been reviewed, current meds:   Current Facility-Administered Medications   Medication Dose Route Frequency    acetaminophen (TYLENOL) tablet 650 mg  650 mg Oral Q6H PRN    albuterol inhalation solution 2 5 mg  2 5 mg Nebulization Q6H PRN    apixaban (ELIQUIS) tablet 2 5 mg  2 5 mg Oral BID    aspirin (ECOTRIN LOW STRENGTH) EC tablet 81 mg  81 mg Oral Daily    atorvastatin (LIPITOR) tablet 40 mg  40 mg Oral Daily With Dinner    digoxin (LANOXIN) tablet 125 mcg  125 mcg Oral Every Other Day    diltiazem (CARDIZEM) 125 mg in sodium chloride 0 9 % 125 mL infusion  5 mg/hr Intravenous Continuous    docusate sodium (COLACE) capsule 100 mg  100 mg Oral BID PRN    FLUoxetine (PROzac) capsule 20 mg  20 mg Oral Daily    fluticasone-vilanterol (BREO ELLIPTA) 100-25 mcg/inh inhaler 1 puff  1 puff Inhalation Daily    furosemide (LASIX) injection 40 mg  40 mg Intravenous Daily    magnesium oxide (MAG-OX) tablet 400 mg  400 mg Oral Daily    melatonin tablet 3 mg  3 mg Oral HS    methylPREDNISolone sodium succinate (Solu-MEDROL) injection 40 mg  40 mg Intravenous Daily    metoprolol tartrate (LOPRESSOR) tablet 25 mg  25 mg Oral Q12H [address]    multivitamin-minerals (CENTRUM) tablet 1 tablet  1 tablet Oral Daily    pantoprazole (PROTONIX) EC tablet 40 mg  40 mg Oral Early Morning    polyethylene glycol (MIRALAX) packet 17 g  17 g Oral Daily    and PTA meds:   Prior to Admission Medications   Prescriptions Last Dose Informant Patient Reported? Taking?    FLUoxetine (PROzac) 20 mg capsule 7/11/2022 at 0900 Spouse/Significant Other Yes Yes   Sig: Take 20 mg by mouth daily   albuterol (Ventolin HFA) 90 mcg/act inhaler 7/11/2022 at 1000  No Yes   Sig: Inhale 2 puffs every 6 (six) hours as needed for wheezing   apixaban (ELIQUIS) 2 5 mg 7/11/2022 at 0900  No Yes   Sig: Take 1 tablet (2 5 mg total) by mouth 2 (two) times a day   Patient taking differently: Take 2 5 mg by mouth 2 (two) times a day Takes at 9 AM and 6 PM   aspirin (ECOTRIN LOW STRENGTH) 81 mg EC tablet 7/11/2022 at 0900 Self No Yes   Sig: Take 1 tablet (81 mg total) by mouth daily   Patient taking differently: Take 81 mg by mouth daily   atorvastatin (LIPITOR) 40 mg tablet 7/10/2022 at 1800  No Yes   Sig: Take 1 tablet (40 mg total) by mouth daily   digoxin (LANOXIN) 0 125 mg tablet 7/11/2022 at 0900  No Yes   Sig: Take 1 tablet (125 mcg total) by mouth every other day   Patient taking differently: Take 125 mcg by mouth every other day Takes on the odd days-took today 7/11 fluticasone-umeclidinium-vilanterol (Trelegy Ellipta) 100-62 5-25 MCG/INH inhaler 7/11/2022 at 0900  No Yes   Sig: Inhale 1 puff daily Rinse mouth after use  Patient taking differently: Inhale 1 puff daily Rinse mouth after use    magnesium oxide (MAG-OX) 400 mg 7/11/2022 at 0900  No Yes   Sig: Take 1 tablet (400 mg total) by mouth daily   metoprolol tartrate (LOPRESSOR) 25 mg tablet 7/11/2022 at 0900  No Yes   Sig: Take 1 tablet (25 mg total) by mouth every 12 (twelve) hours   omeprazole (PriLOSEC) 40 MG capsule 7/11/2022 at 0900 Spouse/Significant Other Yes Yes   Sig: Take 20 mg by mouth daily   torsemide (DEMADEX) 20 mg tablet 7/11/2022 at 0900  No Yes   Sig: Take 1 tablet (20 mg total) by mouth daily   traMADol (ULTRAM) 50 mg tablet Past Week at Unknown time Spouse/Significant Other Yes Yes   Sig: Take 50 mg by mouth 4 (four) times a day as needed      Facility-Administered Medications: None     No Known Allergies    Objective   Vitals: Blood pressure 126/68, pulse (!) 115, temperature 97 8 °F (36 6 °C), resp  rate 19, height 5' 3" (1 6 m), weight 48 1 kg (106 lb 0 7 oz), SpO2 98 %, not currently breastfeeding  Orthostatic Blood Pressures    Flowsheet Row Most Recent Value   Blood Pressure 126/68 filed at 07/12/2022 0215   Patient Position - Orthostatic VS Lying filed at 07/11/2022 1504            Intake/Output Summary (Last 24 hours) at 7/12/2022 0824  Last data filed at 7/12/2022 0112  Gross per 24 hour   Intake --   Output 1980 ml   Net -1980 ml       Invasive Devices  Report    Central Venous Catheter Line  Duration           CVC Central Lines 07/11/22 Triple Right Femoral <1 day                Physical Exam  Vitals and nursing note reviewed  Constitutional:       Appearance: Normal appearance  She is underweight  HENT:      Right Ear: External ear normal       Left Ear: External ear normal    Eyes:      General: No scleral icterus  Right eye: No discharge  Left eye: No discharge  Cardiovascular:      Rate and Rhythm: Tachycardia present  Rhythm irregularly irregular  Pulses: Normal pulses  Heart sounds: Murmur heard  Systolic murmur is present with a grade of 2/6  Pulmonary:      Effort: No accessory muscle usage or respiratory distress  Breath sounds: Examination of the right-middle field reveals decreased breath sounds  Examination of the right-lower field reveals decreased breath sounds  Examination of the left-lower field reveals decreased breath sounds  Decreased breath sounds present  Abdominal:      General: Bowel sounds are normal  There is no distension  Palpations: Abdomen is soft  Musculoskeletal:      Right lower leg: No edema  Left lower leg: No edema  Skin:     General: Skin is warm and dry  Capillary Refill: Capillary refill takes less than 2 seconds  Neurological:      General: No focal deficit present  Mental Status: She is alert and oriented to person, place, and time  Mental status is at baseline  Psychiatric:         Attention and Perception: Attention normal          Mood and Affect: Mood is depressed  Affect is tearful  Behavior: Behavior is cooperative  Thought Content: Thought content normal          Cognition and Memory: Cognition normal          Judgment: Judgment normal       Comments: Patient verbalizes depression regarding her failing health  Lab Results:   I have personally reviewed pertinent lab results      CBC with diff:   Results from last 7 days   Lab Units 07/12/22  0524   WBC Thousand/uL 3 97*   RBC Million/uL 3 81   HEMOGLOBIN g/dL 8 6*   HEMATOCRIT % 30 6*   MCV fL 80*   MCH pg 22 6*   MCHC g/dL 28 1*   RDW % 18 1*   MPV fL 10 7   PLATELETS Thousands/uL 276     CMP:   Results from last 7 days   Lab Units 07/12/22  0524   SODIUM mmol/L 139   CHLORIDE mmol/L 104   CO2 mmol/L 27   BUN mg/dL 36*   CREATININE mg/dL 1 16   CALCIUM mg/dL 8 9   AST U/L 15   ALT U/L 16   ALK PHOS U/L 84 EGFR ml/min/1 73sq m 43     HS Troponin:   0   Lab Value Date/Time    HSTNI0 60 (H) 07/11/2022 1323    HSTNI2 58 (H) 07/11/2022 1533    HSTNI4 74 (H) 07/11/2022 1730     BNP:   Results from last 7 days   Lab Units 07/12/22  0524   POTASSIUM mmol/L 4 0   CHLORIDE mmol/L 104   CO2 mmol/L 27   BUN mg/dL 36*   CREATININE mg/dL 1 16   CALCIUM mg/dL 8 9   EGFR ml/min/1 73sq m 43     Coags:   Results from last 7 days   Lab Units 07/11/22  1323   PTT seconds 40*   INR  1 61*     TSH:   Results from last 7 days   Lab Units 07/11/22  1323   TSH 3RD GENERATON uIU/mL 5 479*     Magnesium:   Results from last 7 days   Lab Units 07/12/22  0524   MAGNESIUM mg/dL 1 9     Lipid Profile:     Imaging: I have personally reviewed pertinent reports      EKG:  Atrial fibrillation with ST depressions seen in the inferior lateral leads  VTE Prophylaxis: Sequential compression device Tatyana Cabrera     Code Status: Level 3 - DNAR and DNI  Advance Directive and Living Will:      Power of :    POLST:      Sherlyn Castillo, [address] [facility]  Cardiology

## 2022-07-12 NOTE — ASSESSMENT & PLAN NOTE
Patient states that she does not want cardiopulmonary resuscitation or intubation under any circumstances    Patient family is aware about overall prognosis, continue supportive care eventual consideration of hospice or palliative care

## 2022-07-13 ENCOUNTER — APPOINTMENT (INPATIENT)
Dept: RADIOLOGY | Facility: HOSPITAL | Age: 82
DRG: 186 | End: 2022-07-13
Payer: MEDICARE

## 2022-07-13 PROBLEM — N17.9 ACUTE KIDNEY INJURY (HCC): Status: RESOLVED | Noted: 2021-07-23 | Resolved: 2022-07-13

## 2022-07-13 LAB
ANION GAP SERPL CALCULATED.3IONS-SCNC: 8 MMOL/L (ref 4–13)
BUN SERPL-MCNC: 42 MG/DL (ref 5–25)
CALCIUM SERPL-MCNC: 9.1 MG/DL (ref 8.3–10.1)
CHLORIDE SERPL-SCNC: 101 MMOL/L (ref 100–108)
CO2 SERPL-SCNC: 28 MMOL/L (ref 21–32)
CREAT SERPL-MCNC: 1.16 MG/DL (ref 0.6–1.3)
ERYTHROCYTE [DISTWIDTH] IN BLOOD BY AUTOMATED COUNT: 18 % (ref 11.6–15.1)
GFR SERPL CREATININE-BSD FRML MDRD: 43 ML/MIN/1.73SQ M
GLUCOSE SERPL-MCNC: 130 MG/DL (ref 65–140)
HCT VFR BLD AUTO: 30.7 % (ref 34.8–46.1)
HGB BLD-MCNC: 8.5 G/DL (ref 11.5–15.4)
MCH RBC QN AUTO: 22.2 PG (ref 26.8–34.3)
MCHC RBC AUTO-ENTMCNC: 27.7 G/DL (ref 31.4–37.4)
MCV RBC AUTO: 80 FL (ref 82–98)
PLATELET # BLD AUTO: 306 THOUSANDS/UL (ref 149–390)
PMV BLD AUTO: 10.7 FL (ref 8.9–12.7)
POTASSIUM SERPL-SCNC: 5 MMOL/L (ref 3.5–5.3)
RBC # BLD AUTO: 3.83 MILLION/UL (ref 3.81–5.12)
SODIUM SERPL-SCNC: 137 MMOL/L (ref 136–145)
WBC # BLD AUTO: 7.87 THOUSAND/UL (ref 4.31–10.16)

## 2022-07-13 PROCEDURE — 99232 SBSQ HOSP IP/OBS MODERATE 35: CPT | Performed by: INTERNAL MEDICINE

## 2022-07-13 PROCEDURE — 97163 PT EVAL HIGH COMPLEX 45 MIN: CPT

## 2022-07-13 PROCEDURE — 71045 X-RAY EXAM CHEST 1 VIEW: CPT

## 2022-07-13 PROCEDURE — 85027 COMPLETE CBC AUTOMATED: CPT | Performed by: INTERNAL MEDICINE

## 2022-07-13 PROCEDURE — NC001 PR NO CHARGE: Performed by: INTERNAL MEDICINE

## 2022-07-13 PROCEDURE — 97167 OT EVAL HIGH COMPLEX 60 MIN: CPT

## 2022-07-13 PROCEDURE — 80048 BASIC METABOLIC PNL TOTAL CA: CPT | Performed by: INTERNAL MEDICINE

## 2022-07-13 PROCEDURE — 97530 THERAPEUTIC ACTIVITIES: CPT

## 2022-07-13 PROCEDURE — 97535 SELF CARE MNGMENT TRAINING: CPT

## 2022-07-13 RX ORDER — DILTIAZEM HYDROCHLORIDE 60 MG/1
60 TABLET, FILM COATED ORAL EVERY 6 HOURS SCHEDULED
Status: DISPENSED | OUTPATIENT
Start: 2022-07-13 | End: 2022-07-14

## 2022-07-13 RX ORDER — TORSEMIDE 20 MG/1
20 TABLET ORAL DAILY
Status: DISCONTINUED | OUTPATIENT
Start: 2022-07-13 | End: 2022-07-15 | Stop reason: HOSPADM

## 2022-07-13 RX ADMIN — ACETAMINOPHEN 650 MG: 325 TABLET ORAL at 22:29

## 2022-07-13 RX ADMIN — Medication 1 TABLET: at 08:46

## 2022-07-13 RX ADMIN — Medication 3 MG: at 21:58

## 2022-07-13 RX ADMIN — APIXABAN 2.5 MG: 2.5 TABLET, FILM COATED ORAL at 17:00

## 2022-07-13 RX ADMIN — FLUOXETINE 20 MG: 20 CAPSULE ORAL at 08:46

## 2022-07-13 RX ADMIN — ATORVASTATIN CALCIUM 40 MG: 40 TABLET, FILM COATED ORAL at 16:54

## 2022-07-13 RX ADMIN — FLUTICASONE FUROATE AND VILANTEROL TRIFENATATE 1 PUFF: 100; 25 POWDER RESPIRATORY (INHALATION) at 08:47

## 2022-07-13 RX ADMIN — DILTIAZEM HYDROCHLORIDE 60 MG: 60 TABLET, FILM COATED ORAL at 14:27

## 2022-07-13 RX ADMIN — DILTIAZEM HYDROCHLORIDE 60 MG: 60 TABLET, FILM COATED ORAL at 08:46

## 2022-07-13 RX ADMIN — TRAMADOL HYDROCHLORIDE 50 MG: 50 TABLET, COATED ORAL at 16:54

## 2022-07-13 RX ADMIN — POLYETHYLENE GLYCOL 3350 17 G: 17 POWDER, FOR SOLUTION ORAL at 08:46

## 2022-07-13 RX ADMIN — METOPROLOL TARTRATE 25 MG: 25 TABLET, FILM COATED ORAL at 08:47

## 2022-07-13 RX ADMIN — MAGNESIUM OXIDE TAB 400 MG (241.3 MG ELEMENTAL MG) 400 MG: 400 (241.3 MG) TAB at 08:47

## 2022-07-13 RX ADMIN — APIXABAN 2.5 MG: 2.5 TABLET, FILM COATED ORAL at 08:46

## 2022-07-13 RX ADMIN — METHYLPREDNISOLONE SODIUM SUCCINATE 40 MG: 40 INJECTION, POWDER, FOR SOLUTION INTRAMUSCULAR; INTRAVENOUS at 08:46

## 2022-07-13 RX ADMIN — DILTIAZEM HYDROCHLORIDE 60 MG: 60 TABLET, FILM COATED ORAL at 17:00

## 2022-07-13 RX ADMIN — SENNOSIDES 17.2 MG: 8.6 TABLET, FILM COATED ORAL at 21:58

## 2022-07-13 RX ADMIN — DOCUSATE SODIUM 100 MG: 100 CAPSULE, LIQUID FILLED ORAL at 21:58

## 2022-07-13 RX ADMIN — ASPIRIN 81 MG: 81 TABLET, COATED ORAL at 08:46

## 2022-07-13 RX ADMIN — TORSEMIDE 20 MG: 20 TABLET ORAL at 08:46

## 2022-07-13 RX ADMIN — METOPROLOL TARTRATE 25 MG: 25 TABLET, FILM COATED ORAL at 21:58

## 2022-07-13 RX ADMIN — PANTOPRAZOLE SODIUM 40 MG: 40 TABLET, DELAYED RELEASE ORAL at 05:04

## 2022-07-13 NOTE — PROGRESS NOTES
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: Followed by Pulmonary    -  IV prednisone 40 mg    -  they are currently holding breathing treatments due to rapid atrial fibrillation    -  continue monitor    3  Acute on chronic diastolic heart failure:  Mild, will discontinue IV Lasix and transition to Demadex 20 mg daily    -  continue Lopressor 25 mg b i d     -  low-sodium diet    -  monitor I&O, daily weights electrolytes    4  Rapid atrial fibrillation:  Rate control improved with current medications of Cardizem, Lopressor and digoxin    -  discussed rate versus rhythm strategy with patient, at this time patient is declining elective cardioversion and wishes to follow rate control     -  DC IV Cardizem and transition to Cardizem 60 mg p o  Q 6 hours    -  continue Lopressor 25 mg b i d     -  continue digoxin 0 125 mg every other day, digoxin level was 0 7 on admission    -  continue Eliquis 2 5 mg b i d     -  continue monitor telemetry    5  Recurrent right-sided pleural effusion:  Underwent thoracentesis on 2022 with removal of 1 5 L of fluid  -  for repeat chest x-ray today to evaluate for reaccumulation    -  followed by Pulmonary    6  Severe pulmonary hypertension:  Moderate TR with peak PA pressure estimated at 77 mm Hg     7  History of transapical aortic valve replacement:  No evidence of aortic stenosis with DVI of 0 89  No significant valvular or paravalvular regurgitation seen on echocardiogram performed on 2022        Subjective / Objective:   Patient seen and examined  She is resting comfortably in bed  Oxygen saturation on 3 L is %    Telemetry reviewed overnight and heart rates at rest have been in the 80s  Unfortunately she remains in atrial fibrillation  Did discuss with her rate verses rhythm therapy  Patient at this time is declining elective cardioversion  Will discontinue IV Cardizem and transition her to p o  Continue monitor telemetry  Vitals: Blood pressure 127/80, pulse 97, temperature 98 °F (36 7 °C), resp  rate 15, height 5' 3" (1 6 m), weight 47 5 kg (104 lb 11 5 oz), SpO2 99 %, not currently breastfeeding  Vitals:    07/12/22 1014 07/13/22 0549   Weight: 48 1 kg (106 lb 0 7 oz) 47 5 kg (104 lb 11 5 oz)     Body mass index is 18 55 kg/m²  BP Readings from Last 3 Encounters:   07/13/22 127/80   03/18/22 155/93   03/07/22 118/72     Orthostatic Blood Pressures    Flowsheet Row Most Recent Value   Blood Pressure 127/80 filed at 07/13/2022 0742   Patient Position - Orthostatic VS Lying filed at 07/12/2022 1015        I/O       07/11 0701  07/12 0700 07/12 0701  07/13 0700 07/13 0701  07/14 0700    P  O   420     Total Intake(mL/kg)  420 (8 8)     Urine (mL/kg/hr) 430 450 (0 4)     Other 1550      Total Output 1980 450     Net -1980 -30            Unmeasured Urine Occurrence  1 x         Invasive Devices  Report    Central Venous Catheter Line  Duration           CVC Central Lines 07/11/22 Triple Right Femoral 1 day                  Intake/Output Summary (Last 24 hours) at 7/13/2022 0811  Last data filed at 7/12/2022 2159  Gross per 24 hour   Intake 420 ml   Output 450 ml   Net -30 ml         Physical Exam:   Physical Exam  Vitals and nursing note reviewed  Constitutional:       Appearance: Normal appearance  She is underweight  She is ill-appearing (Chronically)  HENT:      Right Ear: External ear normal       Left Ear: External ear normal    Eyes:      General: No scleral icterus  Right eye: No discharge  Left eye: No discharge  Cardiovascular:      Rate and Rhythm: Normal rate  Rhythm irregular  Pulses: Normal pulses  Heart sounds: Murmur heard  Systolic murmur is present with a grade of 2/6  Pulmonary:      Effort: No accessory muscle usage or respiratory distress  Breath sounds: Examination of the right-lower field reveals decreased breath sounds  Examination of the left-lower field reveals decreased breath sounds  Decreased breath sounds present  No wheezing  Abdominal:      General: Bowel sounds are normal  There is no distension  Palpations: Abdomen is soft  Musculoskeletal:      Right lower leg: No edema  Left lower leg: No edema  Skin:     General: Skin is warm and dry  Capillary Refill: Capillary refill takes less than 2 seconds  Neurological:      General: No focal deficit present  Mental Status: She is alert and oriented to person, place, and time  Mental status is at baseline  Psychiatric:         Mood and Affect: Mood is depressed  Affect is tearful  Behavior: Behavior is cooperative                   Medications/ Allergies:     Current Facility-Administered Medications   Medication Dose Route Frequency Provider Last Rate    acetaminophen  650 mg Oral Q6H PRN Shahla Hamlin MD      albuterol  2 5 mg Nebulization Q6H PRN Shahla Hamlin MD      apixaban  2 5 mg Oral BID Shahla Hamlin MD      aspirin  81 mg Oral Daily Shahla Hamlin MD      atorvastatin  40 mg Oral Daily With Muna Ovalle MD      digoxin  125 mcg Oral Every Other Day HILDA Gregg      diltiazem  60 mg Oral Q6H [facility] HILDA Gregg      docusate sodium  100 mg Oral BID PRN Shahla Hamlin MD      FLUoxetine  20 mg Oral Daily Shahla Hamlin MD      fluticasone-vilanterol  1 puff Inhalation Daily Shahla Hamlin MD      furosemide  40 mg Intravenous Daily Shahla Hamlin MD      magnesium oxide  400 mg Oral Daily Shahla Hamlin MD      melatonin  3 mg Oral HS Shahla Hamlin MD      methylPREDNISolone sodium succinate  40 mg Intravenous Daily Marbella Hwang MD      metoprolol tartrate  25 mg Oral Q12H [address] HILDA García      multivitamin-minerals  1 tablet Oral Daily Marbella Hwang MD      pantoprazole  40 mg Oral Early Morning Marbella Hwang MD      polyethylene glycol  17 g Oral Daily Marbella Hwang MD      senna  2 tablet Oral HS Devin López MD      traMADol  50 mg Oral Q12H PRN Devin López MD       acetaminophen, 650 mg, Q6H PRN  albuterol, 2 5 mg, Q6H PRN  docusate sodium, 100 mg, BID PRN  traMADol, 50 mg, Q12H PRN      No Known Allergies    VTE Pharmacologic Prophylaxis:   Sequential compression device (Venodyne)  and Eliquis    Labs:   Troponins:  Results from last 7 days   Lab Units 07/11/22  1730 07/11/22  1533   HSTNI D2 ng/L  --  -2   HSTNI D4 ng/L 14  --      CBC with diff:  Results from last 7 days   Lab Units 07/13/22  0503 07/12/22  0524 07/11/22  1323   WBC Thousand/uL 7 87 3 97* 7 94   HEMOGLOBIN g/dL 8 5* 8 6* 9 4*   HEMATOCRIT % 30 7* 30 6* 34 3*   MCV fL 80* 80* 83   PLATELETS Thousands/uL 306 276 319   MCH pg 22 2* 22 6* 22 7*   MCHC g/dL 27 7* 28 1* 27 4*   RDW % 18 0* 18 1* 18 2*   MPV fL 10 7 10 7 10 6   NRBC AUTO /100 WBCs  --  0 1     CMP:  Results from last 7 days   Lab Units 07/13/22  0503 07/12/22  0524 07/11/22  1323   SODIUM mmol/L 137 139 141   POTASSIUM mmol/L 5 0 4 0 5 0   CHLORIDE mmol/L 101 104 105   CO2 mmol/L 28 27 24   ANION GAP mmol/L 8 8 12   BUN mg/dL 42* 36* 32*   CREATININE mg/dL 1 16 1 16 1 39*   CALCIUM mg/dL 9 1 8 9 8 9   AST U/L  --  15 28   ALT U/L  --  16 21   ALK PHOS U/L  --  84 93   TOTAL PROTEIN g/dL  --  6 3* 6 9   ALBUMIN g/dL  --  2 7* 2 9*   TOTAL BILIRUBIN mg/dL  --  0 37 0 42   EGFR ml/min/1 73sq m 43 43 34     Magnesium:  Results from last 7 days   Lab Units 07/12/22  0524   MAGNESIUM mg/dL 1 9     Coags:  Results from last 7 days   Lab Units 07/11/22  1323   PTT seconds 40*   INR  1 61*     TSH:  Results from last 7 days Lab Units 07/11/22  1323   TSH 3RD GENERATON uIU/mL 5 479*     NT-proBNP:   Recent Labs     07/11/22  1323 07/12/22  0524   NTBNP 15,829* 19,319*        Imaging & Testing   I have personally reviewed pertinent reports  XR chest 1 view portable    Result Date: 7/11/2022  Narrative: CHEST INDICATION:   short of breath  COMPARISON:  Chest radiograph from 3/10/2022, chest CT from 8/30/2021  EXAM PERFORMED/VIEWS:  XR CHEST PORTABLE FINDINGS: Normal heart size  Median sternotomy  TAVR  Mild pulmonary venous congestion  Moderate right effusion with right base opacity  Skinfolds over the left hemithorax  No pneumothorax  Severe right glenohumeral degenerative disease  Impression: Mild pulmonary venous congestion  Moderate right effusion with right base opacity, at least in part due to atelectasis  Pneumonia cannot be excluded in the appropriate clinical setting  Workstation performed: [id number]     IR IN-Patient Thoracentesis    Result Date: 7/12/2022  Narrative: PROCEDURE: Therapeutic right thoracentesis STAFF: JOE Vasquez  NUMBER OF IMAGES: Multiple  COMPLICATIONS: None  INDICATION: Symptomatic right pleural effusion  PROCEDURE: Using ultrasound guidance, the right pleural cavity was punctured and a catheter placed into the pleural cavity  A total of 1550 milliliters of fluid was removed  The catheter was then removed  FINDINGS: Large right pleural effusion  Cloudy yellow pleural fluid was removed  Impression: Therapeutic right thoracentesis  Workstation performed: [id number]     Echo complete w/ contrast if indicated    Result Date: 7/12/2022  Narrative: Re Gauman  Left Ventricle: Left ventricular cavity size is normal  Wall thickness is mild to moderately increased  Systolic function is hyperdynamic  Estimated ejection fraction is 75% Although no diagnostic regional wall motion abnormality was identified, this possibility cannot be completely excluded on the basis of this study   Unable to assess diastolic function due to atrial fibrillation    Right Ventricle: Systolic function is reduced  TAPSE is 1 0 cm   Left Atrium: The atrium is moderately dilated    Right Atrium: The atrium is mild to moderately dilated    Aortic Valve: A bioprosthesis/TAVR is present  There is no evidence of significant stenosis with DVI of 0 89  No significant valvular or paravalvular regurgitation seen   Mitral Valve: There is moderate thickening  There is mildly reduced mobility  There is moderate annular calcification  There is mild to moderate regurgitation  There is mild to moderate stenosis  Mean gradient is 5 65 mm Hg   Tricuspid Valve: There is moderate regurgitation  The right ventricular systolic pressure is severely elevated at 77 mm Hg    Pulmonic Valve: There is mild regurgitation    No significant changes noted compared to the prior study        EKG / Monitor: Personally reviewed  Remains in atrial fibrillation, rate is controlled in the 80s at rest   Does elevate to low 100s with any type of activity        Sarah Ni  Cardiology      "This note has been constructed using a voice recognition system  Therefore there may be syntax, spelling, and/or grammatical errors   Please call if you have any questions  "

## 2022-07-13 NOTE — PHYSICAL THERAPY NOTE
PHYSICAL THERAPY EVALUATION/TREATMENT       07/13/22 1345   PT Last Visit   PT Visit Date 07/13/22   Note Type   Note type Evaluation   Pain Assessment   Pain Assessment Tool 0-10   Pain Score No Pain   Patient's Stated Pain Goal No pain   Hospital Pain Intervention(s) Repositioned   Multiple Pain Sites No   Restrictions/Precautions   Weight Bearing Precautions Per Order No   Other Precautions Bed Alarm; Chair Alarm; Fall Risk;O2   Home Living   Type of [address] Two level;Bed/bath upstairs  (4 [employer])   [facility]  chair   Home Equipment Walker;Cane;Wheelchair-manual   Prior Function   Level of [place] Needs assistance with IADLs; Needs assistance with ADLs and functional mobility   Lives With Spouse   Receives Help From Family   ADL Assistance Needs assistance   IADLs Needs assistance   Falls in the last 6 months 1 to 4   Vocational Retired   Comments Pt is an 78year-old female who was admitted to the hospital on 7/11/22 due to SOB   Pt with PMH significant for COPD, on chronic O2 at home 24/7   General   Family/Caregiver Present Yes  (Pt's daughter present at bedside at end of session)   Cognition   Overall Cognitive Status WFL   Orientation Level Oriented X4   Following Commands Follows multistep commands with increased time or repetition   Subjective   Subjective "I am feeling really short of breath "   RLE Assessment   RLE Assessment WFL  (3/5)   LLE Assessment   LLE Assessment WFL  (Hip/anlke 3+/5 ; Knee 3-/5)   Vision-Basic Assessment   Current Vision   (Has glassess for reading, report vision is still blurry)   Visual History Macular degeneration   Bed Mobility   Rolling R 5  Supervision   Rolling L 5  Supervision   Supine to Sit 5  Supervision   Additional items Increased time required   Sit to Supine 4  Minimal assistance   Additional items Increased time required   Transfers   Sit to Stand 4  Minimal assistance   Additional items Assist x 1;Verbal cues   Stand to Sit 4  Minimal assistance   Additional items Assist x 1;Verbal cues   Ambulation/Elevation   Gait pattern Narrow DAXA; Foward flexed; Step through pattern   Gait Assistance 4  Minimal assist   Additional items Assist x 1;Verbal cues   Assistive Device Rolling walker   Distance 10 + 10 feet   Balance   Static Sitting Fair   Dynamic Sitting Fair -   Static Standing Fair -   Dynamic Standing Fair -   Ambulatory Fair -   Activity Tolerance   Activity Tolerance Patient tolerated treatment well;Patient limited by fatigue  (Patient easily SOB with ambulation short distance within room)   Medical Staff Made Aware yes BALDOMERO Woodall   Nurse Made Aware yes   Assessment   Prognosis Good   Problem List Decreased strength;Decreased endurance; Impaired balance;Decreased mobility; Decreased coordination; Impaired judgement;Decreased safety awareness   Assessment Patient seen for Physical Therapy evaluation  Patient admitted with Acute on chronic respiratory failure with hypoxia ([facility] [address] )  Comorbidities affecting patient's physical performance include: Afib, cardiac disease, COPD, CVA and hypertension  Personal factors affecting patient at time of initial evaluation include: lives in 2 story house, ambulating with assistive device, stairs to enter home and inability to navigate community distances  Prior to admission, patient was independent with functional mobility with RW, requiring assist for ADLS, requiring assist for IADLS and living in a multi-level home  Please find objective findings from Physical Therapy assessment regarding body systems outlined above with impairments and limitations including weakness, impaired balance, decreased endurance, gait deviations, pain, decreased activity tolerance, decreased functional mobility tolerance and fall risk    The Barthel Index was used as a functional outcome tool presenting with a score of Barthel Index Score: 35 today indicating marked limitations of functional mobility and ADLS  Patient's clinical presentation is currently unstable/unpredictable as seen in patient's presentation of vital sign response, increased fall risk and decreased endurance  Pt would benefit from continued Physical Therapy treatment to address deficits as defined above and maximize level of functional mobility  As demonstrated by objective findings, the assigned level of complexity for this evaluation is high  The patient's [facility] Basic Mobility Inpatient Short Form Raw Score is 16  A Raw score of less than or equal to 16 suggests the patient may benefit from discharge to post-acute rehabilitation services  Please also refer to the recommendation of the Physical Therapist for safe discharge planning  Goals   Patient Goals to get better   STG Expiration Date 07/20/22   Short Term Goal #1 Pt will be able to perform all bed mobility/transfers IND   Short Term Goal #2 Pt will be able to ambulate x 50 feet with RW with Min A   LTG Expiration Date 07/27/22   Long Term Goal #1 Pt will ambulate x 100 feet with RW with Supervision   Long Term Goal #2 To assess stairs for home   Plan   Treatment/Interventions ADL retraining;Functional transfer training;LE strengthening/ROM; Therapeutic exercise; Endurance training;Bed mobility;Gait training;Spoke to nursing   PT Frequency   (5x/week)   Recommendation   PT Discharge Recommendation Post acute rehabilitation services   [facility] Basic Mobility Inpatient   Turning in Bed Without Bedrails 3   Lying on Back to Sitting on Edge of Flat Bed 3   Moving Bed to Chair 3   Standing Up From Chair 3   Walk in Room 3   Climb 3-5 Stairs 1   Basic Mobility Inpatient Raw Score 16   Basic Mobility Standardized Score 38 32   Highest Level Of Mobility   JH-HLM Goal 5: Stand one or more mins   JH-HLM Achieved 6: Walk 10 steps or more   Barthel Index   Feeding 5   Bathing 0   Grooming Score 0   Dressing Score 5   Bladder Score 5   Bowels Score 5   Toilet Use Score 5   Transfers (Bed/Chair) Score 10   Mobility (Level Surface) Score 0   Stairs Score 0   Barthel Index Score 35   Additional Treatment Session   Start Time 1335   End Time 1345   Treatment Assessment S: "I feel okay but I am short of breath  O: Pt received laying supine in bed with bed alarm donned  A: Pt agreeable to participate in PT session this PM  Pt able to participate in bed mobility/ambulation within room with Min A - easily fatigues with progressive SOB  Pt ambulates with gait deviations as mentioned above - requires cues to safely use RW to prevent falls  Assisted back to bed - patient with increased WOB - requires cues for relaxation techqniues with good response  Equipment Use walker   End of Consult   Patient Position at End of Consult Supine;Bed/Chair alarm activated; All needs within reach   [facility] Insurance Number  Earlene Diaz [id number]

## 2022-07-13 NOTE — PROGRESS NOTES
[address] Internal Medicine Progress Note  Patient: Shaquille Gilmore 78 y o  female   MRN: [id number]  PCP: Ciera Amezquita MD  Unit/Bed#: [address] [place] [address] Encounter: [id number]  Date Of Visit: 07/13/22    Problem List:    Principal Problem:    Acute on chronic respiratory failure with hypoxia ([facility] [address] )  Active Problems:    Pleural effusion    Rapid atrial fibrillation (HCC)    Acute on chronic diastolic congestive heart failure (HCC)    S/P TAVR (transcatheter aortic valve replacement)    COLD (chronic obstructive lung disease) (HCC)    Increased thyroid stimulating hormone (TSH) level    History of CVA (cerebrovascular accident)    DNR (do not resuscitate) discussion    Hypertension    Hyperlipidemia    BMI less than 19,adult    Pulmonary HTN ([facility] )      Assessment & Plan:    Acute on chronic diastolic congestive heart failure ([facility] )  Assessment & Plan  Wt Readings from Last 3 Encounters:   07/13/22 47 5 kg (104 lb 11 5 oz)   03/07/22 43 5 kg (96 lb)   02/07/22 46 7 kg (103 lb)     Noted to have pleural effusion and a vascular congestion  2D echo with EF 75%, history of TAVR, RVSP 77 mmHg  Improving  · Status post IV Lasix 40 mg q 12 hours, transitioning to torsemide 20 mg daily  · Monitor intake output, daily weight  · Follow up Cardiology recommendation        Rapid atrial fibrillation [facility])  Assessment & Plan  Noted with AFib with RVR  Seen by Cardiology, input appreciated  Heart rate control is improving but remains in AFib  Patient declines cardioversion at this point  · Status post Cardizem drip, now transition to oral diltiazem  ·  Continue metoprolol, digoxin  · Telemetry  · On Eliquis  · Cardiology follow-up    Pleural effusion  Assessment & Plan  Presented with evidence of a moderate right-sided pleural effusion  Status post IR guided thoracentesis, 7/11-1 5 L of yellow fluid was removed  No pleural fluid studies were sent  Likely secondary to CHF  Previous pleural fluid studies consistent with transudative  Cytology negative  · Continue diuretics as per Cardiology  · Follow up repeat chest x-ray  · Pulmonary input appreciated, no indication of tunneled intrapleural catheter at present  Will require outpatient monitoring    * Acute on chronic respiratory failure with hypoxia [facility])  Assessment & Plan  Patient presenting with increasing shortness of breath  history of CHF and COPD on 4 L oxygen chronically via nasal cannula  Noted to have moderate volume pleural effusion of the right lung in addition to pulmonary vascular congestion  Appears to be improving status post thoracentesis and diuresis  · Continue diuresis  · Monitor intake output  · Continue supplemental oxygen, taper as tolerated  · Monitor respiratory status    DNR (do not resuscitate) discussion  Assessment & Plan  Patient states that she does not want cardiopulmonary resuscitation or intubation under any circumstances  History of CVA (cerebrovascular accident)  Assessment & Plan  Will continue to monitor  Increased thyroid stimulating hormone (TSH) level  Assessment & Plan  Patient with a TSH level of 5 479  Patient is not on thyroid replacement at home  Free T4 normal    COLD (chronic obstructive lung disease) (HCC)  Assessment & Plan  History of moderate to severe COPD, FEV1 59% of predicted  Currently appears to be close to baseline  No evidence of exacerbation  · Continue bronchodilators, Breo  · Discontinue IV steroids  · Follow-up pulmonary recommendation  · Activity as tolerated    Acute kidney injury (HCC)-resolved as of 7/13/2022  Assessment & Plan  Patient with evidence of acute kidney injury with a creatinine level of 1 39 likely secondary to volume overload  Previous creatinine level 4 months ago was noted to be 0 77    · Monitor    Hyperlipidemia  Assessment & Plan  Continue home medications  Hypertension  Assessment & Plan  Continue home medications      BMI less than 19,adult  Assessment & Plan  With BMI of 18 78          VTE Pharmacologic Prophylaxis: VTE Score: 6 Moderate Risk (Score 3-4) - Pharmacological DVT Prophylaxis Ordered: apixaban (Eliquis)  Patient Centered Rounds: I performed bedside rounds with nursing staff today  Discussions with Specialists or Other Care Team Provider:  Cardiology,     Education and Discussions with Family / Patient: Attempted to update  (son) via phone  Unable to contact  Time Spent for Care: 45 minutes  More than 50% of total time spent on counseling and coordination of care as described above  Current Length of Stay: 2 day(s)  Current Patient Status: Inpatient   Certification Statement: The patient will continue to require additional inpatient hospital stay due to Respiratory failure  Discharge Plan: Anticipate discharge in >72 hrs to discharge location to be determined pending rehab evaluations  Code Status: Level 3 - DNAR and DNI    Subjective:   Breathing appears to be better, likely close to baseline  Reports ongoing generalized weakness  Denies any chest pain or abdominal pain    Heart rate appears to be improving but remains in AFib  Patient declines cardioversion      Objective:     Vitals:   Temp (24hrs), Av 5 °F (36 4 °C), Min:96 3 °F (35 7 °C), Max:98 2 °F (36 8 °C)    Temp:  [96 3 °F (35 7 °C)-98 2 °F (36 8 °C)] 98 °F (36 7 °C)  HR:  [] 97  Resp:  [15-21] 15  BP: (108-138)/(67-91) 127/80  SpO2:  [97 %-100 %] 99 % on 4 L  Body mass index is 18 55 kg/m²  Input and Output Summary (last 24 hours): Intake/Output Summary (Last 24 hours) at 2022 0930  Last data filed at 2022 2159  Gross per 24 hour   Intake 120 ml   Output 450 ml   Net -330 ml       Physical Exam:   Physical Exam  Constitutional:       General: She is not in acute distress  Comments: Elderly, frail, cachectic   HENT:      Head: Normocephalic and atraumatic  Cardiovascular:      Rate and Rhythm: Normal rate     Pulmonary:      Effort: Pulmonary effort is normal  No respiratory distress  Breath sounds: Normal breath sounds  No wheezing or rales  Comments: Diminished bilaterally  Abdominal:      General: Bowel sounds are normal  There is no distension  Palpations: Abdomen is soft  Tenderness: There is no abdominal tenderness  There is no guarding or rebound  Musculoskeletal:      Right lower leg: No edema  Left lower leg: No edema  Skin:     General: Skin is warm and dry  Findings: No rash  Neurological:      Mental Status: She is alert  Mental status is at baseline  Additional Data:     Labs:  Results from last 7 days   Lab Units 07/13/22  0503 07/12/22  0524   WBC Thousand/uL 7 87 3 97*   HEMOGLOBIN g/dL 8 5* 8 6*   HEMATOCRIT % 30 7* 30 6*   PLATELETS Thousands/uL 306 276   NEUTROS PCT %  --  85*   LYMPHS PCT %  --  8*   MONOS PCT %  --  6   EOS PCT %  --  0     Results from last 7 days   Lab Units 07/13/22  0503 07/12/22  0524   SODIUM mmol/L 137 139   POTASSIUM mmol/L 5 0 4 0   CHLORIDE mmol/L 101 104   CO2 mmol/L 28 27   BUN mg/dL 42* 36*   CREATININE mg/dL 1 16 1 16   ANION GAP mmol/L 8 8   CALCIUM mg/dL 9 1 8 9   ALBUMIN g/dL  --  2 7*   TOTAL BILIRUBIN mg/dL  --  0 37   ALK PHOS U/L  --  84   ALT U/L  --  16   AST U/L  --  15   GLUCOSE RANDOM mg/dL 130 135     Results from last 7 days   Lab Units 07/11/22  1323   INR  1 61*                   Lines/Drains:  Invasive Devices  Report    Central Venous Catheter Line  Duration           CVC Central Lines 07/11/22 Triple Right Femoral 1 day                Central Line:  Goal for removal: No longer needed  Will place order to discontinue  Will discontinue when peripheral access obtained            Telemetry:  Telemetry Orders (From admission, onward)             48 Hour Telemetry Monitoring  (ED Bridging Orders Panel)  Continuous x 48 hours        References:    Telemetry Guidelines   Question:  Reason for 48 Hour Telemetry  Answer:  Acute Decompensated CHF (continuous diuretic infusion or total diuretic dose > 200 mg daily, associated electrolyte derangement, ionotropic drip, history of ventricular arrhythmia, or new EF <35%)                 Telemetry Reviewed: AFib with RVR  Indication for Continued Telemetry Use: Arrthymias requiring medical therapy             Imaging: Reviewed radiology reports from this admission including: chest xray    Recent Cultures (last 7 days):   Results from last 7 days   Lab Units 07/11/22  1744   URINE CULTURE  70,000-79,000 cfu/ml        Last 24 Hours Medication List:   Current Facility-Administered Medications   Medication Dose Route Frequency Provider Last Rate    acetaminophen  650 mg Oral Q6H PRN Michel Katz MD      albuterol  2 5 mg Nebulization Q6H PRN Michel Katz MD      apixaban  2 5 mg Oral BID Michel Katz MD      aspirin  81 mg Oral Daily Michel Katz MD      atorvastatin  40 mg Oral Daily With Mariella Gonzalez MD      digoxin  125 mcg Oral Every Other Day HILDA Fontana      diltiazem  60 mg Oral Q6H [address] HILDA Fontana      docusate sodium  100 mg Oral BID PRN Michel Katz MD      FLUoxetine  20 mg Oral Daily Michel Katz MD      fluticasone-vilanterol  1 puff Inhalation Daily Michel Katz MD      magnesium oxide  400 mg Oral Daily Michel Katz MD      melatonin  3 mg Oral HS Michel Katz MD      methylPREDNISolone sodium succinate  40 mg Intravenous Daily Michel Katz MD      metoprolol tartrate  25 mg Oral Q12H [employer], CRNP      multivitamin-minerals  1 tablet Oral Daily Michel Katz MD      pantoprazole  40 mg Oral Early Morning Michel Katz MD      polyethylene glycol  17 g Oral Daily Michel Katz MD      senna  2 tablet Oral HS Neil Valadez MD      torsemide  20 mg Oral Daily HILDA Fontana      traMADol  50 mg Oral Q12H PRN Raine Leverette Gaucher, MD          Today, Patient Was Seen By: Kindra Cedeno MD    ** Please Note: "This note has been constructed using a voice recognition system  Therefore there may be syntax, spelling, and/or grammatical errors   Please call if you have any questions  "**

## 2022-07-13 NOTE — PLAN OF CARE
Problem: RESPIRATORY - ADULT  Goal: Achieves optimal ventilation and oxygenation  Description: INTERVENTIONS:  - Assess for changes in respiratory status  - Assess for changes in mentation and behavior  - Position to facilitate oxygenation and minimize respiratory effort  - Oxygen administered by appropriate delivery if ordered  - Initiate smoking cessation education as indicated  - Encourage broncho-pulmonary hygiene including cough, deep breathe, Incentive Spirometry  - Assess the need for suctioning and aspirate as needed  - Assess and instruct to report SOB or any respiratory difficulty  - Respiratory Therapy support as indicated  Outcome: Progressing     Problem: Nutrition/Hydration-ADULT  Goal: Nutrient/Hydration intake appropriate for improving, restoring or maintaining nutritional needs  Description: Monitor and assess patient's nutrition/hydration status for malnutrition  Collaborate with interdisciplinary team and initiate plan and interventions as ordered  Monitor patient's weight and dietary intake as ordered or per policy  Utilize nutrition screening tool and intervene as necessary  Determine patient's food preferences and provide high-protein, high-caloric foods as appropriate       INTERVENTIONS:  - Monitor oral intake, urinary output, labs, and treatment plans  - Assess nutrition and hydration status and recommend course of action  - Evaluate amount of meals eaten  - Assist patient with eating if necessary   - Allow adequate time for meals  - Recommend/ encourage appropriate diets, oral nutritional supplements, and vitamin/mineral supplements  - Assess need for intravenous fluids  - Provide specific nutrition/hydration education as appropriate  - Include patient/family/caregiver in decisions related to nutrition  Outcome: Progressing     Problem: Potential for Falls  Goal: Patient will remain free of falls  Description: INTERVENTIONS:  - Educate patient/family on patient safety including physical limitations  - Instruct patient to call for assistance with activity   - Consult OT/PT to assist with strengthening/mobility   - Keep Call bell within reach  - Keep bed low and locked with side rails adjusted as appropriate  - Keep care items and personal belongings within reach  - Initiate and maintain comfort rounds  - Make Fall Risk Sign visible to staff  - Offer Toileting every 2 Hours, in advance of need  - Initiate/Maintain bed/chair alarm  - Apply yellow socks and bracelet for high fall risk patients  - Consider moving patient to room near nurses station  Outcome: Progressing

## 2022-07-13 NOTE — OCCUPATIONAL THERAPY NOTE
OT EVALUATION/TREATMENT   07/13/22 7983   Note Type   Note type Evaluation   Additional Comments Patients daughter entered the room nearing the end of the session  Restrictions/Precautions   Other Precautions Bed Alarm; Fall Risk;O2  (Patient on 3/5L O2, 4L O2 chronically at home)   Pain Assessment   Pain Assessment Tool 0-10   Pain Score No Pain   Home Living   Type of [facility] Two level;Bed/bath upstairs;1/2 bath on main level;Stairs to enter with rails  (4 [employer])   Bathroom Shower/Tub Tub/shower unit   Bathroom Toilet Standard   Bathroom Equipment Shower chair;Hand-held shower;Commode  (Long-handle brush)   Home Equipment Walker;Cane;Wheelchair-manual;Reacher   Prior Function   Level of [place] Needs assistance with IADLs; Needs assistance with ADLs and functional mobility   Lives With Spouse   Receives Help From Family   ADL Assistance Needs assistance   IADLs Needs assistance   Falls in the last 6 months 1 to 4   Vocational Retired   Comments Patient admitted with increasing shortness of breath  Patient has a history of CHF and COPD on 4 L oxygen chronically via nasal cannula  Patient was found to have evidence of moderate volume pleural effusion of the right lung in addition to pulmonary vascular congestion  Patient underwent evaluation with Interventional Radiology and underwent thoracentesis     Subjective   Subjective "I feel like I can not breathe - I need someone to motivate me at home, other wise I just sit around"   ADL   Eating Assistance 5  Supervision/Setup   Grooming Assistance 5  Supervision/Setup   UB Bathing Assistance 5  Supervision/Setup    [facility] 5  Supervision/Setup    [address]  4  Minimal Assistance   Bed Mobility   Supine to Sit 5  Supervision   Additional items Increased time required;Verbal cues   Sit to Supine 5  Supervision   Additional items Increased time required;Verbal cues   Transfers   Sit to Stand 4  Minimal assistance   Additional items Assist x 1;Verbal cues   Stand to Sit 4  Minimal assistance   Additional items Assist x 1;Verbal cues   Functional Mobility   Functional Mobility 4  Minimal assistance   Additional Comments Patient ambulated short household distance with min assist and use of RW, on 3 5 L of O2  Balance   Static Sitting Fair   Dynamic Sitting Fair -   Static Standing Fair   Dynamic Standing Fair -   Activity Tolerance   Activity Tolerance Patient limited by fatigue;Treatment limited secondary to medical complications (Comment)  (Shortness of breath)   Nurse Made Aware Yes, Talisha ALEXANDRE   RUE Assessment   RUE Assessment WFL  (3+/5 grossly MMT; pt reports arthritis in B shoulders)   LUE Assessment   LUE Assessment WFL  (3+/5 grossly MMT; pt reports arthritis in B shoulders)   Vision-Basic Assessment   Current Vision   (Pt reports having glasses "but they do not work")   Visual History Macular degeneration   Vision - Complex Assessment   Acuity   (Pt was unable to read employee name badge, reported it was blurry)   Cognition   Overall Cognitive Status [facility]   Arousal/Participation Alert; Responsive; Cooperative   Attention Attends with cues to redirect   Orientation Level Oriented X4   Following Commands Follows multistep commands with increased time or repetition   Comments Patient with increased anxiety due to shortness of breath, requiring mod-max verbal cueing for reassurance  Assessment   Limitation Decreased ADL status; Decreased UE ROM; Decreased UE strength;Decreased endurance;Decreased self-care trans;Decreased high-level ADLs   Prognosis Good   Assessment Patient evaluated by Occupational Therapy  Patient admitted with Acute on chronic respiratory failure with hypoxia ([facility] [address] )    The patients occupational profile, medical and therapy history includes a extensive additional review of physical, cognitive, or psychosocial history related to current functional performance  Comorbidities affecting functional mobility and ADLS include: Afib, arthritis, CHF, COPD and hypertension  Prior to admission, patient was requiring assist for ADLS and requiring assist for IADLS  The evaluation identifies the following performance deficits: weakness, decreased ROM, impaired balance, decreased endurance, increased fall risk, decreased ADLS, decreased IADLS, pain, decreased activity tolerance, decreased strength and visual deficits, that result in activity limitations and/or participation restrictions  This evaluation requires clinical decision making of high complexity, because the patient presents with comorbidites that affect occupational performance and required significant modification of tasks or assistance with consideration of multiple treatment options  The Barthel Index was used as a functional outcome tool presenting with a score of Barthel Index Score: 35, indicating marked limitations of functional mobility and ADLS  The patient's raw score on the -PAC Daily Activity inpatient short form is 18, standardized score is 38 66, less than 39 4  Patients at this level are likely to benefit from DC to post-acute rehabilitation services  Please refer to the recommendation of the Occupational Therapist for safe DC planning  Patient will benefit from skilled Occupational Therapy services to address above deficits and facilitate a safe return to prior level of function  Goals   Patient Goals to get stronger   STG Time Frame   (1-7 days)   Short Term Goal  Goals established to promote Patient Goals: to get stronger:  Patient will increase standing tolerance to 10 minutes during ADL task to decrease assistance level and decrease fall risk; Patient will increase bed mobility to independent in preparation for ADLS and transfers;  Patient will increase functional mobility to and from bathroom with rolling walker with supervision to increase performance with ADLS and to use a toilet; Patient will tolerate 10 minutes of UE ROM/strengthening to increase general activity tolerance and performance in ADLS/IADLS; Patient will improve functional activity tolerance to 10 minutes of sustained functional tasks to increase participation in basic self-care and decrease assistance level;  Patient will be able to to verbalize understanding and perform energy conservation/proper body mechanics during ADLS and functional mobility at least 75% of the time with minimal cueing to decrease signs of fatigue and increase stamina to return to prior level of function; Patient will increase dynamic sitting balance to fair to improve the ability to sit at edge of bed or on a chair for ADLS;  Patient will increase dynamic standing balance to fair to improve postural stability and decrease fall risk during standing ADLS and transfers     LTG Time Frame   (8-14 days)   Long Term Goal Patient will increase standing tolerance to 20 minutes during ADL task to decrease assistance level and decrease fall risk; Patient will increase functional mobility to and from bathroom with rolling walker independently to increase performance with ADLS and to use a toilet; Patient will tolerate 20 minutes of UE ROM/strengthening to increase general activity tolerance and performance in ADLS/IADLS; Patient will improve functional activity tolerance to 20 minutes of sustained functional tasks to increase participation in basic self-care and decrease assistance level;  Patient will be able to to verbalize understanding and perform energy conservation/proper body mechanics during ADLS and functional mobility at least 90% of the time with minimal cueing to decrease signs of fatigue and increase stamina to return to prior level of function; Patient will increase static/dynamic sitting balance to fair+ to improve the ability to sit at edge of bed or on a chair for ADLS;  Patient will increase static/dynamic standing balance to fair+ to improve postural stability and decrease fall risk during standing ADLS and transfers  Pt will score >/= 22/24 on AM-PAC Daily Activity Inpatient scale to promote safe independence with ADLs and functional mobility; Pt will score >/= 65/100 on Barthel Index in order to decrease caregiver assistance needed and increase ability to perform ADLs and functional mobility  Functional Transfer Goals   Pt Will Perform All Functional Transfers   (STG min assist LTG supervision)   ADL Goals   Pt Will Perform Eating   (STG independent)   Pt Will Perform Grooming   (STG independent)   Pt Will Perform Bathing   (STG supervision LTG independent)   Pt Will Perform UE Dressing   (STG independent)   Pt Will Perform LE Dressing   (STG supervision LTG independent)   Pt Will Perform Toileting   (STG supervision LTG independent)   Plan   Treatment Interventions ADL retraining;Functional transfer training;UE strengthening/ROM; Endurance training;Patient/family training;Equipment evaluation/education; Compensatory technique education; Energy conservation; Activityengagement   Goal Expiration Date 07/27/22   OT Frequency 3-5x/wk   Additional Treatment Session   Start Time 1402   End Time 1412   Treatment Assessment S: Sarahnet denies any pain "I want to get stronger" O: Patient completed ambulating toilet transfer with min assist and use of RW, on 3/5L of O2; patients O2SAT >95% throughout activity, however patient reported being nervous and scared that she was not going to be able to breathe  With reassurance and a rest break patient was okay; patient required set/up supervision to comb hair seated at the edge of the bed with fair dynamic balance; supervision to doff/aquiles B socks while supine in bed  A: Patient is limited by shortness of breath, decreased endurance and activity tolerance  Patient will benefit from continued skilled OT services to maximize functional mobility and participation in ADLS/IADLS   P: STR - patients daughter entered the room at the end of the session and was aggreable with patient going to STR to get stronger and become more independent     Recommendation   OT Discharge Recommendation Post acute rehabilitation services   AM-PAC Daily Activity Inpatient   Lower Body Dressing 3   Bathing 3   Toileting 3   Upper Body Dressing 3   Grooming 3   Eating 3   Daily Activity Raw Score 18   Daily Activity Standardized Score (Calc for Raw Score >=11) 38 66   AM-PAC Applied Cognition Inpatient   Following a Speech/Presentation 4   Understanding Ordinary Conversation 4   Taking Medications 4   Remembering Where Things Are Placed or Put Away 4   Remembering List of 4-5 Errands 4   Taking Care of Complicated Tasks 3   Applied Cognition Raw Score 23   Applied Cognition Standardized Score 53 08   Barthel Index   Feeding 5   Bathing 0   Grooming Score 0   Dressing Score 5   Bladder Score 5   Bowels Score 5   Toilet Use Score 5   Transfers (Bed/Chair) Score 10   Mobility (Level Surface) Score 0   Stairs Score 0   Barthel Index Score 35   Licensure   [place] License Number  [employer], OTS

## 2022-07-13 NOTE — ASSESSMENT & PLAN NOTE
History of moderate to severe COPD, FEV1 59% of predicted  Currently appears to be close to baseline    No evidence of exacerbation  · Continue bronchodilators, Breo  · Discontinued IV steroids  · Follow-up pulmonary recommendation  · Activity as tolerated

## 2022-07-13 NOTE — PROGRESS NOTES
Progress Note - Pulmonary   Margreta Favre 78 y o  female MRN: [id number]  Unit/Bed#: [address] [place] [address] Encounter: [id number]    Assessment:  afib with RVR  Pleural effusion: s/p thoracentesis in the past: transudate most likely secondary to HFpEF  Severe pulmonary HTN: most likely type II and III  Moderate to severe COPD with FEV1 of 1 1 L or 59% of predicted  Ho of TVAR      Plan:  -Patient has only needed 2 thoracentesis procedures thus far spaced out over just 3 months  At this point I do not think she needs at tunneled intrapleural catheter  She states she does not think she would want 1 of these even if advise in the future unless her circumstances change  -DC steroid as pt does not seems in exacerbation   -Cont supplemental O2: target Spo2 more than 94%  -History of right-sided lung cancer several years ago which may been a right hilar-superior segment right lower lobe  She had radiation therapy  Subjective:   Pt is AAOX3, not in respiratory distress, occasional cough  She endorses poor appetite     Objective:     Vitals: Blood pressure 112/69, pulse (!) 110, temperature 98 5 °F (36 9 °C), resp  rate 16, height 5' 3" (1 6 m), weight 47 5 kg (104 lb 11 5 oz), SpO2 96 %, not currently breastfeeding  ,Body mass index is 18 55 kg/m²        Intake/Output Summary (Last 24 hours) at 7/13/2022 1718  Last data filed at 7/12/2022 2159  Gross per 24 hour   Intake 120 ml   Output 100 ml   Net 20 ml       Invasive Devices  Report    Central Venous Catheter Line  Duration           CVC Central Lines 07/11/22 Triple Right Femoral 2 days                Physical Exam: /69   Pulse (!) 110   Temp 98 5 °F (36 9 °C)   Resp 16   Ht 5' 3" (1 6 m)   Wt 47 5 kg (104 lb 11 5 oz)   SpO2 96%   BMI 18 55 kg/m²   General appearance: alert and oriented, in no acute distress  Lungs: diminished breath sounds  Heart: irregular, rate controlled, normal S1s2+  Abdomen: soft, non-tender; bowel sounds normal; no masses,  no organomegaly  Extremities: extremities normal, warm and well-perfused; no cyanosis, clubbing, or edema  Neurologic: Grossly normal     Labs: I have personally reviewed pertinent lab results  Imaging and other studies: I have personally reviewed pertinent reports

## 2022-07-14 LAB
ANION GAP SERPL CALCULATED.3IONS-SCNC: 6 MMOL/L (ref 4–13)
BUN SERPL-MCNC: 51 MG/DL (ref 5–25)
CALCIUM SERPL-MCNC: 8.9 MG/DL (ref 8.3–10.1)
CHLORIDE SERPL-SCNC: 100 MMOL/L (ref 100–108)
CO2 SERPL-SCNC: 30 MMOL/L (ref 21–32)
CREAT SERPL-MCNC: 1.27 MG/DL (ref 0.6–1.3)
ERYTHROCYTE [DISTWIDTH] IN BLOOD BY AUTOMATED COUNT: 17.6 % (ref 11.6–15.1)
FERRITIN SERPL-MCNC: 37 NG/ML (ref 8–388)
GFR SERPL CREATININE-BSD FRML MDRD: 38 ML/MIN/1.73SQ M
GLUCOSE SERPL-MCNC: 144 MG/DL (ref 65–140)
HCT VFR BLD AUTO: 31.6 % (ref 34.8–46.1)
HGB BLD-MCNC: 8.7 G/DL (ref 11.5–15.4)
IRON SATN MFR SERPL: 3 % (ref 15–50)
IRON SERPL-MCNC: 12 UG/DL (ref 50–170)
MCH RBC QN AUTO: 22.1 PG (ref 26.8–34.3)
MCHC RBC AUTO-ENTMCNC: 27.5 G/DL (ref 31.4–37.4)
MCV RBC AUTO: 80 FL (ref 82–98)
PLATELET # BLD AUTO: 282 THOUSANDS/UL (ref 149–390)
PMV BLD AUTO: 10 FL (ref 8.9–12.7)
POTASSIUM SERPL-SCNC: 4.8 MMOL/L (ref 3.5–5.3)
RBC # BLD AUTO: 3.93 MILLION/UL (ref 3.81–5.12)
SODIUM SERPL-SCNC: 136 MMOL/L (ref 136–145)
TIBC SERPL-MCNC: 346 UG/DL (ref 250–450)
WBC # BLD AUTO: 7.71 THOUSAND/UL (ref 4.31–10.16)

## 2022-07-14 PROCEDURE — 99232 SBSQ HOSP IP/OBS MODERATE 35: CPT | Performed by: INTERNAL MEDICINE

## 2022-07-14 PROCEDURE — 82728 ASSAY OF FERRITIN: CPT | Performed by: INTERNAL MEDICINE

## 2022-07-14 PROCEDURE — 80048 BASIC METABOLIC PNL TOTAL CA: CPT | Performed by: INTERNAL MEDICINE

## 2022-07-14 PROCEDURE — NC001 PR NO CHARGE: Performed by: INTERNAL MEDICINE

## 2022-07-14 PROCEDURE — 85027 COMPLETE CBC AUTOMATED: CPT | Performed by: INTERNAL MEDICINE

## 2022-07-14 PROCEDURE — 83540 ASSAY OF IRON: CPT | Performed by: INTERNAL MEDICINE

## 2022-07-14 PROCEDURE — 83550 IRON BINDING TEST: CPT | Performed by: INTERNAL MEDICINE

## 2022-07-14 RX ORDER — DILTIAZEM HYDROCHLORIDE 60 MG/1
120 CAPSULE, EXTENDED RELEASE ORAL EVERY 12 HOURS SCHEDULED
Status: DISCONTINUED | OUTPATIENT
Start: 2022-07-14 | End: 2022-07-15 | Stop reason: HOSPADM

## 2022-07-14 RX ORDER — DILTIAZEM HYDROCHLORIDE 60 MG/1
120 CAPSULE, EXTENDED RELEASE ORAL EVERY 12 HOURS SCHEDULED
Status: DISCONTINUED | OUTPATIENT
Start: 2022-07-14 | End: 2022-07-14

## 2022-07-14 RX ADMIN — APIXABAN 2.5 MG: 2.5 TABLET, FILM COATED ORAL at 10:45

## 2022-07-14 RX ADMIN — DILTIAZEM HYDROCHLORIDE 120 MG: 60 CAPSULE, EXTENDED RELEASE ORAL at 21:17

## 2022-07-14 RX ADMIN — FLUTICASONE FUROATE AND VILANTEROL TRIFENATATE 1 PUFF: 100; 25 POWDER RESPIRATORY (INHALATION) at 10:44

## 2022-07-14 RX ADMIN — Medication 1 TABLET: at 10:45

## 2022-07-14 RX ADMIN — METOPROLOL TARTRATE 25 MG: 25 TABLET, FILM COATED ORAL at 10:45

## 2022-07-14 RX ADMIN — Medication 3 MG: at 21:16

## 2022-07-14 RX ADMIN — DILTIAZEM HYDROCHLORIDE 60 MG: 60 TABLET, FILM COATED ORAL at 05:24

## 2022-07-14 RX ADMIN — POLYETHYLENE GLYCOL 3350 17 G: 17 POWDER, FOR SOLUTION ORAL at 10:45

## 2022-07-14 RX ADMIN — DILTIAZEM HYDROCHLORIDE 60 MG: 60 TABLET, FILM COATED ORAL at 12:50

## 2022-07-14 RX ADMIN — METOPROLOL TARTRATE 25 MG: 25 TABLET, FILM COATED ORAL at 21:16

## 2022-07-14 RX ADMIN — BISACODYL 10 MG: 5 TABLET, COATED ORAL at 14:12

## 2022-07-14 RX ADMIN — SENNOSIDES 17.2 MG: 8.6 TABLET, FILM COATED ORAL at 21:17

## 2022-07-14 RX ADMIN — ATORVASTATIN CALCIUM 40 MG: 40 TABLET, FILM COATED ORAL at 17:48

## 2022-07-14 RX ADMIN — FLUOXETINE 20 MG: 20 CAPSULE ORAL at 10:45

## 2022-07-14 RX ADMIN — DIGOXIN 125 MCG: 125 TABLET ORAL at 10:45

## 2022-07-14 RX ADMIN — PANTOPRAZOLE SODIUM 40 MG: 40 TABLET, DELAYED RELEASE ORAL at 05:24

## 2022-07-14 RX ADMIN — MAGNESIUM OXIDE TAB 400 MG (241.3 MG ELEMENTAL MG) 400 MG: 400 (241.3 MG) TAB at 10:45

## 2022-07-14 RX ADMIN — APIXABAN 2.5 MG: 2.5 TABLET, FILM COATED ORAL at 17:48

## 2022-07-14 RX ADMIN — ASPIRIN 81 MG: 81 TABLET, COATED ORAL at 10:45

## 2022-07-14 RX ADMIN — TORSEMIDE 20 MG: 20 TABLET ORAL at 10:45

## 2022-07-14 NOTE — CASE MANAGEMENT
Case Management Discharge Planning Note    Patient name Cassidy Ball  Location 4 [address] [facility]-* MRN [id number]  : 1940 Date 2022       Current Admission Date: 2022  Current Admission Diagnosis:Acute on chronic respiratory failure with hypoxia [facility])   Patient Active Problem List    Diagnosis Date Noted    Pulmonary HTN ([facility] )     BMI less than 19,adult 2022    Acute on chronic respiratory failure with hypoxia ([facility] ) 2022    Acute on chronic diastolic congestive heart failure ([place] [address] ) 2022    Hyperlipidemia 2022    History of CVA (cerebrovascular accident) 2022    DNR (do not resuscitate) discussion 2022    Shortness of breath     Anemia 2021    Moderate protein-calorie malnutrition ([place] [address] ) 2021    Increased thyroid stimulating hormone (TSH) level 2021    Rapid atrial fibrillation ([place] [address] ) 2020    CKD (chronic kidney disease) stage 3, GFR 30-59 ml/min ([facility]) 2020    S/P TAVR (transcatheter aortic valve replacement) 2020    History of lung cancer 2020    Chronic diastolic [facility]congestive) [facility] failure ([facility] ) 2020    Elevated troponin 2020    COLD (chronic obstructive lung disease) ([facility] ) 2020    Chronic respiratory failure with hypoxia 2/2 COPD 2020    Pleural effusion 2020    Depression 2017    CVA (cerebral vascular accident) ([place] [address] ) 10/09/2016    Hypertension 10/09/2016    GERD (gastroesophageal reflux disease) 10/09/2016    Aorto-iliac atherosclerosis ([place] [address] ) 2014      LOS (days): 3  Geometric Mean LOS (GMLOS) (days): 4 30  Days to GMLOS:1 3     OBJECTIVE:  Risk of Unplanned Readmission Score: 18 7       Current admission status: Inpatient   Preferred Pharmacy:   [address], [address] [facility] ([address] [place] [address])  [address] ([address] [place] [address])  [place] [address]  Phone: [phone] Fax: [phone]    Primary Care Provider: Eric Rao MD    Primary Insurance: MEDICARE  Secondary Insurance: AARP    DISCHARGE DETAILS:    Patient has been accepted at [facility] and [facility]  SW spoke with patient at bedside about facility preference but she was unable to make a decision and seemed to have difficulty focusing  SARAH called son Luke Steele to discuss and he agreed to talk with his parents and provide a decision by tomorrow morning  SW notified facilities in [place] and will continue to follow

## 2022-07-14 NOTE — PROGRESS NOTES
Progress Note - Pulmonary   Codi Metz 78 y o  female MRN: [id number]  Unit/Bed#: [address] [place] [address] Encounter: [id number]    Assessment:  afib with RVR  Pleural effusion: s/p thoracentesis in the past: transudate most likely secondary to HFpEF  Severe pulmonary HTN: most likely type II and III  Moderate to severe COPD with FEV1 of 1 1 L or 59% of predicted  Ho of TVAR    Plan:  S/p thoracentesis: given patient overall condition; pleurx is more invasive without much benefit  -DC steroid as pt does not seems in exacerbation   -Cont supplemental O2: target Spo2 more than 94%  -History of right-sided lung cancer several years ago which may been a right hilar-superior segment right lower lobe   She had radiation therapy  Dicussed with Dr Tahir Zepeda    Subjective:   Pt is AAOX3, not in respiratory distress, occasional cough  She endorses poor appetite     Objective:     Vitals: Blood pressure 134/80, pulse 97, temperature 97 7 °F (36 5 °C), resp  rate 17, height 5' 3" (1 6 m), weight 47 6 kg (104 lb 15 oz), SpO2 98 %, not currently breastfeeding  ,Body mass index is 18 59 kg/m²  Intake/Output Summary (Last 24 hours) at 7/14/2022 1659  Last data filed at 7/14/2022 1230  Gross per 24 hour   Intake 1328 ml   Output 100 ml   Net 1228 ml       Invasive Devices  Report    Peripheral Intravenous Line  Duration           Peripheral IV 07/14/22 Dorsal (posterior); Left Forearm <1 day          Drain  Duration           External Urinary Catheter <1 day                Physical Exam: General appearance: alert and oriented, in no acute distress  Lungs: diminished breath sounds  Heart: irregular, rate controlled, normal S1s2+  Abdomen: soft, non-tender; bowel sounds normal; no masses,  no organomegaly  Extremities: extremities normal, warm and well-perfused; no cyanosis, clubbing, or edema  Neurologic: Grossly normal        Labs: I have personally reviewed pertinent lab results    Imaging and other studies: I have personally reviewed pertinent films in Jennifer Richardson MD

## 2022-07-14 NOTE — PROGRESS NOTES
Progress Note - Cardiology   [facility]     Richard Nunes 78 y o  female MRN: [id number]  : 1940  Unit/Bed#: [address] [address] Encounter: [id number]    Assessment and Plan:   1  Rapid atrial fibrillation:  Rate control improved with current medications of Cardizem, Lopressor and digoxin    -  discussed rate versus rhythm strategy with patient, at this time patient is declining elective cardioversion and wishes to follow rate control     -  transition from short-acting Cardizem to Cardizem  q  12 hours starting this evening     -  continue Lopressor 25 mg b i d     -  continue digoxin 0 125 mg every other day, digoxin level was 0 7 on admission    -  continue Eliquis 2 5 mg b i d     -  continue monitor telemetry     2  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: Followed by Pulmonary    -  continue outpatient respiratory therapy     3  Acute on chronic diastolic heart failure:  Mild, will discontinue IV Lasix and transition to Demadex 20 mg daily    -  continue Lopressor 25 mg b i d     -  low-sodium diet    -  monitor I&O, daily weights electrolytes     4  Recurrent right-sided pleural effusion:  Underwent thoracentesis on 2022 with removal of 1 5 L of fluid  -  followed by Pulmonary     5  Severe pulmonary hypertension:  Moderate TR with peak PA pressure estimated at 77 mm Hg     6  History of transapical aortic valve replacement:  No evidence of aortic stenosis with DVI of 0 89  No significant valvular or paravalvular regurgitation seen on echocardiogram performed on 2022        Subjective / Objective:   Patient seen examined  No specific complaints offered at this time  Heart rates improved with addition of Cardizem  Vitals: Blood pressure 123/70, pulse 74, temperature 98 1 °F (36 7 °C), resp  rate 15, height 5' 3" (1 6 m), weight 47 6 kg (104 lb 15 oz), SpO2 96 %, not currently breastfeeding    Vitals:    22 0549 22 0542   Weight: 47 5 kg (104 lb 11 5 oz) 47 6 kg (104 lb 15 oz)     Body mass index is 18 59 kg/m²  BP Readings from Last 3 Encounters:   07/14/22 123/70   03/18/22 155/93   03/07/22 118/72     Orthostatic Blood Pressures    Flowsheet Row Most Recent Value   Blood Pressure 123/70 filed at 07/14/2022 0728   Patient Position - Orthostatic VS Lying filed at 07/14/2022 0251        I/O       07/12 0701 07/13 0700 07/13 0701 07/14 0700 07/14 0701  07/15 0700    P  O  420 918 500    Total Intake(mL/kg) 420 (8 8) 918 (19 3) 500 (10 5)    Urine (mL/kg/hr) 450 (0 4) 300 (0 3)     Other       Total Output 450 300     Net -30 +618 +500           Unmeasured Urine Occurrence 1 x          Invasive Devices  Report    Central Venous Catheter Line  Duration           CVC Central Lines 07/11/22 Triple Right Femoral 2 days          Drain  Duration           External Urinary Catheter <1 day                  Intake/Output Summary (Last 24 hours) at 7/14/2022 1052  Last data filed at 7/14/2022 0900  Gross per 24 hour   Intake 1178 ml   Output 300 ml   Net 878 ml         Physical Exam:   Physical Exam  Vitals and nursing note reviewed  Constitutional:       Appearance: Normal appearance  She is underweight  She is ill-appearing (Chronically)  HENT:      Nose: Nose normal    Eyes:      General: No scleral icterus  Right eye: No discharge  Left eye: No discharge  Cardiovascular:      Rate and Rhythm: Normal rate  Rhythm irregular  Pulses: Normal pulses  Heart sounds: Murmur heard  Systolic murmur is present with a grade of 2/6  Pulmonary:      Effort: Pulmonary effort is normal  No accessory muscle usage or respiratory distress  Breath sounds: Examination of the right-middle field reveals decreased breath sounds  Examination of the right-lower field reveals decreased breath sounds  Examination of the left-lower field reveals decreased breath sounds  Decreased breath sounds present        Comments: On chronic O2  Abdominal: General: Bowel sounds are normal  There is no distension  Palpations: Abdomen is soft  Musculoskeletal:      Right lower leg: No edema  Left lower leg: No edema  Skin:     General: Skin is warm and dry  Capillary Refill: Capillary refill takes less than 2 seconds  Neurological:      General: No focal deficit present  Mental Status: She is alert and oriented to person, place, and time  Mental status is at baseline  Psychiatric:         Mood and Affect: Mood normal          Behavior: Behavior is cooperative                   Medications/ Allergies:     Current Facility-Administered Medications   Medication Dose Route Frequency Provider Last Rate    acetaminophen  650 mg Oral Q6H PRN Salvador Billings MD      albuterol  2 5 mg Nebulization Q6H PRN Salvador Billings MD      apixaban  2 5 mg Oral BID Salvador Billings MD      aspirin  81 mg Oral Daily Salvador Billings MD      atorvastatin  40 mg Oral Daily With Adilson Moss MD      digoxin  125 mcg Oral Every Other Day HILDA Ni      diltiazem  60 mg Oral Q6H [address] HILDA Ni      docusate sodium  100 mg Oral BID PRN Salvador Billings MD      FLUoxetine  20 mg Oral Daily Salvador Billings MD      fluticasone-vilanterol  1 puff Inhalation Daily Salvador Billings MD      magnesium oxide  400 mg Oral Daily Salvador Billings MD      melatonin  3 mg Oral HS Salvador Billings MD      metoprolol tartrate  25 mg Oral Q12H [address] HILDA Ni      multivitamin-minerals  1 tablet Oral Daily Salvador Billings MD      pantoprazole  40 mg Oral Early Morning Salvador Billings MD      polyethylene glycol  17 g Oral Daily Salvador Billings MD      senna  2 tablet Oral HS Amisha Ayon MD      torsemide  20 mg Oral Daily HILDA Ni      traMADol  50 mg Oral Q12H PRN Amisha Ayon MD       acetaminophen, 650 mg, Q6H PRN  albuterol, 2 5 mg, Q6H PRN  docusate sodium, 100 mg, BID PRN  traMADol, 50 mg, Q12H PRN      No Known Allergies    VTE Pharmacologic Prophylaxis:   Sequential compression device (Venodyne)     Labs:   Troponins:  Results from last 7 days   Lab Units 07/11/22  1730 07/11/22  1533   HSTNI D2 ng/L  --  -2   HSTNI D4 ng/L 14  --      CBC with diff:  Results from last 7 days   Lab Units 07/14/22  0449 07/13/22  0503 07/12/22  0524 07/11/22  1323   WBC Thousand/uL 7 71 7 87 3 97* 7 94   HEMOGLOBIN g/dL 8 7* 8 5* 8 6* 9 4*   HEMATOCRIT % 31 6* 30 7* 30 6* 34 3*   MCV fL 80* 80* 80* 83   PLATELETS Thousands/uL 282 306 276 319   MCH pg 22 1* 22 2* 22 6* 22 7*   MCHC g/dL 27 5* 27 7* 28 1* 27 4*   RDW % 17 6* 18 0* 18 1* 18 2*   MPV fL 10 0 10 7 10 7 10 6   NRBC AUTO /100 WBCs  --   --  0 1     CMP:  Results from last 7 days   Lab Units 07/14/22 0449 07/13/22  0503 07/12/22  0524 07/11/22  1323   SODIUM mmol/L 136 137 139 141   POTASSIUM mmol/L 4 8 5 0 4 0 5 0   CHLORIDE mmol/L 100 101 104 105   CO2 mmol/L 30 28 27 24   ANION GAP mmol/L 6 8 8 12   BUN mg/dL 51* 42* 36* 32*   CREATININE mg/dL 1 27 1 16 1 16 1 39*   CALCIUM mg/dL 8 9 9 1 8 9 8 9   AST U/L  --   --  15 28   ALT U/L  --   --  16 21   ALK PHOS U/L  --   --  84 93   TOTAL PROTEIN g/dL  --   --  6 3* 6 9   ALBUMIN g/dL  --   --  2 7* 2 9*   TOTAL BILIRUBIN mg/dL  --   --  0 37 0 42   EGFR ml/min/1 73sq m 38 43 43 34     Magnesium:  Results from last 7 days   Lab Units 07/12/22  0524   MAGNESIUM mg/dL 1 9     Coags:  Results from last 7 days   Lab Units 07/11/22  1323   PTT seconds 40*   INR  1 61*     TSH:  Results from last 7 days   Lab Units 07/11/22  1323   TSH 3RD GENERATON uIU/mL 5 479*     NT-proBNP:   Recent Labs     07/11/22  1323 07/12/22  0524   NTBNP 15,829* 19,319*        Imaging & Testing   I have personally reviewed pertinent reports      XR chest portable    Result Date: 7/13/2022  Narrative: CHEST INDICATION:   Pleural effusion status post thoracentesis  History of right hilar lung cancer status post radiation therapy  COMPARISON:  7/11/22 EXAM PERFORMED/VIEWS:  XR CHEST PORTABLE FINDINGS:  Monitoring leads and clips project over the chest  Patient is rotated Heart shadow is obscured by adjacent opacity  TAVR  Sternotomy wires Small right pleural effusion decreased form prior with associated opacity  Areas of subsegmental atelectasis in the right lung  Interstitial prominence  Skinfolds overlie the left hemithorax  Skinfold vs  partial atelectasis in the right lung  Trace  left effusion suspected  No pneumothorax identified  Degenerative changes most notably involving the shoulders  Impression: Decreased right pleural effusion status post thoracentesis  No appreciable pneumothorax  If this remains of continued concern recommend true upright or decubitus view  Workstation performed: [id number]     XR chest 1 view portable    Result Date: 7/11/2022  Narrative: CHEST INDICATION:   short of breath  COMPARISON:  Chest radiograph from 3/10/2022, chest CT from 8/30/2021  EXAM PERFORMED/VIEWS:  XR CHEST PORTABLE FINDINGS: Normal heart size  Median sternotomy  TAVR  Mild pulmonary venous congestion  Moderate right effusion with right base opacity  Skinfolds over the left hemithorax  No pneumothorax  Severe right glenohumeral degenerative disease  Impression: Mild pulmonary venous congestion  Moderate right effusion with right base opacity, at least in part due to atelectasis  Pneumonia cannot be excluded in the appropriate clinical setting  Workstation performed: [id number]     IR IN-Patient Thoracentesis    Result Date: 7/12/2022  Narrative: PROCEDURE: Therapeutic right thoracentesis STAFF: JOE Ward  NUMBER OF IMAGES: Multiple  COMPLICATIONS: None  INDICATION: Symptomatic right pleural effusion  PROCEDURE: Using ultrasound guidance, the right pleural cavity was punctured and a catheter placed into the pleural cavity   A total of 1550 milliliters of fluid was removed  The catheter was then removed  FINDINGS: Large right pleural effusion  Cloudy yellow pleural fluid was removed  Impression: Therapeutic right thoracentesis  Workstation performed: [id number]     Echo complete w/ contrast if indicated    Result Date: 7/12/2022  Narrative: Hanna Olmstead  Left Ventricle: Left ventricular cavity size is normal  Wall thickness is mild to moderately increased  Systolic function is hyperdynamic  Estimated ejection fraction is 75% Although no diagnostic regional wall motion abnormality was identified, this possibility cannot be completely excluded on the basis of this study  Unable to assess diastolic function due to atrial fibrillation    Right Ventricle: Systolic function is reduced  TAPSE is 1 0 cm   Left Atrium: The atrium is moderately dilated    Right Atrium: The atrium is mild to moderately dilated    Aortic Valve: A bioprosthesis/TAVR is present  There is no evidence of significant stenosis with DVI of 0 89  No significant valvular or paravalvular regurgitation seen   Mitral Valve: There is moderate thickening  There is mildly reduced mobility  There is moderate annular calcification  There is mild to moderate regurgitation  There is mild to moderate stenosis  Mean gradient is 5 65 mm Hg   Tricuspid Valve: There is moderate regurgitation  The right ventricular systolic pressure is severely elevated at 77 mm Hg    Pulmonic Valve: There is mild regurgitation    No significant changes noted compared to the prior study        EKG / Monitor: Personally reviewed  Atrial fibrillation          Guerda Blackmon, [facility]  Cardiology      "This note has been constructed using a voice recognition system  Therefore there may be syntax, spelling, and/or grammatical errors   Please call if you have any questions  "

## 2022-07-14 NOTE — PLAN OF CARE
Problem: RESPIRATORY - ADULT  Goal: Achieves optimal ventilation and oxygenation  Description: INTERVENTIONS:  - Assess for changes in respiratory status  - Assess for changes in mentation and behavior  - Position to facilitate oxygenation and minimize respiratory effort  - Oxygen administered by appropriate delivery if ordered  - Initiate smoking cessation education as indicated  - Encourage broncho-pulmonary hygiene including cough, deep breathe, Incentive Spirometry  - Assess the need for suctioning and aspirate as needed  - Assess and instruct to report SOB or any respiratory difficulty  - Respiratory Therapy support as indicated  Outcome: Progressing     Problem: Nutrition/Hydration-ADULT  Goal: Nutrient/Hydration intake appropriate for improving, restoring or maintaining nutritional needs  Description: Monitor and assess patient's nutrition/hydration status for malnutrition  Collaborate with interdisciplinary team and initiate plan and interventions as ordered  Monitor patient's weight and dietary intake as ordered or per policy  Utilize nutrition screening tool and intervene as necessary  Determine patient's food preferences and provide high-protein, high-caloric foods as appropriate       INTERVENTIONS:  - Monitor oral intake, urinary output, labs, and treatment plans  - Assess nutrition and hydration status and recommend course of action  - Evaluate amount of meals eaten  - Assist patient with eating if necessary   - Allow adequate time for meals  - Recommend/ encourage appropriate diets, oral nutritional supplements, and vitamin/mineral supplements  - Assess need for intravenous fluids  - Provide specific nutrition/hydration education as appropriate  - Include patient/family/caregiver in decisions related to nutrition  Outcome: Progressing     Problem: MOBILITY - ADULT  Goal: Maintain or return to baseline ADL function  Description: INTERVENTIONS:  -  Assess patient's ability to carry out ADLs; assess patient's baseline for ADL function and identify physical deficits which impact ability to perform ADLs (bathing, care of mouth/teeth, toileting, grooming, dressing, etc )  - Assess/evaluate cause of self-care deficits   - Assess range of motion  - Assess patient's mobility; develop plan if impaired  - Assess patient's need for assistive devices and provide as appropriate  - Encourage maximum independence but intervene and supervise when necessary  - Involve family in performance of ADLs  - Assess for home care needs following discharge   - Consider OT consult to assist with ADL evaluation and planning for discharge  - Provide patient education as appropriate  Outcome: Progressing  Goal: Maintains/Returns to pre admission functional level  Description: INTERVENTIONS:  - Perform BMAT or MOVE assessment daily    - Set and communicate daily mobility goal to care team and patient/family/caregiver  - Collaborate with rehabilitation services on mobility goals if consulted  - Perform Range of Motion 3 times a day  - Reposition patient every 2 hours    - Dangle patient 3 times a day  - Stand patient 3 times a day  - Ambulate patient 3 times a day  - Out of bed to chair 3 times a day   - Out of bed for meals 3 times a day  - Out of bed for toileting  - Record patient progress and toleration of activity level   Outcome: Progressing     Problem: Potential for Falls  Goal: Patient will remain free of falls  Description: INTERVENTIONS:  - Educate patient/family on patient safety including physical limitations  - Instruct patient to call for assistance with activity   - Consult OT/PT to assist with strengthening/mobility   - Keep Call bell within reach  - Keep bed low and locked with side rails adjusted as appropriate  - Keep care items and personal belongings within reach  - Initiate and maintain comfort rounds  - Make Fall Risk Sign visible to staff  - Offer Toileting every 2 Hours, in advance of need  - Initiate/Maintain bed alarm  - Obtain necessary fall risk management equipment: yes  - Apply yellow socks and bracelet for high fall risk patients  - Consider moving patient to room near nurses station  Outcome: Progressing     Problem: PAIN - ADULT  Goal: Verbalizes/displays adequate comfort level or baseline comfort level  Description: Interventions:  - Encourage patient to monitor pain and request assistance  - Assess pain using appropriate pain scale  - Administer analgesics based on type and severity of pain and evaluate response  - Implement non-pharmacological measures as appropriate and evaluate response  - Consider cultural and social influences on pain and pain management  - Notify physician/advanced practitioner if interventions unsuccessful or patient reports new pain  Outcome: Progressing     Problem: INFECTION - ADULT  Goal: Absence or prevention of progression during hospitalization  Description: INTERVENTIONS:  - Assess and monitor for signs and symptoms of infection  - Monitor lab/diagnostic results  - Monitor all insertion sites, i e  indwelling lines, tubes, and drains  - Monitor endotracheal if appropriate and nasal secretions for changes in amount and color  - [place] appropriate cooling/warming therapies per order  - Administer medications as ordered  - Instruct and encourage patient and family to use good hand hygiene technique  - Identify and instruct in appropriate isolation precautions for identified infection/condition  Outcome: Progressing     Problem: SAFETY ADULT  Goal: Maintain or return to baseline ADL function  Description: INTERVENTIONS:  -  Assess patient's ability to carry out ADLs; assess patient's baseline for ADL function and identify physical deficits which impact ability to perform ADLs (bathing, care of mouth/teeth, toileting, grooming, dressing, etc )  - Assess/evaluate cause of self-care deficits   - Assess range of motion  - Assess patient's mobility; develop plan if impaired  - Assess patient's need for assistive devices and provide as appropriate  - Encourage maximum independence but intervene and supervise when necessary  - Involve family in performance of ADLs  - Assess for home care needs following discharge   - Consider OT consult to assist with ADL evaluation and planning for discharge  - Provide patient education as appropriate  Outcome: Progressing  Goal: Maintains/Returns to pre admission functional level  Description: INTERVENTIONS:  - Perform BMAT or MOVE assessment daily    - Set and communicate daily mobility goal to care team and patient/family/caregiver  - Collaborate with rehabilitation services on mobility goals if consulted  - Perform Range of Motion 3 times a day  - Reposition patient every 2 hours    - Dangle patient 3 times a day  - Stand patient 3 times a day  - Ambulate patient 3 times a day  - Out of bed to chair 3 times a day   - Out of bed for meals 3 times a day  - Out of bed for toileting  - Record patient progress and toleration of activity level   Outcome: Progressing  Goal: Patient will remain free of falls  Description: INTERVENTIONS:  - Educate patient/family on patient safety including physical limitations  - Instruct patient to call for assistance with activity   - Consult OT/PT to assist with strengthening/mobility   - Keep Call bell within reach  - Keep bed low and locked with side rails adjusted as appropriate  - Keep care items and personal belongings within reach  - Initiate and maintain comfort rounds  - Make Fall Risk Sign visible to staff  - Offer Toileting every 2 Hours, in advance of need  - Initiate/Maintain bed alarm  - Obtain necessary fall risk management equipment: yes  - Apply yellow socks and bracelet for high fall risk patients  - Consider moving patient to room near nurses station  Outcome: Progressing     Problem: DISCHARGE PLANNING  Goal: Discharge to home or other facility with appropriate resources  Description: INTERVENTIONS:  - Identify barriers to discharge w/patient and caregiver  - Arrange for needed discharge resources and transportation as appropriate  - Identify discharge learning needs (meds, wound care, etc )  - Arrange for interpretive services to assist at discharge as needed  - Refer to Case Management Department for coordinating discharge planning if the patient needs post-hospital services based on physician/advanced practitioner order or complex needs related to functional status, cognitive ability, or social support system  Outcome: Progressing     Problem: Knowledge Deficit  Goal: Patient/family/caregiver demonstrates understanding of disease process, treatment plan, medications, and discharge instructions  Description: Complete learning assessment and assess knowledge base    Interventions:  - Provide teaching at level of understanding  - Provide teaching via preferred learning methods  Outcome: Progressing

## 2022-07-14 NOTE — PROGRESS NOTES
[facility] Internal Medicine Progress Note  Patient: Lazarus Goldman 78 y o  female   MRN: [id number]  PCP: Odessa Disla MD  Unit/Bed#: [address] [address] Encounter: [id number]  Date Of Visit: 07/14/22    Problem List:    Principal Problem:    Acute on chronic respiratory failure with hypoxia ([facility] [address] )  Active Problems:    Pleural effusion    Rapid atrial fibrillation (HCC)    Acute on chronic diastolic congestive heart failure (HCC)    S/P TAVR (transcatheter aortic valve replacement)    COLD (chronic obstructive lung disease) (HCC)    Increased thyroid stimulating hormone (TSH) level    History of CVA (cerebrovascular accident)    DNR (do not resuscitate) discussion    Hypertension    Anemia    Hyperlipidemia    BMI less than 19,adult    Pulmonary HTN ([facility] )      Assessment & Plan:    Acute on chronic diastolic congestive heart failure (HCC)  Assessment & Plan  Wt Readings from Last 3 Encounters:   07/14/22 47 6 kg (104 lb 15 oz)   03/07/22 43 5 kg (96 lb)   02/07/22 46 7 kg (103 lb)     Noted to have pleural effusion and a vascular congestion  2D echo with EF 75%, history of TAVR, RVSP 77 mmHg  Improving  · Status post IV Lasix 40 mg q 12 hours, transitioning to torsemide 20 mg daily  · Monitor intake output, daily weight  · Follow up Cardiology recommendation        Rapid atrial fibrillation [facility])  Assessment & Plan  Noted with AFib with RVR  Seen by Cardiology, input appreciated  Heart rate is overall better/improving on oral Cardizem  Patient declines cardioversion at this point  · Status post Cardizem drip,  · Continue Cardizem, changed to Cardizem  mg q 12 hours  ·  Continue metoprolol, digoxin  · Telemetry  · On Eliquis  · Cardiology follow-up    Pleural effusion  Assessment & Plan  Presented with evidence of a moderate right-sided pleural effusion  Status post IR guided thoracentesis, 7/11-1 5 L of yellow fluid was removed  No pleural fluid studies were sent  Likely secondary to CHF    Previous pleural fluid studies consistent with transudative  Cytology negative  · Continue diuretics as per Cardiology  · Repeat chest x-ray with small right and trace left effusion  · Pulmonary input appreciated, no indication of tunneled intrapleural catheter at present  Will require outpatient monitoring    * Acute on chronic respiratory failure with hypoxia [facility])  Assessment & Plan  Patient presenting with increasing shortness of breath  history of CHF and COPD on 4 L oxygen chronically via nasal cannula  Noted to have moderate volume pleural effusion of the right lung in addition to pulmonary vascular congestion  Appears to be improving status post thoracentesis and diuresis, now close to baseline  · Continue diuresis  · Monitor intake output  · Continue supplemental oxygen, taper as tolerated  · Monitor respiratory status    DNR (do not resuscitate) discussion  Assessment & Plan  Patient states that she does not want cardiopulmonary resuscitation or intubation under any circumstances  History of CVA (cerebrovascular accident)  Assessment & Plan  Will continue to monitor  Increased thyroid stimulating hormone (TSH) level  Assessment & Plan  Patient with a TSH level of 5 479  Patient is not on thyroid replacement at home  Free T4 normal    COLD (chronic obstructive lung disease) (HCC)  Assessment & Plan  History of moderate to severe COPD, FEV1 59% of predicted  Currently appears to be close to baseline  No evidence of exacerbation  · Continue bronchodilators, Breo  · Discontinued IV steroids  · Follow-up pulmonary recommendation  · Activity as tolerated    Acute kidney injury (HCC)-resolved as of 7/13/2022  Assessment & Plan  Patient with evidence of acute kidney injury with a creatinine level of 1 39 likely secondary to volume overload  Previous creatinine level 4 months ago was noted to be 0 77    · Monitor    Hyperlipidemia  Assessment & Plan  Continue home medications      Anemia  Assessment & Plan  Microcytic  Appears relatively stable during hospitalization  No evidence of overt bleeding  · Will check iron studies, B12, folate  · Monitor for bleeding    Hypertension  Assessment & Plan  Continue home medications  BMI less than 19,adult  Assessment & Plan  With BMI of 18 78          VTE Pharmacologic Prophylaxis: VTE Score: 6 Moderate Risk (Score 3-4) - Pharmacological DVT Prophylaxis Ordered: apixaban (Eliquis)  Patient Centered Rounds: I performed bedside rounds with nursing staff today  Discussions with Specialists or Other Care Team Provider:  Cardiology,     Education and Discussions with Family / Patient: Attempted to update  (son) via phone  Unable to contact  Time Spent for Care: 45 minutes  More than 50% of total time spent on counseling and coordination of care as described above  Current Length of Stay: 3 day(s)  Current Patient Status: Inpatient   Certification Statement: The patient will continue to require additional inpatient hospital stay due to Respiratory failure  Discharge Plan: Anticipate discharge in 24-48 hrs to rehab facility  Code Status: Level 3 - DNAR and DNI    Subjective:     Breathing is stable  Rate is improving  Denies chest pain  Reports constipation, Dulcolax usually helps    Reports generalized weakness understands that she needs rehab but unclear patient reported short-term or home with PT    Objective:     Vitals:   Temp (24hrs), Av 1 °F (36 7 °C), Min:97 6 °F (36 4 °C), Max:98 5 °F (36 9 °C)    Temp:  [97 6 °F (36 4 °C)-98 5 °F (36 9 °C)] 98 1 °F (36 7 °C)  HR:  [] 74  Resp:  [15-20] 15  BP: ()/(63-79) 123/70  SpO2:  [96 %-100 %] 96 % on 4 L  Body mass index is 18 59 kg/m²  Input and Output Summary (last 24 hours):      Intake/Output Summary (Last 24 hours) at 2022 0932  Last data filed at 2022 0600  Gross per 24 hour   Intake 678 ml   Output 300 ml   Net 378 ml       Physical Exam:   Physical Exam  Constitutional:       General: She is not in acute distress  Comments: Elderly, frail, cachectic   HENT:      Head: Normocephalic and atraumatic  Cardiovascular:      Rate and Rhythm: Normal rate  Pulmonary:      Effort: Pulmonary effort is normal  No respiratory distress  Breath sounds: Normal breath sounds  No wheezing or rales  Comments: Diminished bilaterally  Abdominal:      General: Bowel sounds are normal  There is no distension  Palpations: Abdomen is soft  Tenderness: There is no abdominal tenderness  There is no guarding or rebound  Musculoskeletal:      Right lower leg: No edema  Left lower leg: No edema  Skin:     General: Skin is warm and dry  Findings: No rash  Neurological:      Mental Status: She is alert  Additional Data:     Labs:  Results from last 7 days   Lab Units 07/14/22 0449 07/13/22  0503 07/12/22  0524   WBC Thousand/uL 7 71   < > 3 97*   HEMOGLOBIN g/dL 8 7*   < > 8 6*   HEMATOCRIT % 31 6*   < > 30 6*   PLATELETS Thousands/uL 282   < > 276   NEUTROS PCT %  --   --  85*   LYMPHS PCT %  --   --  8*   MONOS PCT %  --   --  6   EOS PCT %  --   --  0    < > = values in this interval not displayed  Results from last 7 days   Lab Units 07/14/22 0449 07/13/22  0503 07/12/22  0524   SODIUM mmol/L 136   < > 139   POTASSIUM mmol/L 4 8   < > 4 0   CHLORIDE mmol/L 100   < > 104   CO2 mmol/L 30   < > 27   BUN mg/dL 51*   < > 36*   CREATININE mg/dL 1 27   < > 1 16   ANION GAP mmol/L 6   < > 8   CALCIUM mg/dL 8 9   < > 8 9   ALBUMIN g/dL  --   --  2 7*   TOTAL BILIRUBIN mg/dL  --   --  0 37   ALK PHOS U/L  --   --  84   ALT U/L  --   --  16   AST U/L  --   --  15   GLUCOSE RANDOM mg/dL 144*   < > 135    < > = values in this interval not displayed       Results from last 7 days   Lab Units 07/11/22  1323   INR  1 61*                   Lines/Drains:  Invasive Devices  Report    Central Venous Catheter Line  Duration           CVC Central Lines 07/11/22 Triple Right Femoral 2 days          Drain  Duration           External Urinary Catheter <1 day                Central Line:  Goal for removal: No longer needed  Will place order to discontinue  Will discontinue when peripheral access obtained            Telemetry:  Telemetry Orders (From admission, onward)             48 Hour Telemetry Monitoring  (ED Bridging Orders Panel)  Continuous x 48 hours        References:    Telemetry Guidelines   Question:  Reason for 48 Hour Telemetry  Answer:  Acute Decompensated CHF (continuous diuretic infusion or total diuretic dose > 200 mg daily, associated electrolyte derangement, ionotropic drip, history of ventricular arrhythmia, or new EF <35%)                 Telemetry Reviewed: AFib with RVR  Indication for Continued Telemetry Use: Arrthymias requiring medical therapy             Imaging: Reviewed radiology reports from this admission including: chest xray    Recent Cultures (last 7 days):   Results from last 7 days   Lab Units 07/11/22  1744   URINE CULTURE  70,000-79,000 cfu/ml        Last 24 Hours Medication List:   Current Facility-Administered Medications   Medication Dose Route Frequency Provider Last Rate    acetaminophen  650 mg Oral Q6H PRN Radha Messer MD      albuterol  2 5 mg Nebulization Q6H PRN Radha Messer MD      apixaban  2 5 mg Oral BID Radha Messer MD      aspirin  81 mg Oral Daily Radha Messer MD      atorvastatin  40 mg Oral Daily With Lito Aguero MD      digoxin  125 mcg Oral Every Other Day HILDA Calvo      diltiazem  60 mg Oral Q6H [facility] HILDA Calvo      docusate sodium  100 mg Oral BID PRN Radha Messer MD      FLUoxetine  20 mg Oral Daily Radha Messer MD      fluticasone-vilanterol  1 puff Inhalation Daily Radha Messer MD      magnesium oxide  400 mg Oral Daily Radha Messer MD      melatonin  3 mg Oral HS [facility] Ernst Moss MD      metoprolol tartrate  25 mg Oral Q12H [address] Sunday HILDA Casillas      multivitamin-minerals  1 tablet Oral Daily Thony Berg MD      pantoprazole  40 mg Oral Early Morning Thony Berg MD      polyethylene glycol  17 g Oral Daily Thony Berg MD      senna  2 tablet Oral HS Lidya Scott MD      torsemide  20 mg Oral Daily Sunday HILDA Casillas      traMADol  50 mg Oral Q12H PRN Lidya Scott MD          Today, Patient Was Seen By: Lidya Scott MD    ** Please Note: "This note has been constructed using a voice recognition system  Therefore there may be syntax, spelling, and/or grammatical errors   Please call if you have any questions  "**

## 2022-07-14 NOTE — ASSESSMENT & PLAN NOTE
Microcytic  Appears relatively stable during hospitalization  No evidence of overt bleeding  · Iron studies noted neck-start iron supplements  · Monitor for bleeding

## 2022-07-14 NOTE — PLAN OF CARE
Problem: RESPIRATORY - ADULT  Goal: Achieves optimal ventilation and oxygenation  Description: INTERVENTIONS:  - Assess for changes in respiratory status  - Assess for changes in mentation and behavior  - Position to facilitate oxygenation and minimize respiratory effort  - Oxygen administered by appropriate delivery if ordered  - Initiate smoking cessation education as indicated  - Encourage broncho-pulmonary hygiene including cough, deep breathe, Incentive Spirometry  - Assess the need for suctioning and aspirate as needed  - Assess and instruct to report SOB or any respiratory difficulty  - Respiratory Therapy support as indicated  Outcome: Progressing     Problem: Nutrition/Hydration-ADULT  Goal: Nutrient/Hydration intake appropriate for improving, restoring or maintaining nutritional needs  Description: Monitor and assess patient's nutrition/hydration status for malnutrition  Collaborate with interdisciplinary team and initiate plan and interventions as ordered  Monitor patient's weight and dietary intake as ordered or per policy  Utilize nutrition screening tool and intervene as necessary  Determine patient's food preferences and provide high-protein, high-caloric foods as appropriate       INTERVENTIONS:  - Monitor oral intake, urinary output, labs, and treatment plans  - Assess nutrition and hydration status and recommend course of action  - Evaluate amount of meals eaten  - Assist patient with eating if necessary   - Allow adequate time for meals  - Recommend/ encourage appropriate diets, oral nutritional supplements, and vitamin/mineral supplements  - Assess need for intravenous fluids  - Provide specific nutrition/hydration education as appropriate  - Include patient/family/caregiver in decisions related to nutrition  Outcome: Progressing     Problem: PAIN - ADULT  Goal: Verbalizes/displays adequate comfort level or baseline comfort level  Description: Interventions:  - Encourage patient to monitor pain and request assistance  - Assess pain using appropriate pain scale  - Administer analgesics based on type and severity of pain and evaluate response  - Implement non-pharmacological measures as appropriate and evaluate response  - Consider cultural and social influences on pain and pain management  - Notify physician/advanced practitioner if interventions unsuccessful or patient reports new pain  Outcome: Progressing

## 2022-07-15 VITALS
WEIGHT: 106 LBS | SYSTOLIC BLOOD PRESSURE: 120 MMHG | HEIGHT: 63 IN | DIASTOLIC BLOOD PRESSURE: 62 MMHG | RESPIRATION RATE: 18 BRPM | OXYGEN SATURATION: 98 % | TEMPERATURE: 97 F | BODY MASS INDEX: 18.78 KG/M2 | HEART RATE: 86 BPM

## 2022-07-15 LAB
ANION GAP SERPL CALCULATED.3IONS-SCNC: 7 MMOL/L (ref 4–13)
BUN SERPL-MCNC: 48 MG/DL (ref 5–25)
CALCIUM SERPL-MCNC: 8.7 MG/DL (ref 8.3–10.1)
CHLORIDE SERPL-SCNC: 102 MMOL/L (ref 100–108)
CO2 SERPL-SCNC: 29 MMOL/L (ref 21–32)
CREAT SERPL-MCNC: 1.03 MG/DL (ref 0.6–1.3)
ERYTHROCYTE [DISTWIDTH] IN BLOOD BY AUTOMATED COUNT: 17.7 % (ref 11.6–15.1)
FOLATE SERPL-MCNC: 12.1 NG/ML (ref 3.1–17.5)
GFR SERPL CREATININE-BSD FRML MDRD: 50 ML/MIN/1.73SQ M
GLUCOSE SERPL-MCNC: 125 MG/DL (ref 65–140)
HCT VFR BLD AUTO: 31.5 % (ref 34.8–46.1)
HGB BLD-MCNC: 8.7 G/DL (ref 11.5–15.4)
MCH RBC QN AUTO: 22.3 PG (ref 26.8–34.3)
MCHC RBC AUTO-ENTMCNC: 27.6 G/DL (ref 31.4–37.4)
MCV RBC AUTO: 81 FL (ref 82–98)
PLATELET # BLD AUTO: 262 THOUSANDS/UL (ref 149–390)
PMV BLD AUTO: 9.8 FL (ref 8.9–12.7)
POTASSIUM SERPL-SCNC: 4.4 MMOL/L (ref 3.5–5.3)
RBC # BLD AUTO: 3.9 MILLION/UL (ref 3.81–5.12)
SODIUM SERPL-SCNC: 138 MMOL/L (ref 136–145)
VIT B12 SERPL-MCNC: 469 PG/ML (ref 100–900)
WBC # BLD AUTO: 8.37 THOUSAND/UL (ref 4.31–10.16)

## 2022-07-15 PROCEDURE — 82746 ASSAY OF FOLIC ACID SERUM: CPT | Performed by: INTERNAL MEDICINE

## 2022-07-15 PROCEDURE — 99232 SBSQ HOSP IP/OBS MODERATE 35: CPT | Performed by: INTERNAL MEDICINE

## 2022-07-15 PROCEDURE — 99239 HOSP IP/OBS DSCHRG MGMT >30: CPT | Performed by: INTERNAL MEDICINE

## 2022-07-15 PROCEDURE — 80048 BASIC METABOLIC PNL TOTAL CA: CPT | Performed by: INTERNAL MEDICINE

## 2022-07-15 PROCEDURE — NC001 PR NO CHARGE: Performed by: INTERNAL MEDICINE

## 2022-07-15 PROCEDURE — 82607 VITAMIN B-12: CPT | Performed by: INTERNAL MEDICINE

## 2022-07-15 PROCEDURE — 85027 COMPLETE CBC AUTOMATED: CPT | Performed by: INTERNAL MEDICINE

## 2022-07-15 RX ORDER — LEVALBUTEROL INHALATION SOLUTION 0.63 MG/3ML
0.63 SOLUTION RESPIRATORY (INHALATION) EVERY 8 HOURS PRN
Refills: 0
Start: 2022-07-15

## 2022-07-15 RX ORDER — DOCUSATE SODIUM 100 MG/1
100 CAPSULE, LIQUID FILLED ORAL 2 TIMES DAILY PRN
Refills: 0
Start: 2022-07-15

## 2022-07-15 RX ORDER — ACETAMINOPHEN 325 MG/1
650 TABLET ORAL EVERY 6 HOURS PRN
Refills: 0
Start: 2022-07-15

## 2022-07-15 RX ORDER — POLYETHYLENE GLYCOL 3350 17 G/17G
17 POWDER, FOR SOLUTION ORAL DAILY PRN
Refills: 0
Start: 2022-07-15

## 2022-07-15 RX ORDER — DIGOXIN 125 MCG
125 TABLET ORAL DAILY
Status: DISCONTINUED | OUTPATIENT
Start: 2022-07-16 | End: 2022-07-15 | Stop reason: HOSPADM

## 2022-07-15 RX ORDER — DIGOXIN 125 MCG
125 TABLET ORAL ONCE
Status: COMPLETED | OUTPATIENT
Start: 2022-07-15 | End: 2022-07-15

## 2022-07-15 RX ORDER — DILTIAZEM HYDROCHLORIDE 120 MG/1
120 CAPSULE, EXTENDED RELEASE ORAL EVERY 12 HOURS SCHEDULED
Refills: 0
Start: 2022-07-15

## 2022-07-15 RX ORDER — MULTIVITAMIN
1 CAPSULE ORAL DAILY
Refills: 0
Start: 2022-07-15

## 2022-07-15 RX ORDER — DIGOXIN 125 MCG
125 TABLET ORAL DAILY
Refills: 0
Start: 2022-07-16

## 2022-07-15 RX ORDER — SENNOSIDES 8.6 MG
17.2 TABLET ORAL
Refills: 0
Start: 2022-07-15

## 2022-07-15 RX ADMIN — PANTOPRAZOLE SODIUM 40 MG: 40 TABLET, DELAYED RELEASE ORAL at 05:28

## 2022-07-15 RX ADMIN — ASPIRIN 81 MG: 81 TABLET, COATED ORAL at 10:06

## 2022-07-15 RX ADMIN — ATORVASTATIN CALCIUM 40 MG: 40 TABLET, FILM COATED ORAL at 17:34

## 2022-07-15 RX ADMIN — METOPROLOL TARTRATE 25 MG: 25 TABLET, FILM COATED ORAL at 10:06

## 2022-07-15 RX ADMIN — DILTIAZEM HYDROCHLORIDE 120 MG: 60 CAPSULE, EXTENDED RELEASE ORAL at 10:05

## 2022-07-15 RX ADMIN — DIGOXIN 125 MCG: 125 TABLET ORAL at 11:45

## 2022-07-15 RX ADMIN — FLUTICASONE FUROATE AND VILANTEROL TRIFENATATE 1 PUFF: 100; 25 POWDER RESPIRATORY (INHALATION) at 10:07

## 2022-07-15 RX ADMIN — TRAMADOL HYDROCHLORIDE 50 MG: 50 TABLET, COATED ORAL at 10:06

## 2022-07-15 RX ADMIN — POLYETHYLENE GLYCOL 3350 17 G: 17 POWDER, FOR SOLUTION ORAL at 10:06

## 2022-07-15 RX ADMIN — FLUOXETINE 20 MG: 20 CAPSULE ORAL at 10:06

## 2022-07-15 RX ADMIN — APIXABAN 2.5 MG: 2.5 TABLET, FILM COATED ORAL at 17:33

## 2022-07-15 RX ADMIN — TORSEMIDE 20 MG: 20 TABLET ORAL at 10:06

## 2022-07-15 RX ADMIN — Medication 1 TABLET: at 10:06

## 2022-07-15 RX ADMIN — MAGNESIUM OXIDE TAB 400 MG (241.3 MG ELEMENTAL MG) 400 MG: 400 (241.3 MG) TAB at 10:06

## 2022-07-15 RX ADMIN — APIXABAN 2.5 MG: 2.5 TABLET, FILM COATED ORAL at 10:06

## 2022-07-15 NOTE — PROGRESS NOTES
Progress Note - Pulmonary   Ellen Casas 78 y o  female MRN: [id number]  Unit/Bed#: [address] [place] [address] Encounter: [id number]    Assessment:  Afib: rate is controlled now   Pleural effusion: s/p thoracentesis in the past: transudate most likely secondary to HFpEF  Severe pulmonary HTN: most likely type II and III  Moderate to severe COPD with FEV1 of 1 1 L or 59% of predicted    Ho of TVAR    Plan:  S/p thoracentesis: given patient overall condition; pleurx is more invasive without much benefit  -DC steroid as pt does not seems in exacerbation   -Cont supplemental O2: target Spo2 more than 94%  -History of right-sided lung cancer several years ago which may been a right hilar-superior segment right lower lobe   She had radiation therapy  Subjective:   Pt is AAOX3, not in respiratory distress, occasional cough         Objective:     Vitals: Blood pressure 120/62, pulse (!) 128, temperature (!) 97 °F (36 1 °C), resp  rate 18, height 5' 3" (1 6 m), weight 48 1 kg (106 lb), SpO2 98 %, not currently breastfeeding  ,Body mass index is 18 78 kg/m²  Intake/Output Summary (Last 24 hours) at 7/15/2022 0956  Last data filed at 7/14/2022 1801  Gross per 24 hour   Intake 550 ml   Output --   Net 550 ml       Invasive Devices  Report    Peripheral Intravenous Line  Duration           Peripheral IV 07/14/22 Dorsal (posterior); Left Forearm <1 day          Drain  Duration           External Urinary Catheter 1 day                Physical Exam:    General appearance: alert and oriented, in no acute distress; bilateral temporal wasting noted   Lungs: diminished breath sounds  Heart: irregular, rate controlled, normal S1s2+  Abdomen: soft, non-tender; bowel sounds normal; no masses,  no organomegaly  Extremities: extremities normal, warm and well-perfused; no cyanosis, clubbing, or edema  Neurologic: Grossly normal     Labs: I have personally reviewed pertinent lab results    Imaging and other studies: I have personally reviewed pertinent films in Veto Garsia MD

## 2022-07-15 NOTE — CASE MANAGEMENT
Case Management Discharge Planning Note    Patient name Mia Maxwell  Location [address] [place] 404/4 [address] [facility]-* MRN [id number]  : 1940 Date 7/15/2022       Current Admission Date: 2022  Current Admission Diagnosis:Acute on chronic respiratory failure with hypoxia [facility])   Patient Active Problem List    Diagnosis Date Noted    Pulmonary HTN ([facility] )     BMI less than 19,adult 2022    Acute on chronic respiratory failure with hypoxia ([facility] ) 2022    Acute on chronic diastolic congestive heart failure ([place] [address] ) 2022    Hyperlipidemia 2022    History of CVA (cerebrovascular accident) 2022    DNR (do not resuscitate) discussion 2022    Shortness of breath     Anemia 2021    Moderate protein-calorie malnutrition ([place] [address] ) 2021    Increased thyroid stimulating hormone (TSH) level 2021    Rapid atrial fibrillation ([place] [address] ) 2020    CKD (chronic kidney disease) stage 3, GFR 30-59 ml/min ([facility]) 2020    S/P TAVR (transcatheter aortic valve replacement) 2020    History of lung cancer 2020    Chronic diastolic (congestive) heart failure ([place] [address] ) 2020    Elevated troponin 2020    COLD (chronic obstructive lung disease) ([place] [address] ) 2020    Chronic respiratory failure with hypoxia 2/2 COPD 2020    Pleural effusion 2020    Depression 2017    CVA (cerebral vascular accident) ([place] [address] ) 10/09/2016    Hypertension 10/09/2016    GERD (gastroesophageal reflux disease) 10/09/2016    Aorto-iliac atherosclerosis ([place] [address] ) 2014      LOS (days): 4  Geometric Mean LOS (GMLOS) (days): 4 30  Days to GMLOS:0 4     OBJECTIVE:  Risk of Unplanned Readmission Score: 16 85       Current admission status: Inpatient   Preferred Pharmacy:   [address] ([address] [facility] ([address] [place] [address] 22)  [address] [address] [place] [address] 70 ([address] [place] [address] 25)  [place] [address]  Phone: [phone] Fax: [phone]    Primary Care Provider: Elvia Rodas MD    Primary Insurance: MEDICARE  Secondary Insurance: AARP    DISCHARGE DETAILS:     Other Referral/Resources/Interventions Provided:  Interventions: Short Term Rehab, Transportation    Treatment Team Recommendation: Short Term Rehab  Discharge Destination Plan[de-identified] Short Term Rehab  Transport at Discharge : [facility]  Dispatcher Contacted: Yes  Number/Name of Dispatcher: TOBIAS     ETA of Transport (Date): 07/15/22  ETA of Transport (Time): 1400      Requested transport mode changed from [facility] to [facility] due to patient's O2 needs  [facility] request cancelled in [facility] and new request placed for [facility], 1400 requested, time currently pending

## 2022-07-15 NOTE — [place] UNIVERSAL TRANSFER FORM
[place] UNIVERSAL TRANSFER FORM  (ALL ITEMS MUST BE COMPLETED)    1  TRANSFER FROM: [address]      TRANSFER TO: [facility]     2  DATE OF TRANSFER: 7/15/2022                        TIME OF TRANSFER: 1400      3  PATIENT NAME: MART Espinoza      YOB: 1940                             GENDER: female    4  LANGUAGE:   English    5  PHYSICIAN NAME:  Kentrell Valencia MD                   PHONE: [phone]    6  CODE STATUS: Level 3 - DNAR and DNI        Out of Hospital DNR Attached: no    7  :                                      :  Extended Emergency Contact Information  Primary Emergency Contact: [facility]  Mobile Phone: [phone]  Relation: Son  Secondary Emergency Contact: Pj Merritt  Mobile Phone: [phone]  Relation: Joana Combs Eleazar Representative/Proxy: no           Legal Guardian:  no           NAME OF:           HEALTH CARE REPRESENTATIVE/PROXY:                                         OR           LEGAL GUARDIAN, IF NOT :                                               PHONE:  (Day)           (Night)                        (Cell)    8  REASON FOR TRANSFER: (Must include brief medical history and recent changes in physical function or cognition ) Acute on chronic resp failure, oxygen dependent          V/S: /62 (BP Location: Left arm)   Pulse 86   Temp (!) 97 °F (36 1 °C) (Oral)   Resp 18   Ht 5' 3" (1 6 m)   Wt 48 1 kg (106 lb)   SpO2 98%   BMI 18 78 kg/m²           PAIN: None  9  PRIMARY DIAGNOSIS: Acute on chronic respiratory failure with hypoxia (HCC)      Secondary Diagnosis:         Pacemaker: No    Internal Defib: No          Mental Health Diagnosis (if Applicable):     10  RESTRAINTS: No  11  RESPIRATORY NEEDS: Oxygen    12  ISOLATION/PRECAUTION: None    13  ALLERGY: Patient has no known allergies      14  SENSORY: Hard of hearing, glasses, dentures upper  15  SKIN CONDITION: intact     16  DIET:  Cardiac diet   17  IV ACCESS: no    18  PERSONAL ITEMS SENT WITH PATIENT: clothing, glasses, dentures upper    19  ATTACHED DOCUMENTS: MUST ATTACH CURRENT MEDICATION INFORMATION      20  AT RISK ALERTS: fall risk         HARM TO:      21  WEIGHT BEARING STATUS:         Left Leg:  No restrictions        Right Leg: no restrictions    22  MENTAL STATUS: alert and oriented x3, forgetful, pleasant     23  FUNCTION:        Walk: with help        Transfer: with help        Toilet: with help        Feed: set up    24  IMMUNIZATIONS/SCREENING:     Immunization History   Administered Date(s) Administered    COVID-19 J&J ([email or web address]) vaccine 0 5 mL 05/26/2021    Pneumococcal Polysaccharide PPV23 12/12/2020       25  BOWEL: continent    26  BLADDER: incontinent    27   SENDING FACILITY CONTACT: OSMAN CEVALLOS        Title: [facility]        Unit: [address]        Phone: [phone] [address] (if known):        Title:        Unit:         Phone:         FORM PREFILLED BY (if applicable)       Title:       Unit:        Phone:         FORM COMPLETED BY John Quintero RN      Title: [facility]      Phone: [phone]

## 2022-07-15 NOTE — PHYSICAL THERAPY NOTE
PT Cancellation        07/15/22 3004   PT Last Visit   PT Visit Date 07/15/22   Note Type   Note Type Cancelled Session   Cancel Reasons Other  (Pt refusing - reports she is feeling fatigued, wants to rest before going to [facility] this afternoon )   [employer] License Number  Earlene Cavalier [id number]

## 2022-07-15 NOTE — PROGRESS NOTES
Progress Note - Cardiology   [facility]     Mendel Haff 78 y o  female MRN: [id number]  : 1940  Unit/Bed#: [address] [place] [address] Encounter: [id number]    Assessment and Plan:   1  Rapid atrial fibrillation:  Discussed rate versus rhythm strategy with patient, at this time patient is declining elective cardioversion and wishes to follow rate control     -  overall rate control improved  Hesitant to increase medication much more as patient does have periods of bradycardia    -  continue Cardizem  q  12 hours starting evening     -  continue Lopressor 25 mg b i d     -  continue digoxin 0 125 mg every other day, digoxin level was 0 7 on admission    -  continue Eliquis 2 5 mg b i d      2  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:  Followed by Pulmonary    -  continue outpatient respiratory therapy     3  Acute on chronic diastolic heart failure:  Mild, will discontinue IV Lasix and transition to Demadex 20 mg daily    -  continue Lopressor 25 mg b i d     -  low-sodium diet    -  monitor I&O, daily weights electrolytes     4  Recurrent right-sided pleural effusion:  Underwent thoracentesis on 2022 with removal of 1 5 L of fluid    -  followed by Pulmonary     5  Severe pulmonary hypertension:  Moderate TR with peak PA pressure estimated at 77 mm Hg     6  History of transapical aortic valve replacement:  No evidence of aortic stenosis with DVI of 0 89   No significant valvular or paravalvular regurgitation seen on echocardiogram performed on 2022     Patient appears to be stable from a cardiac standpoint  May discharge when cleared by primary team       Subjective / Objective:   Patient seen and examined  No complaints cardiac offered at this time  Appears to be resting comfortably at her baseline oxygen  Vitals: Blood pressure 120/62, pulse (!) 128, temperature (!) 97 °F (36 1 °C), resp   rate 18, height 5' 3" (1 6 m), weight 48 1 kg (106 lb), SpO2 98 %, not currently breastfeeding  Vitals:    07/14/22 0542 07/15/22 0600   Weight: 47 6 kg (104 lb 15 oz) 48 1 kg (106 lb)     Body mass index is 18 78 kg/m²  BP Readings from Last 3 Encounters:   07/15/22 120/62   03/18/22 155/93   03/07/22 118/72     Orthostatic Blood Pressures    Flowsheet Row Most Recent Value   Blood Pressure 120/62 filed at 07/15/2022 0805   Patient Position - Orthostatic VS Lying filed at 07/14/2022 0251        I/O       07/13 0701  07/14 0700 07/14 0701  07/15 0700 07/15 0701  07/16 0700    P  O  918 1050     Total Intake(mL/kg) 918 (19 3) 1050 (21 8)     Urine (mL/kg/hr) 300 (0 3)      Total Output 300      Net +618 +1050                Invasive Devices  Report    Peripheral Intravenous Line  Duration           Peripheral IV 07/14/22 Dorsal (posterior); Left Forearm <1 day          Drain  Duration           External Urinary Catheter 1 day                  Intake/Output Summary (Last 24 hours) at 7/15/2022 0823  Last data filed at 7/14/2022 1801  Gross per 24 hour   Intake 1050 ml   Output --   Net 1050 ml         Physical Exam:   Physical Exam  Vitals and nursing note reviewed  Constitutional:       Appearance: Normal appearance  She is underweight  She is ill-appearing (Chronically)  HENT:      Right Ear: External ear normal       Left Ear: External ear normal    Eyes:      General: No scleral icterus  Right eye: No discharge  Left eye: No discharge  Cardiovascular:      Rate and Rhythm: Normal rate  Rhythm irregular  Pulses: Normal pulses  Heart sounds: Murmur heard  Systolic murmur is present with a grade of 2/6  Pulmonary:      Effort: Pulmonary effort is normal  No accessory muscle usage or respiratory distress  Breath sounds: Examination of the right-middle field reveals decreased breath sounds  Examination of the right-lower field reveals decreased breath sounds  Examination of the left-lower field reveals decreased breath sounds   Decreased breath sounds present  Abdominal:      General: Bowel sounds are normal  There is no distension  Palpations: Abdomen is soft  Musculoskeletal:      Right lower leg: No edema  Left lower leg: No edema  Skin:     General: Skin is warm and dry  Capillary Refill: Capillary refill takes less than 2 seconds  Neurological:      General: No focal deficit present  Mental Status: She is alert and oriented to person, place, and time  Mental status is at baseline  Psychiatric:         Mood and Affect: Mood normal          Behavior: Behavior is cooperative                     Medications/ Allergies:     Current Facility-Administered Medications   Medication Dose Route Frequency Provider Last Rate    acetaminophen  650 mg Oral Q6H PRN Osbaldo Martin MD      albuterol  2 5 mg Nebulization Q6H PRN Osbaldo Martin MD      apixaban  2 5 mg Oral BID Osbaldo Martin MD      aspirin  81 mg Oral Daily Osbaldo Martin MD      atorvastatin  40 mg Oral Daily With Sindi Sanders MD      digoxin  125 mcg Oral Every Other Day [address]      diltiazem  120 mg Oral Q12H [facility] [address]      docusate sodium  100 mg Oral BID PRN Osbaldo Martin MD      FLUoxetine  20 mg Oral Daily Osbaldo Martin MD      fluticasone-vilanterol  1 puff Inhalation Daily Osbaldo Martin MD      magnesium oxide  400 mg Oral Daily Osbaldo Martin MD      melatonin  3 mg Oral HS Osbaldo Martin MD      metoprolol tartrate  25 mg Oral Q12H [facility] [address]      multivitamin-minerals  1 tablet Oral Daily Osbaldo Martin MD      pantoprazole  40 mg Oral Early Morning Osbaldo Martin MD      polyethylene glycol  17 g Oral Daily Osbaldo Martin MD      senna  2 tablet Oral HS Alysia Lovett MD      torsemide  20 mg Oral Daily [address]      traMADol  50 mg Oral Q12H PRN Alysia Lovett MD acetaminophen, 650 mg, Q6H PRN  albuterol, 2 5 mg, Q6H PRN  docusate sodium, 100 mg, BID PRN  traMADol, 50 mg, Q12H PRN      No Known Allergies    VTE Pharmacologic Prophylaxis:   Sequential compression device (Venodyne)     Labs:   Troponins:  Results from last 7 days   Lab Units 07/11/22  1730 07/11/22  1533   HSTNI D2 ng/L  --  -2   HSTNI D4 ng/L 14  --      CBC with diff:  Results from last 7 days   Lab Units 07/15/22  0557 07/14/22  0449 07/13/22  0503 07/12/22  0524 07/11/22  1323   WBC Thousand/uL 8 37 7 71 7 87 3 97* 7 94   HEMOGLOBIN g/dL 8 7* 8 7* 8 5* 8 6* 9 4*   HEMATOCRIT % 31 5* 31 6* 30 7* 30 6* 34 3*   MCV fL 81* 80* 80* 80* 83   PLATELETS Thousands/uL 262 282 306 276 319   MCH pg 22 3* 22 1* 22 2* 22 6* 22 7*   MCHC g/dL 27 6* 27 5* 27 7* 28 1* 27 4*   RDW % 17 7* 17 6* 18 0* 18 1* 18 2*   MPV fL 9 8 10 0 10 7 10 7 10 6   NRBC AUTO /100 WBCs  --   --   --  0 1     CMP:  Results from last 7 days   Lab Units 07/15/22  0557 07/14/22  0449 07/13/22  0503 07/12/22  0524 07/11/22  1323   SODIUM mmol/L 138 136 137 139 141   POTASSIUM mmol/L 4 4 4 8 5 0 4 0 5 0   CHLORIDE mmol/L 102 100 101 104 105   CO2 mmol/L 29 30 28 27 24   ANION GAP mmol/L 7 6 8 8 12   BUN mg/dL 48* 51* 42* 36* 32*   CREATININE mg/dL 1 03 1 27 1 16 1 16 1 39*   CALCIUM mg/dL 8 7 8 9 9 1 8 9 8 9   AST U/L  --   --   --  15 28   ALT U/L  --   --   --  16 21   ALK PHOS U/L  --   --   --  84 93   TOTAL PROTEIN g/dL  --   --   --  6 3* 6 9   ALBUMIN g/dL  --   --   --  2 7* 2 9*   TOTAL BILIRUBIN mg/dL  --   --   --  0 37 0 42   EGFR ml/min/1 73sq m 50 38 43 43 34     Magnesium:  Results from last 7 days   Lab Units 07/12/22  0524   MAGNESIUM mg/dL 1 9     Coags:  Results from last 7 days   Lab Units 07/11/22  1323   PTT seconds 40*   INR  1 61*     TSH:  Results from last 7 days   Lab Units 07/11/22  1323   TSH 3RD GENERATON uIU/mL 5 479*       Imaging & Testing   I have personally reviewed pertinent reports      XR chest portable    Result Date: 7/13/2022  Narrative: CHEST INDICATION:   Pleural effusion status post thoracentesis  History of right hilar lung cancer status post radiation therapy  COMPARISON:  7/11/22 EXAM PERFORMED/VIEWS:  XR CHEST PORTABLE FINDINGS:  Monitoring leads and clips project over the chest  Patient is rotated Heart shadow is obscured by adjacent opacity  TAVR  Sternotomy wires Small right pleural effusion decreased form prior with associated opacity  Areas of subsegmental atelectasis in the right lung  Interstitial prominence  Skinfolds overlie the left hemithorax  Skinfold vs  partial atelectasis in the right lung  Trace  left effusion suspected  No pneumothorax identified  Degenerative changes most notably involving the shoulders  Impression: Decreased right pleural effusion status post thoracentesis  No appreciable pneumothorax  If this remains of continued concern recommend true upright or decubitus view  Workstation performed: [id number]     XR chest 1 view portable    Result Date: 7/11/2022  Narrative: CHEST INDICATION:   short of breath  COMPARISON:  Chest radiograph from 3/10/2022, chest CT from 8/30/2021  EXAM PERFORMED/VIEWS:  XR CHEST PORTABLE FINDINGS: Normal heart size  Median sternotomy  TAVR  Mild pulmonary venous congestion  Moderate right effusion with right base opacity  Skinfolds over the left hemithorax  No pneumothorax  Severe right glenohumeral degenerative disease  Impression: Mild pulmonary venous congestion  Moderate right effusion with right base opacity, at least in part due to atelectasis  Pneumonia cannot be excluded in the appropriate clinical setting  Workstation performed: [id number]     IR IN-Patient Thoracentesis    Result Date: 7/12/2022  Narrative: PROCEDURE: Therapeutic right thoracentesis STAFF: JOE George  NUMBER OF IMAGES: Multiple  COMPLICATIONS: None  INDICATION: Symptomatic right pleural effusion   PROCEDURE: Using ultrasound guidance, the right pleural cavity was punctured and a catheter placed into the pleural cavity  A total of 1550 milliliters of fluid was removed  The catheter was then removed  FINDINGS: Large right pleural effusion  Cloudy yellow pleural fluid was removed  Impression: Therapeutic right thoracentesis  Workstation performed: [id number]     Echo complete w/ contrast if indicated    Result Date: 7/12/2022  Narrative: Lorena Serrano  Left Ventricle: Left ventricular cavity size is normal  Wall thickness is mild to moderately increased  Systolic function is hyperdynamic  Estimated ejection fraction is 75% Although no diagnostic regional wall motion abnormality was identified, this possibility cannot be completely excluded on the basis of this study  Unable to assess diastolic function due to atrial fibrillation    Right Ventricle: Systolic function is reduced  TAPSE is 1 0 cm   Left Atrium: The atrium is moderately dilated    Right Atrium: The atrium is mild to moderately dilated    Aortic Valve: A bioprosthesis/TAVR is present  There is no evidence of significant stenosis with DVI of 0 89  No significant valvular or paravalvular regurgitation seen   Mitral Valve: There is moderate thickening  There is mildly reduced mobility  There is moderate annular calcification  There is mild to moderate regurgitation  There is mild to moderate stenosis  Mean gradient is 5 65 mm Hg   Tricuspid Valve: There is moderate regurgitation  The right ventricular systolic pressure is severely elevated at 77 mm Hg    Pulmonic Valve: There is mild regurgitation    No significant changes noted compared to the prior study        EKG / Monitor: Personally reviewed  Atrial fibrillation rates in [address], [address] [place] [address]      "This note has been constructed using a voice recognition system  Therefore there may be syntax, spelling, and/or grammatical errors   Please call if you have any questions  "

## 2022-07-15 NOTE — CASE MANAGEMENT
Case Management Discharge Planning Note    Patient name Karen Cartagena  Location [address] [address] [address] [facility] MRN [id number]  : 1940 Date 7/15/2022       Current Admission Date: 2022  Current Admission Diagnosis:Acute on chronic respiratory failure with hypoxia [facility])   Patient Active Problem List    Diagnosis Date Noted    Pulmonary HTN ([facility] )     BMI less than 19,adult 2022    Acute on chronic respiratory failure with hypoxia ([facility] ) 2022    Acute on chronic diastolic congestive heart failure ([place] [address] ) 2022    Hyperlipidemia 2022    History of CVA (cerebrovascular accident) 2022    DNR (do not resuscitate) discussion 2022    Shortness of breath     Anemia 2021    Moderate protein-calorie malnutrition ([place] [address] ) 2021    Increased thyroid stimulating hormone (TSH) level 2021    Rapid atrial fibrillation ([place] [address] ) 2020    CKD (chronic kidney disease) stage 3, GFR 30-59 ml/min ([facility]) 2020    S/P TAVR (transcatheter aortic valve replacement) 2020    History of lung cancer 2020    Chronic diastolic (congestive) heart failure ([place] [address] ) 2020    Elevated troponin 2020    COLD (chronic obstructive lung disease) ([place] [address] ) 2020    Chronic respiratory failure with hypoxia 2/2 COPD 2020    Pleural effusion 2020    Depression 2017    CVA (cerebral vascular accident) ([place] [address] ) 10/09/2016    Hypertension 10/09/2016    GERD (gastroesophageal reflux disease) 10/09/2016    Aorto-iliac atherosclerosis ([place] [address] ) 2014      LOS (days): 4  Geometric Mean LOS (GMLOS) (days): 4 30  Days to GMLOS:0 5     OBJECTIVE:  Risk of Unplanned Readmission Score: 16 81       Current admission status: Inpatient   Preferred Pharmacy:   [address], [address] [facility] ([address] [address])  [address] [address] [place] [address] 70 ([address] [place] [address])  [place] [address]  Phone: [phone] Fax: [phone]    Primary Care Provider: Davida Valle MD    Primary Insurance: MEDICARE  Secondary Insurance: AARP    DISCHARGE DETAILS:    Discharge planning discussed with[de-identified] Sincere Ann of Choice: Yes  Comments - Freedom of Choice: Per sincere Brar family's preference is for STR at [facility] contacted family/caregiver?: Yes  Were Treatment Team discharge recommendations reviewed with patient/caregiver?: Yes  Did patient/caregiver verbalize understanding of patient care needs?: Yes  Were patient/caregiver advised of the risks associated with not following Treatment Team discharge recommendations?: Yes    Contacts  Patient Contacts: Marie Doyle  Relationship to Patient[de-identified] Family  Contact Method: Phone  Phone Number: [phone]  Reason/Outcome: Emergency Contact, Discharge [address]         Is the patient interested in [facility] [place] at discharge?: No    DME Referral Provided  Referral made for DME?: No    Other Referral/Resources/Interventions Provided:  Interventions: Short Term Rehab, Transportation  Referral Comments: Patient accepted at [facility]  Referral for [facility] transport placed    Would you like to participate in our [facility] service program?  : No - Declined    Treatment Team Recommendation: Short Term Rehab  Discharge Destination Plan[de-identified] Short Term Rehab  Transport at Discharge : Wheelchair [place] [facility]      IMM Given (Date):: 07/15/22  IMM Given to[de-identified] Family (IMM reviewed by phone with sincere Brar and he verbalized understanding  He is in agreement with discharge determination  Copy given to patient and copy placed in scan bin for chart )     Accepting Facility Name, [address] : [facility]  Receiving Facility/Agency Phone Number: [phone]       received a phone call from sincere Brar stating that he had discussed with family and decided that they would prefer STR at [address] [facility] [place]    His father does not drive and this location will allow him to walk to visit patient at facility  SARAH notified CC at [place] via [facility] and they are able to accept patient today  SARAH was notified by attending that patient is medically cleared for discharge  SARAH requested [facility] transport from [facility] in [facility] for 1300 and time is pending  SW will continue to follow

## 2022-07-15 NOTE — PLAN OF CARE
Problem: RESPIRATORY - ADULT  Goal: Achieves optimal ventilation and oxygenation  Description: INTERVENTIONS:  - Assess for changes in respiratory status  - Assess for changes in mentation and behavior  - Position to facilitate oxygenation and minimize respiratory effort  - Oxygen administered by appropriate delivery if ordered  - Initiate smoking cessation education as indicated  - Encourage broncho-pulmonary hygiene including cough, deep breathe, Incentive Spirometry  - Assess the need for suctioning and aspirate as needed  - Assess and instruct to report SOB or any respiratory difficulty  - Respiratory Therapy support as indicated  Outcome: Progressing     Problem: Nutrition/Hydration-ADULT  Goal: Nutrient/Hydration intake appropriate for improving, restoring or maintaining nutritional needs  Description: Monitor and assess patient's nutrition/hydration status for malnutrition  Collaborate with interdisciplinary team and initiate plan and interventions as ordered  Monitor patient's weight and dietary intake as ordered or per policy  Utilize nutrition screening tool and intervene as necessary  Determine patient's food preferences and provide high-protein, high-caloric foods as appropriate       INTERVENTIONS:  - Monitor oral intake, urinary output, labs, and treatment plans  - Assess nutrition and hydration status and recommend course of action  - Evaluate amount of meals eaten  - Assist patient with eating if necessary   - Allow adequate time for meals  - Recommend/ encourage appropriate diets, oral nutritional supplements, and vitamin/mineral supplements  - Assess need for intravenous fluids  - Provide specific nutrition/hydration education as appropriate  - Include patient/family/caregiver in decisions related to nutrition  Outcome: Progressing     Problem: MOBILITY - ADULT  Goal: Maintain or return to baseline ADL function  Description: INTERVENTIONS:  -  Assess patient's ability to carry out ADLs; assess patient's baseline for ADL function and identify physical deficits which impact ability to perform ADLs (bathing, care of mouth/teeth, toileting, grooming, dressing, etc )  - Assess/evaluate cause of self-care deficits   - Assess range of motion  - Assess patient's mobility; develop plan if impaired  - Assess patient's need for assistive devices and provide as appropriate  - Encourage maximum independence but intervene and supervise when necessary  - Involve family in performance of ADLs  - Assess for home care needs following discharge   - Consider OT consult to assist with ADL evaluation and planning for discharge  - Provide patient education as appropriate  Outcome: Progressing     Problem: PAIN - ADULT  Goal: Verbalizes/displays adequate comfort level or baseline comfort level  Description: Interventions:  - Encourage patient to monitor pain and request assistance  - Assess pain using appropriate pain scale  - Administer analgesics based on type and severity of pain and evaluate response  - Implement non-pharmacological measures as appropriate and evaluate response  - Consider cultural and social influences on pain and pain management  - Notify physician/advanced practitioner if interventions unsuccessful or patient reports new pain  Outcome: Progressing

## 2022-07-16 LAB
ATRIAL RATE: 70 BPM
ATRIAL RATE: 94 BPM
PR INTERVAL: 168 MS
QRS AXIS: -32 DEGREES
QRS AXIS: -60 DEGREES
QRSD INTERVAL: 92 MS
QRSD INTERVAL: 92 MS
QT INTERVAL: 312 MS
QT INTERVAL: 394 MS
QTC INTERVAL: 425 MS
QTC INTERVAL: 451 MS
T WAVE AXIS: 199 DEGREES
T WAVE AXIS: 268 DEGREES
VENTRICULAR RATE: 126 BPM
VENTRICULAR RATE: 70 BPM

## 2022-07-16 PROCEDURE — 93010 ELECTROCARDIOGRAM REPORT: CPT | Performed by: INTERNAL MEDICINE

## 2022-07-18 NOTE — DISCHARGE SUMMARY
Discharge Summary - [facility] Internal Medicine    Patient Information: August Mendez 78 y o  female MRN: [id number]  Unit/Bed#: [address] [address] Encounter: [id number]    Discharging Physician / Practitioner: Rohit Gonzales MD  PCP: Gregory Hensley MD  Admission Date: 7/11/2022    Reason for Admission: Shortness of Breath (States she is having problems breathing   states he increased her oxygen to 4 LNC  Facial pallor, mottled legs, tachypneic  Son states had to have her lung tapped in past  )      Discharge Diagnoses:     Principal Problem:    Acute on chronic respiratory failure with hypoxia (HCC)  Active Problems:    Pleural effusion    Rapid atrial fibrillation (HCC)    Acute on chronic diastolic congestive heart failure (HCC)    S/P TAVR (transcatheter aortic valve replacement)    COLD (chronic obstructive lung disease) ([facility])    Increased thyroid stimulating hormone (TSH) level    History of CVA (cerebrovascular accident)    DNR (do not resuscitate) discussion    Hypertension    Anemia    Hyperlipidemia    BMI less than 19,adult    Pulmonary HTN ([facility] [address] )  Resolved Problems:    Acute kidney injury ([facility] [address] )        Acute on chronic diastolic congestive heart failure ([facility] [address] )  Assessment & Plan  Wt Readings from Last 3 Encounters:   07/15/22 48 1 kg (106 lb)   03/07/22 43 5 kg (96 lb)   02/07/22 46 7 kg (103 lb)     Noted to have pleural effusion and a vascular congestion  2D echo with EF 75%, history of TAVR, RVSP 77 mmHg  Improving  · Status post IV Lasix 40 mg q 12 hours, transitioning to torsemide 20 mg daily  · Monitor intake output, daily weight  · Follow up Cardiology recommendation        Rapid atrial fibrillation ([facility] [address] )  Assessment & Plan  Noted with AFib with RVR  Seen by Cardiology, input appreciated  Heart rate is overall better/improving on oral Cardizem    Patient declines cardioversion at this point  · Status post Cardizem drip,  · Continue Cardizem, changed to Cardizem  mg q 12 hours  ·  Continue metoprolol, digoxin  · On Eliquis  · Cardiology follow-up after discharge  · Continue current dose of Cardizem, metoprolol and digoxin  Follow-up BMP and digoxin 7/20    Pleural effusion  Assessment & Plan  Presented with evidence of a moderate right-sided pleural effusion  Status post IR guided thoracentesis, 7/11-1 5 L of yellow fluid was removed  No pleural fluid studies were sent  Likely secondary to CHF  Previous pleural fluid studies consistent with transudative  Cytology negative  · Continue diuretics as per Cardiology  · Repeat chest x-ray with small right and trace left effusion  · Pulmonary input appreciated, no indication of tunneled intrapleural catheter at present  Will require outpatient monitoring  · Follow-up chest x-ray as outpatient    * Acute on chronic respiratory failure with hypoxia [facility])  Assessment & Plan  Patient presenting with increasing shortness of breath  history of CHF and COPD on 4 L oxygen chronically via nasal cannula  Noted to have moderate volume pleural effusion of the right lung in addition to pulmonary vascular congestion  Appears to be improving status post thoracentesis and diuresis, now close to baseline  · Continue diuresis  · Monitor intake output  · Continue supplemental oxygen, taper as tolerated  · Monitor respiratory status    DNR (do not resuscitate) discussion  Assessment & Plan  Patient states that she does not want cardiopulmonary resuscitation or intubation under any circumstances  Patient family is aware about overall prognosis, continue supportive care eventual consideration of hospice or palliative care    History of CVA (cerebrovascular accident)  Assessment & Plan  Will continue to monitor  Increased thyroid stimulating hormone (TSH) level  Assessment & Plan  Patient with a TSH level of 5 479  Patient is not on thyroid replacement at home      Free T4 normal    COLD (chronic obstructive lung disease) ([facility] [address] )  Assessment & Plan  History of moderate to severe COPD, FEV1 59% of predicted  Currently appears to be close to baseline  No evidence of exacerbation  · Continue bronchodilators, Breo  · Discontinued IV steroids  · Follow-up pulmonary recommendation  · Activity as tolerated    Acute kidney injury (HCC)-resolved as of 7/13/2022  Assessment & Plan  Patient with evidence of acute kidney injury with a creatinine level of 1 39 likely secondary to volume overload  Previous creatinine level 4 months ago was noted to be 0 77    · Monitor    Hyperlipidemia  Assessment & Plan  Continue home medications  Anemia  Assessment & Plan  Microcytic  Appears relatively stable during hospitalization  No evidence of overt bleeding  · Iron studies noted neck-start iron supplements  · Monitor for bleeding    Hypertension  Assessment & Plan  Continue home medications  BMI less than 19,adult  Assessment & Plan  With BMI of 18 78      Consultations During Hospital Stay:  IP CONSULT TO CARDIOLOGY  IP CONSULT TO PULMONOLOGY    Procedures Performed:     · Thoracentesis  · Echocardiogram    Significant Findings:     · Refer to hospital course and above listed diagnosis related plan for details    Imaging while in hospital:    XR chest portable    Result Date: 7/13/2022  Narrative: CHEST INDICATION:   Pleural effusion status post thoracentesis  History of right hilar lung cancer status post radiation therapy  COMPARISON:  7/11/22 EXAM PERFORMED/VIEWS:  XR CHEST PORTABLE FINDINGS:  Monitoring leads and clips project over the chest  Patient is rotated Heart shadow is obscured by adjacent opacity  TAVR  Sternotomy wires Small right pleural effusion decreased form prior with associated opacity  Areas of subsegmental atelectasis in the right lung  Interstitial prominence  Skinfolds overlie the left hemithorax  Skinfold vs  partial atelectasis in the right lung  Trace  left effusion suspected  No pneumothorax identified   Degenerative changes most notably involving the shoulders  Impression: Decreased right pleural effusion status post thoracentesis  No appreciable pneumothorax  If this remains of continued concern recommend true upright or decubitus view  Workstation performed: [id number]     XR chest 1 view portable    Result Date: 7/11/2022  Narrative: CHEST INDICATION:   short of breath  COMPARISON:  Chest radiograph from 3/10/2022, chest CT from 8/30/2021  EXAM PERFORMED/VIEWS:  XR CHEST PORTABLE FINDINGS: Normal heart size  Median sternotomy  TAVR  Mild pulmonary venous congestion  Moderate right effusion with right base opacity  Skinfolds over the left hemithorax  No pneumothorax  Severe right glenohumeral degenerative disease  Impression: Mild pulmonary venous congestion  Moderate right effusion with right base opacity, at least in part due to atelectasis  Pneumonia cannot be excluded in the appropriate clinical setting  Workstation performed: [id number]     IR IN-Patient Thoracentesis    Result Date: 7/12/2022  Narrative: PROCEDURE: Therapeutic right thoracentesis STAFF: JOE Rothman  NUMBER OF IMAGES: Multiple  COMPLICATIONS: None  INDICATION: Symptomatic right pleural effusion  PROCEDURE: Using ultrasound guidance, the right pleural cavity was punctured and a catheter placed into the pleural cavity  A total of 1550 milliliters of fluid was removed  The catheter was then removed  FINDINGS: Large right pleural effusion  Cloudy yellow pleural fluid was removed  Impression: Therapeutic right thoracentesis  Workstation performed: [id number]     Echo complete w/ contrast if indicated    Result Date: 7/12/2022  Narrative: Rani Moore  Left Ventricle: Left ventricular cavity size is normal  Wall thickness is mild to moderately increased  Systolic function is hyperdynamic  Estimated ejection fraction is 75% Although no diagnostic regional wall motion abnormality was identified, this possibility cannot be completely excluded on the basis of this study   Unable to assess diastolic function due to atrial fibrillation    Right Ventricle: Systolic function is reduced  TAPSE is 1 0 cm   Left Atrium: The atrium is moderately dilated    Right Atrium: The atrium is mild to moderately dilated    Aortic Valve: A bioprosthesis/TAVR is present  There is no evidence of significant stenosis with DVI of 0 89  No significant valvular or paravalvular regurgitation seen   Mitral Valve: There is moderate thickening  There is mildly reduced mobility  There is moderate annular calcification  There is mild to moderate regurgitation  There is mild to moderate stenosis  Mean gradient is 5 65 mm Hg   Tricuspid Valve: There is moderate regurgitation  The right ventricular systolic pressure is severely elevated at 77 mm Hg    Pulmonic Valve: There is mild regurgitation    No significant changes noted compared to the prior study       Incidental Findings:   · None    Test Results Pending at Discharge (will require follow up):   · As per After Visit Summary     Outpatient Tests Requested:  · BMP, CBC and digoxin level 7/20  · Chest x-ray    Complications:  Refer to hospital course and above listed diagnosis related plan, if any    Hospital Course: As per HPI  Richard Nunes is a 78 y o  female patient with history of COPD with chronic respiratory failure, CHF, atrial fibrillation who originally presented to the hospital on 7/11/2022 due to complaints of progressively worsening shortness of breath on exertion  In the emergency department patient was noted to have a moderate volume right-sided pleural effusion as well as pulmonary vascular congestion  Patient was given IV Lasix therapy and underwent evaluation with Interventional Radiology at which time she underwent thoracentesis  Patient is also noted to be in rapid atrial fibrillation    Patient currently denies any abdominal pain, lightheadedness, dizziness, palpitations, fevers, chills, change in urinary habits, change in bowel habits, recent travel or any known sick contacts  Patient was noted to have pleural effusion in AFib with RVR with evidence of CHF contributing to patient's symptoms and patient was subsequently admitted  Patient underwent thoracentesis  Also patient was continued on diuresis  Cardiac medications were adjusted with improvement in heart rate control  Patient was seen and followed by Cardiology  Patient was also seen by Pulmonary for evaluation for pleural effusion and recurrence  Patient's symptoms improved after thoracentesis and diuresis  Cardiac medications were adjusted with better heart rate control  Patient was offered cardioversion but patient declined  Patient respiratory status gradually improved to baseline, overall prognosis discussed with patient and family during hospitalization  Patient was advised subacute rehab which patient agreed  Patient currently remains clinically stable will be discharged to rehab for further evaluation and treatment  Discussed with seen at the time of discharge regarding current diagnosis treatment and overall prognosis  Son verbalized understanding regarding prognosis and reported they are looking forward to consideration of transition to hospice in future with patient's prognosis  Please see above list of diagnoses and related plan for additional information  Condition at Discharge: stable     Discharge Day Visit / Exam:     Subjective:  Reports her breathing is better  Denies any pain  Reports generalized weakness  Anxious at times    Vitals: Blood Pressure: 120/62 (07/15/22 0805)  Pulse: 86 (07/15/22 1145)  Temperature: (!) 97 °F (36 1 °C) (07/15/22 0805)  Temp Source: Oral (07/15/22 0805)  Respirations: 18 (07/15/22 0805)  Height: 5' 3" (160 cm) (07/12/22 1014)  Weight - Scale: 48 1 kg (106 lb) (07/15/22 0600)  SpO2: 98 % (07/15/22 0805)  Exam:   Physical Exam  Constitutional:       General: She is not in acute distress       Comments: Cachectic, frail   HENT:      Head: Normocephalic and atraumatic  Cardiovascular:      Rate and Rhythm: Normal rate  Pulmonary:      Effort: Pulmonary effort is normal  No respiratory distress  Breath sounds: Normal breath sounds  No wheezing or rales  Comments: Diminished bilaterally  Abdominal:      General: Bowel sounds are normal  There is no distension  Palpations: Abdomen is soft  Tenderness: There is no abdominal tenderness  There is no guarding or rebound  Musculoskeletal:      Right lower leg: No edema  Left lower leg: No edema  Skin:     General: Skin is warm and dry  Findings: No rash  Neurological:      Mental Status: She is alert  Mental status is at baseline  Discharge instructions/Information to patient and family:(Discharge Medications and Follow up):   See after visit summary for information provided to patient and family  Provisions for Follow-Up Care:  See after visit summary for information related to follow-up care and any pertinent home health orders  Disposition: Short-term rehab at [facility]    Planned Readmission:  No     Discharge Statement:  I spent 45 minutes discharging the patient  This time was spent on the day of discharge  I had direct contact with the patient on the day of discharge  Greater than 50% of the total time was spent examining patient, answering all patient questions, arranging and discussing plan of care with patient as well as directly providing post-discharge instructions  Additional time then spent on discharge activities  Coordinated with Cardiology and Pulmonary as above discharge  Discussed with patient's son over the phone  Discharge Medications:  See after visit summary for reconciled discharge medications provided to patient and family  ** Please Note: "This note has been constructed using a voice recognition system  Therefore there may be syntax, spelling, and/or grammatical errors   Please call if you have any questions  "**

## 2023-12-18 NOTE — ASSESSMENT & PLAN NOTE
Patient Name: Ansley Gibson  : 1939    MRN: [id number]                              Versus SIRS as evidenced by tachycardia, tachypnea, hypotension lactic acidosis  Initially suspected to be UTI but likely secondary to AFib with RVR  Improved after rate control  Blood culture, urine culture negative  Procalcitonin negative  · Will discontinue antibiotics correction  -AB               User Key  (hSerlyn Blanchard, PT GP 1    [id number] Sherlyn Blanchard, PT  12/18/2023

## 2024-04-16 NOTE — PLAN OF CARE
Problem: RESPIRATORY - ADULT  Goal: Achieves optimal ventilation and oxygenation  Description: INTERVENTIONS:  - Assess for changes in respiratory status  - Assess for changes in mentation and behavior  - Position to facilitate oxygenation and minimize respiratory effort  - Oxygen administered by appropriate delivery if ordered  - Initiate smoking cessation education as indicated  - Encourage broncho-pulmonary hygiene including cough, deep breathe, Incentive Spirometry  - Assess the need for suctioning and aspirate as needed  - Assess and instruct to report SOB or any respiratory difficulty  - Respiratory Therapy support as indicated  Outcome: Progressing     Problem: Potential for Falls  Goal: Patient will remain free of falls  Description: INTERVENTIONS:  - Educate patient/family on patient safety including physical limitations  - Instruct patient to call for assistance with activity   - Consult OT/PT to assist with strengthening/mobility   - Keep Call bell within reach  - Keep bed low and locked with side rails adjusted as appropriate  - Keep care items and personal belongings within reach  - Initiate and maintain comfort rounds  - Make Fall Risk Sign visible to staff  - Offer Toileting every 2 Hours, in advance of need  - Initiate/Maintain bed alarm  - Obtain necessary fall risk management equipment: call bell  - Apply yellow socks and bracelet for high fall risk patients  - Consider moving patient to room near nurses station  Outcome: Progressing 8

## 2024-11-25 NOTE — ASSESSMENT & PLAN NOTE
-Rib fracture protocol  -Multimodal pain management  -APS following; not a candidate for epidural  -Cont IS/pulm toilet  -DVT Ppx  -F/U repeat CXR today
-Rib fracture protocol  -Multimodal pain management  -APS following; not a candidate for epidural  -Cont IS/pulm toilet  -DVT Ppx  -F/U repeat CXR today
Cont  Home meds
Cont  Home meds
PT/OT
PT/OT  F/U Echo
no history of blood product transfusion